# Patient Record
Sex: MALE | Race: WHITE | NOT HISPANIC OR LATINO | Employment: OTHER | URBAN - METROPOLITAN AREA
[De-identification: names, ages, dates, MRNs, and addresses within clinical notes are randomized per-mention and may not be internally consistent; named-entity substitution may affect disease eponyms.]

---

## 2020-06-29 ENCOUNTER — APPOINTMENT (OUTPATIENT)
Dept: GENERAL RADIOLOGY | Facility: HOSPITAL | Age: 80
End: 2020-06-29

## 2020-06-29 ENCOUNTER — APPOINTMENT (OUTPATIENT)
Dept: CT IMAGING | Facility: HOSPITAL | Age: 80
End: 2020-06-29

## 2020-06-29 ENCOUNTER — HOSPITAL ENCOUNTER (INPATIENT)
Facility: HOSPITAL | Age: 80
LOS: 15 days | Discharge: NURSING, REHAB OR LONG-TERM CARE | End: 2020-07-14
Attending: EMERGENCY MEDICINE | Admitting: INTERNAL MEDICINE

## 2020-06-29 DIAGNOSIS — J96.01 ACUTE RESPIRATORY FAILURE WITH HYPOXIA (HCC): ICD-10-CM

## 2020-06-29 DIAGNOSIS — I47.20 VENTRICULAR TACHYCARDIA (HCC): ICD-10-CM

## 2020-06-29 DIAGNOSIS — R50.9 ACUTE FEBRILE ILLNESS: Primary | ICD-10-CM

## 2020-06-29 DIAGNOSIS — S51.011A SKIN TEAR OF RIGHT ELBOW WITHOUT COMPLICATION, INITIAL ENCOUNTER: ICD-10-CM

## 2020-06-29 DIAGNOSIS — R13.12 OROPHARYNGEAL DYSPHAGIA: ICD-10-CM

## 2020-06-29 PROBLEM — F03.90 DEMENTIA (HCC): Status: ACTIVE | Noted: 2020-06-29

## 2020-06-29 PROBLEM — K44.9 HIATAL HERNIA: Chronic | Status: ACTIVE | Noted: 2020-06-29

## 2020-06-29 PROBLEM — Z86.73 H/O: CVA (CEREBROVASCULAR ACCIDENT): Status: ACTIVE | Noted: 2020-06-29

## 2020-06-29 PROBLEM — J18.9 PNA (PNEUMONIA): Status: ACTIVE | Noted: 2020-06-29

## 2020-06-29 PROBLEM — S72.001A FRACTURE OF RIGHT HIP (HCC): Status: ACTIVE | Noted: 2020-06-29

## 2020-06-29 LAB
ALBUMIN SERPL-MCNC: 3.9 G/DL (ref 3.5–5.2)
ALBUMIN/GLOB SERPL: 1.1 G/DL
ALP SERPL-CCNC: 109 U/L (ref 39–117)
ALT SERPL W P-5'-P-CCNC: 12 U/L (ref 1–41)
ANION GAP SERPL CALCULATED.3IONS-SCNC: 12 MMOL/L (ref 5–15)
APTT PPP: 32.8 SECONDS (ref 24–37)
AST SERPL-CCNC: 19 U/L (ref 1–40)
B PARAPERT DNA SPEC QL NAA+PROBE: NOT DETECTED
B PERT DNA SPEC QL NAA+PROBE: NOT DETECTED
BASE EXCESS BLDA CALC-SCNC: 3 MMOL/L (ref -5–5)
BASOPHILS # BLD AUTO: 0.03 10*3/MM3 (ref 0–0.2)
BASOPHILS NFR BLD AUTO: 0.3 % (ref 0–1.5)
BILIRUB SERPL-MCNC: 0.9 MG/DL (ref 0.2–1.2)
BILIRUB UR QL STRIP: NEGATIVE
BUN SERPL-MCNC: 17 MG/DL (ref 8–23)
BUN/CREAT SERPL: 17.5 (ref 7–25)
C PNEUM DNA NPH QL NAA+NON-PROBE: NOT DETECTED
CA-I BLDA-SCNC: 1.23 MMOL/L (ref 1.2–1.32)
CALCIUM SPEC-SCNC: 9.2 MG/DL (ref 8.6–10.5)
CHLORIDE SERPL-SCNC: 100 MMOL/L (ref 98–107)
CLARITY UR: CLEAR
CO2 BLDA-SCNC: 29 MMOL/L (ref 24–29)
CO2 SERPL-SCNC: 26 MMOL/L (ref 22–29)
COLOR UR: YELLOW
CREAT BLDA-MCNC: 0.9 MG/DL (ref 0.6–1.3)
CREAT SERPL-MCNC: 0.97 MG/DL (ref 0.76–1.27)
D-LACTATE SERPL-SCNC: 1.8 MMOL/L (ref 0.5–2)
DEPRECATED RDW RBC AUTO: 42.1 FL (ref 37–54)
EOSINOPHIL # BLD AUTO: 0.03 10*3/MM3 (ref 0–0.4)
EOSINOPHIL NFR BLD AUTO: 0.3 % (ref 0.3–6.2)
ERYTHROCYTE [DISTWIDTH] IN BLOOD BY AUTOMATED COUNT: 14 % (ref 12.3–15.4)
FLUAV H1 2009 PAND RNA NPH QL NAA+PROBE: NOT DETECTED
FLUAV H1 HA GENE NPH QL NAA+PROBE: NOT DETECTED
FLUAV H3 RNA NPH QL NAA+PROBE: NOT DETECTED
FLUAV SUBTYP SPEC NAA+PROBE: NOT DETECTED
FLUBV RNA ISLT QL NAA+PROBE: NOT DETECTED
GFR SERPL CREATININE-BSD FRML MDRD: 74 ML/MIN/1.73
GLOBULIN UR ELPH-MCNC: 3.5 GM/DL
GLUCOSE BLDC GLUCOMTR-MCNC: 170 MG/DL (ref 70–130)
GLUCOSE SERPL-MCNC: 123 MG/DL (ref 65–99)
GLUCOSE UR STRIP-MCNC: NEGATIVE MG/DL
HADV DNA SPEC NAA+PROBE: NOT DETECTED
HCO3 BLDA-SCNC: 27.9 MMOL/L (ref 22–26)
HCOV 229E RNA SPEC QL NAA+PROBE: NOT DETECTED
HCOV HKU1 RNA SPEC QL NAA+PROBE: NOT DETECTED
HCOV NL63 RNA SPEC QL NAA+PROBE: NOT DETECTED
HCOV OC43 RNA SPEC QL NAA+PROBE: NOT DETECTED
HCT VFR BLD AUTO: 41.3 % (ref 37.5–51)
HCT VFR BLDA CALC: 41 % (ref 38–51)
HGB BLD-MCNC: 13.3 G/DL (ref 13–17.7)
HGB BLDA-MCNC: 13.9 G/DL (ref 12–17)
HGB UR QL STRIP.AUTO: NEGATIVE
HMPV RNA NPH QL NAA+NON-PROBE: NOT DETECTED
HOLD SPECIMEN: NORMAL
HOLD SPECIMEN: NORMAL
HPIV1 RNA SPEC QL NAA+PROBE: NOT DETECTED
HPIV2 RNA SPEC QL NAA+PROBE: NOT DETECTED
HPIV3 RNA NPH QL NAA+PROBE: NOT DETECTED
HPIV4 P GENE NPH QL NAA+PROBE: NOT DETECTED
IMM GRANULOCYTES # BLD AUTO: 0.06 10*3/MM3 (ref 0–0.05)
IMM GRANULOCYTES NFR BLD AUTO: 0.6 % (ref 0–0.5)
KETONES UR QL STRIP: NEGATIVE
LEUKOCYTE ESTERASE UR QL STRIP.AUTO: NEGATIVE
LYMPHOCYTES # BLD AUTO: 1.2 10*3/MM3 (ref 0.7–3.1)
LYMPHOCYTES NFR BLD AUTO: 11.8 % (ref 19.6–45.3)
M PNEUMO IGG SER IA-ACNC: NOT DETECTED
MAGNESIUM SERPL-MCNC: 1.7 MG/DL (ref 1.6–2.4)
MCH RBC QN AUTO: 26.8 PG (ref 26.6–33)
MCHC RBC AUTO-ENTMCNC: 32.2 G/DL (ref 31.5–35.7)
MCV RBC AUTO: 83.1 FL (ref 79–97)
MONOCYTES # BLD AUTO: 1.03 10*3/MM3 (ref 0.1–0.9)
MONOCYTES NFR BLD AUTO: 10.1 % (ref 5–12)
NEUTROPHILS NFR BLD AUTO: 7.83 10*3/MM3 (ref 1.7–7)
NEUTROPHILS NFR BLD AUTO: 76.9 % (ref 42.7–76)
NITRITE UR QL STRIP: NEGATIVE
NRBC BLD AUTO-RTO: 0 /100 WBC (ref 0–0.2)
PCO2 BLDA: 46.8 MM HG (ref 35–45)
PH BLDA: 7.38 PH UNITS (ref 7.35–7.6)
PH UR STRIP.AUTO: 8.5 [PH] (ref 5–8)
PLATELET # BLD AUTO: 227 10*3/MM3 (ref 140–450)
PMV BLD AUTO: 11.4 FL (ref 6–12)
PO2 BLDA: 17 MMHG (ref 80–105)
POTASSIUM BLDA-SCNC: 4.2 MMOL/L (ref 3.5–4.9)
POTASSIUM SERPL-SCNC: 4.4 MMOL/L (ref 3.5–5.2)
PROCALCITONIN SERPL-MCNC: 0.08 NG/ML (ref 0.1–0.25)
PROT SERPL-MCNC: 7.4 G/DL (ref 6–8.5)
PROT UR QL STRIP: ABNORMAL
RBC # BLD AUTO: 4.97 10*6/MM3 (ref 4.14–5.8)
RHINOVIRUS RNA SPEC NAA+PROBE: NOT DETECTED
RSV RNA NPH QL NAA+NON-PROBE: NOT DETECTED
SAO2 % BLDA: 23 % (ref 95–98)
SODIUM BLD-SCNC: 140 MMOL/L (ref 138–146)
SODIUM SERPL-SCNC: 138 MMOL/L (ref 136–145)
SP GR UR STRIP: 1.01 (ref 1–1.03)
TROPONIN T SERPL-MCNC: 0.03 NG/ML (ref 0–0.03)
UROBILINOGEN UR QL STRIP: ABNORMAL
WBC # BLD AUTO: 10.18 10*3/MM3 (ref 3.4–10.8)
WHOLE BLOOD HOLD SPECIMEN: NORMAL
WHOLE BLOOD HOLD SPECIMEN: NORMAL

## 2020-06-29 PROCEDURE — 70450 CT HEAD/BRAIN W/O DYE: CPT

## 2020-06-29 PROCEDURE — 82565 ASSAY OF CREATININE: CPT

## 2020-06-29 PROCEDURE — 84295 ASSAY OF SERUM SODIUM: CPT

## 2020-06-29 PROCEDURE — 70496 CT ANGIOGRAPHY HEAD: CPT

## 2020-06-29 PROCEDURE — 83735 ASSAY OF MAGNESIUM: CPT | Performed by: NURSE PRACTITIONER

## 2020-06-29 PROCEDURE — 87040 BLOOD CULTURE FOR BACTERIA: CPT | Performed by: EMERGENCY MEDICINE

## 2020-06-29 PROCEDURE — 82803 BLOOD GASES ANY COMBINATION: CPT

## 2020-06-29 PROCEDURE — 25010000002 TDAP 5-2.5-18.5 LF-MCG/0.5 SUSPENSION: Performed by: EMERGENCY MEDICINE

## 2020-06-29 PROCEDURE — 99285 EMERGENCY DEPT VISIT HI MDM: CPT

## 2020-06-29 PROCEDURE — 85730 THROMBOPLASTIN TIME PARTIAL: CPT | Performed by: EMERGENCY MEDICINE

## 2020-06-29 PROCEDURE — 85025 COMPLETE CBC W/AUTO DIFF WBC: CPT | Performed by: EMERGENCY MEDICINE

## 2020-06-29 PROCEDURE — 84484 ASSAY OF TROPONIN QUANT: CPT | Performed by: EMERGENCY MEDICINE

## 2020-06-29 PROCEDURE — 82330 ASSAY OF CALCIUM: CPT

## 2020-06-29 PROCEDURE — 71250 CT THORAX DX C-: CPT

## 2020-06-29 PROCEDURE — 0042T HC CT CEREBRAL PERFUSION W/WO CONTRAST: CPT

## 2020-06-29 PROCEDURE — 25010000002 PIPERACILLIN SOD-TAZOBACTAM PER 1 G: Performed by: EMERGENCY MEDICINE

## 2020-06-29 PROCEDURE — 93005 ELECTROCARDIOGRAM TRACING: CPT | Performed by: EMERGENCY MEDICINE

## 2020-06-29 PROCEDURE — 82947 ASSAY GLUCOSE BLOOD QUANT: CPT

## 2020-06-29 PROCEDURE — 87641 MR-STAPH DNA AMP PROBE: CPT | Performed by: NURSE PRACTITIONER

## 2020-06-29 PROCEDURE — 85014 HEMATOCRIT: CPT

## 2020-06-29 PROCEDURE — 81003 URINALYSIS AUTO W/O SCOPE: CPT | Performed by: EMERGENCY MEDICINE

## 2020-06-29 PROCEDURE — 80053 COMPREHEN METABOLIC PANEL: CPT | Performed by: EMERGENCY MEDICINE

## 2020-06-29 PROCEDURE — 73502 X-RAY EXAM HIP UNI 2-3 VIEWS: CPT

## 2020-06-29 PROCEDURE — 99223 1ST HOSP IP/OBS HIGH 75: CPT | Performed by: INTERNAL MEDICINE

## 2020-06-29 PROCEDURE — 25010000002 AMIODARONE IN DEXTROSE 5% 150-4.21 MG/100ML-% SOLUTION: Performed by: EMERGENCY MEDICINE

## 2020-06-29 PROCEDURE — 83605 ASSAY OF LACTIC ACID: CPT | Performed by: EMERGENCY MEDICINE

## 2020-06-29 PROCEDURE — 25010000002 ONDANSETRON PER 1 MG

## 2020-06-29 PROCEDURE — 93010 ELECTROCARDIOGRAM REPORT: CPT | Performed by: INTERNAL MEDICINE

## 2020-06-29 PROCEDURE — 70498 CT ANGIOGRAPHY NECK: CPT

## 2020-06-29 PROCEDURE — 90715 TDAP VACCINE 7 YRS/> IM: CPT | Performed by: EMERGENCY MEDICINE

## 2020-06-29 PROCEDURE — 99222 1ST HOSP IP/OBS MODERATE 55: CPT | Performed by: NURSE PRACTITIONER

## 2020-06-29 PROCEDURE — 84145 PROCALCITONIN (PCT): CPT | Performed by: NURSE PRACTITIONER

## 2020-06-29 PROCEDURE — 90471 IMMUNIZATION ADMIN: CPT | Performed by: EMERGENCY MEDICINE

## 2020-06-29 PROCEDURE — 25010000002 METOCLOPRAMIDE PER 10 MG: Performed by: EMERGENCY MEDICINE

## 2020-06-29 PROCEDURE — 71045 X-RAY EXAM CHEST 1 VIEW: CPT

## 2020-06-29 PROCEDURE — 25010000002 AMIODARONE IN DEXTROSE 5% 360-4.14 MG/200ML-% SOLUTION: Performed by: EMERGENCY MEDICINE

## 2020-06-29 PROCEDURE — 87635 SARS-COV-2 COVID-19 AMP PRB: CPT | Performed by: INTERNAL MEDICINE

## 2020-06-29 PROCEDURE — 0 IOPAMIDOL PER 1 ML: Performed by: EMERGENCY MEDICINE

## 2020-06-29 PROCEDURE — 93005 ELECTROCARDIOGRAM TRACING: CPT | Performed by: NURSE PRACTITIONER

## 2020-06-29 PROCEDURE — 0100U HC BIOFIRE FILMARRAY RESP PANEL 2: CPT | Performed by: INTERNAL MEDICINE

## 2020-06-29 PROCEDURE — 82962 GLUCOSE BLOOD TEST: CPT

## 2020-06-29 PROCEDURE — 84132 ASSAY OF SERUM POTASSIUM: CPT

## 2020-06-29 PROCEDURE — 25010000002 VANCOMYCIN 10 G RECONSTITUTED SOLUTION: Performed by: EMERGENCY MEDICINE

## 2020-06-29 RX ORDER — METOCLOPRAMIDE HYDROCHLORIDE 5 MG/ML
10 INJECTION INTRAMUSCULAR; INTRAVENOUS ONCE
Status: COMPLETED | OUTPATIENT
Start: 2020-06-29 | End: 2020-06-29

## 2020-06-29 RX ORDER — ACETAMINOPHEN 325 MG/1
650 TABLET ORAL EVERY 4 HOURS PRN
Status: DISCONTINUED | OUTPATIENT
Start: 2020-06-29 | End: 2020-07-14 | Stop reason: HOSPADM

## 2020-06-29 RX ORDER — SODIUM CHLORIDE 0.9 % (FLUSH) 0.9 %
10 SYRINGE (ML) INJECTION AS NEEDED
Status: DISCONTINUED | OUTPATIENT
Start: 2020-06-29 | End: 2020-07-01

## 2020-06-29 RX ORDER — ONDANSETRON 2 MG/ML
4 INJECTION INTRAMUSCULAR; INTRAVENOUS ONCE
Status: COMPLETED | OUTPATIENT
Start: 2020-06-29 | End: 2020-06-29

## 2020-06-29 RX ORDER — SODIUM CHLORIDE 0.9 % (FLUSH) 0.9 %
10 SYRINGE (ML) INJECTION AS NEEDED
Status: DISCONTINUED | OUTPATIENT
Start: 2020-06-29 | End: 2020-07-14 | Stop reason: HOSPADM

## 2020-06-29 RX ORDER — ACETAMINOPHEN 650 MG/1
650 SUPPOSITORY RECTAL EVERY 4 HOURS PRN
Status: DISCONTINUED | OUTPATIENT
Start: 2020-06-29 | End: 2020-06-30

## 2020-06-29 RX ORDER — VANCOMYCIN HYDROCHLORIDE 1 G/200ML
15 INJECTION, SOLUTION INTRAVENOUS EVERY 12 HOURS
Status: DISCONTINUED | OUTPATIENT
Start: 2020-06-30 | End: 2020-07-02

## 2020-06-29 RX ORDER — SODIUM CHLORIDE, SODIUM LACTATE, POTASSIUM CHLORIDE, CALCIUM CHLORIDE 600; 310; 30; 20 MG/100ML; MG/100ML; MG/100ML; MG/100ML
75 INJECTION, SOLUTION INTRAVENOUS CONTINUOUS
Status: DISCONTINUED | OUTPATIENT
Start: 2020-06-29 | End: 2020-07-12

## 2020-06-29 RX ORDER — DONEPEZIL HYDROCHLORIDE 10 MG/1
10 TABLET, FILM COATED ORAL DAILY
COMMUNITY

## 2020-06-29 RX ORDER — ACETAMINOPHEN 650 MG/1
650 SUPPOSITORY RECTAL ONCE
Status: COMPLETED | OUTPATIENT
Start: 2020-06-29 | End: 2020-06-29

## 2020-06-29 RX ORDER — OMEPRAZOLE 20 MG/1
20 CAPSULE, DELAYED RELEASE ORAL DAILY
COMMUNITY
End: 2020-07-14 | Stop reason: HOSPADM

## 2020-06-29 RX ORDER — AMLODIPINE BESYLATE AND BENAZEPRIL HYDROCHLORIDE 10; 40 MG/1; MG/1
1 CAPSULE ORAL DAILY
COMMUNITY
End: 2020-07-14 | Stop reason: HOSPADM

## 2020-06-29 RX ORDER — SODIUM CHLORIDE 0.9 % (FLUSH) 0.9 %
10 SYRINGE (ML) INJECTION EVERY 12 HOURS SCHEDULED
Status: DISCONTINUED | OUTPATIENT
Start: 2020-06-29 | End: 2020-07-01

## 2020-06-29 RX ORDER — PROMETHAZINE HYDROCHLORIDE 25 MG/ML
12.5 INJECTION, SOLUTION INTRAMUSCULAR; INTRAVENOUS EVERY 6 HOURS PRN
Status: DISCONTINUED | OUTPATIENT
Start: 2020-06-29 | End: 2020-06-30

## 2020-06-29 RX ADMIN — METOCLOPRAMIDE 10 MG: 5 INJECTION, SOLUTION INTRAMUSCULAR; INTRAVENOUS at 19:52

## 2020-06-29 RX ADMIN — ACETAMINOPHEN 650 MG: 650 SUPPOSITORY RECTAL at 19:33

## 2020-06-29 RX ADMIN — ONDANSETRON 4 MG: 2 INJECTION INTRAMUSCULAR; INTRAVENOUS at 18:20

## 2020-06-29 RX ADMIN — SODIUM CHLORIDE, POTASSIUM CHLORIDE, SODIUM LACTATE AND CALCIUM CHLORIDE 75 ML/HR: 600; 310; 30; 20 INJECTION, SOLUTION INTRAVENOUS at 22:07

## 2020-06-29 RX ADMIN — VANCOMYCIN HYDROCHLORIDE 1500 MG: 10 INJECTION, POWDER, LYOPHILIZED, FOR SOLUTION INTRAVENOUS at 20:19

## 2020-06-29 RX ADMIN — AMIODARONE HYDROCHLORIDE 1 MG/MIN: 1.8 INJECTION, SOLUTION INTRAVENOUS at 19:26

## 2020-06-29 RX ADMIN — IOPAMIDOL 115 ML: 755 INJECTION, SOLUTION INTRAVENOUS at 18:52

## 2020-06-29 RX ADMIN — TETANUS TOXOID, REDUCED DIPHTHERIA TOXOID AND ACELLULAR PERTUSSIS VACCINE, ADSORBED 0.5 ML: 5; 2.5; 8; 8; 2.5 SUSPENSION INTRAMUSCULAR at 20:02

## 2020-06-29 RX ADMIN — AMIODARONE HYDROCHLORIDE 150 MG: 1.5 INJECTION, SOLUTION INTRAVENOUS at 19:14

## 2020-06-29 RX ADMIN — TAZOBACTAM SODIUM AND PIPERACILLIN SODIUM 3.38 G: 375; 3 INJECTION, SOLUTION INTRAVENOUS at 20:01

## 2020-06-29 RX ADMIN — SODIUM CHLORIDE, PRESERVATIVE FREE 10 ML: 5 INJECTION INTRAVENOUS at 22:08

## 2020-06-30 ENCOUNTER — ANESTHESIA EVENT (OUTPATIENT)
Dept: PERIOP | Facility: HOSPITAL | Age: 80
End: 2020-06-30

## 2020-06-30 ENCOUNTER — PREP FOR SURGERY (OUTPATIENT)
Dept: OTHER | Facility: HOSPITAL | Age: 80
End: 2020-06-30

## 2020-06-30 ENCOUNTER — APPOINTMENT (OUTPATIENT)
Dept: CT IMAGING | Facility: HOSPITAL | Age: 80
End: 2020-06-30

## 2020-06-30 ENCOUNTER — APPOINTMENT (OUTPATIENT)
Dept: CARDIOLOGY | Facility: HOSPITAL | Age: 80
End: 2020-06-30

## 2020-06-30 LAB
ANION GAP SERPL CALCULATED.3IONS-SCNC: 10 MMOL/L (ref 5–15)
BASOPHILS # BLD AUTO: 0.07 10*3/MM3 (ref 0–0.2)
BASOPHILS NFR BLD AUTO: 0.6 % (ref 0–1.5)
BH CV ECHO MEAS - AO MAX PG (FULL): 3.7 MMHG
BH CV ECHO MEAS - AO MAX PG: 6 MMHG
BH CV ECHO MEAS - AO MEAN PG (FULL): 2 MMHG
BH CV ECHO MEAS - AO MEAN PG: 2.9 MMHG
BH CV ECHO MEAS - AO ROOT AREA (BSA CORRECTED): 2
BH CV ECHO MEAS - AO ROOT AREA: 10.1 CM^2
BH CV ECHO MEAS - AO ROOT DIAM: 3.6 CM
BH CV ECHO MEAS - AO V2 MAX: 122.2 CM/SEC
BH CV ECHO MEAS - AO V2 MEAN: 78.3 CM/SEC
BH CV ECHO MEAS - AO V2 VTI: 24.8 CM
BH CV ECHO MEAS - AVA(I,A): 2.1 CM^2
BH CV ECHO MEAS - AVA(I,D): 2.1 CM^2
BH CV ECHO MEAS - AVA(V,A): 2.6 CM^2
BH CV ECHO MEAS - AVA(V,D): 2.6 CM^2
BH CV ECHO MEAS - BSA(HAYCOCK): 1.7 M^2
BH CV ECHO MEAS - BSA: 1.8 M^2
BH CV ECHO MEAS - BZI_BMI: 19.4 KILOGRAMS/M^2
BH CV ECHO MEAS - BZI_METRIC_HEIGHT: 177.8 CM
BH CV ECHO MEAS - BZI_METRIC_WEIGHT: 61.2 KG
BH CV ECHO MEAS - EDV(CUBED): 56.6 ML
BH CV ECHO MEAS - EDV(MOD-SP2): 50 ML
BH CV ECHO MEAS - EDV(MOD-SP4): 47 ML
BH CV ECHO MEAS - EDV(TEICH): 63.5 ML
BH CV ECHO MEAS - EF(CUBED): 73.5 %
BH CV ECHO MEAS - EF(MOD-SP2): 62 %
BH CV ECHO MEAS - EF(MOD-SP4): 68.1 %
BH CV ECHO MEAS - EF(TEICH): 66 %
BH CV ECHO MEAS - EF{MOD-BP}: 65 %
BH CV ECHO MEAS - ESV(CUBED): 15 ML
BH CV ECHO MEAS - ESV(MOD-SP2): 19 ML
BH CV ECHO MEAS - ESV(MOD-SP4): 15 ML
BH CV ECHO MEAS - ESV(TEICH): 21.6 ML
BH CV ECHO MEAS - FS: 35.7 %
BH CV ECHO MEAS - IVS/LVPW: 1
BH CV ECHO MEAS - IVSD: 1 CM
BH CV ECHO MEAS - LA DIMENSION: 3.2 CM
BH CV ECHO MEAS - LA/AO: 0.89
BH CV ECHO MEAS - LAD MAJOR: 4.2 CM
BH CV ECHO MEAS - LAT PEAK E' VEL: 5.3 CM/SEC
BH CV ECHO MEAS - LATERAL E/E' RATIO: 10.1
BH CV ECHO MEAS - LV DIASTOLIC VOL/BSA (35-75): 26.6 ML/M^2
BH CV ECHO MEAS - LV MASS(C)D: 123.1 GRAMS
BH CV ECHO MEAS - LV MASS(C)DI: 69.7 GRAMS/M^2
BH CV ECHO MEAS - LV MAX PG: 2.3 MMHG
BH CV ECHO MEAS - LV MEAN PG: 0.93 MMHG
BH CV ECHO MEAS - LV SYSTOLIC VOL/BSA (12-30): 8.5 ML/M^2
BH CV ECHO MEAS - LV V1 MAX: 75.6 CM/SEC
BH CV ECHO MEAS - LV V1 MEAN: 43.3 CM/SEC
BH CV ECHO MEAS - LV V1 VTI: 12.4 CM
BH CV ECHO MEAS - LVIDD: 3.8 CM
BH CV ECHO MEAS - LVIDS: 2.5 CM
BH CV ECHO MEAS - LVLD AP2: 6.6 CM
BH CV ECHO MEAS - LVLD AP4: 6.9 CM
BH CV ECHO MEAS - LVLS AP2: 5.5 CM
BH CV ECHO MEAS - LVLS AP4: 5.6 CM
BH CV ECHO MEAS - LVOT AREA (M): 4.2 CM^2
BH CV ECHO MEAS - LVOT AREA: 4.2 CM^2
BH CV ECHO MEAS - LVOT DIAM: 2.3 CM
BH CV ECHO MEAS - LVPWD: 1 CM
BH CV ECHO MEAS - MED PEAK E' VEL: 5.4 CM/SEC
BH CV ECHO MEAS - MEDIAL E/E' RATIO: 9.9
BH CV ECHO MEAS - MV A MAX VEL: 79.4 CM/SEC
BH CV ECHO MEAS - MV DEC TIME: 0.25 SEC
BH CV ECHO MEAS - MV E MAX VEL: 54.8 CM/SEC
BH CV ECHO MEAS - MV E/A: 0.69
BH CV ECHO MEAS - MV MAX PG: 2.4 MMHG
BH CV ECHO MEAS - MV MEAN PG: 0.93 MMHG
BH CV ECHO MEAS - MV V2 MAX: 77.5 CM/SEC
BH CV ECHO MEAS - MV V2 MEAN: 44.5 CM/SEC
BH CV ECHO MEAS - MV V2 VTI: 22.7 CM
BH CV ECHO MEAS - MVA(VTI): 2.3 CM^2
BH CV ECHO MEAS - PA ACC SLOPE: 555.9 CM/SEC^2
BH CV ECHO MEAS - PA ACC TIME: 0.1 SEC
BH CV ECHO MEAS - PA MAX PG: 1.6 MMHG
BH CV ECHO MEAS - PA PR(ACCEL): 33.1 MMHG
BH CV ECHO MEAS - PA V2 MAX: 63.7 CM/SEC
BH CV ECHO MEAS - RVSP: 34 MMHG
BH CV ECHO MEAS - SI(AO): 142.3 ML/M^2
BH CV ECHO MEAS - SI(CUBED): 23.5 ML/M^2
BH CV ECHO MEAS - SI(LVOT): 29.3 ML/M^2
BH CV ECHO MEAS - SI(MOD-SP2): 17.6 ML/M^2
BH CV ECHO MEAS - SI(MOD-SP4): 18.1 ML/M^2
BH CV ECHO MEAS - SI(TEICH): 23.7 ML/M^2
BH CV ECHO MEAS - SV(AO): 251.3 ML
BH CV ECHO MEAS - SV(CUBED): 41.6 ML
BH CV ECHO MEAS - SV(LVOT): 51.7 ML
BH CV ECHO MEAS - SV(MOD-SP2): 31 ML
BH CV ECHO MEAS - SV(MOD-SP4): 32 ML
BH CV ECHO MEAS - SV(TEICH): 41.9 ML
BH CV ECHO MEAS - TAPSE (>1.6): 2 CM2
BH CV ECHO MEAS - TR MAX PG: 31 MMHG
BH CV ECHO MEAS - TR MAX VEL: 274.2 CM/SEC
BH CV ECHO MEASUREMENTS AVERAGE E/E' RATIO: 10.24
BH CV VAS BP RIGHT ARM: NORMAL MMHG
BH CV XLRA - RV BASE: 3.3 CM
BH CV XLRA - RV LENGTH: 5.6 CM
BH CV XLRA - RV MID: 2.1 CM
BH CV XLRA - TDI S': 9.21 CM/SEC
BUN SERPL-MCNC: 18 MG/DL (ref 8–23)
BUN/CREAT SERPL: 19.6 (ref 7–25)
CALCIUM SPEC-SCNC: 8.8 MG/DL (ref 8.6–10.5)
CHLORIDE SERPL-SCNC: 99 MMOL/L (ref 98–107)
CHOLEST SERPL-MCNC: 154 MG/DL (ref 0–200)
CO2 SERPL-SCNC: 25 MMOL/L (ref 22–29)
CREAT SERPL-MCNC: 0.92 MG/DL (ref 0.76–1.27)
DEPRECATED RDW RBC AUTO: 48.6 FL (ref 37–54)
EOSINOPHIL # BLD AUTO: 0.18 10*3/MM3 (ref 0–0.4)
EOSINOPHIL NFR BLD AUTO: 1.6 % (ref 0.3–6.2)
ERYTHROCYTE [DISTWIDTH] IN BLOOD BY AUTOMATED COUNT: 14.8 % (ref 12.3–15.4)
GFR SERPL CREATININE-BSD FRML MDRD: 79 ML/MIN/1.73
GLUCOSE BLDC GLUCOMTR-MCNC: 100 MG/DL (ref 70–130)
GLUCOSE BLDC GLUCOMTR-MCNC: 104 MG/DL (ref 70–130)
GLUCOSE BLDC GLUCOMTR-MCNC: 97 MG/DL (ref 70–130)
GLUCOSE SERPL-MCNC: 111 MG/DL (ref 65–99)
HBA1C MFR BLD: 5.6 % (ref 4.8–5.6)
HCT VFR BLD AUTO: 42.2 % (ref 37.5–51)
HDLC SERPL-MCNC: 46 MG/DL (ref 40–60)
HGB BLD-MCNC: 13 G/DL (ref 13–17.7)
IMM GRANULOCYTES # BLD AUTO: 0.1 10*3/MM3 (ref 0–0.05)
IMM GRANULOCYTES NFR BLD AUTO: 0.9 % (ref 0–0.5)
LDLC SERPL CALC-MCNC: 94 MG/DL (ref 0–100)
LDLC/HDLC SERPL: 2.05 {RATIO}
LEFT ATRIUM VOLUME INDEX: 14.2 ML/M^2
LEFT ATRIUM VOLUME: 25 ML
LV EF 2D ECHO EST: 55 %
LYMPHOCYTES # BLD AUTO: 1.49 10*3/MM3 (ref 0.7–3.1)
LYMPHOCYTES NFR BLD AUTO: 13.3 % (ref 19.6–45.3)
MAGNESIUM SERPL-MCNC: 2.3 MG/DL (ref 1.6–2.4)
MAXIMAL PREDICTED HEART RATE: 139 BPM
MCH RBC QN AUTO: 27.4 PG (ref 26.6–33)
MCHC RBC AUTO-ENTMCNC: 30.8 G/DL (ref 31.5–35.7)
MCV RBC AUTO: 89 FL (ref 79–97)
MONOCYTES # BLD AUTO: 1.59 10*3/MM3 (ref 0.1–0.9)
MONOCYTES NFR BLD AUTO: 14.2 % (ref 5–12)
MRSA DNA SPEC QL NAA+PROBE: NEGATIVE
NEUTROPHILS NFR BLD AUTO: 69.4 % (ref 42.7–76)
NEUTROPHILS NFR BLD AUTO: 7.78 10*3/MM3 (ref 1.7–7)
NRBC BLD AUTO-RTO: 0 /100 WBC (ref 0–0.2)
PHOSPHATE SERPL-MCNC: 4 MG/DL (ref 2.5–4.5)
PLATELET # BLD AUTO: 174 10*3/MM3 (ref 140–450)
PMV BLD AUTO: 11 FL (ref 6–12)
POTASSIUM SERPL-SCNC: 4.1 MMOL/L (ref 3.5–5.2)
PROCALCITONIN SERPL-MCNC: 1.65 NG/ML (ref 0.1–0.25)
RBC # BLD AUTO: 4.74 10*6/MM3 (ref 4.14–5.8)
SARS-COV-2 N GENE NPH QL NAA+PROBE: NOT DETECTED
SODIUM SERPL-SCNC: 134 MMOL/L (ref 136–145)
STRESS TARGET HR: 118 BPM
TRIGL SERPL-MCNC: 69 MG/DL (ref 0–150)
TROPONIN T SERPL-MCNC: 0.02 NG/ML (ref 0–0.03)
TROPONIN T SERPL-MCNC: 0.03 NG/ML (ref 0–0.03)
TSH SERPL DL<=0.05 MIU/L-ACNC: 2.16 UIU/ML (ref 0.27–4.2)
VLDLC SERPL-MCNC: 13.8 MG/DL
WBC # BLD AUTO: 11.21 10*3/MM3 (ref 3.4–10.8)

## 2020-06-30 PROCEDURE — 84484 ASSAY OF TROPONIN QUANT: CPT | Performed by: NURSE PRACTITIONER

## 2020-06-30 PROCEDURE — 25010000002 PIPERACILLIN SOD-TAZOBACTAM PER 1 G: Performed by: NURSE PRACTITIONER

## 2020-06-30 PROCEDURE — 93306 TTE W/DOPPLER COMPLETE: CPT | Performed by: INTERNAL MEDICINE

## 2020-06-30 PROCEDURE — 80048 BASIC METABOLIC PNL TOTAL CA: CPT | Performed by: NURSE PRACTITIONER

## 2020-06-30 PROCEDURE — 74176 CT ABD & PELVIS W/O CONTRAST: CPT

## 2020-06-30 PROCEDURE — 82962 GLUCOSE BLOOD TEST: CPT

## 2020-06-30 PROCEDURE — 84100 ASSAY OF PHOSPHORUS: CPT | Performed by: NURSE PRACTITIONER

## 2020-06-30 PROCEDURE — 83735 ASSAY OF MAGNESIUM: CPT | Performed by: NURSE PRACTITIONER

## 2020-06-30 PROCEDURE — 92610 EVALUATE SWALLOWING FUNCTION: CPT

## 2020-06-30 PROCEDURE — 99222 1ST HOSP IP/OBS MODERATE 55: CPT | Performed by: ORTHOPAEDIC SURGERY

## 2020-06-30 PROCEDURE — 84145 PROCALCITONIN (PCT): CPT | Performed by: INTERNAL MEDICINE

## 2020-06-30 PROCEDURE — 93306 TTE W/DOPPLER COMPLETE: CPT

## 2020-06-30 PROCEDURE — 84443 ASSAY THYROID STIM HORMONE: CPT | Performed by: NURSE PRACTITIONER

## 2020-06-30 PROCEDURE — 85025 COMPLETE CBC W/AUTO DIFF WBC: CPT | Performed by: NURSE PRACTITIONER

## 2020-06-30 PROCEDURE — 25010000002 VANCOMYCIN PER 500 MG

## 2020-06-30 PROCEDURE — 92523 SPEECH SOUND LANG COMPREHEN: CPT

## 2020-06-30 PROCEDURE — 83036 HEMOGLOBIN GLYCOSYLATED A1C: CPT | Performed by: NURSE PRACTITIONER

## 2020-06-30 PROCEDURE — 99232 SBSQ HOSP IP/OBS MODERATE 35: CPT | Performed by: INTERNAL MEDICINE

## 2020-06-30 PROCEDURE — 80061 LIPID PANEL: CPT | Performed by: NURSE PRACTITIONER

## 2020-06-30 RX ORDER — ATORVASTATIN CALCIUM 40 MG/1
80 TABLET, FILM COATED ORAL NIGHTLY
Status: DISCONTINUED | OUTPATIENT
Start: 2020-06-30 | End: 2020-07-14 | Stop reason: HOSPADM

## 2020-06-30 RX ORDER — ASPIRIN 300 MG/1
300 SUPPOSITORY RECTAL DAILY
Status: DISCONTINUED | OUTPATIENT
Start: 2020-06-30 | End: 2020-06-30

## 2020-06-30 RX ORDER — ASPIRIN 325 MG
325 TABLET ORAL DAILY
Status: DISCONTINUED | OUTPATIENT
Start: 2020-06-30 | End: 2020-06-30

## 2020-06-30 RX ORDER — SODIUM CHLORIDE 0.9 % (FLUSH) 0.9 %
10 SYRINGE (ML) INJECTION EVERY 12 HOURS SCHEDULED
Status: DISCONTINUED | OUTPATIENT
Start: 2020-06-30 | End: 2020-07-13

## 2020-06-30 RX ORDER — SODIUM CHLORIDE 0.9 % (FLUSH) 0.9 %
10 SYRINGE (ML) INJECTION AS NEEDED
Status: DISCONTINUED | OUTPATIENT
Start: 2020-06-30 | End: 2020-07-01

## 2020-06-30 RX ORDER — ASPIRIN 300 MG/1
300 SUPPOSITORY RECTAL DAILY
Status: DISCONTINUED | OUTPATIENT
Start: 2020-07-02 | End: 2020-07-01

## 2020-06-30 RX ORDER — ASPIRIN 325 MG
325 TABLET ORAL DAILY
Status: DISCONTINUED | OUTPATIENT
Start: 2020-07-02 | End: 2020-07-01

## 2020-06-30 RX ADMIN — TAZOBACTAM SODIUM AND PIPERACILLIN SODIUM 3.38 G: 375; 3 INJECTION, SOLUTION INTRAVENOUS at 02:53

## 2020-06-30 RX ADMIN — VANCOMYCIN HYDROCHLORIDE 1000 MG: 1 INJECTION, SOLUTION INTRAVENOUS at 08:09

## 2020-06-30 RX ADMIN — SODIUM CHLORIDE, PRESERVATIVE FREE 10 ML: 5 INJECTION INTRAVENOUS at 20:43

## 2020-06-30 RX ADMIN — ATORVASTATIN CALCIUM 80 MG: 40 TABLET, FILM COATED ORAL at 20:43

## 2020-06-30 RX ADMIN — SODIUM CHLORIDE, PRESERVATIVE FREE 10 ML: 5 INJECTION INTRAVENOUS at 08:10

## 2020-06-30 RX ADMIN — TAZOBACTAM SODIUM AND PIPERACILLIN SODIUM 3.38 G: 375; 3 INJECTION, SOLUTION INTRAVENOUS at 18:27

## 2020-06-30 RX ADMIN — ASPIRIN 300 MG: 300 SUPPOSITORY RECTAL at 05:35

## 2020-06-30 RX ADMIN — TAZOBACTAM SODIUM AND PIPERACILLIN SODIUM 3.38 G: 375; 3 INJECTION, SOLUTION INTRAVENOUS at 11:33

## 2020-06-30 RX ADMIN — VANCOMYCIN HYDROCHLORIDE 1000 MG: 1 INJECTION, SOLUTION INTRAVENOUS at 20:43

## 2020-06-30 RX ADMIN — SODIUM CHLORIDE, POTASSIUM CHLORIDE, SODIUM LACTATE AND CALCIUM CHLORIDE 75 ML/HR: 600; 310; 30; 20 INJECTION, SOLUTION INTRAVENOUS at 11:33

## 2020-07-01 ENCOUNTER — APPOINTMENT (OUTPATIENT)
Dept: GENERAL RADIOLOGY | Facility: HOSPITAL | Age: 80
End: 2020-07-01

## 2020-07-01 ENCOUNTER — ANESTHESIA (OUTPATIENT)
Dept: PERIOP | Facility: HOSPITAL | Age: 80
End: 2020-07-01

## 2020-07-01 LAB
ANION GAP SERPL CALCULATED.3IONS-SCNC: 11 MMOL/L (ref 5–15)
BASOPHILS # BLD AUTO: 0.04 10*3/MM3 (ref 0–0.2)
BASOPHILS NFR BLD AUTO: 0.5 % (ref 0–1.5)
BUN SERPL-MCNC: 17 MG/DL (ref 8–23)
BUN/CREAT SERPL: 16.8 (ref 7–25)
CALCIUM SPEC-SCNC: 8.1 MG/DL (ref 8.6–10.5)
CHLORIDE SERPL-SCNC: 100 MMOL/L (ref 98–107)
CO2 SERPL-SCNC: 24 MMOL/L (ref 22–29)
CREAT SERPL-MCNC: 1.01 MG/DL (ref 0.76–1.27)
DEPRECATED RDW RBC AUTO: 42.1 FL (ref 37–54)
EOSINOPHIL # BLD AUTO: 0.37 10*3/MM3 (ref 0–0.4)
EOSINOPHIL NFR BLD AUTO: 4.2 % (ref 0.3–6.2)
ERYTHROCYTE [DISTWIDTH] IN BLOOD BY AUTOMATED COUNT: 14.2 % (ref 12.3–15.4)
GFR SERPL CREATININE-BSD FRML MDRD: 71 ML/MIN/1.73
GLUCOSE SERPL-MCNC: 97 MG/DL (ref 65–99)
HCT VFR BLD AUTO: 34 % (ref 37.5–51)
HGB BLD-MCNC: 10.9 G/DL (ref 13–17.7)
IMM GRANULOCYTES # BLD AUTO: 0.04 10*3/MM3 (ref 0–0.05)
IMM GRANULOCYTES NFR BLD AUTO: 0.5 % (ref 0–0.5)
LYMPHOCYTES # BLD AUTO: 1.04 10*3/MM3 (ref 0.7–3.1)
LYMPHOCYTES NFR BLD AUTO: 11.9 % (ref 19.6–45.3)
MAGNESIUM SERPL-MCNC: 1.8 MG/DL (ref 1.6–2.4)
MCH RBC QN AUTO: 26.5 PG (ref 26.6–33)
MCHC RBC AUTO-ENTMCNC: 32.1 G/DL (ref 31.5–35.7)
MCV RBC AUTO: 82.5 FL (ref 79–97)
MONOCYTES # BLD AUTO: 1.17 10*3/MM3 (ref 0.1–0.9)
MONOCYTES NFR BLD AUTO: 13.4 % (ref 5–12)
NEUTROPHILS NFR BLD AUTO: 6.06 10*3/MM3 (ref 1.7–7)
NEUTROPHILS NFR BLD AUTO: 69.5 % (ref 42.7–76)
NRBC BLD AUTO-RTO: 0 /100 WBC (ref 0–0.2)
NT-PROBNP SERPL-MCNC: 1196 PG/ML (ref 5–1800)
PHOSPHATE SERPL-MCNC: 2.6 MG/DL (ref 2.5–4.5)
PLATELET # BLD AUTO: 170 10*3/MM3 (ref 140–450)
PMV BLD AUTO: 11.9 FL (ref 6–12)
POTASSIUM SERPL-SCNC: 3.6 MMOL/L (ref 3.5–5.2)
PROCALCITONIN SERPL-MCNC: 1.6 NG/ML (ref 0.1–0.25)
RBC # BLD AUTO: 4.12 10*6/MM3 (ref 4.14–5.8)
SODIUM SERPL-SCNC: 135 MMOL/L (ref 136–145)
VANCOMYCIN TROUGH SERPL-MCNC: 15.4 MCG/ML (ref 5–20)
WBC # BLD AUTO: 8.72 10*3/MM3 (ref 3.4–10.8)

## 2020-07-01 PROCEDURE — 84100 ASSAY OF PHOSPHORUS: CPT | Performed by: INTERNAL MEDICINE

## 2020-07-01 PROCEDURE — 25010000002 ROPIVACAINE PER 1 MG: Performed by: NURSE ANESTHETIST, CERTIFIED REGISTERED

## 2020-07-01 PROCEDURE — C1713 ANCHOR/SCREW BN/BN,TIS/BN: HCPCS | Performed by: ORTHOPAEDIC SURGERY

## 2020-07-01 PROCEDURE — 84145 PROCALCITONIN (PCT): CPT | Performed by: INTERNAL MEDICINE

## 2020-07-01 PROCEDURE — 73552 X-RAY EXAM OF FEMUR 2/>: CPT

## 2020-07-01 PROCEDURE — 25010000003 MAGNESIUM SULFATE 4 GM/100ML SOLUTION: Performed by: INTERNAL MEDICINE

## 2020-07-01 PROCEDURE — 99232 SBSQ HOSP IP/OBS MODERATE 35: CPT | Performed by: INTERNAL MEDICINE

## 2020-07-01 PROCEDURE — C1769 GUIDE WIRE: HCPCS | Performed by: ORTHOPAEDIC SURGERY

## 2020-07-01 PROCEDURE — 83735 ASSAY OF MAGNESIUM: CPT | Performed by: INTERNAL MEDICINE

## 2020-07-01 PROCEDURE — 25010000002 ONDANSETRON PER 1 MG: Performed by: ANESTHESIOLOGY

## 2020-07-01 PROCEDURE — 27235 TREAT THIGH FRACTURE: CPT | Performed by: ORTHOPAEDIC SURGERY

## 2020-07-01 PROCEDURE — 0QS634Z REPOSITION RIGHT UPPER FEMUR WITH INTERNAL FIXATION DEVICE, PERCUTANEOUS APPROACH: ICD-10-PCS | Performed by: ORTHOPAEDIC SURGERY

## 2020-07-01 PROCEDURE — 25010000002 VANCOMYCIN PER 500 MG

## 2020-07-01 PROCEDURE — 83880 ASSAY OF NATRIURETIC PEPTIDE: CPT | Performed by: INTERNAL MEDICINE

## 2020-07-01 PROCEDURE — 73502 X-RAY EXAM HIP UNI 2-3 VIEWS: CPT

## 2020-07-01 PROCEDURE — 25010000003 CEFAZOLIN IN DEXTROSE 2-4 GM/100ML-% SOLUTION: Performed by: ANESTHESIOLOGY

## 2020-07-01 PROCEDURE — 25010000002 PIPERACILLIN SOD-TAZOBACTAM PER 1 G: Performed by: NURSE PRACTITIONER

## 2020-07-01 PROCEDURE — 25010000002 PROPOFOL 10 MG/ML EMULSION: Performed by: ANESTHESIOLOGY

## 2020-07-01 PROCEDURE — 76000 FLUOROSCOPY <1 HR PHYS/QHP: CPT

## 2020-07-01 PROCEDURE — 80048 BASIC METABOLIC PNL TOTAL CA: CPT | Performed by: INTERNAL MEDICINE

## 2020-07-01 PROCEDURE — 80202 ASSAY OF VANCOMYCIN: CPT

## 2020-07-01 PROCEDURE — 85025 COMPLETE CBC W/AUTO DIFF WBC: CPT | Performed by: INTERNAL MEDICINE

## 2020-07-01 PROCEDURE — 25010000003 POTASSIUM CHLORIDE 10 MEQ/100ML SOLUTION: Performed by: INTERNAL MEDICINE

## 2020-07-01 DEVICE — SCRW CANN THRD 7.3X16X80MM: Type: IMPLANTABLE DEVICE | Site: HIP | Status: FUNCTIONAL

## 2020-07-01 DEVICE — WASHR BONE 13MM: Type: IMPLANTABLE DEVICE | Site: HIP | Status: FUNCTIONAL

## 2020-07-01 DEVICE — IMPLANTABLE DEVICE: Type: IMPLANTABLE DEVICE | Site: HIP | Status: FUNCTIONAL

## 2020-07-01 RX ORDER — MAGNESIUM HYDROXIDE 1200 MG/15ML
LIQUID ORAL AS NEEDED
Status: DISCONTINUED | OUTPATIENT
Start: 2020-07-01 | End: 2020-07-01 | Stop reason: HOSPADM

## 2020-07-01 RX ORDER — FENTANYL CITRATE 50 UG/ML
50 INJECTION, SOLUTION INTRAMUSCULAR; INTRAVENOUS
Status: DISCONTINUED | OUTPATIENT
Start: 2020-07-01 | End: 2020-07-01 | Stop reason: HOSPADM

## 2020-07-01 RX ORDER — POTASSIUM CHLORIDE 7.45 MG/ML
10 INJECTION INTRAVENOUS
Status: DISCONTINUED | OUTPATIENT
Start: 2020-07-01 | End: 2020-07-14 | Stop reason: HOSPADM

## 2020-07-01 RX ORDER — SODIUM CHLORIDE 0.9 % (FLUSH) 0.9 %
10 SYRINGE (ML) INJECTION AS NEEDED
Status: DISCONTINUED | OUTPATIENT
Start: 2020-07-01 | End: 2020-07-14 | Stop reason: HOSPADM

## 2020-07-01 RX ORDER — SODIUM CHLORIDE, SODIUM LACTATE, POTASSIUM CHLORIDE, CALCIUM CHLORIDE 600; 310; 30; 20 MG/100ML; MG/100ML; MG/100ML; MG/100ML
INJECTION, SOLUTION INTRAVENOUS CONTINUOUS PRN
Status: DISCONTINUED | OUTPATIENT
Start: 2020-07-01 | End: 2020-07-01

## 2020-07-01 RX ORDER — CEFAZOLIN SODIUM 2 G/100ML
INJECTION, SOLUTION INTRAVENOUS AS NEEDED
Status: DISCONTINUED | OUTPATIENT
Start: 2020-07-01 | End: 2020-07-01 | Stop reason: SURG

## 2020-07-01 RX ORDER — ROPIVACAINE HYDROCHLORIDE 2 MG/ML
8 INJECTION, SOLUTION EPIDURAL; INFILTRATION CONTINUOUS
Status: DISPENSED | OUTPATIENT
Start: 2020-07-01 | End: 2020-07-06

## 2020-07-01 RX ORDER — FAMOTIDINE 10 MG/ML
20 INJECTION, SOLUTION INTRAVENOUS ONCE
Status: DISCONTINUED | OUTPATIENT
Start: 2020-07-01 | End: 2020-07-01

## 2020-07-01 RX ORDER — FAMOTIDINE 20 MG/1
20 TABLET, FILM COATED ORAL ONCE
Status: DISCONTINUED | OUTPATIENT
Start: 2020-07-01 | End: 2020-07-01

## 2020-07-01 RX ORDER — BUPIVACAINE HYDROCHLORIDE 5 MG/ML
INJECTION, SOLUTION EPIDURAL; INTRACAUDAL
Status: COMPLETED | OUTPATIENT
Start: 2020-07-01 | End: 2020-07-01

## 2020-07-01 RX ORDER — SODIUM CHLORIDE 0.9 % (FLUSH) 0.9 %
10 SYRINGE (ML) INJECTION EVERY 12 HOURS SCHEDULED
Status: DISCONTINUED | OUTPATIENT
Start: 2020-07-01 | End: 2020-07-13

## 2020-07-01 RX ORDER — ASPIRIN 325 MG
325 TABLET, DELAYED RELEASE (ENTERIC COATED) ORAL EVERY 12 HOURS SCHEDULED
Status: DISCONTINUED | OUTPATIENT
Start: 2020-07-01 | End: 2020-07-09

## 2020-07-01 RX ORDER — CEFAZOLIN SODIUM 2 G/100ML
2 INJECTION, SOLUTION INTRAVENOUS EVERY 8 HOURS
Status: COMPLETED | OUTPATIENT
Start: 2020-07-02 | End: 2020-07-02

## 2020-07-01 RX ORDER — PROPOFOL 10 MG/ML
VIAL (ML) INTRAVENOUS AS NEEDED
Status: DISCONTINUED | OUTPATIENT
Start: 2020-07-01 | End: 2020-07-01 | Stop reason: SURG

## 2020-07-01 RX ORDER — MAGNESIUM SULFATE HEPTAHYDRATE 40 MG/ML
4 INJECTION, SOLUTION INTRAVENOUS AS NEEDED
Status: DISCONTINUED | OUTPATIENT
Start: 2020-07-01 | End: 2020-07-14 | Stop reason: HOSPADM

## 2020-07-01 RX ORDER — ONDANSETRON 2 MG/ML
INJECTION INTRAMUSCULAR; INTRAVENOUS AS NEEDED
Status: DISCONTINUED | OUTPATIENT
Start: 2020-07-01 | End: 2020-07-01 | Stop reason: SURG

## 2020-07-01 RX ORDER — SODIUM CHLORIDE, SODIUM LACTATE, POTASSIUM CHLORIDE, CALCIUM CHLORIDE 600; 310; 30; 20 MG/100ML; MG/100ML; MG/100ML; MG/100ML
9 INJECTION, SOLUTION INTRAVENOUS CONTINUOUS
Status: DISCONTINUED | OUTPATIENT
Start: 2020-07-01 | End: 2020-07-01

## 2020-07-01 RX ORDER — LIDOCAINE HYDROCHLORIDE 10 MG/ML
0.5 INJECTION, SOLUTION EPIDURAL; INFILTRATION; INTRACAUDAL; PERINEURAL ONCE AS NEEDED
Status: DISCONTINUED | OUTPATIENT
Start: 2020-07-01 | End: 2020-07-14 | Stop reason: HOSPADM

## 2020-07-01 RX ORDER — HYDROMORPHONE HYDROCHLORIDE 1 MG/ML
0.5 INJECTION, SOLUTION INTRAMUSCULAR; INTRAVENOUS; SUBCUTANEOUS
Status: DISCONTINUED | OUTPATIENT
Start: 2020-07-01 | End: 2020-07-01 | Stop reason: HOSPADM

## 2020-07-01 RX ADMIN — SODIUM CHLORIDE, PRESERVATIVE FREE 10 ML: 5 INJECTION INTRAVENOUS at 22:17

## 2020-07-01 RX ADMIN — ONDANSETRON 4 MG: 2 INJECTION INTRAMUSCULAR; INTRAVENOUS at 19:49

## 2020-07-01 RX ADMIN — ASPIRIN 325 MG: 325 TABLET, COATED ORAL at 22:16

## 2020-07-01 RX ADMIN — BUPIVACAINE HYDROCHLORIDE 20 ML: 5 INJECTION, SOLUTION EPIDURAL; INTRACAUDAL at 13:17

## 2020-07-01 RX ADMIN — SODIUM CHLORIDE, POTASSIUM CHLORIDE, SODIUM LACTATE AND CALCIUM CHLORIDE: 600; 310; 30; 20 INJECTION, SOLUTION INTRAVENOUS at 19:52

## 2020-07-01 RX ADMIN — CEFAZOLIN SODIUM 2 G: 2 INJECTION, SOLUTION INTRAVENOUS at 18:38

## 2020-07-01 RX ADMIN — TAZOBACTAM SODIUM AND PIPERACILLIN SODIUM 3.38 G: 375; 3 INJECTION, SOLUTION INTRAVENOUS at 12:32

## 2020-07-01 RX ADMIN — VANCOMYCIN HYDROCHLORIDE 1000 MG: 1 INJECTION, SOLUTION INTRAVENOUS at 10:34

## 2020-07-01 RX ADMIN — POTASSIUM CHLORIDE 10 MEQ: 7.46 INJECTION, SOLUTION INTRAVENOUS at 16:07

## 2020-07-01 RX ADMIN — TAZOBACTAM SODIUM AND PIPERACILLIN SODIUM 3.38 G: 375; 3 INJECTION, SOLUTION INTRAVENOUS at 03:32

## 2020-07-01 RX ADMIN — SODIUM CHLORIDE, POTASSIUM CHLORIDE, SODIUM LACTATE AND CALCIUM CHLORIDE 75 ML/HR: 600; 310; 30; 20 INJECTION, SOLUTION INTRAVENOUS at 01:06

## 2020-07-01 RX ADMIN — SODIUM CHLORIDE, PRESERVATIVE FREE 10 ML: 5 INJECTION INTRAVENOUS at 08:00

## 2020-07-01 RX ADMIN — PROPOFOL 150 MG: 10 INJECTION, EMULSION INTRAVENOUS at 18:19

## 2020-07-01 RX ADMIN — POTASSIUM CHLORIDE 10 MEQ: 7.46 INJECTION, SOLUTION INTRAVENOUS at 12:34

## 2020-07-01 RX ADMIN — POTASSIUM CHLORIDE 10 MEQ: 7.46 INJECTION, SOLUTION INTRAVENOUS at 13:28

## 2020-07-01 RX ADMIN — SODIUM CHLORIDE, POTASSIUM CHLORIDE, SODIUM LACTATE AND CALCIUM CHLORIDE 75 ML/HR: 600; 310; 30; 20 INJECTION, SOLUTION INTRAVENOUS at 17:56

## 2020-07-01 RX ADMIN — POTASSIUM CHLORIDE 10 MEQ: 7.46 INJECTION, SOLUTION INTRAVENOUS at 14:56

## 2020-07-01 RX ADMIN — ROPIVACAINE HYDROCHLORIDE 8 ML/HR: 2 INJECTION, SOLUTION EPIDURAL; INFILTRATION at 14:58

## 2020-07-01 RX ADMIN — MAGNESIUM SULFATE HEPTAHYDRATE 4 G: 40 INJECTION, SOLUTION INTRAVENOUS at 10:39

## 2020-07-01 NOTE — PLAN OF CARE
Patient awaiting hip surgery. VSS. Potassium and Mg replaced. Plan to go to tele floor after surgery.

## 2020-07-01 NOTE — PROGRESS NOTES
INTENSIVIST   PROGRESS NOTE     Hospital:  LOS: 2 days      S     Mr. Ken Jasso, 80 y.o. male is followed for:      Acute febrile illness    Fracture of right hip (CMS/HCC)    Dementia (CMS/HCC)    H/O: CVA (no deficit)    As an Intensivist, we provide an integrated approach to the ICU patient and family, medical management of comorbid conditions, including but not limited to electrolytes, glycemic control, organ dysfunction, lead interdisciplinary rounds and coordinate the care with all other services, including those from other specialists.     Interval History:  Uneventful night.  No complaints.  Awaiting for surgery this afternoon.    Temp  Min: 98.4 °F (36.9 °C)  Max: 99.7 °F (37.6 °C)     The patient's relevant past medical, surgical and social history were reviewed and updated in Epic as appropriate.          O     Vitals:  Temp: 98.7 °F (37.1 °C) (07/01/20 0800) Temp  Min: 98.4 °F (36.9 °C)  Max: 99.7 °F (37.6 °C)   Temp core:      BP: 149/74 (07/01/20 1000) BP  Min: 128/74  Max: 165/94   Pulse: 87 (07/01/20 1000) Pulse  Min: 61  Max: 98   Resp: 18 (07/01/20 1000) Resp  Min: 16  Max: 18   SpO2: 93 % (07/01/20 1000) SpO2  Min: 92 %  Max: 99 %   Device: room air (07/01/20 1000)    Flow Rate: 1 (06/30/20 1200) Flow (L/min)  Min: 1  Max: 1     Intake/Ouptut 24 hrs (7:00AM - 6:59 AM)  Intake & Output (last 3 days)       06/28 0701 - 06/29 0700 06/29 0701 - 06/30 0700 06/30 0701 - 07/01 0700 07/01 0701 - 07/02 0700    P.O.   360     I.V. (mL/kg)  313 (5.1) 1856.2 (31) 164 (2.7)    IV Piggyback  571.2 623 19    Total Intake(mL/kg)  884.2 (14.4) 2839.2 (47.4) 183 (3.1)    Urine (mL/kg/hr)  200 850 (0.6) 50 (0.2)    Total Output  200 850 50    Net  +684.2 +1989.2 +133            Urine Unmeasured Occurrence  1 x 2 x 1 x        Physical Examination  Telemetry:  Rhythm: normal sinus rhythm (07/01/20 1000)         Constitutional:  No acute distress.   Cardiovascular: RRR.   Normal heart sounds.  No murmurs,  gallop or rub.   Respiratory: Normal breath sounds  No adventitious sounds.   Abdominal:  Soft with no tenderness.  No distension.   No HSM.   Extremities: Warm.  Dry.  No cyanosis.  No Edema   Neurological:   Awake.  Best Eye Response: 4-->(E4) spontaneous (07/01/20 1000)  Best Motor Response: 6-->(M6) obeys commands (07/01/20 1000)  Best Verbal Response: 4-->(V4) confused (07/01/20 1000)  [place] Coma Scale Score: 14 (07/01/20 1000)       Hematology:  Results from last 7 days   Lab Units 07/01/20  0400 06/30/20 0605 06/29/20  1837   WBC 10*3/mm3 8.72 11.21* 10.18   HEMOGLOBIN g/dL 10.9* 13.0 13.3   MCV fL 82.5 89.0 83.1   PLATELETS 10*3/mm3 170 174 227   NEUTROS ABS 10*3/mm3 6.06 7.78* 7.83*   LYMPHS ABS 10*3/mm3 1.04 1.49 1.20   EOS ABS 10*3/mm3 0.37 0.18 0.03   IMMATURE GRANS (ABS) 10*3/mm3 0.04 0.10* 0.06*       Chemistry:  Estimated Creatinine Clearance: 48.6 mL/min (by C-G formula based on SCr of 1.01 mg/dL).    Results from last 7 days   Lab Units 07/01/20 0250 06/30/20 0605 06/29/20  1837   SODIUM mmol/L 135* 134* 138   POTASSIUM mmol/L 3.6 4.1 4.4   CHLORIDE mmol/L 100 99 100   CO2 mmol/L 24.0 25.0 26.0   BUN mg/dL 17 18 17   CREATININE mg/dL 1.01 0.92 0.97   GLUCOSE mg/dL 97 111* 123*   CALCIUM mg/dL 8.1* 8.8 9.2   ANION GAP mmol/L 11.0 10.0 12.0     Results from last 7 days   Lab Units 07/01/20  0250 06/30/20 0605 06/29/20  1837   MAGNESIUM mg/dL 1.8 2.3 1.7   PHOSPHORUS mg/dL 2.6 4.0  --      Inflammatory markers:  Results from last 7 days   Lab Units 07/01/20  0250 06/30/20 0605 06/29/20  1932 06/29/20  1837   LACTATE mmol/L  --   --  1.8  --    PROCALCITONIN ng/mL 1.60* 1.65*  --  0.08*   TRIGLYCERIDES mg/dL  --  69  --   --      Cardiac Labs:  Results from last 7 days   Lab Units 07/01/20  0250 06/30/20  0605 06/30/20  0036 06/29/20  1837   TROPONIN T ng/mL  --  0.021 0.030 0.034*   PROBNP pg/mL 1,196.0  --   --   --      COVID-19  Lab Results   Lab Value Date/Time    COVID19 Not Detected  06/29/2020 2004     Images:  Ct Abdomen Pelvis Without Contrast    Result Date: 6/30/2020  Atelectatic changes or infiltrate seen at the lung bases bilaterally for which clinical correlation is needed. Mild wall thickening of the stomach which may be related to incomplete distention. Clinical correlation is needed. Stool within the colon. The remainder of the abdomen and pelvis is grossly unremarkable. No obstruction to the kidneys with residual contrast seen in the renal collecting systems as well as the ureters and bladder from prior examination.  D:  06/30/2020 E:  06/30/2020  This report was finalized on 6/30/2020 4:48 PM by Dr. Cher Macias MD.      Ct Angiogram Head    Result Date: 6/30/2020  1. CTA neck demonstrates atherosclerotic involvement including calcific disease creating 25% right and 20% left luminal narrowing as measured by NASCET criteria. 2. CTA head demonstrates minimal atherosclerotic involvement of the distal internal carotid arteries without hemodynamically significant stenosis, aneurysm or occlusion with calcific disease burden associated as well as moderate stenosis of calcific disease burden and involvement of the left V4 segment focal area distal left vertebral artery without distinct occlusion and patency observed of the [place]. [place] is otherwise unremarkable without occlusion in particular the MCA distributions are widely patent.  D:  06/29/2020 E:  06/30/2020      Ct Angiogram Neck    Result Date: 6/30/2020  1. CTA neck demonstrates atherosclerotic involvement including calcific disease creating 25% right and 20% left luminal narrowing as measured by NASCET criteria. 2. CTA head demonstrates minimal atherosclerotic involvement of the distal internal carotid arteries without hemodynamically significant stenosis, aneurysm or occlusion with calcific disease burden associated as well as moderate stenosis of calcific disease burden and involvement of the left V4  segment focal area distal left vertebral artery without distinct occlusion and patency observed of the [place]. [place] is otherwise unremarkable without occlusion in particular the MCA distributions are widely patent.  D:  06/29/2020 E:  06/30/2020      Ct Chest Without Contrast    Result Date: 6/30/2020  Low lung volumes with hypoventilatory effects including moderate subsegmental dependent atelectasis in the dependent portions however no focal consolidation or opacification to suggest acute parenchymal process or airspace disease. Negative for pneumonia.  D:  06/29/2020 E:  06/30/2020       Xr Chest 1 View    Result Date: 6/29/2020  No acute cardiopulmonary findings. Signer Name: JAIME Díaz MD  Signed: 6/29/2020 7:36 PM  Workstation Name: [facility]  Radiology [facility]    Ct Head Without Contrast Stroke Protocol    Result Date: 6/30/2020  No acute cardiopulmonary process. Specifically no acute intracranial hemorrhage.  NOTE: Scan performed on 06/29/2020 at 1816 hours. Scan report given to treatment team in person by Dr. Elizondo at scanner on 06/29/2020 at 1820 hours.  D:  06/29/2020 E:  06/30/2020       Ct Cerebral Perfusion With & Without Contrast    Result Date: 6/30/2020  No reversible ischemia within a specific vascular territory.  D:  06/29/2020 E:  06/30/2020       Xr Hip With Or Without Pelvis 2 - 3 View Right    Result Date: 6/29/2020  Laterally impacted subcapital right femoral neck fracture. Signer Name: JAIME Díaz MD  Signed: 6/29/2020 9:02 PM  Workstation Name: [facility]  Radiology [facility]      Echo:  Results for orders placed during the hospital encounter of 06/29/20   Adult Transthoracic Echo Complete W/ Cont if Necessary Per Protocol (With Agitated Saline)    Narrative · Estimated EF = 55%.  · Left ventricular systolic function is normal.  · Mild mitral valve regurgitation is present  · Mild to moderate tricuspid valve  regurgitation is present.  · Calculated right ventricular systolic pressure from tricuspid   regurgitation is 34 mmHg.  · No intracardiac shunting is seen  · No cardiac source for embolus is seen          Results: Reviewed.  I reviewed the patient's new laboratory and imaging results.  I independently reviewed the patient's new images.    Medications: Reviewed.    Assessment/Plan   A / P     Temperature      Assessment:    80 y.o.male, admitted on 6/29/2020 with Acute febrile illness [R50.9]:     1. Fevers - none since admission to ICU  1. Temp 103 °F in the ED.  2. PCT elevated, 07/01/20 similar to 06/30/20, not in the sepsis range.  2. Dementia  3. R/o CVA - As per stroke team: probable NOT CVA  1. Negative CT perfusion.  4. Paroxysmal A Fib.  1. Episode of Wide Complex Tachycardia in the ED, resolved spontaneously.  5. Right femoral neck fracture. Eventually surgical repair.  6. Antibiotics: Piperacillin-Tazobactam  + Vanco IV  7. Glucose: No h/o DM    Results from last 7 days   Lab Units 06/30/20  1741 06/30/20  1118 06/30/20  0539 06/29/20  1938   GLUCOSE mg/dL 97 100 104 170*     Lab Results   Lab Value Date/Time    HGBA1C 5.60 06/30/2020 0605       Nutrition Support: Patient isn't on Tube Feeding   Modulars: Patient doesn't have any tube feeding modular orders   Diet: NPO Diet   Advance Directives: Code Status and Medical Interventions:   Ordered at: 06/30/20 0213     Code Status:    CPR     Medical Interventions (Level of Support Prior to Arrest):    Full        Plan:    1. Discussed with Dr. Harp yesterday. Surgery today.  2. Continue abs for now, awaiting final cultures.  3. F/U PCT and WBC in AM  4. ECHO EF 55%  5. Replace Mg, K.  6. Disposition: Transfer to Telemetry Unit.    Plan of care and goals reviewed during interdisciplinary rounds.  I discussed the patient's findings and my recommendations with patient    Level of Risk is High due to:  illness with threat to life or bodily function.     Time:  25 minutes, in direct patient care, with the patient and/or on the [place] coordinating care with other health care providers.     I have spent > 50% percent of this time, counseling and discussing management.     OK to floor.  We will follow as needed once out of the Intensive Care Unit.  Hospitalist Team will assist with medical management and assume Primary Attending, once on the Floor.    Thank you.    Rodney Banks MD, FACP, FCCP, [facility]  Intensive Care Medicine, Nutrition Support and Pulmonary Medicine     [x]  Primary Attending  []  Consultant

## 2020-07-01 NOTE — NURSING NOTE
Daily low elicia screen and report completed. No identifiable issues or concerns at this time. Skin/pressure interventions and appropriate charting are kosher. Contact [facility] nurse if needs arise.     Thanks

## 2020-07-01 NOTE — BRIEF OP NOTE
HIP PERCUTANEOUS PINNING  Progress Note    Ken Jasso  7/1/2020    Pre-op Diagnosis:   RIGHT valgus impacted femoral neck fracture       Post-Op Diagnosis Codes:   same    Procedure/CPT® Codes:      Procedure(s):  HIP PERCUTANEOUS PINNING    Surgeon(s):  Sam Harp MD    Anesthesia: Choice    Staff:   Circulator: Mini Pitts RN  Radiology Technologist: Kristy Martinez  Scrub Person: Meenakshi Bryant; Luisa Malcolm  Vendor Representative: Joey Castaneda  Nursing Assistant: Jess Almanza; Krista Tejeda    Estimated Blood Loss: minimal    Urine Voided: * No values recorded between 7/1/2020  6:13 PM and 7/1/2020  7:53 PM *    Specimens:                None          Drains:   [REMOVED] External Urinary Catheter (Removed)   Site Assessment Clean;Skin intact;Bleeding 6/30/2020  7:00 PM   Application/Removal external catheter removed;skin care provided 6/30/2020 10:45 PM   Collection Container Wall suction 6/30/2020  7:00 PM   Wall suction (mmHG) 80 mmHG 6/30/2020  7:00 PM   Output (mL) 150 mL 6/30/2020 10:45 PM       Findings: valgus impacted fracture, amenable to percutaneous screw fixation    Complications: none      Sam Harp MD     Date: 7/1/2020  Time: 19:54

## 2020-07-01 NOTE — PROGRESS NOTES
Clinical Nutrition     Multidisciplinary Rounds      Patient Name: Ken Jasso  Date of Encounter: 07/01/20 10:22  MRN: [id number]  Admission date: 6/29/2020    CHOLO. MASTER to continue to follow per protocol.     Pt going for surgery    Current diet: NPO Diet  No active supplement orders    Intervention:  Follow treatment plan  Care plan reviewed    Follow up:   Per protocol      Lennei Hinkle RD  10:22 AM  Time: 10min

## 2020-07-01 NOTE — PROGRESS NOTES
"Pharmacy Consult-Vancomycin Dosing  Ken Jasso is a  80 y.o. male receiving vancomycin therapy.     Indication: PNA  Consulting Provider: intensivist  Goal Trough:15-20    Current Antimicrobial Therapy  Anti-Infectives (From admission, onward)      Ordered     Dose/Rate Route Frequency Start Stop    06/29/20 2249  vancomycin (VANCOCIN) in iso-osmotic dextrose IVPB 1 g (premix) 200 mL  Review   Ordering Provider:  Lisandro Reyes, [facility]    15 mg/kg × 72.6 kg  over 90 Minutes Intravenous Every 12 Hours 06/30/20 0900 07/07/20 0859    06/29/20 2204  piperacillin-tazobactam (ZOSYN) 3.375 g in iso-osmotic dextrose 50 ml (premix)  Review   Ordering Provider:  Lazaro Lozada APRN    3.375 g  over 4 Hours Intravenous Every 8 Hours 06/30/20 0300 07/07/20 0259    06/29/20 2226  Pharmacy to dose vancomycin  Review   Ordering Provider:  Lazaro Lozada APRN     Does not apply Continuous PRN 06/29/20 2226 07/09/20 2225    06/29/20 1915  piperacillin-tazobactam (ZOSYN) 3.375 g in iso-osmotic dextrose 50 ml (premix)     Ordering Provider:  Bhargav Hall MD    3.375 g  over 30 Minutes Intravenous Once 06/29/20 1917 06/29/20 2018 06/29/20 1915  vancomycin 1500 mg/500 mL 0.9% NS IVPB ([facility])     Ordering Provider:  Bhargav Hall MD    20 mg/kg × 72.6 kg  over 90 Minutes Intravenous Once 06/29/20 1917 06/29/20 2149          Allergies  Allergies as of 06/29/2020    (No Known Allergies)     Labs  Results from last 7 days   Lab Units 07/01/20  0250 06/30/20  0605 06/29/20  1837   BUN mg/dL 17 18 17   CREATININE mg/dL 1.01 0.92 0.97     Results from last 7 days   Lab Units 07/01/20  0400 06/30/20  0605 06/29/20  1837   WBC 10*3/mm3 8.72 11.21* 10.18     Evaluation of Dosing   Last Dose Received in the ED/Outside Facility: 1500mg  Ht - 177.8 cm (70\")  Wt - 59.9 kg (132 lb 0.9 oz)    Estimated Creatinine Clearance: 48.6 mL/min (by C-G formula based on SCr of 1.01 mg/dL).  Intake & Output (last 3 days)         06/28 0701 " - 06/29 0700 06/29 0701 - 06/30 0700 06/30 0701 - 07/01 0700 07/01 0701 - 07/02 0700    P.O.   360     I.V. (mL/kg)  313 (5.1) 1856.2 (31) 164 (2.7)    IV Piggyback  571.2 623 19    Total Intake(mL/kg)  884.2 (14.4) 2839.2 (47.4) 183 (3.1)    Urine (mL/kg/hr)  200 850 (0.6) 50 (0.2)    Total Output  200 850 50    Net  +684.2 +1989.2 +133            Urine Unmeasured Occurrence  1 x 2 x 1 x          Microbiology and Radiology  Microbiology Results (last 10 days)       Procedure Component Value - Date/Time    MRSA Screen, PCR (Inpatient) - Swab, Nares [[id number]]  (Normal) Collected:  06/29/20 2352    Lab Status:  Final result Specimen:  Swab from Nares Updated:  06/30/20 0958     MRSA PCR Negative    Narrative:       MRSA Negative    Respiratory Panel, PCR - Swab, Nasopharynx [[id number]]  (Normal) Collected:  06/29/20 2004    Lab Status:  Final result Specimen:  Swab from Nasopharynx Updated:  06/29/20 2129     ADENOVIRUS, PCR Not Detected     Coronavirus 229E Not Detected     Coronavirus HKU1 Not Detected     Coronavirus NL63 Not Detected     Coronavirus OC43 Not Detected     Human Metapneumovirus Not Detected     Human Rhinovirus/Enterovirus Not Detected     Influenza B PCR Not Detected     Parainfluenza Virus 1 Not Detected     Parainfluenza Virus 2 Not Detected     Parainfluenza Virus 3 Not Detected     Parainfluenza Virus 4 Not Detected     Bordetella pertussis pcr Not Detected     Influenza A H1 2009 PCR Not Detected     Chlamydophila pneumoniae PCR Not Detected     Mycoplasma pneumo by PCR Not Detected     Influenza A PCR Not Detected     Influenza A H3 Not Detected     Influenza A H1 Not Detected     RSV, PCR Not Detected     Bordetella parapertussis PCR Not Detected    Narrative:       The coronavirus on the RVP is NOT COVID-19 and is NOT indicative of infection with COVID-19.     COVID-19,[facility] IN-HOUSE, NP SWAB IN TRANSPORT MEDIA 8-12 HR TAT - Swab, Nasopharynx [[id number]]  (Normal) Collected:  06/29/20  2004    Lab Status:  Final result Specimen:  Swab from Nasopharynx Updated:  06/30/20 0305     COVID19 Not Detected    Narrative:       Fact sheet for providers: https://www.fda.gov/media/713378/download     Fact sheet for patients: https://www.fda.gov/media/394828/download    Blood Culture - Blood, Arm, Right [[id number]] Collected:  06/29/20 1931    Lab Status:  Preliminary result Specimen:  Blood from Arm, Right Updated:  06/30/20 2000     Blood Culture No growth at 24 hours    Blood Culture - Blood, Arm, Left [[id number]] Collected:  06/29/20 1931    Lab Status:  Preliminary result Specimen:  Blood from Arm, Left Updated:  06/30/20 2000     Blood Culture No growth at 24 hours          Evaluation of Level  Results from last 7 days   Lab Units 07/01/20  0832   VANCOMYCIN TR mcg/mL 15.40       Assessment/Plan:  Continue current dose of Vancomycin. Will continue through surgery.  Pharmacy will continue to follow the patient’s culture results and clinical progress daily.    Thanks,   Jeannette OrtizD BCPS

## 2020-07-01 NOTE — ANESTHESIA PROCEDURE NOTES
Airway  Urgency: elective    Airway not difficult    General Information and Staff    Patient location during procedure: OR  Anesthesiologist: Darci Salinas MD    Indications and Patient Condition  Indications for airway management: airway protection    Preoxygenated: yes  Mask difficulty assessment: 1 - vent by mask    Final Airway Details  Final airway type: supraglottic airway      Successful airway: classic and I-gel  Size 4    Number of attempts at approach: 1    Additional Comments  LMA placed without difficulty, ventilation with assist, equal breath sounds and symmetric chest rise and fall

## 2020-07-01 NOTE — ANESTHESIA PREPROCEDURE EVALUATION
Anesthesia Evaluation     Patient summary reviewed and Nursing notes reviewed                Airway   Mallampati: II  Dental      Pulmonary    (+) pneumonia ,   Cardiovascular     ECG reviewed    (+) dysrhythmias Atrial Fib,     ROS comment: ECHO from this admission reviewed:  · Estimated EF = 55%.  · Left ventricular systolic function is normal.  · Mild mitral valve regurgitation is present  · Mild to moderate tricuspid valve regurgitation is present.  · Calculated right ventricular systolic pressure from tricuspid regurgitation is 34 mmHg.  · No intracardiac shunting is seen  · No cardiac source for embolus is seen      Admitted to the ICU for wide-complex tachycardia. Started on amiodarone drip which is now off.     Neuro/Psych  (+) CVA, psychiatric history, dementia,     GI/Hepatic/Renal/Endo    (+)  hiatal hernia,    (-) liver disease, no renal disease, diabetes    Musculoskeletal     Abdominal    Substance History      OB/GYN          Other          Other Comment: Acute febrile illness of unknown etiology                Anesthesia Plan    ASA 3     general with block     intravenous induction     Anesthetic plan, all risks, benefits, and alternatives have been provided, discussed and informed consent has been obtained with: patient.

## 2020-07-01 NOTE — PROGRESS NOTES
RIGHT valgus impacted femoral neck fracture    Ready for surgery today  I spoke with the family regarding the surgery.    Cleared for surgery and transferred from ICU to OR.    Right hip pain, no events overnight.

## 2020-07-01 NOTE — ANESTHESIA PROCEDURE NOTES
Peripheral Block    Pre-sedation assessment completed: 7/1/2020 1:09 PM    Patient reassessed immediately prior to procedure    Patient location during procedure: pre-op  Stop time: 7/1/2020 1:18 PM  Reason for block: procedure for pain and at surgeon's request  Performed by  CRNA: Kalina Almanza CRNA  Assisted by: Kalina Lee CRNA  Preanesthetic Checklist  Completed: patient identified, site marked, surgical consent, pre-op evaluation, timeout performed, IV checked, risks and benefits discussed and monitors and equipment checked  Prep:  Sterile barriers:cap, gloves and mask  Prep: ChloraPrep  Patient monitoring: blood pressure monitoring, continuous pulse oximetry and EKG  Procedure  Performed under: local infiltration  Guidance:ultrasound guided  Images:still images obtained, printed/placed on chart    Laterality:right  Block Type:fascia iliaca catheter  Injection Technique:catheter  Needle Type:echogenic  Needle Gauge:18 G  Resistance on Injection: none  Catheter Size:20 G (20g)  Cath Depth at skin: 15 cm    Medications Used: bupivacaine (PF) (MARCAINE) 0.5 % injection, 20 mL  Med admintered at 7/1/2020 1:17 PM      Medications  Preservative Free Saline:20ml    Post Assessment  Injection Assessment: negative aspiration for heme, no paresthesia on injection and incremental injection  Patient Tolerance:comfortable throughout block  Complications:no  Additional Notes  Procedure:                 Pt placed in supine position.   The insertion site was prepped in sterile fashion with Chlorapreop and clear plastic drapes.  Analgesia was provided by skin infiltration at insertion site with Lidocaine 1% 3mls.  A B-Fernandes 18 g , 4 inch echogenic Touhy needle was advance In-plane under ultrasound guidance. The   Anterior superior Iliac crest was initially visualized and the probe was directed slightly medially and slightly towards the umbilicus.  The course of the needle was tracked over the sartorius muscle  through the fascia Iliacus and into the anterior portion of the Iliacus muscle.  Major vessels where identified and avoided as where structures of the peritoneal cavity.  LA injection was made incrementally in 1-5ml amounts spread was visualized superiorly below fascia iliacus.  Injection was completed with negative aspiration of blood and negative intravascular injection.  Injection pressures where normal or minimal resistance.  A 20 g B-Fernandes wire styleted catheter was then advance thru the needle and very easily placed in a superior or cephalad direction.  The catheter was secured at insertion site with skin afix , mastisol, steristreps.  A CHG tegaderm dressing was placed over the insertion site and the nerve catheter labeled and capped.  Thank You.

## 2020-07-01 NOTE — PLAN OF CARE
Problem: Patient Care Overview  Goal: Plan of Care Review  Outcome: Ongoing (interventions implemented as appropriate)  Flowsheets (Taken 7/1/2020 4120)  Progress: improving  Plan of Care Reviewed With: patient  Outcome Summary: VSS. Femur surgery scheduled for today. Awaiting Tele bed.

## 2020-07-02 ENCOUNTER — APPOINTMENT (OUTPATIENT)
Dept: GENERAL RADIOLOGY | Facility: HOSPITAL | Age: 80
End: 2020-07-02

## 2020-07-02 LAB
ANION GAP SERPL CALCULATED.3IONS-SCNC: 13 MMOL/L (ref 5–15)
BASOPHILS # BLD AUTO: 0.03 10*3/MM3 (ref 0–0.2)
BASOPHILS NFR BLD AUTO: 0.3 % (ref 0–1.5)
BUN SERPL-MCNC: 12 MG/DL (ref 8–23)
BUN/CREAT SERPL: 14 (ref 7–25)
CALCIUM SPEC-SCNC: 8.1 MG/DL (ref 8.6–10.5)
CHLORIDE SERPL-SCNC: 99 MMOL/L (ref 98–107)
CO2 SERPL-SCNC: 23 MMOL/L (ref 22–29)
CREAT SERPL-MCNC: 0.86 MG/DL (ref 0.76–1.27)
DEPRECATED RDW RBC AUTO: 42.3 FL (ref 37–54)
EOSINOPHIL # BLD AUTO: 0.22 10*3/MM3 (ref 0–0.4)
EOSINOPHIL NFR BLD AUTO: 2.5 % (ref 0.3–6.2)
ERYTHROCYTE [DISTWIDTH] IN BLOOD BY AUTOMATED COUNT: 14.2 % (ref 12.3–15.4)
GFR SERPL CREATININE-BSD FRML MDRD: 85 ML/MIN/1.73
GLUCOSE SERPL-MCNC: 80 MG/DL (ref 65–99)
HCT VFR BLD AUTO: 32.9 % (ref 37.5–51)
HGB BLD-MCNC: 10.8 G/DL (ref 13–17.7)
IMM GRANULOCYTES # BLD AUTO: 0.04 10*3/MM3 (ref 0–0.05)
IMM GRANULOCYTES NFR BLD AUTO: 0.4 % (ref 0–0.5)
LYMPHOCYTES # BLD AUTO: 0.8 10*3/MM3 (ref 0.7–3.1)
LYMPHOCYTES NFR BLD AUTO: 8.9 % (ref 19.6–45.3)
MAGNESIUM SERPL-MCNC: 2.4 MG/DL (ref 1.6–2.4)
MCH RBC QN AUTO: 26.9 PG (ref 26.6–33)
MCHC RBC AUTO-ENTMCNC: 32.8 G/DL (ref 31.5–35.7)
MCV RBC AUTO: 82 FL (ref 79–97)
MONOCYTES # BLD AUTO: 1.2 10*3/MM3 (ref 0.1–0.9)
MONOCYTES NFR BLD AUTO: 13.4 % (ref 5–12)
NEUTROPHILS NFR BLD AUTO: 6.65 10*3/MM3 (ref 1.7–7)
NEUTROPHILS NFR BLD AUTO: 74.5 % (ref 42.7–76)
NRBC BLD AUTO-RTO: 0 /100 WBC (ref 0–0.2)
PHOSPHATE SERPL-MCNC: 2.9 MG/DL (ref 2.5–4.5)
PLATELET # BLD AUTO: 176 10*3/MM3 (ref 140–450)
PMV BLD AUTO: 12 FL (ref 6–12)
POTASSIUM SERPL-SCNC: 3.6 MMOL/L (ref 3.5–5.2)
PROCALCITONIN SERPL-MCNC: 0.91 NG/ML (ref 0.1–0.25)
RBC # BLD AUTO: 4.01 10*6/MM3 (ref 4.14–5.8)
SODIUM SERPL-SCNC: 135 MMOL/L (ref 136–145)
WBC # BLD AUTO: 8.94 10*3/MM3 (ref 3.4–10.8)

## 2020-07-02 PROCEDURE — 84100 ASSAY OF PHOSPHORUS: CPT | Performed by: INTERNAL MEDICINE

## 2020-07-02 PROCEDURE — 74230 X-RAY XM SWLNG FUNCJ C+: CPT

## 2020-07-02 PROCEDURE — 92611 MOTION FLUOROSCOPY/SWALLOW: CPT

## 2020-07-02 PROCEDURE — 80048 BASIC METABOLIC PNL TOTAL CA: CPT | Performed by: INTERNAL MEDICINE

## 2020-07-02 PROCEDURE — 84145 PROCALCITONIN (PCT): CPT | Performed by: INTERNAL MEDICINE

## 2020-07-02 PROCEDURE — 99232 SBSQ HOSP IP/OBS MODERATE 35: CPT | Performed by: INTERNAL MEDICINE

## 2020-07-02 PROCEDURE — 99024 POSTOP FOLLOW-UP VISIT: CPT | Performed by: ORTHOPAEDIC SURGERY

## 2020-07-02 PROCEDURE — 83735 ASSAY OF MAGNESIUM: CPT | Performed by: INTERNAL MEDICINE

## 2020-07-02 PROCEDURE — 25010000002 PIPERACILLIN SOD-TAZOBACTAM PER 1 G: Performed by: INTERNAL MEDICINE

## 2020-07-02 PROCEDURE — 25010000003 CEFAZOLIN IN DEXTROSE 2-4 GM/100ML-% SOLUTION: Performed by: ORTHOPAEDIC SURGERY

## 2020-07-02 PROCEDURE — 85025 COMPLETE CBC W/AUTO DIFF WBC: CPT | Performed by: INTERNAL MEDICINE

## 2020-07-02 PROCEDURE — 97166 OT EVAL MOD COMPLEX 45 MIN: CPT

## 2020-07-02 PROCEDURE — 97162 PT EVAL MOD COMPLEX 30 MIN: CPT

## 2020-07-02 PROCEDURE — 92610 EVALUATE SWALLOWING FUNCTION: CPT

## 2020-07-02 RX ORDER — PANTOPRAZOLE SODIUM 40 MG/1
40 TABLET, DELAYED RELEASE ORAL EVERY MORNING
Status: DISCONTINUED | OUTPATIENT
Start: 2020-07-02 | End: 2020-07-03

## 2020-07-02 RX ORDER — DONEPEZIL HYDROCHLORIDE 10 MG/1
10 TABLET, FILM COATED ORAL DAILY
Status: DISCONTINUED | OUTPATIENT
Start: 2020-07-02 | End: 2020-07-14 | Stop reason: HOSPADM

## 2020-07-02 RX ORDER — AMLODIPINE BESYLATE 10 MG/1
10 TABLET ORAL
Status: DISCONTINUED | OUTPATIENT
Start: 2020-07-02 | End: 2020-07-14 | Stop reason: HOSPADM

## 2020-07-02 RX ORDER — LISINOPRIL 40 MG/1
40 TABLET ORAL
Status: DISCONTINUED | OUTPATIENT
Start: 2020-07-02 | End: 2020-07-14 | Stop reason: HOSPADM

## 2020-07-02 RX ADMIN — AMLODIPINE BESYLATE 10 MG: 10 TABLET ORAL at 11:20

## 2020-07-02 RX ADMIN — ASPIRIN 325 MG: 325 TABLET, COATED ORAL at 08:22

## 2020-07-02 RX ADMIN — CEFAZOLIN SODIUM 2 G: 2 INJECTION, SOLUTION INTRAVENOUS at 00:17

## 2020-07-02 RX ADMIN — BARIUM SULFATE 50 ML: 400 SUSPENSION ORAL at 13:25

## 2020-07-02 RX ADMIN — LISINOPRIL 40 MG: 40 TABLET ORAL at 11:20

## 2020-07-02 RX ADMIN — BARIUM SULFATE 10 ML: 400 SUSPENSION ORAL at 13:25

## 2020-07-02 RX ADMIN — TAZOBACTAM SODIUM AND PIPERACILLIN SODIUM 3.38 G: 375; 3 INJECTION, SOLUTION INTRAVENOUS at 02:48

## 2020-07-02 RX ADMIN — TAZOBACTAM SODIUM AND PIPERACILLIN SODIUM 3.38 G: 375; 3 INJECTION, SOLUTION INTRAVENOUS at 18:11

## 2020-07-02 RX ADMIN — DONEPEZIL HYDROCHLORIDE 10 MG: 10 TABLET, FILM COATED ORAL at 11:20

## 2020-07-02 RX ADMIN — BARIUM SULFATE 100 ML: 0.81 POWDER, FOR SUSPENSION ORAL at 13:26

## 2020-07-02 RX ADMIN — BARIUM SULFATE 20 ML: 400 PASTE ORAL at 13:25

## 2020-07-02 RX ADMIN — TAZOBACTAM SODIUM AND PIPERACILLIN SODIUM 3.38 G: 375; 3 INJECTION, SOLUTION INTRAVENOUS at 11:20

## 2020-07-02 RX ADMIN — CEFAZOLIN SODIUM 2 G: 2 INJECTION, SOLUTION INTRAVENOUS at 08:22

## 2020-07-02 RX ADMIN — PANTOPRAZOLE SODIUM 40 MG: 40 TABLET, DELAYED RELEASE ORAL at 11:20

## 2020-07-02 NOTE — THERAPY EVALUATION
Patient Name: Ken Jasso  : 1940    MRN: [id number]                              Today's Date: 2020       Admit Date: 2020    Visit Dx:     ICD-10-CM ICD-9-CM   1. Acute febrile illness R50.9 780.60   2. Acute respiratory failure with hypoxia (CMS/[facility]) J96.01 518.81   3. Ventricular tachycardia (CMS/[facility]) I47.2 427.1   4. Skin tear of right elbow without complication, initial encounter S51.011A 881.01   5. Dysphagia, unspecified type R13.10 787.20     Patient Active Problem List   Diagnosis   • Acute febrile illness   • Fracture of right hip (CMS/[facility])   • Dementia (CMS/[facility])   • H/O: CVA (no deficit)     Past Medical History:   Diagnosis Date   • Dementia (CMS/HCC)    • Dementia (CMS/HCC)    • Dysphagia    • Esophageal dilatation    • Hiatal hernia    • PNA (pneumonia)    • PNA (pneumonia)    • Pneumonia     [place] EPISODES    • Prostate CA (CMS/[facility])    • Stroke (CMS/[facility])          History reviewed. No pertinent surgical history.  General Information     Row Name 20 1447          PT Evaluation Time/Intention    Document Type  evaluation  -AA     Mode of Treatment  physical therapy  -AA     Row Name 20 1447          General Information    Patient Profile Reviewed?  yes  -AA     Prior Level of Function  independent:;all household mobility;gait;transfer;bed mobility;feeding;grooming;dressing  -AA     Existing Precautions/Restrictions  fall dementia; TDWB RLE  -AA     Barriers to Rehab  cognitive status  -AA     Row Name 20 1447          Relationship/Environment    Lives With  spouse  -AA     Row Name 20 1447          Resource/Environmental Concerns    Current Living Arrangements  independent/assisted living facility  -AA     Row Name 20 1447          Home Main Entrance    Number of Stairs, Main Entrance  none  -AA     Row Name 20 1447          Stairs Within Home, Primary    Number of Stairs, Within Home, Primary  none  -AA     Row Name  07/02/20 1447          Cognitive Assessment/Intervention- PT/OT    Orientation Status (Cognition)  oriented x 4;verbal cues/prompts needed for orientation  -AA     Cognitive Assessment/Intervention Comment  pt A&O x4 although displays confusion during evaluation and inability to follow cues  -AA     Row Name 07/02/20 1447          Safety Issues, Functional Mobility    Safety Issues Affecting Function (Mobility)  ability to follow commands;awareness of need for assistance;insight into deficits/self awareness;judgment;positioning of assistive device;problem solving;safety precaution awareness;safety precautions follow-through/compliance;sequencing abilities  -AA     Impairments Affecting Function (Mobility)  balance;cognition;endurance/activity tolerance;pain;range of motion (ROM);strength  -AA       User Key  (r) = Recorded By, (t) = Taken By, (c) = Cosigned By    Initials Name Provider Type    Micaela Pickering, PT Physical Therapist        Mobility     Row Name 07/02/20 1447          Bed Mobility Assessment/Treatment    Bed Mobility Assessment/Treatment  rolling right;scooting/bridging;supine-sit  -AA     Rolling Right [place] (Bed Mobility)  maximum assist (25% patient effort);2 person assist;verbal cues;nonverbal cues (demo/gesture)  -AA     Scooting/Bridging [place] (Bed Mobility)  maximum assist (25% patient effort);2 person assist;verbal cues;nonverbal cues (demo/gesture)  -AA     Supine-Sit [place] (Bed Mobility)  maximum assist (25% patient effort);2 person assist;verbal cues;nonverbal cues (demo/gesture)  -AA     Assistive Device (Bed Mobility)  bed rails;head of bed elevated;draw sheet  -     Comment (Bed Mobility)  pain with RLE mobility; difficulty following cues for sequencing  -     Row Name 07/02/20 1447          Transfer Assessment/Treatment    Comment (Transfers)  VC for hand placement, foot placement. TDWB RLE, and posture.  Pt unable to maintain WB status with max verbal and  tactile cues.  SPT max A of 2 with pt sit prior to being at chair, dependently transferred to chair.  Pt unable to maintain WB status and continues to remain extremely flexed while transferring despite cues and education  -     Row Name 07/02/20 1447          Bed-Chair Transfer    Bed-Chair [place] (Transfers)  maximum assist (25% patient effort);2 person assist;verbal cues;nonverbal cues (demo/gesture)  -     Assistive Device (Bed-Chair Transfers)  walker, front-wheeled  -     Row Name 07/02/20 1447          Sit-Stand Transfer    Sit-Stand [place] (Transfers)  maximum assist (25% patient effort);2 person assist;verbal cues;nonverbal cues (demo/gesture)  -     Assistive Device (Sit-Stand Transfers)  walker, front-wheeled  -     Row Name 07/02/20 1447          Gait/Stairs Assessment/Training    Comment (Gait/Stairs)  pt unable to ambulate at this time  -       User Key  (r) = Recorded By, (t) = Taken By, (c) = Cosigned By    Initials Name Provider Type    AA Micaela Tate, PT Physical Therapist        Obj/Interventions     Row Name 07/02/20 1447          General ROM    GENERAL ROM COMMENTS  BLE WFL  -[facility] Name 07/02/20 1447          MMT (Manual Muscle Testing)    General MMT Comments  LLE 4+/5, RLE NT  -[facility] Name 07/02/20 1447          Static Sitting Balance    Level of [place] (Unsupported Sitting, Static Balance)  minimal assist, 75% patient effort  -     Sitting Position (Unsupported Sitting, Static Balance)  sitting on edge of bed  -AA     Time Able to Maintain Position (Unsupported Sitting, Static Balance)  more than 5 minutes  -     Row Name 07/02/20 1447          Static Standing Balance    Level of [place] (Supported Standing, Static Balance)  maximal assist, 25 to 49% patient effort;2 person assist  -     Assistive Device Utilized (Supported Standing, Static Balance)  walker, rolling  -     Row Name 07/02/20 1447          Dynamic Standing Balance    Level  of [place], Reaches Outside Midline (Standing, Dynamic Balance)  maximal assist, 25 to 49% patient effort;2 person assist  -AA     Assistive Device Utilized (Supported Standing, Dynamic Balance)  walker, rolling  -[facility] Name 07/02/20 1447          Sensory Assessment/Intervention    Sensory General Assessment  no sensation deficits identified  -       User Key  (r) = Recorded By, (t) = Taken By, (c) = Cosigned By    Initials Name Provider Type    Micaela Pickering, PT Physical Therapist        Goals/Plan     Row Name 07/02/20 1447          Bed Mobility Goal 1 (PT)    Activity/Assistive Device (Bed Mobility Goal 1, PT)  bed mobility activities, all  -AA     [place] Level/Cues Needed (Bed Mobility Goal 1, PT)  minimum assist (75% or more patient effort)  -AA     Time Frame (Bed Mobility Goal 1, PT)  10 days  -AA     Row Name 07/02/20 1447          Transfer Goal 1 (PT)    Activity/Assistive Device (Transfer Goal 1, PT)  sit-to-stand/stand-to-sit;bed-to-chair/chair-to-bed  -AA     [place] Level/Cues Needed (Transfer Goal 1, PT)  minimum assist (75% or more patient effort);2 person assist  -AA     Time Frame (Transfer Goal 1, PT)  10 days  -AA     Row Name 07/02/20 1447          Gait Training Goal 1 (PT)    Activity/Assistive Device (Gait Training Goal 1, PT)  gait (walking locomotion);assistive device use  -AA     [place] Level (Gait Training Goal 1, PT)  moderate assist (50-74% patient effort);2 person assist  -AA     Distance (Gait Goal 1, PT)  10'  -AA     Time Frame (Gait Training Goal 1, PT)  10 days  -       User Key  (r) = Recorded By, (t) = Taken By, (c) = Cosigned By    Initials Name Provider Type    Micaela Pickering, PT Physical Therapist        Clinical Impression     [facility] Name 07/02/20 1447          Pain Assessment    Additional Documentation  Pain Scale: Numbers Pre/Post-Treatment (Group)  -AA     Row Name 07/02/20 1447          Pain Scale: Numbers Pre/Post-Treatment    Pain  Scale: Numbers, Pretreatment  0/10 - no pain  -AA     Pain Scale: Numbers, Post-Treatment  0/10 - no pain  -AA     Pain Location - Side  Right  -AA     Pain Location  hip  -AA     Pre/Post Treatment Pain Comment  pain with mobility; pt declines cold pack  -AA     Pain Intervention(s)  Repositioned;Ambulation/increased activity  -AA     Row Name 07/02/20 1447          Plan of Care Review    Plan of Care Reviewed With  patient;spouse  -AA     Progress  improving  -AA     Row Name 07/02/20 1447          Physical Therapy Clinical Impression    Criteria for Skilled Interventions Met (PT Clinical Impression)  yes;treatment indicated  -AA     Rehab Potential (PT Clinical Summary)  good, to achieve stated therapy goals  -AA     Predicted Duration of Therapy (PT)  10 days  -AA     Row Name 07/02/20 1447          Positioning and Restraints    Pre-Treatment Position  in bed  -AA     Post Treatment Position  chair  -AA     In Chair  notified nsg;exit alarm on;reclined;on mechanical lift sling;with family/caregiver;waffle cushion;call light within reach;encouraged to call for assist SCDs  -AA       User Key  (r) = Recorded By, (t) = Taken By, (c) = Cosigned By    Initials Name Provider Type    AA Micaela Tate, PT Physical Therapist        Outcome Measures     Row Name 07/02/20 1447          How much help from another person do you currently need...    Turning from your back to your side while in flat bed without using bedrails?  2  -AA     Moving from lying on back to sitting on the side of a flat bed without bedrails?  2  -AA     Moving to and from a bed to a chair (including a wheelchair)?  2  -AA     Standing up from a chair using your arms (e.g., wheelchair, bedside chair)?  2  -AA     Climbing 3-5 steps with a railing?  1  -AA     To walk in hospital room?  1  -AA     AM-PAC 6 Clicks Score (PT)  10  -AA     Row Name 07/02/20 1447          Functional Assessment    Outcome Measure Options  AM-PAC 6 Clicks Basic Mobility  (PT)  -AA       User Key  (r) = Recorded By, (t) = Taken By, (c) = Cosigned By    Initials Name Provider Type    AA Micaela Tate, PT Physical Therapist        Physical Therapy Education                 Title: PT OT SLP Therapies (In Progress)     Topic: Physical Therapy (In Progress)     Point: Mobility training (In Progress)     Description:   Instruct learner(s) on safety and technique for assisting patient out of bed, chair or wheelchair.  Instruct in the proper use of assistive devices, such as walker, crutches, cane or brace.              Patient Friendly Description:   It's important to get you on your feet again, but we need to do so in a way that is safe for you. Falling has serious consequences, and your personal safety is the most important thing of all.        When it's time to get out of bed, one of us or a family member will sit next to you on the bed to give you support.     If your doctor or nurse tells you to use a walker, crutches, a cane, or a brace, be sure you use it every time you get out of bed, even if you think you don't need it.    Learning Progress Summary           Patient Acceptance, E,D, NR by AA at 7/2/2020 1447   Family Acceptance, E,D, NR by AA at 7/2/2020 1447                   Point: Home exercise program (Not Started)     Description:   Instruct learner(s) on appropriate technique for monitoring, assisting and/or progressing patient with therapeutic exercises and activities.              Learner Progress:   Not documented in this visit.          Point: Body mechanics (In Progress)     Description:   Instruct learner(s) on proper positioning and spine alignment for patient and/or caregiver during mobility tasks and/or exercises.              Learning Progress Summary           Patient Acceptance, E,D, NR by AA at 7/2/2020 1447   Family Acceptance, E,D, NR by AA at 7/2/2020 1447                   Point: Precautions (In Progress)     Description:   Instruct learner(s) on prescribed  precautions during mobility and gait tasks              Learning Progress Summary           Patient Acceptance, E,D, NR by SYLVIE at 7/2/2020 1447   Family Acceptance, E,D, NR by SYLVIE at 7/2/2020 1447                               User Key     Initials Effective Dates Name Provider Type Discipline     04/02/18 -  Micaela Tate, PT Physical Therapist PT              PT Recommendation and Plan  Planned Therapy Interventions (PT Eval): balance training, bed mobility training, gait training, home exercise program, patient/family education, strengthening, transfer training  Outcome Summary/Treatment Plan (PT)  Anticipated Discharge Disposition (PT): skilled nursing facility  Plan of Care Reviewed With: patient, spouse  Progress: improving  Outcome Summary: Pt presents s/p R hip pinning with reduced mobility and strength.  Pt requried max A of 2 for bed mobility and transfers with RW.  SPT with RW and max A of 2 with pt attempt to sit prior to being at chair requiring dependent of 2.  Pt unable to maintain TDWB RLE or follow cues for sequencing and safety.  Pain reported with mobility and subsides at rest.  Recommend DC to SNF when appropriate.     Time Calculation:   PT Charges     Row Name 07/02/20 1447             Time Calculation    Start Time  1447  -AA      PT Received On  07/02/20  -      PT Goal Re-Cert Due Date  07/12/20  -        User Key  (r) = Recorded By, (t) = Taken By, (c) = Cosigned By    Initials Name Provider Type    AA Micaela Tate PT Physical Therapist        Therapy Charges for Today     Code Description Service Date Service Provider Modifiers Qty    24123873750 HC PT EVAL MOD COMPLEXITY 4 7/2/2020 Micaela Tate, PT GP 1          PT G-Codes  Outcome Measure Options: AM-PAC 6 Clicks Basic Mobility (PT)  AM-PAC 6 Clicks Score (PT): 10    April ROX Tate PT  7/2/2020

## 2020-07-02 NOTE — PLAN OF CARE
Problem: Patient Care Overview  Goal: Plan of Care Review  Outcome: Ongoing (interventions implemented as appropriate)  Flowsheets (Taken 7/2/2020 6707)  Progress: improving  Plan of Care Reviewed With: patient  Note:   Patient denied pain during the night, dressing in place small stained area noted, patient placed on waffle mattress bottom red and blanchable, skin tear noted on right elbow mepilex in place, patient had several incontinent episodes during the night.

## 2020-07-02 NOTE — THERAPY EVALUATION
Acute Care - Occupational Therapy Initial Evaluation  [facility]     Patient Name: Ken Jasso  : 1940  MRN: [id number]  Today's Date: 2020             Admit Date: 2020       ICD-10-CM ICD-9-CM   1. Acute febrile illness R50.9 780.60   2. Acute respiratory failure with hypoxia (CMS/[facility]) J96.01 518.81   3. Ventricular tachycardia (CMS/[facility]) I47.2 427.1   4. Skin tear of right elbow without complication, initial encounter S51.011A 881.01   5. Oropharyngeal dysphagia R13.12 787.22     Patient Active Problem List   Diagnosis   • Acute febrile illness   • Fracture of right hip (CMS/HCC)   • Dementia (CMS/HCC)   • H/O: CVA (no deficit)     Past Medical History:   Diagnosis Date   • Dementia (CMS/HCC)    • Dementia (CMS/HCC)    • Dysphagia    • Esophageal dilatation    • Hiatal hernia    • PNA (pneumonia)    • PNA (pneumonia) 2019   • Pneumonia     [place] EPISODES    • Prostate CA (CMS/HCC)    • Stroke (CMS/HCC)     2019     History reviewed. No pertinent surgical history.       OT ASSESSMENT FLOWSHEET (last 12 hours)      Occupational Therapy Evaluation     Row Name 20 1457                   OT Evaluation Time/Intention    Subjective Information  no complaints  -JY        Document Type  evaluation  -JY        Mode of Treatment  occupational therapy  -JY        Patient Effort  good  -JY           General Information    Patient Profile Reviewed?  yes  -JY        Patient Observations  alert;cooperative;agree to therapy  -JY        Patient/Family Observations  spouse present at bedside and supportive in PLOF recall   -JY        Prior Level of Function  independent:;ADL's;feeding;grooming;dressing;bathing;all household mobility;gait;transfer;bed mobility;dependent:;home management;cooking;cleaning  -JY        Existing Precautions/Restrictions  fall;weight bearing;other (see comments) TDWB RLE, dementia   -JY        Risks Reviewed  patient:;spouse/S.O.:  -JY        Benefits Reviewed   patient:;spouse/S.O.:  -JY        Barriers to Rehab  cognitive status;previous functional deficit previous fxl deficits in IADL completion   -JY           Relationship/Environment    Lives With  spouse  -JY           Resource/Environmental Concerns    Current Living Arrangements  independent/assisted living facility  -JY           Home Main Entrance    Number of Stairs, Main Entrance  none  -JY           Stairs Within Home, Primary    Number of Stairs, Within Home, Primary  none  -JY           Cognitive Assessment/Interventions    Additional Documentation  Cognitive Assessment/Intervention (Group)  -JY           Cognitive Assessment/Intervention- PT/OT    Affect/Mental Status (Cognitive)  WFL;confused;other (see comments) intermittent confusion   -JY        Orientation Status (Cognition)  oriented x 4;verbal cues/prompts needed for orientation  -JY        Follows Commands (Cognition)  follows one step commands;50-74% accuracy  -JY        Cognitive Function (Cognitive)  memory deficit  -JY        Safety Deficit (Cognitive)  awareness of need for assistance;insight into deficits/self awareness;safety precautions awareness;safety precautions follow-through/compliance  -JY        Cognitive Assessment/Intervention Comment  pt A & O x 4 yet displays intermittent confusion and difficulty following commands; pt spouse served as primary contributor of PLOF   -JY           Safety Issues, Functional Mobility    Safety Issues Affecting Function (Mobility)  ability to follow commands;awareness of need for assistance;insight into deficits/self awareness;judgment;positioning of assistive device;problem solving;safety precaution awareness;safety precautions follow-through/compliance;sequencing abilities  -JY        Impairments Affecting Function (Mobility)  balance;cognition;endurance/activity tolerance;pain;range of motion (ROM);strength  -JY        Comment, Safety Issues/Impairments (Mobility)  defer to PT for specifics   -JY            Mobility Assessment/Treatment    Extremity Weight-bearing Status  right lower extremity  -JY        Right Lower Extremity (Weight-bearing Status)  touch down weight-bearing (TDWB)  -JY           Bed Mobility Assessment/Treatment    Bed Mobility Assessment/Treatment  rolling left;supine-sit;scooting/bridging  -JY        Rolling Right [place] (Bed Mobility)  maximum assist (25% patient effort);2 person assist;verbal cues;nonverbal cues (demo/gesture)  -JY        Scooting/Bridging [place] (Bed Mobility)  maximum assist (25% patient effort);2 person assist;verbal cues;nonverbal cues (demo/gesture)  -JY        Supine-Sit [place] (Bed Mobility)  maximum assist (25% patient effort);2 person assist;verbal cues;nonverbal cues (demo/gesture)  -JY        Assistive Device (Bed Mobility)  bed rails;draw sheet;head of bed elevated  -JY        Comment (Bed Mobility)  pt req'd increased time to advance LEs over to EOB and req'd physical A for LEs and trunk/ UB   -JY           Functional Mobility    Functional Mobility- Ind. Level  not tested  -JY        Functional Mobility- Comment  pt u/a to follow TDWB and when standing presented w/ significant forward flexion at trunk thus fxl mobility not assessed this date d/t safety   -JY           Transfer Assessment/Treatment    Transfer Assessment/Treatment  sit-stand transfer;stand-sit transfer;bed-chair transfer  -JY        Maintains Weight-bearing Status (Transfers)  unable to maintain weight-bearing status  -JY        Comment (Transfers)  v/c for hand placement to control ascend/descend and progress transition; pt demonstrated difficulty following commands and presented w/ safety concerns in not adhering to TDWB and forward flexion at trunk while standing. Max verbal and tactile cues provided for WB and posture and pt did not correct.   -JY           Bed-Chair Transfer    Bed-Chair [place] (Transfers)  maximum assist (25% patient effort);2 person assist;verbal  cues;contact guard  -JY        Assistive Device (Bed-Chair Transfers)  walker, front-wheeled  -JY           Sit-Stand Transfer    Sit-Stand [place] (Transfers)  maximum assist (25% patient effort);2 person assist;verbal cues;nonverbal cues (demo/gesture)  -JY        Assistive Device (Sit-Stand Transfers)  walker, front-wheeled  -JY           Stand-Sit Transfer    Stand-Sit [place] (Transfers)  maximum assist (25% patient effort);2 person assist;verbal cues;nonverbal cues (demo/gesture)  -JY        Assistive Device (Stand-Sit Transfers)  walker, front-wheeled  -JY           BADL Safety/Performance    Impairments, BADL Safety/Performance  balance;cognition;endurance/activity tolerance;pain;range of motion;strength;trunk/postural control  -JY        Cognitive Impairments, BADL Safety/Performance  awareness, need for assistance;insight into deficits/self awareness;safety precaution awareness;safety precaution follow-through;sequencing abilities  -JY        Skilled BADL Treatment/Intervention  BADL process/adaptation training  -JY        Progress in BADL Status  other (see comments) initiated for POC   -JY           General ROM    GENERAL ROM COMMENTS  BUEs grossly WFL   -JY           MMT (Manual Muscle Testing)    General MMT Comments  BUEs grossly 4 to 4+/5   -JY           Motor Assessment/Interventions    Additional Documentation  Balance (Group)  -JY           Balance    Balance  static sitting balance;static standing balance;dynamic sitting balance;dynamic standing balance  -JY           Static Sitting Balance    Level of [place] (Unsupported Sitting, Static Balance)  minimal assist, 75% patient effort  -JY        Sitting Position (Unsupported Sitting, Static Balance)  sitting on edge of bed  -JY        Time Able to Maintain Position (Unsupported Sitting, Static Balance)  more than 5 minutes  -JY           Dynamic Sitting Balance    Level of [place], Reaches Outside Midline (Sitting, Dynamic  Balance)  moderate assist, 50 to 74% patient effort  -JY        Sitting Position, Reaches Outside Midline (Sitting, Dynamic Balance)  sitting on edge of bed  -JY        Comment, Reaches Outside Midline (Sitting, Dynamic Balance)  initial distal reach for fxl purpose, pt presented w/ forward flexion at rest hindering balance   -JY           Static Standing Balance    Level of [place] (Supported Standing, Static Balance)  maximal assist, 25 to 49% patient effort;2 person assist  -JY        Assistive Device Utilized (Supported Standing, Static Balance)  walker, rolling  -JY           Dynamic Standing Balance    Level of [place], Reaches Outside Midline (Standing, Dynamic Balance)  maximal assist, 25 to 49% patient effort;2 person assist  -JY        Assistive Device Utilized (Supported Standing, Dynamic Balance)  walker, rolling  -JY           Sensory Assessment/Intervention    Sensory General Assessment  no sensation deficits identified BUEs   -JY           Positioning and Restraints    Pre-Treatment Position  in bed  -JY        Post Treatment Position  chair  -JY        In Chair  notified nsg;reclined;call light within reach;encouraged to call for assist;exit alarm on;with family/caregiver;waffle cushion;on mechanical lift sling;legs elevated;heels elevated;compression device SCDs applied; PT DECLINED ICE   -JY           Pain Assessment    Additional Documentation  Pain Scale: Numbers Pre/Post-Treatment (Group)  -JY           Pain Scale: Numbers Pre/Post-Treatment    Pain Scale: Numbers, Pretreatment  0/10 - no pain  -JY        Pain Scale: Numbers, Post-Treatment  0/10 - no pain  -JY        Pain Location - Side  Right  -JY        Pain Location  hip  -JY        Pre/Post Treatment Pain Comment  pt indicated increase pain w/ mobility yet denied any upon rest at recliner; pt declined ice pack for further relief.   -JY           Wound 06/29/20 1930 Right elbow Skin Tear    Wound - Properties Group Date first  assessed: 06/29/20  -AB Time first assessed: 1930  -AB Present on Hospital Admission: Y  -AB Side: Right  -AB Location: elbow  -AB Primary Wound Type: Skin tear  -AB Additional Comments: CLEANED AND DRESSING PLACED   -AB       Wound 07/01/20 1848 Right lateral hip Incision    Wound - Properties Group Date first assessed: 07/01/20  -HB Time first assessed: 1848  -HB Side: Right  -HB Orientation: lateral  -HB Location: hip  -HB Primary Wound Type: Incision  -HB       Plan of Care Review    Plan of Care Reviewed With  patient;spouse  -JY        Progress  no change OT IE   -JY           Clinical Impression (OT)    OT Diagnosis  decreased I in ADLs, related t/f   -JY        Patient/Family Goals Statement (OT Eval)  to increase I in ADLs, related t/f, return to PLOF   -JY        Criteria for Skilled Therapeutic Interventions Met (OT Eval)  yes;treatment indicated  -JY        Rehab Potential (OT Eval)  good, to achieve stated therapy goals  -JY        Therapy Frequency (OT Eval)  daily  -JY        Anticipated Discharge Disposition (OT)  skilled nursing facility  -JY           Vital Signs    O2 Delivery Pre Treatment  room air  -JY        O2 Delivery Intra Treatment  room air  -JY        O2 Delivery Post Treatment  room air  -JY        Pre Patient Position  Supine  -JY        Intra Patient Position  Standing  -JY        Post Patient Position  Sitting  -JY           OT Goals    Transfer Goal Selection (OT)  transfer, OT goal 1  -JY        Dressing Goal Selection (OT)  dressing, OT goal 1  -JY        Toileting Goal Selection (OT)  toileting, OT goal 1  -JY        Strength Goal Selection (OT)  strength, OT goal 1  -JY        Activity Tolerance Goal Selection (OT)  activity tolerance, OT goal 1  -JY        Additional Documentation  Strength Goal Selection (OT) (Row);Activity Tolerance Goal Selection (OT) (Row)  -JY           Transfer Goal 1 (OT)    Activity/Assistive Device (Transfer Goal 1, OT)   sit-to-stand/stand-to-sit;bed-to-chair/chair-to-bed;walker, rolling;other (see comments) for progression in ADLs w/AD/AE as recand adhere to TDWB   -JY        [place] Level/Cues Needed (Transfer Goal 1, OT)  minimum assist (75% or more patient effort);2 person assist;verbal cues required;tactile cues required  -JY        Time Frame (Transfer Goal 1, OT)  long term goal (LTG);by discharge  -JY        Progress/Outcome (Transfer Goal 1, OT)  goal ongoing  -JY           Dressing Goal 1 (OT)    Activity/Assistive Device (Dressing Goal 1, OT)  lower body dressing;other (see comments) pants/socks/undergarments w/ AD/AE as needed/recommended   -JY        [place]/Cues Needed (Dressing Goal 1, OT)  moderate assist (50-74% patient effort);verbal cues required;tactile cues required  -JY        Time Frame (Dressing Goal 1, OT)  long term goal (LTG);by discharge  -JY        Progress/Outcome (Dressing Goal 1, OT)  goal ongoing  -JY           Strength Goal 1 (OT)    Strength Goal 1 (OT)  Pt to complete seated HEP encompassing BUEs x 10 reps and tolerable resistance in order to advanced ADL and related t/f   -JY        Time Frame (Strength Goal 1, OT)  long term goal (LTG);by discharge  -JY        Progress/Outcome (Strength Goal 1, OT)  goal ongoing  -JY            Activity Tolerance Goal 1 (OT)    Activity Tolerance Goal 1 (OT)  Pt to tolerate 3-5 min standing tolerance w/ FWW in order to progress position for caregiver A hygiene/dressing w/ TDWB adhered   -JY        Activity Level (Endurance Goal 1, OT)  5 min activity  -JY        Time Frame (Activity Tolerance Goal 1, OT)  long term goal (LTG);by discharge  -JY        Progress/Outcome (Activity Tolerance Goal 1, OT)  goal ongoing  -JY          User Key  (r) = Recorded By, (t) = Taken By, (c) = Cosigned By    Initials Name Effective Dates    AB Efrem Mei RN 06/16/16 -     JAIMEY Pat Montes OT 01/29/20 -     HB Mini Pitts RN 02/21/20 -          Occupational  Therapy Education                 Title: PT OT SLP Therapies (In Progress)     Topic: Occupational Therapy (In Progress)     Point: ADL training (In Progress)     Description:   Instruct learner(s) on proper safety adaptation and remediation techniques during self care or transfers.   Instruct in proper use of assistive devices.              Learning Progress Summary           Patient Acceptance, E,D, NR by PATTI at 7/2/2020 1457   Family Acceptance, E,D, NR by PATTI at 7/2/2020 1457                   Point: Home exercise program (In Progress)     Description:   Instruct learner(s) on appropriate technique for monitoring, assisting and/or progressing therapeutic exercises/activities.              Learning Progress Summary           Patient Acceptance, E,D, NR by PATTI at 7/2/2020 1457   Family Acceptance, E,D, NR by PATTI at 7/2/2020 1457                   Point: Precautions (In Progress)     Description:   Instruct learner(s) on prescribed precautions during self-care and functional transfers.              Learning Progress Summary           Patient Acceptance, E,D, NR by PATTI at 7/2/2020 1457   Family Acceptance, E,D, NR by PATTI at 7/2/2020 1457                   Point: Body mechanics (In Progress)     Description:   Instruct learner(s) on proper positioning and spine alignment during self-care, functional mobility activities and/or exercises.              Learning Progress Summary           Patient Acceptance, E,D, NR by PATTI at 7/2/2020 1457   Family Acceptance, E,D, NR by PATTI at 7/2/2020 1457                               User Key     Initials Effective Dates Name Provider Type Discipline    JY 01/29/20 -  Pat Montes OT Occupational Therapist OT                  OT Recommendation and Plan  Outcome Summary/Treatment Plan (OT)  Anticipated Discharge Disposition (OT): skilled nursing facility  Therapy Frequency (OT Eval): daily  Plan of Care Review  Plan of Care Reviewed With: patient, spouse  Plan of Care Reviewed With:  patient, spouse  Outcome Summary: OT IE completed post chart review. Pt presents w/ decreased I in ADLs and related t/f most limited by pain w/ bed mobility, muscle weakness at BUEs, fxl endurance deficits and decreased balance and poor adherence to TDWB and postural alignment. Pt did not adhere to TDWB w/ max verbal and physical cues/A and presented w/ forward flexion at waist. Pt grossly req'd max A x 2 for all bed mobility, STS and fxl transfer to recliner. Pt u/a to complete any LBB or bathing d/t pain at hip w/ distal reach and difficulty following commands. Pt indicates pain resolves w/ neutral position and seated rest. Pt would benefit from further IPOT POC and d/c recommendation being SNF given underlying impairments and social support.    Outcome Measures     Row Name 07/02/20 1457             How much help from another is currently needed...    Putting on and taking off regular lower body clothing?  2  -JY      Bathing (including washing, rinsing, and drying)  1  -JY      Toileting (which includes using toilet bed pan or urinal)  1  -JY      Putting on and taking off regular upper body clothing  2  -JY      Taking care of personal grooming (such as brushing teeth)  2  -JY      Eating meals  1  -JY      AM-PAC 6 Clicks Score (OT)  9  -JY         Functional Assessment    Outcome Measure Options  AM-PAC 6 Clicks Daily Activity (OT)  -JY        User Key  (r) = Recorded By, (t) = Taken By, (c) = Cosigned By    Initials Name Provider Type    Pat Carranza OT Occupational Therapist          Time Calculation:   Time Calculation- OT     Row Name 07/02/20 1457             Time Calculation- OT    OT Start Time  1457  -JY      OT Received On  07/02/20  -JY      OT Goal Re-Cert Due Date  07/12/20  -JY        User Key  (r) = Recorded By, (t) = Taken By, (c) = Cosigned By    Initials Name Provider Type    Pat Carranza OT Occupational Therapist        Therapy Charges for Today     Code Description Service Date  Service Provider Modifiers Qty    58045199895 HC OT EVAL MOD COMPLEXITY 4 7/2/2020 Pat Montes OT GO 1               Pat Montes OT  7/2/2020

## 2020-07-02 NOTE — PLAN OF CARE
Problem: Patient Care Overview  Goal: Plan of Care Review  Outcome: Ongoing (interventions implemented as appropriate)  Flowsheets (Taken 7/2/2020 5349)  Plan of Care Reviewed With: patient  Note:   SLP evaluation completed. Will f/u for further dysphagia edu/tx, as appropriate per POC . Please see note for further details and recommendations.

## 2020-07-02 NOTE — ANESTHESIA POSTPROCEDURE EVALUATION
Patient: Ken Jasso    Procedure Summary     Date:  07/01/20 [facility]    Anesthesia Start:  1813 Anesthesia Stop:      Procedure:  HIP PERCUTANEOUS PINNING (Right Hip) Diagnosis:      Surgeon:  Sam Harp MD Provider:  Darci Salinas MD    Anesthesia Type:  general with block ASA Status:  3          Anesthesia Type: general with block    Vitals  No vitals data found for the desired time range.          Post Anesthesia Care and Evaluation    Patient location during evaluation: PACU  Patient participation: complete - patient participated  Level of consciousness: awake and alert  Pain score: 0  Pain management: adequate  Airway patency: patent  Anesthetic complications: No anesthetic complications  PONV Status: none  Cardiovascular status: hemodynamically stable and acceptable  Respiratory status: nonlabored ventilation, acceptable and nasal cannula  Hydration status: acceptable

## 2020-07-02 NOTE — PLAN OF CARE
Problem: Dysphagia (Adult)  Goal: Identify Related Risk Factors and Signs and Symptoms  Outcome: Outcome(s) achieved  Flowsheets (Taken 7/2/2020 1617)  Related Risk Factors (Dysphagia): knowledge deficit; mental status altered  General Observations Signs and Symptoms (Dysphagia): recurring pneumonia  Oral Phase Dysphagia Signs and Symptoms (Dysphagia): impaired mastication  Pharyngeal Phase Dysphagia Signs and Symptoms (Dysphagia): aspiration clinically evident  Upper Esophageal Phase Dysphagia Signs and Symptoms (Dysphagia): difficulty with solid foods   Pt failed MBS, unable to swallow safely, will continue to monitor

## 2020-07-02 NOTE — THERAPY RE-EVALUATION
Acute Care - Speech Language Pathology   Swallow Re-Evaluation [facility]   Clinical Swallow Evaluation       Patient Name: Ken Jasso  : 1940  MRN: [id number]  Today's Date: 2020               Admit Date: 2020    Visit Dx:     ICD-10-CM ICD-9-CM   1. Acute febrile illness R50.9 780.60   2. Acute respiratory failure with hypoxia (CMS/[facility]) J96.01 518.81   3. Ventricular tachycardia (CMS/[facility]) I47.2 427.1   4. Skin tear of right elbow without complication, initial encounter S51.011A 881.01   5. Dysphagia, unspecified type R13.10 787.20     Patient Active Problem List   Diagnosis   • Acute febrile illness   • Fracture of right hip (CMS/HCC)   • Dementia (CMS/HCC)   • H/O: CVA (no deficit)     Past Medical History:   Diagnosis Date   • Dementia (CMS/HCC)    • Dementia (CMS/HCC)    • Dysphagia    • Esophageal dilatation    • Hiatal hernia    • PNA (pneumonia)    • PNA (pneumonia) 2019   • Pneumonia     [place] EPISODES    • Prostate CA (CMS/HCC)    • Stroke (CMS/HCC)          History reviewed. No pertinent surgical history.     SWALLOW EVALUATION (last 72 hours)      SLP Adult Swallow Evaluation     Row Name 20 0930 20 1500 20 1100             Rehab Evaluation    Document Type  re-evaluation  -CH  re-evaluation  -AW  evaluation  -AW      Subjective Information  no complaints  -CH  no complaints  -AW  no complaints  -AW      Patient Observations  alert;cooperative;agree to therapy  -CH  alert;cooperative  -AW  alert;cooperative  -AW      Patient/Family Observations  No family present  -CH  no family present  -AW  --      Patient Effort  good  -CH  good  -AW  --      Symptoms Noted During/After Treatment  --  none  -AW  --         General Information    Patient Profile Reviewed  yes  -CH  yes  -AW  --      Pertinent History Of Current Problem  see initial evaluation. Pt. with s/s of aspiration with thin liquids on initial evaluation. Diet recommended was lvl 4 ,  NTL with recommendation for instrumental evaluation following hip sx. Re-evaluation completed to assess swallow function and determine readiness for instrumental evaluation.   -CH  see prior  -AW  --      Current Method of Nutrition  NPO  -CH  NPO repeating bedside as surgery not being done today.   -AW  --      Precautions/Limitations, Vision  WFL  -CH  --  --      Precautions/Limitations, Hearing  WFL  -CH  --  --      Plans/Goals Discussed with  patient  -CH  patient  -AW  --      Barriers to Rehab  none identified  -CH  none identified  -AW  --      Patient's Goals for Discharge  patient did not state  -CH  patient did not state  -AW  --         Pain Assessment    Additional Documentation  Pain Scale: FACES Pre/Post-Treatment (Group);Pain Scale: Numbers Pre/Post-Treatment (Group)  -CH  --  --         Pain Scale: Numbers Pre/Post-Treatment    Pain Scale: Numbers, Pretreatment  0/10 - no pain  -CH  0/10 - no pain  -AW  --      Pain Scale: Numbers, Post-Treatment  0/10 - no pain  -CH  0/10 - no pain  -AW  --         Pain Scale: FACES Pre/Post-Treatment    Pain: FACES Scale, Pretreatment  0-->no hurt  -CH  --  --      Pain: FACES Scale, Post-Treatment  0-->no hurt  -CH  --  --         Oral Motor and Function    Dentition Assessment  other (see comments);missing teeth wears lower dentures, not available. Left at home?  -CH  --  --      Secretion Management  WNL/WFL  -CH  requires suctioning to control secretions pt coughs up secretions  -AW  --      Mucosal Quality  dry  -CH  moist, healthy  -AW  --      Volitional Swallow  delayed  -CH  delayed  -AW  --      Volitional Cough  WFL  -CH  WFL  -AW  --         Oral Musculature and Cranial Nerve Assessment    Oral Motor General Assessment  WFL  -CH  --  --         General Eating/Swallowing Observations    Eating/Swallowing Skills  self-fed;appropriate self-feeding skills observed  -CH  self-fed  -AW  self-fed  -AW      Positioning During Eating  upright 90 degree;upright  in bed  -CH  upright in bed  -AW  upright in bed  -AW      Utensils Used  spoon;cup  -CH  spoon;straw  -AW  spoon;straw  -AW      Consistencies Trialed  thin liquids;nectar/syrup-thick liquids;pureed;soft textures  -CH  thin liquids;pureed;nectar/syrup-thick liquids;regular textures  -AW  regular textures;pureed;thin liquids  -AW         Respiratory    Respiratory Status  WFL  -CH  WFL  -AW  --         Clinical Swallow Eval    Oral Prep Phase  impaired  -CH  WFL  -AW  WFL  -AW      Oral Transit  WFL  -CH  WFL  -AW  WFL  -AW      Oral Residue  WFL  -CH  WFL  -AW  WFL  -AW      Pharyngeal Phase  suspected pharyngeal impairment  -CH  suspected pharyngeal impairment  -AW  suspected pharyngeal impairment  -AW      Esophageal Phase  suspected esophageal impairment  -CH  suspected esophageal impairment  -AW  suspected esophageal impairment  -AW      Clinical Swallow Evaluation Summary  Pt with hx of esophageal dysphagia and dilation. Prolonged and inadequate mastication with soft solids d/t missing lower dentures. Throat clear/cough following thin liquid trials via teaspoon. No s/s with NT liquids via cup. No s/s with pureed or pureed/mashed trials. Recommend lvl 3, NTL via cup. MBS to further evaluate.  -CH  Pt re-evaluated at the bedside as surgery may be planned for tomorrow. Do not want to proceed with FEES, then pt be intubated for surgery, and swallow status have worsening. Pt still showing some throat clear and multiple swallows with thins. He is not showing overt s/s with nectar or foods. Will place on L4, NT and f/u tomorrow for surgery plan. Will do FEES after surgery.   -AW  Pt with h/o esophageal dilations. Pt with wet vocal quality and throat clearing after all trials, intermittent coughing noted with thins. Will do FEES today.   -AW         Oral Prep Concerns    Oral Prep Concerns  prolonged mastication;inefficient mastication  -CH  --  --      Prolonged Mastication  mechanical soft  -CH  --  --       Inefficient Mastication  mechanical soft  -CH  --  --         Pharyngeal Phase Concerns    Pharyngeal Phase Concerns  cough;throat clear  -CH  wet vocal quality;multiple swallows  -AW  wet vocal quality;cough;throat clear;multiple swallows  -AW      Wet Vocal Quality  --  thin  -AW  thin  -AW      Multiple Swallows  --  --  thin  -AW      Cough  thin  -CH  --  thin  -AW      Throat Clear  thin  -CH  thin  -AW  thin;pudding;regular consistencies  -AW         Esophageal Phase Concerns    Esophageal Phase Concerns  belching  -CH  belching  -AW  --      Belching  thin;nectar  -CH  --  --         Clinical Impression    SLP Swallowing Diagnosis  mild;oral dysfunction;suspected pharyngeal dysfunction  -CH  functional oral phase;suspected pharyngeal dysfunction  -AW  functional oral phase;suspected pharyngeal dysfunction  -AW      Functional Impact  risk of aspiration/pneumonia  -CH  risk of aspiration/pneumonia  -AW  risk of aspiration/pneumonia  -AW      Swallow Criteria for Skilled Therapeutic Interventions Met  demonstrates skilled criteria  -CH  demonstrates skilled criteria  -AW  demonstrates skilled criteria  -AW         Recommendations    Therapy Frequency (Swallow)  PRN  -CH  PRN  -AW  --      SLP Diet Recommendation  puree with some mashed;nectar thick liquids  -CH  mechanical soft with no mixed consistencies;nectar thick liquids  -AW  --      Recommended Diagnostics  VFSS (MBS)  -  reassess via clinical swallow evaluation;FEES;other (see comments) will check surgery status tomorrow. FEES to follow hip repai  -AW  FEES  -AW      Recommended Precautions and Strategies  upright posture during/after eating  -CH  upright posture during/after eating  -AW  --      SLP Rec. for Method of Medication Administration  meds whole;with pudding or applesauce;with thick liquids  -  meds whole;with pudding or applesauce;with thick liquids  -AW  --      Monitor for Signs of Aspiration  notify SLP if any concerns  -CH  notify  SLP if any concerns  -AW  --      Anticipated Dischage Disposition (SLP)  unknown  -  unknown  -AW  --        User Key  (r) = Recorded By, (t) = Taken By, (c) = Cosigned By    Initials Name Effective Dates    AW FatimaMacie, MS CCC-SLP 06/22/15 -     Ny Mckee MS CCC-SLP 02/14/19 -           EDUCATION  The patient has been educated in the following areas:   Dysphagia (Swallowing Impairment) Modified Diet Instruction.    SLP Recommendation and Plan  SLP Swallowing Diagnosis: mild, oral dysfunction, suspected pharyngeal dysfunction  SLP Diet Recommendation: puree with some mashed, nectar thick liquids  Recommended Precautions and Strategies: upright posture during/after eating  SLP Rec. for Method of Medication Administration: meds whole, with pudding or applesauce, with thick liquids     Monitor for Signs of Aspiration: notify SLP if any concerns  Recommended Diagnostics: VFSS (MBS)  Swallow Criteria for Skilled Therapeutic Interventions Met: demonstrates skilled criteria  Anticipated Dischage Disposition (SLP): unknown     Therapy Frequency (Swallow): PRN                      Time Calculation:   Time Calculation- SLP     Row Name 07/02/20 1016             Time Calculation- SLP    SLP Start Time  0930  -      SLP Received On  07/02/20  -        User Key  (r) = Recorded By, (t) = Taken By, (c) = Cosigned By    Initials Name Provider Type    Ny Mckee MS CCC-SLP Speech and Language Pathologist          Therapy Charges for Today     Code Description Service Date Service Provider Modifiers Qty    14892320472 HC ST EVAL ORAL PHARYNG SWALLOW 3 7/2/2020 Ny Rendon MS CCC-SLP GN 1               Ny Rendon MS CCC-TREVA  7/2/2020

## 2020-07-02 NOTE — OP NOTE
SIDE: RIGHT hip    Location: [place], [place]    Surgeon: Sam Harp M.D.       Assistant: none    Anesthesia:  General endotracheal anesthesia    Block: Iliofascial block with indwelling catheter by anesthesia team, removed prior to surgery    Pre-operative Diagnosis                          1. Non-displaced valgus impacted femoral neck fracture      Post-operative Diagnosis  1. same    Operative Procedure         1. Closed reduction, percutaneous hip screw fixation    Intraoperative Complications: None    Estimated Blood Loss: 10mL    Drain: NO    Specimen: None    Antibiotics:   IV Cefazolin infused prior to incision    Fixation Technique  Percutaneous screw fixation  Traction with Lo Table    Intraoperative findings:   Valgus impacted fracture    Components:  Synthes   3 screws implanted; 7.3mm cannulated screws  Inferior - 80mm with washer short thread  Superior/Posterior - 75mm with washer fully threaded (placed a partial thread but removed and much better bite with fully threaded)  Superior/Anterior - 80mm no washer, short thread    Time out: Time out was called and the patient, side, site, and intended procedure was confirmed.    Counts: Needle, lap sponge, and ray-javi sponge counts were correct prior to closure.    Marked: The patient was marked with an indelible marker in the preoperative holding area confirming the correct site and side.    History & Physical:   A history and physical examination was completed at the time of consultation.     Consent: The patient signed the consent form for surgery with an understanding of the risks and benefits as outlined at the time of consultation and in the preoperative holding area.    Risks and Benefits:   Need for blood transfusion; medical complications with anesthesia/surgery including deep venous thrombosis, pulmonary embolus, stroke, myocardial infarction, and death; potential block complications if block performed;  infection; revision surgery; fracture; failure of hardware; nerve or blood vessel injury; incomplete pain relief; incomplete restoration of hip range of motion; possible need to revise to hip hemiarthroplasty.    I personally spoke with the family by phone preoperatively as well and consent was obtained with the family as well.    Indications for surgery: The patient suffered an acute femoral neck fracture, but stable valgus impacted pattern.    Operative Findings     Femoral neck fracture    Procedure in Detail:   The patient was seen and identified in the preoperative area.  The operative extremity was marked with an indelible marker.  The patient was examined and a consult note was signed in the chart.  The patient understood the risks and benefits of the surgery and elected to proceed with surgery. The patient was taken to the operating room, placed on the operating table in the supine position where general anesthesia was administered.  All bony prominences were well-padded.  The upper extremities were padded and secured.    The well leg was placed and well-padded in the leg ybarra.  The operative leg was placed in the traction boot. C-arm confirmed the fracture.  Mild traction and slight adduction was applied prior to sterile prep and drape. Fracture was well aligned.    The operative extremity was cleaned with isopropyl alcohol then sterile prepping and draping. Iodine-impregnated drape was placed to ensure no skin edges were exposed.  A fresh pair of surgical gloves was placed following placement of the iodine-impregnated drape.      I used fluoroscopy to determine the proper start point using a percutaneous pin placement.  I then made a small incision to allow for all 3 screws to be placed.  I split the fascia.  I started with the inferior screw I placed the guidepin and confirmed proper positioning on the AP and lateral films try to stay as close as I could to the inferior calcar and centered on the  lateral view.  Once I was pleased with this I proceeded to place the posterior superior pin in the anterior superior pin with the assistance of the Jignesh gun device.  I tried to minimize passages of the guidepins to prevent subtrochanteric fracture and also started above the level of the lesser trochanter.  I was pleased with all 3 pin positions given the spread in the length.    I started with placement of the inferior screw I used the drill to open the lateral cortex and placed a partially threaded short thread 80 mm screw with excellent fixation.  I then proceeded to place the posterior superior screw with a 75 mm partially-threaded initially but did not have a great bite so ultimately at the end switch this to a 75 mm fully threaded with a washer.  The inferior screw also had a washer.  And finally I placed the anterior screw in a similar fashion an 80 mm partially-threaded screw with no washer had an excellent bite.  I sequentially retightened each to ensure proper fixation and was very pleased.  The fracture remained in a stable position and was well-appearing.    I copiously irrigated and then closed the fascial layer with 0 Vicryl the deep layer with 0 Vicryl and then ultimately 2-0 Vicryl 3-0 PDS and skin fixation Dermabond.  And then Steri-Strips and a sterile dressing was placed.      The patient tolerated the procedure well and was transferred to the recovery room in satisfactory condition.        Postoperative Plan:    Radiographs: AP pelvis and hip lateral in recovery room    Activity: Touchdown weightbearing letting pain be the patient’s guide and assistance needed along with walker and wheelchair.    PT consultation: assistance and walker/wheelchair    Antibiotics: postoperative IV antibiotics for 24hr postoperatively    DVT prophylaxis: Perioperative DVT prophylaxis with sequential compression devices (SCDs) on both lower extremities.  Aspirin 325mg PO BID for 2 weeks    Diet: advance as  tolerated/defer to primary team    Follow-up in clinic: 3 weeks, x-rays on arrival at clinic AP pelvis and hip lateral

## 2020-07-02 NOTE — DISCHARGE INSTR - DIET
MBS/VFSS Reason for Referral 7/2/2020  Patient was referred for a MBS to assess the efficiency of his/her swallow function, rule out aspiration and make recommendations regarding safe dietary consistencies, effective compensatory strategies, and safe eating environment.             Recommendations/Treatment  SLP Swallowing Diagnosis: mild, oral dysfunction, severe, profound, pharyngeal dysfunction  Functional Impact: risk of aspiration/pneumonia, risk of malnutrition, risk of dehydration  Rehab Potential/Prognosis, Swallowing: re-evaluate goals as necessary  Swallow Criteria for Skilled Therapeutic Interventions Met: demonstrates skilled criteria  Therapy Frequency (Swallow): 5 days per week  Predicted Duration Therapy Intervention (Days): until discharge  SLP Diet Recommendation: NPO, temporary alternate methods of nutrition/hydration, other (see comments)(may consider comfort diet 2' adv age + hx dementia)  SLP Rec. for Method of Medication Administration: meds via alternate route  Anticipated Dischage Disposition (SLP): anticipate therapy at next level of care  Demonstrates Need for Referral to Another Service: other (see comments)(may consider palliative care team consult)    Instrumental Set-up  Utensils Used: spoon, cup  Consistencies Trialed: thin liquids, nectar/syrup-thick liquids, honey-thick liquids, pudding thick    Oral Preparation/ Oral Phase  Oral Prep Phase: WFL, other (see comments)(for consistencies trialed)  Oral Transit Phase: impaired  Oral Residue: impaired  Oral Phase, Comment: DNT solid 2' severity of aspiration risk     Impaired Oral Transit Phase: increased A-P transit time  Impaired Oral Residue: lingual residue  Lingual Residue: pudding/puree  Response to Oral Residue: cleared residue, with spontaneous subsequent swallow  Oral Residue, Comment: Mild amount    Pharyngeal Phase  Initiation of Pharyngeal Swallow: bolus in pyriform sinuses  Pharyngeal Phase: impaired pharyngeal phase of  swallowing  Penetration Before the Swallow: thin liquids, nectar-thick liquids, secondary to delayed swallow initiation or mistiming  Penetration During the Swallow: thin liquids, nectar-thick liquids, secondary to delayed swallow initiation or mistiming, secondary to reduced laryngeal elevation, secondary to reduced vestibular closure  Penetration After the Swallow: honey-thick liquids, pudding/puree, secondary to residue, in valleculae, in pyriform sinuses  Aspiration After the Swallow: thin liquids, nectar-thick liquids, secondary to residue, in valleculae, in pyriform sinuses, in laryngeal vestibule  Response to Penetration: no response  Response to Aspiration: no response, silent aspiration  Pharyngeal Residue: all consistencies tested, diffuse within pharynx, secondary to reduced base of tongue retraction, secondary to reduced posterior pharyngeal wall stripping, secondary to reduced laryngeal elevation, secondary to reduced hyolaryngeal excursion, other (see comments)(greatest in valleculae & w/ pudding)  Response to Residue: unable to clear residue  Attempted Compensatory Maneuvers: bolus size, bolus presentation style, chin tuck, head turn to right, multiple swallows, effortful (hard swallow), combination of maneuvers (see comments)  Response to Attempted Compensatory Maneuvers: did not prevent aspiration, did not reduce residue  Pharyngeal Phase, Comment: There was penetration of thin and nectar-thick liquid before/during the swallow w/ aspiration occurring after the swallow. There was diffuse pharyngeal residue across consistencies - mod-severe w/ thin/nectar/honey, profound w/ minimal clearance w/ pudding. This led to penetration of honey-thick and pudding consistencies after the swallow that pt did not appear to sense nor spontaneously clear. Aspiration of pharyngeal residue deemed inevitable across consistencies. Pt exhibited wet vocal quality near end of study; otherwise, aspiration was SILENT.  Significant diffuse pharyngeal weakness noted, as well as prominent c-spine curvature, and prominent cricopharyngeous. Safest options is NPO w/ alternative means of nutrition. Given advanced age + hx dementia, may consider comfort diet, pending POC. Discussed w/ Dr. Britt who plans to speak w/ family. If comfort diet desired, consider dysphagia level III (puree/mashed) diet w/ allowance of soft solids, as tolerated, and thin vs nectar-thick liquids, as tolerated.                     MBS/VFSS Reason for Referral 7/13/2020  Patient was referred for a MBS to assess the efficiency of his/her swallow function, rule out aspiration and make recommendations regarding safe dietary consistencies, effective compensatory strategies, and safe eating environment.             Recommendations/Treatment  SLP Swallowing Diagnosis: mild, oral dysfunction, severe, profound, pharyngeal dysfunction  Functional Impact: risk of aspiration/pneumonia, risk of malnutrition, risk of dehydration  Rehab Potential/Prognosis, Swallowing: re-evaluate goals as necessary  Swallow Criteria for Skilled Therapeutic Interventions Met: demonstrates skilled criteria  Therapy Frequency (Swallow): 5 days per week  Predicted Duration Therapy Intervention (Days): until discharge  SLP Diet Recommendation: full liquid diet, other (see comments)(conservative comfort diet suggestion 2' pt refusal of FT)  Recommended Precautions and Strategies: upright posture during/after eating, small bites of food and sips of liquid, other (see comments)(slow pace, aggressive oral care - prior to PO)  SLP Rec. for Method of Medication Administration: meds crushed, with pudding or applesauce(crush w/ ice cream/runny puree if possible)  Monitor for Signs of Aspiration: notify SLP if any concerns  Anticipated Dischage Disposition (SLP): anticipate therapy at next level of care, skilled nursing facility    Instrumental Set-up  Utensils Used: spoon, cup  Consistencies Trialed: thin  liquids, nectar/syrup-thick liquids, honey-thick liquids, pudding thick    Oral Preparation/ Oral Phase  Oral Prep Phase: WFL, other (see comments)(for consistencies trialed)  Oral Transit Phase: impaired  Oral Residue: impaired  Oral Phase, Comment: DNT solid 2' severity of aspiration risk     Impaired Oral Transit Phase: increased A-P transit time  Impaired Oral Residue: lingual residue  Lingual Residue: pudding/puree  Response to Oral Residue: cleared residue, with spontaneous subsequent swallow  Oral Residue, Comment: Mild amount    Pharyngeal Phase  Initiation of Pharyngeal Swallow: bolus in pyriform sinuses  Pharyngeal Phase: impaired pharyngeal phase of swallowing  Penetration Before the Swallow: thin liquids, nectar-thick liquids, secondary to delayed swallow initiation or mistiming  Aspiration Before the Swallow: thin liquids, nectar-thick liquids, secondary to delayed swallow initiation or mistiming  Penetration During the Swallow: thin liquids, nectar-thick liquids, secondary to delayed swallow initiation or mistiming, secondary to reduced laryngeal elevation, secondary to reduced vestibular closure  Penetration After the Swallow: pudding/puree, secondary to residue, in valleculae, in pyriform sinuses  Aspiration After the Swallow: thin liquids, nectar-thick liquids, honey-thick liquids, secondary to residue, in valleculae, in pyriform sinuses, in laryngeal vestibule  Response to Penetration: no response  Response to Aspiration: inconsistent response, other (see comments)(wk/non-productive cough only w/ large amounts of aspiration)  Pharyngeal Residue: all consistencies tested, diffuse within pharynx, secondary to reduced base of tongue retraction, secondary to reduced posterior pharyngeal wall stripping, secondary to reduced laryngeal elevation, secondary to reduced hyolaryngeal excursion, other (see comments)(greatest in valleculae & w/ pudding (severe-profound amt))  Response to Residue: unable to  "clear residue  Attempted Compensatory Maneuvers: bolus size, bolus presentation style, head turn to right, multiple swallows, effortful (hard swallow), breath hold, supraglottic swallow  Response to Attempted Compensatory Maneuvers: did not prevent aspiration, did not reduce residue  Pharyngeal Phase, Comment: Unforunately, no improvement in oropharyngeal swallow function. Pt cont to exhibit penetration/aspiration of thin and nectar-thick liquids before/during the swallow. Cont'd significant pharyngeal residue across consistencies--particularly w/ pudding--which resulted in aspiration of thin/nectar/honey after the swallow and penetration of pudding residue after the swallow (eventual aspiration deemed inevitable). Pt exhibited larger amount of aspiration of liquid when administered as liquid wash as residue increased. Discussed results of study, risks of aspiration, silent aspiration, swallow physiology, and options w/ pt and his spouse following study.  Pt's wife reported that he has had swallowing issues in the past, as well as pneumonia. Suspect dysphagia in acute-on-chronic in nature. Pt/wife cont to decline feeding tube, despite understanding risks of aspiration. Desire \"safest-possible\" PO diet, though understand pt will aspirate all consistencies to some degree. Pt strongly dislikes thickened liquids and would like to avoid these, as well. Pt/wife would like to try full liquid diet (thin liquids) w/ precautions in place @ this time. Paged Dr. Dorado who ok'd this diet if pt's wishes. Will f/u for further edu/modifications and tx, if appropriate per POC.    Cervical Esophageal Phase                  "

## 2020-07-02 NOTE — PROGRESS NOTES
Continued Stay Note  [facility]     Patient Name: Ken Jasso  MRN: [id number]  Today's Date: 7/2/2020    Admit Date: 6/29/2020    Discharge Plan     Row Name 07/02/20 1514       Plan    Plan  Home with Home Health vs Inpatient Rehab    Patient/Family in Agreement with Plan  yes    Plan Comments  CM is continuing to follow Mr. Jasso for PT and OT evaluation.  He will likely benefit from inpatient rehab.  Per ICU CM notes, Mr. Jasso was agreeable to a referral to [facility].  Referred Mr. Jasso to [place] with [facility].  A prior auth will be required from Mr. Jasso's [employer] for the rehab admission.  CM will follow up.    Final Discharge Disposition Code  03 - skilled nursing facility (SNF)        Discharge Codes    No documentation.       Expected Discharge Date and Time     Expected Discharge Date Expected Discharge Time    Jul 5, 2020             Abida Florence RN

## 2020-07-02 NOTE — MBS/VFSS/FEES
Acute Care - Speech Language Pathology   Swallow Initial Evaluation  [place]   Modified Barium Swallow Study (MBS)     Patient Name: Ken Jasso  : 1940  MRN: [id number]  Today's Date: 2020               Admit Date: 2020    Visit Dx:     ICD-10-CM ICD-9-CM   1. Acute febrile illness R50.9 780.60   2. Acute respiratory failure with hypoxia (CMS/[facility]) J96.01 518.81   3. Ventricular tachycardia (CMS/[facility]) I47.2 427.1   4. Skin tear of right elbow without complication, initial encounter S51.011A 881.01   5. Oropharyngeal dysphagia R13.12 787.22     Patient Active Problem List   Diagnosis   • Acute febrile illness   • Fracture of right hip (CMS/HCC)   • Dementia (CMS/HCC)   • H/O: CVA (no deficit)     Past Medical History:   Diagnosis Date   • Dementia (CMS/HCC)    • Dementia (CMS/HCC)    • Dysphagia    • Esophageal dilatation    • Hiatal hernia    • PNA (pneumonia)    • PNA (pneumonia) 2019   • Pneumonia     [place] EPISODES    • Prostate CA (CMS/HCC)    • Stroke (CMS/HCC)     2019     History reviewed. No pertinent surgical history.     SWALLOW EVALUATION (last 72 hours)      SLP Adult Swallow Evaluation     Row Name 20 1310 20 0930 20 1500 20 1100          Rehab Evaluation    Document Type  evaluation  -AC  re-evaluation  -CH  re-evaluation  -AW  evaluation  -AW     Subjective Information  no complaints  -AC  no complaints  -CH  no complaints  -AW  no complaints  -AW     Patient Observations  alert;cooperative  -AC  alert;cooperative;agree to therapy  -CH  alert;cooperative  -AW  alert;cooperative  -AW     Patient/Family Observations  No family present.   -AC  No family present  -CH  no family present  -AW  --     Patient Effort  good  -AC  good  -CH  good  -AW  --     Symptoms Noted During/After Treatment  --  --  none  -AW  --        General Information    Patient Profile Reviewed  yes  -AC  yes  -CH  yes  -AW  --     Pertinent History Of Current Problem   See prev eval.  -AC  see initial evaluation. Pt. with s/s of aspiration with thin liquids on initial evaluation. Diet recommended was lvl 4 , NTL with recommendation for instrumental evaluation following hip sx. Re-evaluation completed to assess swallow function and determine readiness for instrumental evaluation.   -CH  see prior  -AW  --     Current Method of Nutrition  pureed with some mashed;nectar/syrup-thick liquids  -AC  NPO  -CH  NPO repeating bedside as surgery not being done today.   -AW  --     Precautions/Limitations, Vision  --  WFL  -CH  --  --     Precautions/Limitations, Hearing  --  WFL  -CH  --  --     Prior Level of Function-Swallowing  esophageal concerns;other (see comments) hx esophageal dilation per chart  -AC  --  --  --     Plans/Goals Discussed with  patient  -AC  patient  -CH  patient  -AW  --     Barriers to Rehab  cognitive status  -  none identified  -  none identified  -AW  --     Patient's Goals for Discharge  patient did not state  -AC  patient did not state  -CH  patient did not state  -AW  --        Pain Assessment    Additional Documentation  --  Pain Scale: FACES Pre/Post-Treatment (Group);Pain Scale: Numbers Pre/Post-Treatment (Group)  -CH  --  --        Pain Scale: Numbers Pre/Post-Treatment    Pain Scale: Numbers, Pretreatment  --  0/10 - no pain  -CH  0/10 - no pain  -AW  --     Pain Scale: Numbers, Post-Treatment  --  0/10 - no pain  -CH  0/10 - no pain  -AW  --        Pain Scale: FACES Pre/Post-Treatment    Pain: FACES Scale, Pretreatment  0-->no hurt  -AC  0-->no hurt  -CH  --  --     Pain: FACES Scale, Post-Treatment  0-->no hurt  -AC  0-->no hurt  -CH  --  --        Oral Motor and Function    Dentition Assessment  --  other (see comments);missing teeth wears lower dentures, not available. Left at home?  -CH  --  --     Secretion Management  --  WNL/WFL  -CH  requires suctioning to control secretions pt coughs up secretions  -AW  --     Mucosal Quality  --  dry  -CH   moist, healthy  -AW  --     Volitional Swallow  --  delayed  -CH  delayed  -AW  --     Volitional Cough  --  WFL  -CH  WFL  -AW  --        Oral Musculature and Cranial Nerve Assessment    Oral Motor General Assessment  --  [facility]  -  --  --        General Eating/Swallowing Observations    Respiratory Support Currently in Use  room air  -AC  --  --  --     Eating/Swallowing Skills  fed by SLP;self-fed;appropriate self-feeding skills observed  -AC  self-fed;appropriate self-feeding skills observed  -  self-fed  -AW  self-fed  -AW     Positioning During Eating  upright 90 degree;upright in chair  -AC  upright 90 degree;upright in bed  -  upright in bed  -AW  upright in bed  -AW     Utensils Used  --  spoon;cup  -  spoon;straw  -AW  spoon;straw  -AW     Consistencies Trialed  --  thin liquids;nectar/syrup-thick liquids;pureed;soft textures  -  thin liquids;pureed;nectar/syrup-thick liquids;regular textures  -  regular textures;pureed;thin liquids  -AW        Respiratory    Respiratory Status  --  WFL  -  WFL  -AW  --        Clinical Swallow Eval    Oral Prep Phase  --  impaired  -  WFL  -AW  WFL  -AW     Oral Transit  --  [facility]  -  WFL  -AW  WFL  -AW     Oral Residue  --  L  -  WFL  -AW  WFL  -AW     Pharyngeal Phase  --  suspected pharyngeal impairment  -  suspected pharyngeal impairment  -AW  suspected pharyngeal impairment  -AW     Esophageal Phase  --  suspected esophageal impairment  -  suspected esophageal impairment  -AW  suspected esophageal impairment  -AW     Clinical Swallow Evaluation Summary  --  Pt with hx of esophageal dysphagia and dilation. Prolonged and inadequate mastication with soft solids d/t missing lower dentures. Throat clear/cough following thin liquid trials via teaspoon. No s/s with NT liquids via cup. No s/s with pureed or pureed/mashed trials. Recommend lvl 3, NTL via cup. MBS to further evaluate.  -  Pt re-evaluated at the bedside as surgery may be planned for  tomorrow. Do not want to proceed with FEES, then pt be intubated for surgery, and swallow status have worsening. Pt still showing some throat clear and multiple swallows with thins. He is not showing overt s/s with nectar or foods. Will place on L4, NT and f/u tomorrow for surgery plan. Will do FEES after surgery.   -AW  Pt with h/o esophageal dilations. Pt with wet vocal quality and throat clearing after all trials, intermittent coughing noted with thins. Will do FEES today.   -AW        Oral Prep Concerns    Oral Prep Concerns  --  prolonged mastication;inefficient mastication  -CH  --  --     Prolonged Mastication  --  mechanical soft  -CH  --  --     Inefficient Mastication  --  mechanical soft  -CH  --  --        Pharyngeal Phase Concerns    Pharyngeal Phase Concerns  --  cough;throat clear  -CH  wet vocal quality;multiple swallows  -AW  wet vocal quality;cough;throat clear;multiple swallows  -AW     Wet Vocal Quality  --  --  thin  -AW  thin  -AW     Multiple Swallows  --  --  --  thin  -AW     Cough  --  thin  -CH  --  thin  -AW     Throat Clear  --  thin  -CH  thin  -AW  thin;pudding;regular consistencies  -AW        Esophageal Phase Concerns    Esophageal Phase Concerns  --  belching  -CH  belching  -AW  --     Belching  --  thin;nectar  -CH  --  --        MBS/VFSS    Utensils Used  spoon;cup  -AC  --  --  --     Consistencies Trialed  thin liquids;nectar/syrup-thick liquids;honey-thick liquids;pudding thick  -AC  --  --  --        MBS/VFSS Interpretation    Oral Prep Phase  WFL;other (see comments) for consistencies trialed  -AC  --  --  --     Oral Transit Phase  impaired  -AC  --  --  --     Oral Residue  impaired  -AC  --  --  --     Oral Phase, Comment  DNT solid 2' severity of aspiration risk  -AC  --  --  --        Oral Transit Phase    Impaired Oral Transit Phase  increased A-P transit time  -AC  --  --  --     Increased A-P Transit Time  all consistencies tested;secondary to impaired cognitive  status  -AC  --  --  --        Oral Residue    Impaired Oral Residue  lingual residue  -AC  --  --  --     Lingual Residue  pudding/puree  -AC  --  --  --     Response to Oral Residue  cleared residue;with spontaneous subsequent swallow  -AC  --  --  --     Oral Residue, Comment  Mild amount  -AC  --  --  --        Initiation of Pharyngeal Swallow    Initiation of Pharyngeal Swallow  bolus in pyriform sinuses  -AC  --  --  --     Pharyngeal Phase  impaired pharyngeal phase of swallowing  -AC  --  --  --     Penetration Before the Swallow  thin liquids;nectar-thick liquids;secondary to delayed swallow initiation or mistiming  -AC  --  --  --     Penetration During the Swallow  thin liquids;nectar-thick liquids;secondary to delayed swallow initiation or mistiming;secondary to reduced laryngeal elevation;secondary to reduced vestibular closure  -AC  --  --  --     Penetration After the Swallow  honey-thick liquids;pudding/puree;secondary to residue;in valleculae;in pyriform sinuses  -AC  --  --  --     Aspiration After the Swallow  thin liquids;nectar-thick liquids;secondary to residue;in valleculae;in pyriform sinuses;in laryngeal vestibule  -AC  --  --  --     Response to Penetration  no response  -AC  --  --  --     Response to Aspiration  no response, silent aspiration  -AC  --  --  --     Rosenbek's Scale  thin:;nectar:;8--->level 8;honey:;pudding/puree:;5--->level 5  -AC  --  --  --     Pharyngeal Residue  all consistencies tested;diffuse within pharynx;secondary to reduced base of tongue retraction;secondary to reduced posterior pharyngeal wall stripping;secondary to reduced laryngeal elevation;secondary to reduced hyolaryngeal excursion;other (see comments) greatest in valleculae & w/ pudding  -AC  --  --  --     Response to Residue  unable to clear residue  -AC  --  --  --     Attempted Compensatory Maneuvers  bolus size;bolus presentation style;chin tuck;head turn to right;multiple swallows;effortful (hard  swallow);combination of maneuvers (see comments)  -AC  --  --  --     Response to Attempted Compensatory Maneuvers  did not prevent aspiration;did not reduce residue  -AC  --  --  --     Pharyngeal Phase, Comment  There was penetration of thin and nectar-thick liquid before/during the swallow w/ aspiration occurring after the swallow. There was diffuse pharyngeal residue across consistencies - mod-severe w/ thin/nectar/honey, profound w/ minimal clearance w/ pudding. This led to penetration of honey-thick and pudding consistencies after the swallow that pt did not appear to sense nor spontaneously clear. Aspiration of pharyngeal residue deemed inevitable across consistencies. Pt exhibited wet vocal quality near end of study; otherwise, aspiration was SILENT. Significant diffuse pharyngeal weakness noted, as well as prominent c-spine curvature, and prominent cricopharyngeous.     Safest option is NPO w/ alternative means of nutrition. Given advanced age + hx dementia, may consider comfort diet, pending POC. Discussed w/ Dr. Britt who plans to speak w/ family. If comfort diet desired, consider dysphagia level III (puree/mashed) diet w/ allowance of soft solids, as tolerated, and thin vs nectar-thick liquids, as tolerated.   -AC  --  --  --        Clinical Impression    SLP Swallowing Diagnosis  mild;oral dysfunction;severe;profound;pharyngeal dysfunction  -AC  mild;oral dysfunction;suspected pharyngeal dysfunction  -CH  functional oral phase;suspected pharyngeal dysfunction  -AW  functional oral phase;suspected pharyngeal dysfunction  -AW     Functional Impact  risk of aspiration/pneumonia;risk of malnutrition;risk of dehydration  -AC  risk of aspiration/pneumonia  -CH  risk of aspiration/pneumonia  -AW  risk of aspiration/pneumonia  -AW     Rehab Potential/Prognosis, Swallowing  re-evaluate goals as necessary  -AC  --  --  --     Swallow Criteria for Skilled Therapeutic Interventions Met  demonstrates skilled  criteria  -AC  demonstrates skilled criteria  -CH  demonstrates skilled criteria  -AW  demonstrates skilled criteria  -AW        Recommendations    Therapy Frequency (Swallow)  5 days per week  -AC  PRN  -CH  PRN  -AW  --     Predicted Duration Therapy Intervention (Days)  until discharge  -  --  --  --     SLP Diet Recommendation  NPO;temporary alternate methods of nutrition/hydration;other (see comments) may consider comfort diet 2' adv age + hx dementia  -AC  puree with some mashed;nectar thick liquids  -CH  mechanical soft with no mixed consistencies;nectar thick liquids  -AW  --     Recommended Diagnostics  --  VFSS (MBS)  -CH  reassess via clinical swallow evaluation;FEES;other (see comments) will check surgery status tomorrow. FEES to follow hip repai  -AW  FEES  -AW     Recommended Precautions and Strategies  --  upright posture during/after eating  -CH  upright posture during/after eating  -AW  --     SLP Rec. for Method of Medication Administration  meds via alternate route  -AC  meds whole;with pudding or applesauce;with thick liquids  -  meds whole;with pudding or applesauce;with thick liquids  -AW  --     Monitor for Signs of Aspiration  --  notify SLP if any concerns  -CH  notify SLP if any concerns  -AW  --     Anticipated Dischage Disposition (SLP)  anticipate therapy at next level of care  -AC  unknown  -CH  unknown  -AW  --     Demonstrates Need for Referral to Another Service  other (see comments) may consider palliative care team consult  -  --  --  --       User Key  (r) = Recorded By, (t) = Taken By, (c) = Cosigned By    Initials Name Effective Dates     Lyla Otero, MS CCC-SLP 07/27/17 -     AW Macie Fatima, MS CCC-SLP 06/22/15 -     Ny Mckee MS CCC-SLP 02/14/19 -           EDUCATION  The patient has been educated in the following areas:   Dysphagia (Swallowing Impairment) Oral Care/Hydration NPO rationale.    SLP Recommendation and Plan  SLP Swallowing Diagnosis:  mild, oral dysfunction, severe, profound, pharyngeal dysfunction  SLP Diet Recommendation: NPO, temporary alternate methods of nutrition/hydration, other (see comments)(may consider comfort diet 2' adv age + hx dementia)     SLP Rec. for Method of Medication Administration: meds via alternate route           Swallow Criteria for Skilled Therapeutic Interventions Met: demonstrates skilled criteria  Anticipated Dischage Disposition (SLP): anticipate therapy at next level of care  Rehab Potential/Prognosis, Swallowing: re-evaluate goals as necessary  Therapy Frequency (Swallow): 5 days per week  Predicted Duration Therapy Intervention (Days): until discharge  Demonstrates Need for Referral to Another Service: other (see comments)(may consider palliative care team consult)    Plan of Care Reviewed With: patient           Time Calculation:   Time Calculation- SLP     Row Name 07/02/20 1653 07/02/20 1016          Time Calculation- SLP    SLP Start Time  1310  -  0930  -     SLP Received On  07/02/20  -  07/02/20  -       User Key  (r) = Recorded By, (t) = Taken By, (c) = Cosigned By    Initials Name Provider Type    AC Lyla Otero MS CCC-SLP Speech and Language Pathologist     Ny Rendon MS CCC-SLP Speech and Language Pathologist          Therapy Charges for Today     Code Description Service Date Service Provider Modifiers Qty    [id number] HC ST MOTION FLUORO EVAL SWALLOW 5 7/2/2020 Lyla Otero MS CCC-SLP GN 1               Lyla Otero MS CCC-SLP  7/2/2020

## 2020-07-02 NOTE — PROGRESS NOTES
Subjective:  The patient rested comfortably overnight with an expected amount of postoperative pain.  No events overnight. Some confusion this AM - did not realize he had surgery.  He seems more comfortable with regards to the hip.    Medications/Allergies:  Scheduled Meds:  aspirin 325 mg Oral Q12H   atorvastatin 80 mg Oral Nightly   ceFAZolin 2 g Intravenous Q8H   piperacillin-tazobactam 3.375 g Intravenous Q8H   sodium chloride 10 mL Intravenous Q12H   sodium chloride 10 mL Intravenous Q12H     Continuous Infusions:  lactated ringers 75 mL/hr Last Rate: 75 mL/hr (07/01/20 1756)   niCARdipine 5-15 mg/hr    ropivacaine (NAROPIN) 0.2% peripheral nerve cath (moog) 8 mL/hr Last Rate: 8 mL/hr (07/01/20 1458)     PRN Meds:.•  acetaminophen **OR** [DISCONTINUED] acetaminophen  •  lidocaine PF 1%  •  magnesium sulfate  •  potassium chloride  •  sodium chloride  •  sodium chloride  Patient has no known allergies.      Objective:  Vital Signs   Temp:  [97.4 °F (36.3 °C)-99 °F (37.2 °C)] 98.8 °F (37.1 °C)  Heart Rate:  [73-90] 85  Resp:  [14-20] 16  BP: (143-179)/() 164/86    Results Review:   I reviewed the patient's new clinical results.  Results from last 7 days   Lab Units 07/02/20  0340   WBC 10*3/mm3 8.94   HEMOGLOBIN g/dL 10.8*   HEMATOCRIT % 32.9*   PLATELETS 10*3/mm3 176     Results from last 7 days   Lab Units 07/02/20  0340   SODIUM mmol/L 135*   POTASSIUM mmol/L 3.6   CHLORIDE mmol/L 99   CO2 mmol/L 23.0   BUN mg/dL 12   CREATININE mg/dL 0.86   GLUCOSE mg/dL 80   CALCIUM mg/dL 8.1*         Results from last 7 days   Lab Units 07/02/20  0340  06/29/20  1837   SODIUM mmol/L 135*   < > 138   POTASSIUM mmol/L 3.6   < > 4.4   CHLORIDE mmol/L 99   < > 100   CO2 mmol/L 23.0   < > 26.0   BUN mg/dL 12   < > 17   CREATININE mg/dL 0.86   < > 0.97   CALCIUM mg/dL 8.1*   < > 9.2   ALK PHOS U/L  --   --  109   ALT (SGPT) U/L  --   --  12   AST (SGOT) U/L  --   --  19   GLUCOSE mg/dL 80   < > 123*    < > = values in this  interval not displayed.       Xr Hip With Or Without Pelvis 2 - 3 View Right    Result Date: 7/1/2020  CR Hip Uni Comp Min 2 Vws RT INDICATION: Status post percutaneous screws. Right hip fracture. COMPARISON: 6/29/2020 FINDINGS: AP and frog-leg lateral view(s) of the right hip. There is been the interval placement of 3 percutaneous screws. No evidence of hardware failure. The position of the screws appears satisfactory.     Satisfactory postoperative exam. Signer Name: Lisandro Colby MD  Signed: 7/1/2020 9:16 PM  Workstation Name: [facility]  Radiology [facility]    Xr Hip With Or Without Pelvis 2 - 3 View Right    Result Date: 6/29/2020  CR Hip Uni Comp Min 2 Vws RT INDICATION: Pain after a fall today. COMPARISON: None. FINDINGS: AP and frog-leg lateral view(s) of the right hip.  Laterally impacted subcapital right femoral neck fracture with up to 9 mm of suspected impaction. No pelvic fracture. No significant arthrosis right hip. Left hip unremarkable. Contrast distended bladder. No bone erosion or destruction.      Laterally impacted subcapital right femoral neck fracture. Signer Name: JAIME Díaz MD  Signed: 6/29/2020 9:02 PM  Workstation Name: [facility]  Radiology [facility]       Physical Exam:  General: comfortable  Vascular: 2+ pulses, symmetric; digits are pink and well perfused  Operative Hip Exam:   Neurologic: sensation to light touch is intact distally, EHL/FHL/TA/GCS intact  Dermatologic: surgical dressing is well appearing - clean, dry, and intact; no obvious signs or symptoms of infection or DVT, no calf swelling  No pain with gentle log rolling  No hip deformity, symmetric leg length    Assessment/Plan:  I appreciate the medical management of the admitting internal medicine team.      The patient is okay for discharge from orthopedic perspective once cleared by the medical team and home or appropriate transfer is arranged.  Please contact me if there are any  questions.    POD #: 1    Anemia - acute blood loss anemia following hip fracture, minimal surgical blood loss  Pain control - well controlled    Dressing - clean and can be removed by patient on POD#3.  Okay for RN to replace daily and as needed prior to discharge if dressing is saturated.      Antibiotics - complete 24 hour postoperative course of IV antibioitics    Diet - per primary team    Activity -   Touchdown weight bearing with assistance and support devices (walker, wheelchair) as needed    DVT Prophylaxis - I recommend daily Aspirin 325mg by mouth BID for 6 weeks; Rolando and ISI hose as an inpatient    PT/OT Consult - Touchdown weight bearing    Osteoporotic hip fracture - I recommend informing the patient's PCP regarding osteoporotic fracture/consideration of enrolling patient in osteoporotic care program postoperatively      Follow-up - the  will need to call my office to arrange for a discharge follow-up appointment in 3 weeks.  I discussed the patient's findings and my recommendations with patient        Please contact me personally at [phone] for any questions.    Please have the schedulers call our office at [phone] for the clinic appointment as needed.    Sam Harp MD, FAAOS  Orthopedic Surgeon  [facility]  Orthopedics and Sports Medicine  [address]  [place] [address]      Sam Harp MD  07/02/20  08:23

## 2020-07-02 NOTE — PLAN OF CARE
Problem: Patient Care Overview  Goal: Plan of Care Review  Outcome: Ongoing (interventions implemented as appropriate)  Flowsheets (Taken 7/2/2020 0822 by Yamilex Chau, RN)  Plan of Care Reviewed With: patient  Note:   SLP re-evaluation completed. Will continue to address dysphagia with MBS. Please see note for further details and recommendations.

## 2020-07-02 NOTE — PLAN OF CARE
Problem: Patient Care Overview  Goal: Plan of Care Review  Outcome: Ongoing (interventions implemented as appropriate)  Flowsheets (Taken 7/2/2020 8653)  Outcome Summary: Pt presents s/p R hip pinning with reduced mobility and strength.  Pt requried max A of 2 for bed mobility and transfers with RW.  SPT with RW and max A of 2 with pt attempt to sit prior to being at chair requiring dependent of 2.  Pt unable to maintain TDWB RLE or follow cues for sequencing and safety.  Pt unable to ambulate at this time.  Pain reported with mobility and subsides at rest.  Recommend DC to SNF when appropriate.

## 2020-07-02 NOTE — PLAN OF CARE
Problem: Patient Care Overview  Goal: Plan of Care Review  Flowsheets (Taken 7/2/2020 7273)  Outcome Summary: OT IE completed post chart review. Pt presents w/ decreased I in ADLs and related t/f most limited by pain w/ bed mobility, muscle weakness at BUEs, fxl endurance deficits and decreased balance and poor adherence to TDWB and postural alignment. Pt did not adhere to TDWB w/ max verbal and physical cues/A and presented w/ forward flexion at waist. Pt grossly req'd max A x 2 for all bed mobility, STS and fxl transfer to recliner. Pt u/a to complete any LBB or bathing d/t pain at hip w/ distal reach and difficulty following commands. Pt indicates pain resolves w/ neutral position and seated rest. Pt would benefit from further IPOT POC and d/c recommendation being SNF given underlying impairments and social support.

## 2020-07-02 NOTE — PROGRESS NOTES
[facility] Medicine Services  PROGRESS NOTE    Patient Name: Ken Jasso  : 1940  MRN: [id number]    Date of Admission: 2020  Primary Care Physician: Provider, No Known    Subjective   Subjective     CC:  Follow-up for right hip fracture, acute febrile illness     HPI:  No acute events overnight. Denies significant pain in hp, has not had a BM yet. Denies fever/chills     Review of Systems  CV- No chest pain, palpitations  Resp- No cough, dyspnea  GI- No N/V/D, abd pain     Objective   Objective     Vital Signs:   Temp:  [97.4 °F (36.3 °C)-99.5 °F (37.5 °C)] 99.5 °F (37.5 °C)  Heart Rate:  [73-90] 87  Resp:  [14-20] 16  BP: (137-179)/() 137/89  Total (NIH Stroke Scale): 0     Physical Exam:  Constitutional: No acute distress, awake, alert  HENT: NCAT, mucous membranes moist  Respiratory: Clear to auscultation bilaterally, respiratory effort normal on nasal canula  Cardiovascular: RRR, no murmurs  Gastrointestinal: Positive bowel sounds, soft, nontender, nondistended  Psychiatric: Appropriate affect, cooperative  Neurologic: Oriented x 3,Cranial Nerves grossly intact to confrontation, speech clear  Skin: No rashes    Results Reviewed:  Results from last 7 days   Lab Units 20  0340 20  0400 20  0250 20  0605   WBC 10*3/mm3 8.94 8.72  --  11.21*   HEMOGLOBIN g/dL 10.8* 10.9*  --  13.0   HEMATOCRIT % 32.9* 34.0*  --  42.2   PLATELETS 10*3/mm3 176 170  --  174   PROCALCITONIN ng/mL 0.91*  --  1.60* 1.65*     Results from last 7 days   Lab Units 20  0340 20  0250 20  0605 20  0036 20  1837   SODIUM mmol/L 135* 135* 134*  --  138   POTASSIUM mmol/L 3.6 3.6 4.1  --  4.4   CHLORIDE mmol/L 99 100 99  --  100   CO2 mmol/L 23.0 24.0 25.0  --  26.0   BUN mg/dL 12 17 18  --  17   CREATININE mg/dL 0.86 1.01 0.92  --  0.97   GLUCOSE mg/dL 80 97 111*  --  123*   CALCIUM mg/dL 8.1* 8.1* 8.8  --  9.2   ALT (SGPT) U/L  --   --   --    --  12   AST (SGOT) U/L  --   --   --   --  19   TROPONIN T ng/mL  --   --  0.021 0.030 0.034*   PROBNP pg/mL  --  1,196.0  --   --   --      Estimated Creatinine Clearance: 57.1 mL/min (by C-G formula based on SCr of 0.86 mg/dL).    Microbiology Results Abnormal     Procedure Component Value - Date/Time    Blood Culture - Blood, Arm, Right [[id number]] Collected:  06/29/20 1931    Lab Status:  Preliminary result Specimen:  Blood from Arm, Right Updated:  07/01/20 2000     Blood Culture No growth at 2 days    Blood Culture - Blood, Arm, Left [[id number]] Collected:  06/29/20 1931    Lab Status:  Preliminary result Specimen:  Blood from Arm, Left Updated:  07/01/20 2000     Blood Culture No growth at 2 days    MRSA Screen, PCR (Inpatient) - Swab, Nares [[id number]]  (Normal) Collected:  06/29/20 2352    Lab Status:  Final result Specimen:  Swab from Nares Updated:  06/30/20 0958     MRSA PCR Negative    Narrative:       MRSA Negative    COVID-19,[facility] IN-HOUSE, NP SWAB IN TRANSPORT MEDIA 8-12 HR TAT - Swab, Nasopharynx [[id number]]  (Normal) Collected:  06/29/20 2004    Lab Status:  Final result Specimen:  Swab from Nasopharynx Updated:  06/30/20 0305     COVID19 Not Detected    Narrative:       Fact sheet for providers: https://www.fda.gov/media/855988/download     Fact sheet for patients: https://www.fda.gov/media/779708/download    Respiratory Panel, PCR - Swab, Nasopharynx [[id number]]  (Normal) Collected:  06/29/20 2004    Lab Status:  Final result Specimen:  Swab from Nasopharynx Updated:  06/29/20 2129     ADENOVIRUS, PCR Not Detected     Coronavirus 229E Not Detected     Coronavirus HKU1 Not Detected     Coronavirus NL63 Not Detected     Coronavirus OC43 Not Detected     Human Metapneumovirus Not Detected     Human Rhinovirus/Enterovirus Not Detected     Influenza B PCR Not Detected     Parainfluenza Virus 1 Not Detected     Parainfluenza Virus 2 Not Detected     Parainfluenza Virus 3 Not Detected      Parainfluenza Virus 4 Not Detected     Bordetella pertussis pcr Not Detected     Influenza A H1 2009 PCR Not Detected     Chlamydophila pneumoniae PCR Not Detected     Mycoplasma pneumo by PCR Not Detected     Influenza A PCR Not Detected     Influenza A H3 Not Detected     Influenza A H1 Not Detected     RSV, PCR Not Detected     Bordetella parapertussis PCR Not Detected    Narrative:       The coronavirus on the RVP is NOT COVID-19 and is NOT indicative of infection with COVID-19.           Imaging Results (Last 24 Hours)     Procedure Component Value Units Date/Time    FL C Arm During Surgery [[id number]] Collected:  07/02/20 0835     Updated:  07/02/20 1200    Narrative:       EXAMINATION: FL C ARM DURING SURGERY- 07/01/2020     INDICATION: hip pinning; R50.9-Fever, unspecified; J96.01-Acute  respiratory failure with hypoxia; I47.2-Ventricular tachycardia;  S51.011A-Laceration without foreign body of right elbow, initial  encounter      TECHNIQUE: Intraoperative fluoroscopy for improved localization and  treatment planning     COMPARISON: NONE     FINDINGS: Intraoperative fluoroscopy with total fluoroscopic time usage  2 minutes 12 seconds and 16 representative images saved during right hip  pinning.       Impression:       Intraoperative fluoroscopy utilized during right hip  pinning.     D:  07/02/2020  E:  07/02/2020           XR Hip With or Without Pelvis 2 - 3 View Right [[id number]] Collected:  07/01/20 2116     Updated:  07/01/20 2118    Narrative:       CR Hip Uni Comp Min 2 Vws RT    INDICATION:   Status post percutaneous screws. Right hip fracture.    COMPARISON:   6/29/2020    FINDINGS:  AP and frog-leg lateral view(s) of the right hip. There is been the interval placement of 3 percutaneous screws. No evidence of hardware failure. The position of the screws appears satisfactory.      Impression:       Satisfactory postoperative exam.    Signer Name: Lisandro Colby MD   Signed: 7/1/2020 9:16 PM    Workstation Name: [id number]    [facility]    CT Chest Without Contrast [[id number]] Collected:  06/29/20 1844     Updated:  07/01/20 1500    Narrative:       EXAMINATION: CT CHEST WO CONTRAST- 06/29/2020     INDICATION: low sats, fever      TECHNIQUE: CT chest without intravenous contrast     The radiation dose reduction device was turned on for each scan per the  ALARA (As Low as Reasonably Achievable) protocol.     COMPARISON: NONE     FINDINGS: Thyroid is homogeneous in attenuation. No bulky mediastinal  adenopathy. Central airways are patent. Esophagus in normal course and  caliber. Atherosclerotic nonaneurysmal thoracic aorta.  Cardiac size  within normal limits and without pericardial effusion. Extended lung  windows demonstrate mild to moderate subsegmental dependent atelectasis  without focal consolidation or opacification. No nodule or mass. No  pleural effusion. Degenerative changes of the thoracic spine without  aggressive osseous lesion. No soft tissue body wall findings of concern.  Visualized portions of the upper abdomen grossly unremarkable.       Impression:       Low lung volumes with hypoventilatory effects including  moderate subsegmental dependent atelectasis in the dependent portions  however no focal consolidation or opacification to suggest acute  parenchymal process or airspace disease. Negative for pneumonia.     D:  06/29/2020  E:  06/30/2020        This report was finalized on 7/1/2020 2:56 PM by Dr. Manoj Elizondo.       CT Cerebral Perfusion With & Without Contrast [[id number]] Collected:  06/29/20 1852     Updated:  07/01/20 1500    Narrative:       EXAMINATION: CT CEREBRAL PERFUSION W WO CONTRAST-      INDICATION: Stroke.      TECHNIQUE: CT cerebral perfusion with and without intravenous contrast.  Multiple parametric maps including mean transit time, time to drain,  cerebral blood flow and cerebral blood volume performed.     The radiation dose reduction  device was turned on for each scan per the  ALARA (As Low as Reasonably Achievable) protocol.     COMPARISON: CT head stroke protocol immediately prior.     FINDINGS: Grossly symmetric and normal correlating parametric maps  without perfusion defect of abnormality. No reversible ischemia within a  specific vascular territory is identified.       Impression:       No reversible ischemia within a specific vascular territory.     D:  06/29/2020  E:  06/30/2020        This report was finalized on 7/1/2020 2:56 PM by Dr. Manoj Elizondo.       CT Angiogram Neck [[id number]] Collected:  06/29/20 1854     Updated:  07/01/20 1500    Narrative:       EXAMINATION: CT ANGIOGRAM NECK-, CT ANGIOGRAM HEAD-      INDICATION: Stroke.      TECHNIQUE: CT angiogram head and neck with and without intravenous  contrast administration. 2-D and 3-D reconstructions performed.     The radiation dose reduction device was turned on for each scan per the  ALARA (As Low as Reasonably Achievable) protocol.     COMPARISON: CT cerebral perfusion and CT head noncontrast performed  concurrently.     FINDINGS: CTA NECK: Normal three vessel arch with patent great vessel  origins. Proximal subclavian arteries are patent. Vertebral arteries  demonstrate grossly symmetric appearance of the vertebral artery caliber  and flow signal of opacification without hemodynamically significant  stenosis, aneurysm or occlusion with tortuosity of the vertebral  arteries bilateral exiting the foramina, however no luminal irregularity  or defect. Calcification within the V4 intradural segment left vertebral  discussed further below the dedicated CTA head portion. Carotids  demonstrate grossly normal course and branching pattern with  atherosclerotic involvement including calcific disease creating 25%  right and 20% left luminal narrowing as measured by NASCET criteria with  patency to the distal internal carotid arteries intracranial segments  discussed further below in the  dedicated CTA head portion. Cervical soft  tissues unremarkable for acute findings without bulky cervical  adenopathy. Thyroid is homogeneous. Lung apices are grossly clear.     CTA HEAD: Distal internal carotid arteries demonstrate mild irregularity  of atherosclerotic involvement and calcific disease burden with mild  luminal stenosis, however no hemodynamically significant stenosis,  aneurysm or occlusion. Anterior cerebral arteries are patent without  hemodynamically significant stenosis, aneurysm or occlusion. Middle  cerebral arteries are patent without hemodynamically significant  stenosis, aneurysm or occlusion. Vertebrobasilar system and posterior  cerebral arteries demonstrate at least moderate stenosis of calcific  disease involvement left vertebral distal V4 segment, however, patency  observed of the basilar artery and basilar apex with posterior cerebral  arteries widely patent bilaterally without hemodynamically significant  stenosis, aneurysm or occlusion. Visualized venous structures are  unremarkable with superior sagittal sinus widely patent.       Impression:       1. CTA neck demonstrates atherosclerotic involvement including calcific  disease creating 25% right and 20% left luminal narrowing as measured by  NASCET criteria.  2. CTA head demonstrates minimal atherosclerotic involvement of the  distal internal carotid arteries without hemodynamically significant  stenosis, aneurysm or occlusion with calcific disease burden associated  as well as moderate stenosis of calcific disease burden and involvement  of the left V4 segment focal area distal left vertebral artery without  distinct occlusion and patency observed of the [place]. [place] is otherwise unremarkable without occlusion in particular the  MCA distributions are widely patent.     D:  06/29/2020  E:  06/30/2020     This report was finalized on 7/1/2020 2:56 PM by Dr. Manoj Elizondo.       CT Angiogram Head [[id number]]  Collected:  06/29/20 1854     Updated:  07/01/20 1500    Narrative:       EXAMINATION: CT ANGIOGRAM NECK-, CT ANGIOGRAM HEAD-      INDICATION: Stroke.      TECHNIQUE: CT angiogram head and neck with and without intravenous  contrast administration. 2-D and 3-D reconstructions performed.     The radiation dose reduction device was turned on for each scan per the  ALARA (As Low as Reasonably Achievable) protocol.     COMPARISON: CT cerebral perfusion and CT head noncontrast performed  concurrently.     FINDINGS: CTA NECK: Normal three vessel arch with patent great vessel  origins. Proximal subclavian arteries are patent. Vertebral arteries  demonstrate grossly symmetric appearance of the vertebral artery caliber  and flow signal of opacification without hemodynamically significant  stenosis, aneurysm or occlusion with tortuosity of the vertebral  arteries bilateral exiting the foramina, however no luminal irregularity  or defect. Calcification within the V4 intradural segment left vertebral  discussed further below the dedicated CTA head portion. Carotids  demonstrate grossly normal course and branching pattern with  atherosclerotic involvement including calcific disease creating 25%  right and 20% left luminal narrowing as measured by NASCET criteria with  patency to the distal internal carotid arteries intracranial segments  discussed further below in the dedicated CTA head portion. Cervical soft  tissues unremarkable for acute findings without bulky cervical  adenopathy. Thyroid is homogeneous. Lung apices are grossly clear.     CTA HEAD: Distal internal carotid arteries demonstrate mild irregularity  of atherosclerotic involvement and calcific disease burden with mild  luminal stenosis, however no hemodynamically significant stenosis,  aneurysm or occlusion. Anterior cerebral arteries are patent without  hemodynamically significant stenosis, aneurysm or occlusion. Middle  cerebral arteries are patent without  hemodynamically significant  stenosis, aneurysm or occlusion. Vertebrobasilar system and posterior  cerebral arteries demonstrate at least moderate stenosis of calcific  disease involvement left vertebral distal V4 segment, however, patency  observed of the basilar artery and basilar apex with posterior cerebral  arteries widely patent bilaterally without hemodynamically significant  stenosis, aneurysm or occlusion. Visualized venous structures are  unremarkable with superior sagittal sinus widely patent.       Impression:       1. CTA neck demonstrates atherosclerotic involvement including calcific  disease creating 25% right and 20% left luminal narrowing as measured by  NASCET criteria.  2. CTA head demonstrates minimal atherosclerotic involvement of the  distal internal carotid arteries without hemodynamically significant  stenosis, aneurysm or occlusion with calcific disease burden associated  as well as moderate stenosis of calcific disease burden and involvement  of the left V4 segment focal area distal left vertebral artery without  distinct occlusion and patency observed of the [place]. [place] is otherwise unremarkable without occlusion in particular the  MCA distributions are widely patent.     D:  06/29/2020  E:  06/30/2020     This report was finalized on 7/1/2020 2:56 PM by Dr. Manoj Elizondo.       CT Head Without Contrast Stroke Protocol [[id number]] Collected:  06/29/20 1823     Updated:  07/01/20 1459    Narrative:       EXAMINATION: CT HEAD WO CONTRAST STROKE PROTOCOL-      INDICATION: Stroke.      TECHNIQUE: CT head without intravenous contrast following stroke  protocol.     The radiation dose reduction device was turned on for each scan per the  ALARA (As Low as Reasonably Achievable) protocol.     COMPARISON: None.     FINDINGS: Midline structures are symmetric without evidence of mass,  mass effect or midline shift demonstrating at least moderately advanced  low-attenuation of  the periventricular and deep white matter of chronic  small vessel ischemic disease. No intraaxial hemorrhage or extraaxial  fluid collection. Globes and orbits are unremarkable. Visualized  paranasal sinuses and mastoid air cells are grossly clear and well  pneumatized. Calvarium intact.       Impression:       No acute cardiopulmonary process. Specifically no acute  intracranial hemorrhage.     NOTE: Scan performed on 06/29/2020 at 1816 hours. Scan report given to  treatment team in person by Dr. Elizondo at scanner on 06/29/2020 at 1820  hours.     D:  06/29/2020  E:  06/30/2020        This report was finalized on 7/1/2020 2:56 PM by Dr. Manoj Elizondo.       XR Femur 2 View Right [[id number]] Collected:  07/01/20 1131     Updated:  07/01/20 1347    Narrative:       EXAMINATION: XR FEMUR 2 VW RIGHT-      INDICATION: Right hip fracture; R50.9-Fever, unspecified; J96.01-Acute  respiratory failure with hypoxia; I47.2-Ventricular tachycardia;  S51.011A-Laceration without foreign body of right elbow, initial  encounter.      COMPARISON: None.     FINDINGS: Two views of the right femur reveal fracture seen of the right  femoral neck. Slight impaction identified with mild overlapping of the  fracture fragments. The remainder of the femur is unremarkable. The  acetabulum is intact with some mild degenerative changes identified.           Impression:       Right femoral neck fracture. The remainder of the femur is  grossly unremarkable with spurring of the patella. There is minimal  degenerative changes seen within the acetabulum with no significant  displacement.     D:  07/01/2020  E:  07/01/2020             Results for orders placed during the hospital encounter of 06/29/20   Adult Transthoracic Echo Complete W/ Cont if Necessary Per Protocol (With Agitated Saline)    Narrative · Estimated EF = 55%.  · Left ventricular systolic function is normal.  · Mild mitral valve regurgitation is present  · Mild to moderate tricuspid  valve regurgitation is present.  · Calculated right ventricular systolic pressure from tricuspid   regurgitation is 34 mmHg.  · No intracardiac shunting is seen  · No cardiac source for embolus is seen          I have reviewed the medications:  Scheduled Meds:  amLODIPine 10 mg Oral Q24H   aspirin 325 mg Oral Q12H   atorvastatin 80 mg Oral Nightly   donepezil 10 mg Oral Daily   lisinopril 40 mg Oral Q24H   pantoprazole 40 mg Oral QAM   piperacillin-tazobactam 3.375 g Intravenous Q8H   sodium chloride 10 mL Intravenous Q12H   sodium chloride 10 mL Intravenous Q12H     Continuous Infusions:  lactated ringers 75 mL/hr Last Rate: 75 mL/hr (07/01/20 1756)   niCARdipine 5-15 mg/hr    ropivacaine (NAROPIN) 0.2% peripheral nerve cath (moog) 8 mL/hr Last Rate: 8 mL/hr (07/01/20 1458)     PRN Meds:.•  acetaminophen **OR** [DISCONTINUED] acetaminophen  •  lidocaine PF 1%  •  magnesium sulfate  •  potassium chloride  •  sodium chloride  •  sodium chloride    Assessment/Plan   Assessment & Plan     Active Hospital Problems    Diagnosis  POA   • **Fracture of right hip (CMS/HCC) [S72.001A]  Yes   • Acute febrile illness [R50.9]  Yes   • Dementia (CMS/HCC) [F03.90]  Yes   • H/O: CVA (no deficit) [Z86.73]  Not Applicable      Resolved Hospital Problems   No resolved problems to display.        Brief Hospital Course to date:  Ken Jasso is a 80 y.o. male with PMH of CVA, HTN, dementia, and GERD who was admitted on 6/29 after fall while walking to car, sustaining a right femoral neck fracture, there was concern for stroke and code stroke was called. Imaging negative for acute CVA. Had fever up to 103 on arrival and non-sustained wide complex tachycardia requiring admission to the ICU.     Fever  -temp 103 in the ED, has not had a fever since then   -continue zosyn for possible pneumonia, CT abd/pelvis showed possible infiltrates in lower lungs bilaterally  -d/c vanc as MRSA nares negative  -blood cultures NGTD    Right femoral  neck fracture  -s/p hip percutaneous pinning on 7/1  -PT/OT pending   -follow-up with Dr. Harp in 3 weeks, continue aspirin 325 mg BID for 6 weeks for VTE ppx    Dysphagia  -speech recommended MBS    HTN- continue amlodipine, lisinopril and atorvastatin   Dementia-continue aricept     DVT Prophylaxis:  SCD    Disposition: I expect the patient to be discharged pending PT/OT eval    CODE STATUS:   Code Status and Medical Interventions:   Ordered at: 06/30/20 0213     Code Status:    CPR     Medical Interventions (Level of Support Prior to Arrest):    Full         Electronically signed by Tash Britt MD, 07/02/20, 12:40.

## 2020-07-03 LAB — POTASSIUM SERPL-SCNC: 3.7 MMOL/L (ref 3.5–5.2)

## 2020-07-03 PROCEDURE — 99232 SBSQ HOSP IP/OBS MODERATE 35: CPT | Performed by: INTERNAL MEDICINE

## 2020-07-03 PROCEDURE — 25010000003 POTASSIUM CHLORIDE 10 MEQ/100ML SOLUTION: Performed by: INTERNAL MEDICINE

## 2020-07-03 PROCEDURE — 97110 THERAPEUTIC EXERCISES: CPT

## 2020-07-03 PROCEDURE — 97530 THERAPEUTIC ACTIVITIES: CPT

## 2020-07-03 PROCEDURE — 84132 ASSAY OF SERUM POTASSIUM: CPT | Performed by: INTERNAL MEDICINE

## 2020-07-03 PROCEDURE — 25010000002 PIPERACILLIN SOD-TAZOBACTAM PER 1 G: Performed by: INTERNAL MEDICINE

## 2020-07-03 RX ORDER — LANSOPRAZOLE
30 KIT EVERY MORNING
Status: DISCONTINUED | OUTPATIENT
Start: 2020-07-04 | End: 2020-07-14 | Stop reason: HOSPADM

## 2020-07-03 RX ORDER — LANSOPRAZOLE
30 KIT EVERY MORNING
Status: DISCONTINUED | OUTPATIENT
Start: 2020-07-03 | End: 2020-07-03

## 2020-07-03 RX ORDER — POTASSIUM CHLORIDE 1.5 G/1.77G
40 POWDER, FOR SOLUTION ORAL AS NEEDED
Status: DISCONTINUED | OUTPATIENT
Start: 2020-07-03 | End: 2020-07-14 | Stop reason: HOSPADM

## 2020-07-03 RX ORDER — POTASSIUM CHLORIDE 750 MG/1
40 CAPSULE, EXTENDED RELEASE ORAL AS NEEDED
Status: DISCONTINUED | OUTPATIENT
Start: 2020-07-03 | End: 2020-07-14 | Stop reason: HOSPADM

## 2020-07-03 RX ADMIN — DONEPEZIL HYDROCHLORIDE 10 MG: 10 TABLET, FILM COATED ORAL at 11:48

## 2020-07-03 RX ADMIN — AMLODIPINE BESYLATE 10 MG: 10 TABLET ORAL at 11:50

## 2020-07-03 RX ADMIN — POTASSIUM CHLORIDE 10 MEQ: 7.46 INJECTION, SOLUTION INTRAVENOUS at 16:53

## 2020-07-03 RX ADMIN — POTASSIUM CHLORIDE 10 MEQ: 7.46 INJECTION, SOLUTION INTRAVENOUS at 11:51

## 2020-07-03 RX ADMIN — ATORVASTATIN CALCIUM 80 MG: 40 TABLET, FILM COATED ORAL at 20:11

## 2020-07-03 RX ADMIN — SODIUM CHLORIDE, PRESERVATIVE FREE 10 ML: 5 INJECTION INTRAVENOUS at 08:25

## 2020-07-03 RX ADMIN — POTASSIUM CHLORIDE 10 MEQ: 7.46 INJECTION, SOLUTION INTRAVENOUS at 15:34

## 2020-07-03 RX ADMIN — ASPIRIN 325 MG: 325 TABLET, COATED ORAL at 20:11

## 2020-07-03 RX ADMIN — TAZOBACTAM SODIUM AND PIPERACILLIN SODIUM 3.38 G: 375; 3 INJECTION, SOLUTION INTRAVENOUS at 02:51

## 2020-07-03 RX ADMIN — TAZOBACTAM SODIUM AND PIPERACILLIN SODIUM 3.38 G: 375; 3 INJECTION, SOLUTION INTRAVENOUS at 18:32

## 2020-07-03 RX ADMIN — ASPIRIN 325 MG: 325 TABLET, COATED ORAL at 11:49

## 2020-07-03 RX ADMIN — ACETAMINOPHEN 650 MG: 325 TABLET, FILM COATED ORAL at 11:50

## 2020-07-03 RX ADMIN — TAZOBACTAM SODIUM AND PIPERACILLIN SODIUM 3.38 G: 375; 3 INJECTION, SOLUTION INTRAVENOUS at 11:51

## 2020-07-03 RX ADMIN — LISINOPRIL 40 MG: 40 TABLET ORAL at 11:50

## 2020-07-03 RX ADMIN — POTASSIUM CHLORIDE 10 MEQ: 7.46 INJECTION, SOLUTION INTRAVENOUS at 14:09

## 2020-07-03 RX ADMIN — SODIUM CHLORIDE, PRESERVATIVE FREE 10 ML: 5 INJECTION INTRAVENOUS at 20:11

## 2020-07-03 RX ADMIN — SODIUM CHLORIDE, POTASSIUM CHLORIDE, SODIUM LACTATE AND CALCIUM CHLORIDE 75 ML/HR: 600; 310; 30; 20 INJECTION, SOLUTION INTRAVENOUS at 02:51

## 2020-07-03 NOTE — THERAPY TREATMENT NOTE
Patient Name: Ken Jasso  : 1940    MRN: [id number]                              Today's Date: 7/3/2020       Admit Date: 2020    Visit Dx:     ICD-10-CM ICD-9-CM   1. Acute febrile illness R50.9 780.60   2. Acute respiratory failure with hypoxia (CMS/[facility]) J96.01 518.81   3. Ventricular tachycardia (CMS/[facility]) I47.2 427.1   4. Skin tear of right elbow without complication, initial encounter S51.011A 881.01   5. Oropharyngeal dysphagia R13.12 787.22     Patient Active Problem List   Diagnosis   • Acute febrile illness   • Fracture of right hip (CMS/[facility])   • Dementia (CMS/[facility])   • H/O: CVA (no deficit)     Past Medical History:   Diagnosis Date   • Dementia (CMS/HCC)    • Dementia (CMS/[facility])    • Dysphagia    • Esophageal dilatation    • Hiatal hernia    • PNA (pneumonia)    • PNA (pneumonia)    • Pneumonia     [place] EPISODES    • Prostate CA (CMS/[facility])    • Stroke (CMS/[facility])          History reviewed. No pertinent surgical history.  General Information     Row Name 20 1136          PT Evaluation Time/Intention    Document Type  therapy note (daily note)  -EJ     Mode of Treatment  physical therapy  -     Row Name 20 1136          General Information    Patient Profile Reviewed?  yes  -EJ     Existing Precautions/Restrictions  fall;weight bearing;other (see comments) dementia; TDWB RLE  -EJ     Barriers to Rehab  cognitive status;previous functional deficit  -     Row Name 20 1136          Cognitive Assessment/Intervention- PT/OT    Orientation Status (Cognition)  oriented x 4;verbal cues/prompts needed for orientation  -     Row Name 20 1136          Safety Issues, Functional Mobility    Impairments Affecting Function (Mobility)  balance;cognition;endurance/activity tolerance;pain;range of motion (ROM);strength  -EJ       User Key  (r) = Recorded By, (t) = Taken By, (c) = Cosigned By    Initials Name Provider Type    EJ Karine Mcmahon, PT Physical  Therapist        Mobility     Row Name 07/03/20 1139          Bed Mobility Assessment/Treatment    Bed Mobility Assessment/Treatment  rolling right;scooting/bridging;supine-sit  -EJ     Scooting/Bridging [place] (Bed Mobility)  maximum assist (25% patient effort);2 person assist;verbal cues;nonverbal cues (demo/gesture)  -     Supine-Sit [place] (Bed Mobility)  maximum assist (25% patient effort);2 person assist;verbal cues;nonverbal cues (demo/gesture)  -     Assistive Device (Bed Mobility)  bed rails;head of bed elevated;draw sheet  -     Comment (Bed Mobility)  increased time needed.  -     Row Name 07/03/20 1139          Transfer Assessment/Treatment    Comment (Transfers)  BUE supported as pt unable to maintain upright posture with UE support independently for long enough to complete t/f. two attempts needed. Pt's RLE placed on PT's foot to assist with movement to chair and monitor weight bearing. Pt able to keep TTWB with this assist.  -[facility] Name 07/03/20 1139          Bed-Chair Transfer    Bed-Chair [place] (Transfers)  maximum assist (25% patient effort);2 person assist;verbal cues;nonverbal cues (demo/gesture)  -[facility] Name 07/03/20 1139          Sit-Stand Transfer    Sit-Stand [place] (Transfers)  maximum assist (25% patient effort);2 person assist;verbal cues;nonverbal cues (demo/gesture)  -[facility] Name 07/03/20 1139          Gait/Stairs Assessment/Training    Comment (Gait/Stairs)  unable to progress to ambulation at this time.  -[facility] Name 07/03/20 1139          Mobility Assessment/Intervention    Extremity Weight-bearing Status  (S) right lower extremity  -     Right Lower Extremity (Weight-bearing Status)  (S) touch down weight-bearing (TDWB)  -       User Key  (r) = Recorded By, (t) = Taken By, (c) = Cosigned By    Initials Name Provider Type    Karine Hale PT Physical Therapist        Obj/Interventions     Row Name 07/03/20 114           Therapeutic Exercise    Lower Extremity (Therapeutic Exercise)  SLR (straight leg raise), left;heel slides, right  -EJ     Lower Extremity Range of Motion (Therapeutic Exercise)  hip abduction/adduction, right;ankle dorsiflexion/plantar flexion, bilateral  -EJ     Exercise Type (Therapeutic Exercise)  AAROM (active assistive range of motion);isometric contraction, static  -EJ     Position (Therapeutic Exercise)  seated  -EJ     Row Name 07/03/20 1143          Static Sitting Balance    Level of [place] (Unsupported Sitting, Static Balance)  moderate assist, 50 to 74% patient effort  -EJ     Sitting Position (Unsupported Sitting, Static Balance)  sitting on edge of bed  -EJ     Time Able to Maintain Position (Unsupported Sitting, Static Balance)  more than 5 minutes  -EJ     Comment (Unsupported Sitting, Static Balance)  Pt has forward flexed posture with head down. Pt cued for head up and chest out with good improvement but he does not maintain this position. with fatigue pt pitches forward and has LOB anteriorly in sitting. Assist to regain balance.  -     Row Name 07/03/20 1143          Dynamic Sitting Balance    Level of [place], Reaches Outside Midline (Sitting, Dynamic Balance)  moderate assist, 50 to 74% patient effort  -EJ     Sitting Position, Reaches Outside Midline (Sitting, Dynamic Balance)  sitting on edge of bed  -EJ       User Key  (r) = Recorded By, (t) = Taken By, (c) = Cosigned By    Initials Name Provider Type    Karine Hale PT Physical Therapist        Goals/Plan    No documentation.       Clinical Impression     Row Name 07/03/20 1150          Pain Assessment    Additional Documentation  Pain Scale: Numbers Pre/Post-Treatment (Group)  -EJ     Row Name 07/03/20 1150          Pain Scale: Numbers Pre/Post-Treatment    Pain Scale: Numbers, Pretreatment  0/10 - no pain  -EJ     Pain Scale: Numbers, Post-Treatment  0/10 - no pain  -EJ     Pain Location - Side  Right  -EJ     Pain  Location  hip  -EJ     Pre/Post Treatment Pain Comment  Pt denied pain during t/f after completion, but grimiced during bed mobility.   -EJ     Pain Intervention(s)  Repositioned;Ambulation/increased activity  -EJ     Row Name 07/03/20 1150          Plan of Care Review    Plan of Care Reviewed With  patient  -EJ     Outcome Summary  Pt t/f via MAX A x2 with BUE support, support to maintain RLE TTWB, to chair. Pt required MAX A x2 for bed mobility and MOD A for sitting balance at EOB. SNF recommended.   -EJ     Row Name 07/03/20 1150          Physical Therapy Clinical Impression    Criteria for Skilled Interventions Met (PT Clinical Impression)  yes;treatment indicated  -EJ     Rehab Potential (PT Clinical Summary)  good, to achieve stated therapy goals  -EJ     Row Name 07/03/20 1150          Vital Signs    Pre Systolic BP Rehab  -- BP stable; no drop noted  -EJ     Intratreatment Heart Rate (beats/min)  94  -EJ     Pre SpO2 (%)  98  -EJ     O2 Delivery Pre Treatment  supplemental O2  -EJ     O2 Delivery Intra Treatment  room air  -EJ     Post SpO2 (%)  94  -EJ     O2 Delivery Post Treatment  room air  -EJ     Pre Patient Position  Sitting  -EJ     Intra Patient Position  Standing  -EJ     Post Patient Position  Sitting  -EJ     Row Name 07/03/20 1150          Positioning and Restraints    Pre-Treatment Position  in bed  -EJ     Post Treatment Position  chair  -EJ     In Chair  reclined  -EJ       User Key  (r) = Recorded By, (t) = Taken By, (c) = Cosigned By    Initials Name Provider Type    EJ Karine Mcmahon, PT Physical Therapist        Outcome Measures     Row Name 07/03/20 1152          How much help from another person do you currently need...    Turning from your back to your side while in flat bed without using bedrails?  2  -EJ     Moving from lying on back to sitting on the side of a flat bed without bedrails?  2  -EJ     Moving to and from a bed to a chair (including a wheelchair)?  2  -EJ      Standing up from a chair using your arms (e.g., wheelchair, bedside chair)?  2  -EJ     Climbing 3-5 steps with a railing?  1  -EJ     To walk in hospital room?  1  -EJ     AM-PAC 6 Clicks Score (PT)  10  -EJ     Row Name 07/03/20 1154          Functional Assessment    Outcome Measure Options  AM-PAC 6 Clicks Basic Mobility (PT)  -       User Key  (r) = Recorded By, (t) = Taken By, (c) = Cosigned By    Initials Name Provider Type    Karine Hale PT Physical Therapist        Physical Therapy Education                 Title: PT OT SLP Therapies (In Progress)     Topic: Physical Therapy (In Progress)     Point: Mobility training (In Progress)     Description:   Instruct learner(s) on safety and technique for assisting patient out of bed, chair or wheelchair.  Instruct in the proper use of assistive devices, such as walker, crutches, cane or brace.              Patient Friendly Description:   It's important to get you on your feet again, but we need to do so in a way that is safe for you. Falling has serious consequences, and your personal safety is the most important thing of all.        When it's time to get out of bed, one of us or a family member will sit next to you on the bed to give you support.     If your doctor or nurse tells you to use a walker, crutches, a cane, or a brace, be sure you use it every time you get out of bed, even if you think you don't need it.    Learning Progress Summary           Patient Acceptance, E, NR by ERICA at 7/3/2020 1154    Acceptance, E,D, NR by AA at 7/2/2020 1447   Family Acceptance, E,D, NR by AA at 7/2/2020 1447                   Point: Home exercise program (Not Started)     Description:   Instruct learner(s) on appropriate technique for monitoring, assisting and/or progressing patient with therapeutic exercises and activities.              Learner Progress:   Not documented in this visit.          Point: Body mechanics (In Progress)     Description:   Instruct  learner(s) on proper positioning and spine alignment for patient and/or caregiver during mobility tasks and/or exercises.              Learning Progress Summary           Patient Acceptance, E, NR by  at 7/3/2020 1154    Acceptance, E,D, NR by AA at 7/2/2020 1447   Family Acceptance, E,D, NR by AA at 7/2/2020 1447                   Point: Precautions (In Progress)     Description:   Instruct learner(s) on prescribed precautions during mobility and gait tasks              Learning Progress Summary           Patient Acceptance, E, NR by  at 7/3/2020 1154    Acceptance, E,D, NR by AA at 7/2/2020 1447   Family Acceptance, E,D, NR by AA at 7/2/2020 1447                               User Key     Initials Effective Dates Name Provider Type [facility] 11/20/18 -  Karine Mcmahon PT Physical Therapist PT    AA 04/02/18 -  Micaela Tate PT Physical Therapist PT              PT Recommendation and Plan     Outcome Summary/Treatment Plan (PT)  Anticipated Discharge Disposition (PT): skilled nursing facility  Plan of Care Reviewed With: patient  Outcome Summary: Pt t/f via MAX A x2 with BUE support, support to maintain RLE TTWB, to chair. Pt required MAX A x2 for bed mobility and MOD A for sitting balance at EOB. SNF recommended.      Time Calculation:   PT Charges     Row Name 07/03/20 1155             Time Calculation    Start Time  1105  -EJ      PT Received On  07/03/20  -         Time Calculation- PT    Total Timed Code Minutes- PT  25 minute(s)  -EJ         Timed Charges    80953 - PT Therapeutic Exercise Minutes  7  -EJ      77479 - PT Therapeutic Activity Minutes  18  -EJ        User Key  (r) = Recorded By, (t) = Taken By, (c) = Cosigned By    Initials Name Provider Type     Karine Mcmahon PT Physical Therapist        Therapy Charges for Today     Code Description Service Date Service Provider Modifiers Qty    31504561054 HC PT THER PROC EA 15 MIN 7/3/2020 Karine Mcmahon PT GP 1     [id number]  PT THERAPEUTIC ACT EA 15 MIN 7/3/2020 Karine Mcmahon, PT GP 1          PT G-Codes  Outcome Measure Options: AM-PAC 6 Clicks Basic Mobility (PT)  AM-PAC 6 Clicks Score (PT): 10  AM-PAC 6 Clicks Score (OT): 9    Karine Adame, PT  7/3/2020

## 2020-07-03 NOTE — PROGRESS NOTES
[facility] Medicine Services  PROGRESS NOTE    Patient Name: Ken Jasso  : 1940  MRN: [id number]    Date of Admission: 2020  Primary Care Physician: Provider, No Known    Subjective   Subjective     CC:  Follow-up dysphagia, and right hip fracture     HPI:  No acute events overnight. Pain controlled in right leg except when people get him up to move.    Review of Systems  Gen- No fevers, chills  CV- No chest pain, palpitations  Resp- No cough, dyspnea  GI- No N/V/D, abd pain     Objective   Objective     Vital Signs:   Temp:  [98.5 °F (36.9 °C)-99.5 °F (37.5 °C)] 99 °F (37.2 °C)  Heart Rate:  [] 86  Resp:  [16] 16  BP: (137-162)/(76-89) 162/87  Total (NIH Stroke Scale): 0     Physical Exam:  Constitutional: No acute distress, awake, alert  HENT: NCAT, mucous membranes dry  Respiratory: Clear to auscultation bilaterally, respiratory effort normal on nasal canula   Cardiovascular: RRR, no murmurs, no lower extremity edema  Gastrointestinal: Positive bowel sounds, soft, nontender, nondistended  Psychiatric: Appropriate affect, cooperative  Neurologic: Oriented x 3, Cranial Nerves grossly intact to confrontation, speech difficult to understand   Skin: No rashes on exposed skin     Results Reviewed:  Results from last 7 days   Lab Units 20  0340 20  0400 20  0250 20  0605   WBC 10*3/mm3 8.94 8.72  --  11.21*   HEMOGLOBIN g/dL 10.8* 10.9*  --  13.0   HEMATOCRIT % 32.9* 34.0*  --  42.2   PLATELETS 10*3/mm3 176 170  --  174   PROCALCITONIN ng/mL 0.91*  --  1.60* 1.65*     Results from last 7 days   Lab Units 20  0340 20  0250 20  0605 20  0036 20  1837   SODIUM mmol/L 135* 135* 134*  --  138   POTASSIUM mmol/L 3.6 3.6 4.1  --  4.4   CHLORIDE mmol/L 99 100 99  --  100   CO2 mmol/L 23.0 24.0 25.0  --  26.0   BUN mg/dL 12 17 18  --  17   CREATININE mg/dL 0.86 1.01 0.92  --  0.97   GLUCOSE mg/dL 80 97 111*  --  123*      CALCIUM mg/dL 8.1* 8.1* 8.8  --  9.2   ALT (SGPT) U/L  --   --   --   --  12   AST (SGOT) U/L  --   --   --   --  19   TROPONIN T ng/mL  --   --  0.021 0.030 0.034*   PROBNP pg/mL  --  1,196.0  --   --   --      Estimated Creatinine Clearance: 57.1 mL/min (by C-G formula based on SCr of 0.86 mg/dL).    Microbiology Results Abnormal     Procedure Component Value - Date/Time    Blood Culture - Blood, Arm, Right [[id number]] Collected:  06/29/20 1931    Lab Status:  Preliminary result Specimen:  Blood from Arm, Right Updated:  07/02/20 2000     Blood Culture No growth at 3 days    Blood Culture - Blood, Arm, Left [[id number]] Collected:  06/29/20 1931    Lab Status:  Preliminary result Specimen:  Blood from Arm, Left Updated:  07/02/20 2000     Blood Culture No growth at 3 days    MRSA Screen, PCR (Inpatient) - Swab, Nares [[id number]]  (Normal) Collected:  06/29/20 2352    Lab Status:  Final result Specimen:  Swab from Nares Updated:  06/30/20 0958     MRSA PCR Negative    Narrative:       MRSA Negative    COVID-19,[facility] IN-HOUSE, NP SWAB IN TRANSPORT MEDIA 8-12 HR TAT - Swab, Nasopharynx [[id number]]  (Normal) Collected:  06/29/20 2004    Lab Status:  Final result Specimen:  Swab from Nasopharynx Updated:  06/30/20 0305     COVID19 Not Detected    Narrative:       Fact sheet for providers: https://www.fda.gov/media/931342/download     Fact sheet for patients: https://www.fda.gov/media/604066/download    Respiratory Panel, PCR - Swab, Nasopharynx [[id number]]  (Normal) Collected:  06/29/20 2004    Lab Status:  Final result Specimen:  Swab from Nasopharynx Updated:  06/29/20 2129     ADENOVIRUS, PCR Not Detected     Coronavirus 229E Not Detected     Coronavirus HKU1 Not Detected     Coronavirus NL63 Not Detected     Coronavirus OC43 Not Detected     Human Metapneumovirus Not Detected     Human Rhinovirus/Enterovirus Not Detected     Influenza B PCR Not Detected     Parainfluenza Virus 1 Not Detected     Parainfluenza  Virus 2 Not Detected     Parainfluenza Virus 3 Not Detected     Parainfluenza Virus 4 Not Detected     Bordetella pertussis pcr Not Detected     Influenza A H1 2009 PCR Not Detected     Chlamydophila pneumoniae PCR Not Detected     Mycoplasma pneumo by PCR Not Detected     Influenza A PCR Not Detected     Influenza A H3 Not Detected     Influenza A H1 Not Detected     RSV, PCR Not Detected     Bordetella parapertussis PCR Not Detected    Narrative:       The coronavirus on the RVP is NOT COVID-19 and is NOT indicative of infection with COVID-19.           Imaging Results (Last 24 Hours)     Procedure Component Value Units Date/Time    FL Video Swallow With Speech Single Contrast [[id number]] Collected:  07/02/20 1420     Updated:  07/02/20 2016    Narrative:       EXAMINATION: FL VIDEO SWALLOW W SPEECH SINGLE-CONTRAST-     INDICATION: dysphagia; R50.9-Fever, unspecified; J96.01-Acute  respiratory failure with hypoxia; I47.2-Ventricular tachycardia;  S51.011A-Laceration without foreign body of right elbow, initial  encounter; R13.10-Dysphagia, unspecified     TECHNIQUE: 48 seconds of fluoroscopic time was used for this exam. 8  associated fluoroscopic loops were saved. The patient was evaluated in  the seated lateral position while taking a variety of consistencies of  barium by mouth under the direction of speech pathology.     COMPARISON: NONE     FINDINGS: 48 seconds of fluoroscopy provided for a modified barium  swallow. Please see speech therapy report for full details and  recommendations.          Impression:       Fluoroscopy provided for a modified barium swallow. Please  see speech therapy report for full details and recommendations.         This report was finalized on 7/2/2020 8:13 PM by Dr. Cher Macias MD.       XR Femur 2 View Right [[id number]] Collected:  07/01/20 1131     Updated:  07/02/20 2014    Narrative:       EXAMINATION: XR FEMUR 2 VW RIGHT-      INDICATION: Right hip fracture;  R50.9-Fever, unspecified; J96.01-Acute  respiratory failure with hypoxia; I47.2-Ventricular tachycardia;  S51.011A-Laceration without foreign body of right elbow, initial  encounter.      COMPARISON: None.     FINDINGS: Two views of the right femur reveal fracture seen of the right  femoral neck. Slight impaction identified with mild overlapping of the  fracture fragments. The remainder of the femur is unremarkable. The  acetabulum is intact with some mild degenerative changes identified.           Impression:       Right femoral neck fracture. The remainder of the femur is  grossly unremarkable with spurring of the patella. There is minimal  degenerative changes seen within the acetabulum with no significant  displacement.     D:  07/01/2020  E:  07/01/2020     This report was finalized on 7/2/2020 8:11 PM by Dr. Cher Macias MD.       FL C Arm During Surgery [[id number]] Collected:  07/02/20 0835     Updated:  07/02/20 1200    Narrative:       EXAMINATION: FL C ARM DURING SURGERY- 07/01/2020     INDICATION: hip pinning; R50.9-Fever, unspecified; J96.01-Acute  respiratory failure with hypoxia; I47.2-Ventricular tachycardia;  S51.011A-Laceration without foreign body of right elbow, initial  encounter      TECHNIQUE: Intraoperative fluoroscopy for improved localization and  treatment planning     COMPARISON: NONE     FINDINGS: Intraoperative fluoroscopy with total fluoroscopic time usage  2 minutes 12 seconds and 16 representative images saved during right hip  pinning.       Impression:       Intraoperative fluoroscopy utilized during right hip  pinning.     D:  07/02/2020  E:  07/02/2020                 Results for orders placed during the hospital encounter of 06/29/20   Adult Transthoracic Echo Complete W/ Cont if Necessary Per Protocol (With Agitated Saline)    Narrative · Estimated EF = 55%.  · Left ventricular systolic function is normal.  · Mild mitral valve regurgitation is present  · Mild to moderate  tricuspid valve regurgitation is present.  · Calculated right ventricular systolic pressure from tricuspid   regurgitation is 34 mmHg.  · No intracardiac shunting is seen  · No cardiac source for embolus is seen          I have reviewed the medications:  Scheduled Meds:  amLODIPine 10 mg Oral Q24H   aspirin 325 mg Oral Q12H   atorvastatin 80 mg Oral Nightly   donepezil 10 mg Oral Daily   lisinopril 40 mg Oral Q24H   pantoprazole 40 mg Oral QAM   piperacillin-tazobactam 3.375 g Intravenous Q8H   sodium chloride 10 mL Intravenous Q12H   sodium chloride 10 mL Intravenous Q12H     Continuous Infusions:  lactated ringers 75 mL/hr Last Rate: 75 mL/hr (07/03/20 0251)   niCARdipine 5-15 mg/hr    ropivacaine (NAROPIN) 0.2% peripheral nerve cath (moog) 8 mL/hr Last Rate: 8 mL/hr (07/01/20 1458)     PRN Meds:.•  acetaminophen **OR** [DISCONTINUED] acetaminophen  •  lidocaine PF 1%  •  magnesium sulfate  •  potassium chloride  •  sodium chloride  •  sodium chloride    Assessment/Plan   Assessment & Plan     Active Hospital Problems    Diagnosis  POA   • **Fracture of right hip (CMS/HCC) [S72.001A]  Yes   • Acute febrile illness [R50.9]  Yes   • Dementia (CMS/HCC) [F03.90]  Yes   • H/O: CVA (no deficit) [Z86.73]  Not Applicable      Resolved Hospital Problems   No resolved problems to display.        Brief Hospital Course to date:  Ken Jasso is a 80 y.o. male with PMH of CVA, HTN, dementia, and GERD who was admitted on 6/29 after fall while walking to car, sustaining a right femoral neck fracture, there was concern for stroke and code stroke was called. Imaging negative for acute CVA. Had fever up to 103 on arrival and non-sustained wide complex tachycardia requiring admission to the ICU. Now out of ICU and stable  On telemetry floor.      Fever  -temp 103 in the ED, has not had a fever since then   -continue zosyn for possible pneumonia, CT abd/pelvis showed possible infiltrates in lower lungs bilaterally  -d/c vanc as  MRSA nares negative  -blood cultures NGTD      Right femoral neck fracture  -s/p hip percutaneous pinning on 7/1  -PT/OT - wll need SNF   -follow-up with Dr. Harp in 3 weeks, continue aspirin 325 mg BID for 6 weeks for VTE ppx     Dysphagia  -MBS resulted with severe pharyngeal dysphagia  -speech recommending NPO, however, I spoke with Mr. Jasso and he would like to eat, we discussed his code status and he is CODE STATUS: NO CPR/INTUBATION      HTN- continue amlodipine, lisinopril, and atorvastatin   Dementia-continue aricept      DVT Prophylaxis:  SCD     Disposition: I expect the patient to be discharged pending PT/OT eval    CODE STATUS: I had a long discussion with Mr. Jasso this morning about his code status. He wants to be comfortable and wants to be able to eat, and he would not want CPR or intubation knowing that his chances of surviving would be low and would cause more suffering for him and his family.  Code Status and Medical Interventions:   Ordered at: 07/03/20 0956     Limited Support to NOT Include:    Intubation     Level Of Support Discussed With:    Patient     Code Status:    No CPR     Medical Interventions (Level of Support Prior to Arrest):    Limited         Electronically signed by Tash Britt MD, 07/03/20, 09:59.

## 2020-07-03 NOTE — PLAN OF CARE
Problem: Patient Care Overview  Goal: Plan of Care Review  Flowsheets  Taken 7/2/2020 1457 by Pat Montes, OT  Progress: no change (OT IE )  Taken 7/3/2020 1150 by Karine Mcmahon PT  Plan of Care Reviewed With: patient  Outcome Summary: Pt t/f via MAX A x2 with BUE support, support to maintain RLE TTWB, to chair. Pt required MAX A x2 for bed mobility and MOD A for sitting balance at EOB. SNF recommended.

## 2020-07-03 NOTE — PROGRESS NOTES
Continued Stay Note  [facility]     Patient Name: Ken Jasso  MRN: [id number]  Today's Date: 7/3/2020    Admit Date: 6/29/2020    Discharge Plan     Row Name 07/03/20 1426       Plan    Plan  TBD    Patient/Family in Agreement with Plan  yes    Plan Comments  Discussed Mr. Jasso in unit rounds today.  He has been moved to a comfort diet.  MBS resulted with severe pharyngeal dysphagia.  Mr. Jasso is unable to participate with PT.   CM will continue to follow.    Final Discharge Disposition Code  03 - skilled nursing facility (SNF)        Discharge Codes    No documentation.       Expected Discharge Date and Time     Expected Discharge Date Expected Discharge Time    Jul 6, 2020             Abida Florence RN

## 2020-07-04 LAB
BACTERIA SPEC AEROBE CULT: NORMAL
BACTERIA SPEC AEROBE CULT: NORMAL

## 2020-07-04 PROCEDURE — 97110 THERAPEUTIC EXERCISES: CPT

## 2020-07-04 PROCEDURE — 99024 POSTOP FOLLOW-UP VISIT: CPT | Performed by: ORTHOPAEDIC SURGERY

## 2020-07-04 PROCEDURE — 99232 SBSQ HOSP IP/OBS MODERATE 35: CPT | Performed by: INTERNAL MEDICINE

## 2020-07-04 PROCEDURE — 97530 THERAPEUTIC ACTIVITIES: CPT

## 2020-07-04 PROCEDURE — 25010000002 PIPERACILLIN SOD-TAZOBACTAM PER 1 G: Performed by: INTERNAL MEDICINE

## 2020-07-04 RX ORDER — BISACODYL 5 MG/1
10 TABLET, DELAYED RELEASE ORAL DAILY PRN
Status: DISCONTINUED | OUTPATIENT
Start: 2020-07-04 | End: 2020-07-14 | Stop reason: HOSPADM

## 2020-07-04 RX ORDER — BISACODYL 10 MG
10 SUPPOSITORY, RECTAL RECTAL DAILY
Status: DISCONTINUED | OUTPATIENT
Start: 2020-07-04 | End: 2020-07-14 | Stop reason: HOSPADM

## 2020-07-04 RX ADMIN — LANSOPRAZOLE 30 MG: KIT at 07:59

## 2020-07-04 RX ADMIN — ASPIRIN 325 MG: 325 TABLET, COATED ORAL at 08:05

## 2020-07-04 RX ADMIN — TAZOBACTAM SODIUM AND PIPERACILLIN SODIUM 3.38 G: 375; 3 INJECTION, SOLUTION INTRAVENOUS at 04:18

## 2020-07-04 RX ADMIN — ACETAMINOPHEN 650 MG: 325 TABLET, FILM COATED ORAL at 08:05

## 2020-07-04 RX ADMIN — TAZOBACTAM SODIUM AND PIPERACILLIN SODIUM 3.38 G: 375; 3 INJECTION, SOLUTION INTRAVENOUS at 19:33

## 2020-07-04 RX ADMIN — LISINOPRIL 40 MG: 40 TABLET ORAL at 08:06

## 2020-07-04 RX ADMIN — TAZOBACTAM SODIUM AND PIPERACILLIN SODIUM 3.38 G: 375; 3 INJECTION, SOLUTION INTRAVENOUS at 11:44

## 2020-07-04 RX ADMIN — BISACODYL 10 MG: 10 SUPPOSITORY RECTAL at 11:44

## 2020-07-04 RX ADMIN — ACETAMINOPHEN 650 MG: 325 TABLET, FILM COATED ORAL at 00:36

## 2020-07-04 RX ADMIN — ATORVASTATIN CALCIUM 80 MG: 40 TABLET, FILM COATED ORAL at 19:33

## 2020-07-04 RX ADMIN — AMLODIPINE BESYLATE 10 MG: 10 TABLET ORAL at 08:05

## 2020-07-04 RX ADMIN — POLYETHYLENE GLYCOL 3350 17 G: 17 POWDER, FOR SOLUTION ORAL at 11:44

## 2020-07-04 RX ADMIN — ASPIRIN 325 MG: 325 TABLET, COATED ORAL at 19:33

## 2020-07-04 RX ADMIN — DONEPEZIL HYDROCHLORIDE 10 MG: 10 TABLET, FILM COATED ORAL at 08:06

## 2020-07-04 RX ADMIN — SODIUM CHLORIDE, PRESERVATIVE FREE 10 ML: 5 INJECTION INTRAVENOUS at 08:06

## 2020-07-04 NOTE — PLAN OF CARE
A&Ox3 with intermittent confusion. Mood/affect appropriate. Speech clear/spontaneous. PT is able to make needs known verbally. Lung sound coarse bilaterally. Gauze dressing to the right hip CDI. No c/o pain/discomfort have been reported during the shift. PT is currently resting in bed without sx/si of pain/acute distress. VSS. SR with wide QRS on cardiac mx.   Will cont to mx. Call light in reach.

## 2020-07-04 NOTE — PROGRESS NOTES
"Orthopaedic Surgery Progress Note     LOS: 5 days   Patient Care Team:  Provider, No Known as PCP - General    POD 4    Subjective     Interval History:   Patient Reports: No new complaints    Objective     Vital Signs:  Temp (24hrs), Av °F (36.7 °C), Min:98 °F (36.7 °C), Max:98 °F (36.7 °C)    /76   Pulse 94   Temp 98 °F (36.7 °C) (Oral)   Resp 16   Ht 177.8 cm (70\")   Wt 59.9 kg (132 lb 0.9 oz)   SpO2 93%   BMI 18.95 kg/m²     Labs:  Lab Results (last 24 hours)     Procedure Component Value Units Date/Time    Potassium [[id number]]  (Normal) Collected:  20    Specimen:  Blood Updated:  20     Potassium 3.7 mmol/L     Blood Culture - Blood, Arm, Right [[id number]] Collected:  20    Specimen:  Blood from Arm, Right Updated:  20     Blood Culture No growth at 4 days    Blood Culture - Blood, Arm, Left [[id number]] Collected:  20    Specimen:  Blood from Arm, Left Updated:  20     Blood Culture No growth at 4 days          Imaging:  FL Video Swallow With Speech Single Contrast  Narrative: EXAMINATION: FL VIDEO SWALLOW W SPEECH SINGLE-CONTRAST-     INDICATION: dysphagia; R50.9-Fever, unspecified; J96.01-Acute  respiratory failure with hypoxia; I47.2-Ventricular tachycardia;  S51.011A-Laceration without foreign body of right elbow, initial  encounter; R13.10-Dysphagia, unspecified     TECHNIQUE: 48 seconds of fluoroscopic time was used for this exam. 8  associated fluoroscopic loops were saved. The patient was evaluated in  the seated lateral position while taking a variety of consistencies of  barium by mouth under the direction of speech pathology.     COMPARISON: NONE     FINDINGS: 48 seconds of fluoroscopy provided for a modified barium  swallow. Please see speech therapy report for full details and  recommendations.        Impression: Fluoroscopy provided for a modified barium swallow. Please  see speech therapy report for full details " and recommendations.         This report was finalized on 7/2/2020 8:13 PM by Dr. Cher Macias MD.     XR Femur 2 View Right  Narrative: EXAMINATION: XR FEMUR 2 VW RIGHT-      INDICATION: Right hip fracture; R50.9-Fever, unspecified; J96.01-Acute  respiratory failure with hypoxia; I47.2-Ventricular tachycardia;  S51.011A-Laceration without foreign body of right elbow, initial  encounter.      COMPARISON: None.     FINDINGS: Two views of the right femur reveal fracture seen of the right  femoral neck. Slight impaction identified with mild overlapping of the  fracture fragments. The remainder of the femur is unremarkable. The  acetabulum is intact with some mild degenerative changes identified.         Impression: Right femoral neck fracture. The remainder of the femur is  grossly unremarkable with spurring of the patella. There is minimal  degenerative changes seen within the acetabulum with no significant  displacement.     D:  07/01/2020  E:  07/01/2020     This report was finalized on 7/2/2020 8:11 PM by Dr. Cher Macias MD.     FL C Arm During Surgery  Narrative: EXAMINATION: FL C ARM DURING SURGERY- 07/01/2020     INDICATION: hip pinning; R50.9-Fever, unspecified; J96.01-Acute  respiratory failure with hypoxia; I47.2-Ventricular tachycardia;  S51.011A-Laceration without foreign body of right elbow, initial  encounter      TECHNIQUE: Intraoperative fluoroscopy for improved localization and  treatment planning     COMPARISON: NONE     FINDINGS: Intraoperative fluoroscopy with total fluoroscopic time usage  2 minutes 12 seconds and 16 representative images saved during right hip  pinning.     Impression: Intraoperative fluoroscopy utilized during right hip  pinning.     D:  07/02/2020  E:  07/02/2020              Physical Exam:  Right hip incision intact foot dorsiflexion intact    Assessment/Plan   Continue DVT prophylaxis, toe-touch weightbearing right lower extremity   follow-up with Dr. Harp in  3 weeks    Hair Henson MD  07/04/20  10:47

## 2020-07-04 NOTE — PLAN OF CARE
Problem: Patient Care Overview  Goal: Plan of Care Review  Outcome: Ongoing (interventions implemented as appropriate)  Flowsheets (Taken 7/4/2020 1502)  Outcome Summary: Patient completed supine>sit and STS with Max Ax2. He transferred to the recliner with Max A x2 and RW, requiring cues for TDWB status and for pushing through LLE and BUEs for support. PT's foot placed under pt to ensure appropriate WBing status. Will continue to progress as tolerated.

## 2020-07-04 NOTE — PROGRESS NOTES
[facility] Medicine Services  PROGRESS NOTE    Patient Name: Ken Jasso  : 1940  MRN: [id number]    Date of Admission: 2020  Primary Care Physician: Provider, No Known    Subjective   Subjective     CC:  Follow-up for dysphasia and right hip fracture    HPI:  No acute events overnight.  Patient currently eating breakfast, denies any nausea or vomiting.  Still having pain in his right hip make it hard for him to stand, did get up to the chair yesterday left-sided.  Has not had a bowel movement in the last couple days.  Denies any nausea or vomiting.    Review of Systems  Gen- No fevers, chills  CV- No chest pain, palpitations  Resp- No cough, dyspnea         Objective   Objective     Vital Signs:   Temp:  [98 °F (36.7 °C)] 98 °F (36.7 °C)  Heart Rate:  [79-94] 94  Resp:  [16] 16  BP: (132-149)/(72-80) 144/76  Total (NIH Stroke Scale): 0     Physical Exam:  Constitutional: No acute distress, awake, alert  HENT: NCAT, mucous membranes moist  Respiratory: Clear to auscultation bilaterally, respiratory effort normal    Cardiovascular: RRR, no murmurs, no lower extremity edema  Psychiatric: Appropriate affect, cooperative  Neurologic: Oriented x 3, right foot dorsiflexion intact, cranial Nerves grossly intact to confrontation, speech clear  Skin: No rashes    Results Reviewed:  Results from last 7 days   Lab Units 20  0340 20  0400 20  0250 20  0605   WBC 10*3/mm3 8.94 8.72  --  11.21*   HEMOGLOBIN g/dL 10.8* 10.9*  --  13.0   HEMATOCRIT % 32.9* 34.0*  --  42.2   PLATELETS 10*3/mm3 176 170  --  174   PROCALCITONIN ng/mL 0.91*  --  1.60* 1.65*     Results from last 7 days   Lab Units 20  2134 20  0340 20  0250 20  0605 20  0036 20  1837   SODIUM mmol/L  --  135* 135* 134*  --  138   POTASSIUM mmol/L 3.7 3.6 3.6 4.1  --  4.4   CHLORIDE mmol/L  --  99 100 99  --  100   CO2 mmol/L  --  23.0 24.0 25.0  --  26.0   BUN  mg/dL  --  12 17 18  --  17   CREATININE mg/dL  --  0.86 1.01 0.92  --  0.97   GLUCOSE mg/dL  --  80 97 111*  --  123*   CALCIUM mg/dL  --  8.1* 8.1* 8.8  --  9.2   ALT (SGPT) U/L  --   --   --   --   --  12   AST (SGOT) U/L  --   --   --   --   --  19   TROPONIN T ng/mL  --   --   --  0.021 0.030 0.034*   PROBNP pg/mL  --   --  1,196.0  --   --   --      Estimated Creatinine Clearance: 57.1 mL/min (by C-G formula based on SCr of 0.86 mg/dL).    Microbiology Results Abnormal     Procedure Component Value - Date/Time    Blood Culture - Blood, Arm, Right [[id number]] Collected:  06/29/20 1931    Lab Status:  Preliminary result Specimen:  Blood from Arm, Right Updated:  07/03/20 2000     Blood Culture No growth at 4 days    Blood Culture - Blood, Arm, Left [[id number]] Collected:  06/29/20 1931    Lab Status:  Preliminary result Specimen:  Blood from Arm, Left Updated:  07/03/20 2000     Blood Culture No growth at 4 days    MRSA Screen, PCR (Inpatient) - Swab, Nares [[id number]]  (Normal) Collected:  06/29/20 2352    Lab Status:  Final result Specimen:  Swab from Nares Updated:  06/30/20 0958     MRSA PCR Negative    Narrative:       MRSA Negative    COVID-19,[facility] IN-HOUSE, NP SWAB IN TRANSPORT MEDIA 8-12 HR TAT - Swab, Nasopharynx [[id number]]  (Normal) Collected:  06/29/20 2004    Lab Status:  Final result Specimen:  Swab from Nasopharynx Updated:  06/30/20 0305     COVID19 Not Detected    Narrative:       Fact sheet for providers: https://www.fda.gov/media/091872/download     Fact sheet for patients: https://www.fda.gov/media/622638/download    Respiratory Panel, PCR - Swab, Nasopharynx [[id number]]  (Normal) Collected:  06/29/20 2004    Lab Status:  Final result Specimen:  Swab from Nasopharynx Updated:  06/29/20 2129     ADENOVIRUS, PCR Not Detected     Coronavirus 229E Not Detected     Coronavirus HKU1 Not Detected     Coronavirus NL63 Not Detected     Coronavirus OC43 Not Detected     Human Metapneumovirus Not  Detected     Human Rhinovirus/Enterovirus Not Detected     Influenza B PCR Not Detected     Parainfluenza Virus 1 Not Detected     Parainfluenza Virus 2 Not Detected     Parainfluenza Virus 3 Not Detected     Parainfluenza Virus 4 Not Detected     Bordetella pertussis pcr Not Detected     Influenza A H1 2009 PCR Not Detected     Chlamydophila pneumoniae PCR Not Detected     Mycoplasma pneumo by PCR Not Detected     Influenza A PCR Not Detected     Influenza A H3 Not Detected     Influenza A H1 Not Detected     RSV, PCR Not Detected     Bordetella parapertussis PCR Not Detected    Narrative:       The coronavirus on the RVP is NOT COVID-19 and is NOT indicative of infection with COVID-19.           Imaging Results (Last 24 Hours)     ** No results found for the last 24 hours. **          Results for orders placed during the hospital encounter of 06/29/20   Adult Transthoracic Echo Complete W/ Cont if Necessary Per Protocol (With Agitated Saline)    Narrative · Estimated EF = 55%.  · Left ventricular systolic function is normal.  · Mild mitral valve regurgitation is present  · Mild to moderate tricuspid valve regurgitation is present.  · Calculated right ventricular systolic pressure from tricuspid   regurgitation is 34 mmHg.  · No intracardiac shunting is seen  · No cardiac source for embolus is seen          I have reviewed the medications:  Scheduled Meds:  amLODIPine 10 mg Oral Q24H   aspirin 325 mg Oral Q12H   atorvastatin 80 mg Oral Nightly   bisacodyl 10 mg Rectal Daily   donepezil 10 mg Oral Daily   lansoprazole 30 mg Oral QAM   lisinopril 40 mg Oral Q24H   piperacillin-tazobactam 3.375 g Intravenous Q8H   polyethylene glycol 17 g Oral Daily   sodium chloride 10 mL Intravenous Q12H   sodium chloride 10 mL Intravenous Q12H     Continuous Infusions:  lactated ringers 75 mL/hr Last Rate: 75 mL/hr (07/03/20 0251)   niCARdipine 5-15 mg/hr    ropivacaine (NAROPIN) 0.2% peripheral nerve cath (moog) 8 mL/hr Last Rate:  8 mL/hr (07/01/20 1458)     PRN Meds:.•  acetaminophen **OR** [DISCONTINUED] acetaminophen  •  bisacodyl  •  lidocaine PF 1%  •  magnesium sulfate  •  potassium chloride  •  potassium chloride  •  potassium chloride  •  sodium chloride  •  sodium chloride    Assessment/Plan   Assessment & Plan     Active Hospital Problems    Diagnosis  POA   • **Fracture of right hip (CMS/HCC) [S72.001A]  Yes   • Acute febrile illness [R50.9]  Yes   • Dementia (CMS/HCC) [F03.90]  Yes   • H/O: CVA (no deficit) [Z86.73]  Not Applicable      Resolved Hospital Problems   No resolved problems to display.        Brief Hospital Course to date:  Ken Jasso is a 80 y.o. male with PMH of CVA, HTN, dementia, and GERD who was admitted on 6/29 after fall while walking to car, sustaining a right femoral neck fracture, there was concern for stroke and code stroke was called. Imaging negative for acute CVA. Had fever up to 103 on arrival and non-sustained wide complex tachycardia requiring admission to the ICU. Now out of ICU and has been stable  On telemetry floor.      Dysphagia  -MBS resulted with severe pharyngeal dysphagia  -speech recommending NPO, however, I spoke with Mr. Jasso and he would like to eat, we discussed his code status and he is CODE STATUS: NO CPR/INTUBATION      Right femoral neck fracture  -s/p hip percutaneous pinning on 7/1  -PT/OT - wll need SNF   -follow-up with Dr. Harp in 3 weeks, continue aspirin 325 mg BID for 6 weeks for VTE ppx    Fever-resolved  -temp 103 in the ED, has not had a fever since then   -continue zosyn for possible pneumonia for 7 days total, CT abd/pelvis showed possible infiltrates in lower lungs bilaterally  -d/c vanc as MRSA nares negative  -blood cultures NGTD      HTN- continue amlodipine, lisinopril, and atorvastatin   Dementia-continue aricept      DVT Prophylaxis:  SCD     Disposition: I expect the patient to be discharged appropriate discharge planning       CODE STATUS:   Code  Status and Medical Interventions:   Ordered at: 07/03/20 0956     Limited Support to NOT Include:    Intubation     Level Of Support Discussed With:    Patient     Code Status:    No CPR     Medical Interventions (Level of Support Prior to Arrest):    Limited         Electronically signed by Tash Britt MD, 07/04/20, 12:01.

## 2020-07-04 NOTE — THERAPY TREATMENT NOTE
Patient Name: Ken Jasso  : 1940    MRN: [id number]                              Today's Date: 2020       Admit Date: 2020    Visit Dx:     ICD-10-CM ICD-9-CM   1. Acute febrile illness R50.9 780.60   2. Acute respiratory failure with hypoxia (CMS/[facility]) J96.01 518.81   3. Ventricular tachycardia (CMS/[facility]) I47.2 427.1   4. Skin tear of right elbow without complication, initial encounter S51.011A 881.01   5. Oropharyngeal dysphagia R13.12 787.22     Patient Active Problem List   Diagnosis   • Acute febrile illness   • Fracture of right hip (CMS/[facility])   • Dementia (CMS/HCC)   • H/O: CVA (no deficit)     Past Medical History:   Diagnosis Date   • Dementia (CMS/HCC)    • Dementia (CMS/HCC)    • Dysphagia    • Esophageal dilatation    • Hiatal hernia    • PNA (pneumonia)    • PNA (pneumonia)    • Pneumonia     [facility] EPISODES    • Prostate CA (CMS/HCC)    • Stroke (CMS/HCC)          History reviewed. No pertinent surgical history.  General Information     Row Name 20 1502          PT Evaluation Time/Intention    Document Type  therapy note (daily note)  -NS     Mode of Treatment  individual therapy;physical therapy  -NS     Row Name 20 1502          General Information    Patient Profile Reviewed?  yes  -NS     Existing Precautions/Restrictions  fall;weight bearing;other (see comments) TDWB RLE; dementia  -NS     Row Name 20 1502          Cognitive Assessment/Intervention- PT/OT    Orientation Status (Cognition)  oriented x 3  -NS     Row Name 20 1502          Safety Issues, Functional Mobility    Safety Issues Affecting Function (Mobility)  insight into deficits/self awareness;judgment;safety precaution awareness;safety precautions follow-through/compliance;sequencing abilities;awareness of need for assistance  -NS     Impairments Affecting Function (Mobility)  balance;cognition;endurance/activity tolerance;pain;range of motion (ROM);strength;motor  planning;coordination  -NS       User Key  (r) = Recorded By, (t) = Taken By, (c) = Cosigned By    Initials Name Provider Type    Aissatou Frazier, PT Physical Therapist        Mobility     Row Name 07/04/20 1502          Bed Mobility Assessment/Treatment    Bed Mobility Assessment/Treatment  supine-sit  -NS     Supine-Sit [place] (Bed Mobility)  maximum assist (25% patient effort);2 person assist;verbal cues;nonverbal cues (demo/gesture)  -NS     Assistive Device (Bed Mobility)  bed rails;head of bed elevated;draw sheet  -NS     Comment (Bed Mobility)  VCs for sequencing. Assist required to bring RLE to EOB and to bring trunk to upright sitting.   -NS     Row Name 07/04/20 1502          Transfer Assessment/Treatment    Comment (Transfers)  VCs for hand placement and for pushing through LLE and BUEs. PT's foot placed under pt's to assist with pivoting and prevent bearing too much weight. Pt completed 2 reps STS then stand pivot transfer over to chair. Cues for standing up tall.   -[facility] Name 07/04/20 1502          Bed-Chair Transfer    Bed-Chair [place] (Transfers)  maximum assist (25% patient effort);2 person assist;verbal cues;nonverbal cues (demo/gesture)  -NS     Assistive Device (Bed-Chair Transfers)  walker, front-wheeled  -NS     [facility] Name 07/04/20 1502          Sit-Stand Transfer    Sit-Stand [place] (Transfers)  maximum assist (25% patient effort);2 person assist;verbal cues;nonverbal cues (demo/gesture)  -NS     Assistive Device (Sit-Stand Transfers)  walker, front-wheeled  -NS     [facility] Name 07/04/20 1502          Gait/Stairs Assessment/Training    Comment (Gait/Stairs)  Not appropriate to assess at this time.   -NS     Row Name 07/04/20 1502          Mobility Assessment/Intervention    Extremity Weight-bearing Status  (S) right lower extremity  -NS     Right Lower Extremity (Weight-bearing Status)  (S) touch down weight-bearing (TDWB)  -NS       User Key  (r) = Recorded By, (t) = Taken  By, (c) = Cosigned By    Initials Name Provider Type    Aissatou Frazier PT Physical Therapist        Obj/Interventions     Row Name 07/04/20 1502          Therapeutic Exercise    Lower Extremity (Therapeutic Exercise)  gluteal sets;quad sets, bilateral;SLR (straight leg raise), bilateral;marching while seated;LAQ (long arc quad), bilateral  -NS     Lower Extremity Range of Motion (Therapeutic Exercise)  ankle dorsiflexion/plantar flexion, bilateral  -NS     Exercise Type (Therapeutic Exercise)  AAROM (active assistive range of motion);AROM (active range of motion);isometric contraction, static  -NS     Position (Therapeutic Exercise)  seated  -NS     Sets/Reps (Therapeutic Exercise)  AAROM for RLE SLR and LAQs  -NS     Expected Outcome (Therapeutic Exercise)  facilitate normal movement patterns;improve neuromuscular control  -NS     Row Name 07/04/20 1502          Static Sitting Balance    Level of [place] (Unsupported Sitting, Static Balance)  minimal assist, 75% patient effort  -NS     Sitting Position (Unsupported Sitting, Static Balance)  sitting on edge of bed  -NS     Time Able to Maintain Position (Unsupported Sitting, Static Balance)  1 to 2 minutes  -NS     Row Name 07/04/20 1502          Dynamic Sitting Balance    Level of [place], Reaches Outside Midline (Sitting, Dynamic Balance)  moderate assist, 50 to 74% patient effort  -NS     Sitting Position, Reaches Outside Midline (Sitting, Dynamic Balance)  sitting on edge of bed  -NS     Row Name 07/04/20 1502          Static Standing Balance    Level of [place] (Supported Standing, Static Balance)  maximal assist, 25 to 49% patient effort;2 person assist  -NS     Time Able to Maintain Position (Supported Standing, Static Balance)  30 to 45 seconds  -NS     Assistive Device Utilized (Supported Standing, Static Balance)  walker, rolling  -[facility] Name 07/04/20 1502          Dynamic Standing Balance    Level of [place], Reaches Outside  Midline (Standing, Dynamic Balance)  maximal assist, 25 to 49% patient effort;2 person assist  -NS     Assistive Device Utilized (Supported Standing, Dynamic Balance)  walker, rolling  -NS       User Key  (r) = Recorded By, (t) = Taken By, (c) = Cosigned By    Initials Name Provider Type    Aissatou Frazier PT Physical Therapist        Goals/Plan    No documentation.       Clinical Impression     Row Name 07/04/20 1502          Pain Assessment    Additional Documentation  Pain Scale: Numbers Pre/Post-Treatment (Group)  -NS     Row Name 07/04/20 1502          Pain Scale: Numbers Pre/Post-Treatment    Pain Scale: Numbers, Pretreatment  0/10 - no pain  -NS     Pain Scale: Numbers, Post-Treatment  0/10 - no pain  -NS     Pre/Post Treatment Pain Comment  No complaints of pain during session.   -NS     Row Name 07/04/20 1502          Plan of Care Review    Plan of Care Reviewed With  patient  -NS     Progress  improving  -NS     Row Name 07/04/20 1502          Vital Signs    Pre Systolic BP Rehab  -- VSS- RN cleared for PT treatment  -NS     Pre Patient Position  Supine  -NS     Intra Patient Position  Standing  -NS     Post Patient Position  Sitting  -NS     Row Name 07/04/20 1502          Positioning and Restraints    Pre-Treatment Position  in bed  -NS     Post Treatment Position  chair  -NS     In Chair  reclined;call light within reach;encouraged to call for assist;exit alarm on;with family/caregiver;on mechanical lift sling;legs elevated  -NS       User Key  (r) = Recorded By, (t) = Taken By, (c) = Cosigned By    Initials Name Provider Type    Aissatou Frazier PT Physical Therapist        Outcome Measures     Row Name 07/04/20 1502          How much help from another person do you currently need...    Turning from your back to your side while in flat bed without using bedrails?  2  -NS     Moving from lying on back to sitting on the side of a flat bed without bedrails?  2  -NS     Moving to and from a bed to a  chair (including a wheelchair)?  2  -NS     Standing up from a chair using your arms (e.g., wheelchair, bedside chair)?  2  -NS     Climbing 3-5 steps with a railing?  1  -NS     To walk in hospital room?  1  -NS     AM-PAC 6 Clicks Score (PT)  10  -NS     Row Name 07/04/20 1502          Functional Assessment    Outcome Measure Options  AM-PAC 6 Clicks Basic Mobility (PT)  -NS       User Key  (r) = Recorded By, (t) = Taken By, (c) = Cosigned By    Initials Name Provider Type    Aissatou Frazier PT Physical Therapist        Physical Therapy Education                 Title: PT OT SLP Therapies (In Progress)     Topic: Physical Therapy (In Progress)     Point: Mobility training (In Progress)     Description:   Instruct learner(s) on safety and technique for assisting patient out of bed, chair or wheelchair.  Instruct in the proper use of assistive devices, such as walker, crutches, cane or brace.              Patient Friendly Description:   It's important to get you on your feet again, but we need to do so in a way that is safe for you. Falling has serious consequences, and your personal safety is the most important thing of all.        When it's time to get out of bed, one of us or a family member will sit next to you on the bed to give you support.     If your doctor or nurse tells you to use a walker, crutches, a cane, or a brace, be sure you use it every time you get out of bed, even if you think you don't need it.    Learning Progress Summary           Patient Acceptance, E, NR by NS at 7/4/2020 1601    Comment:  Patient was educated about WBing precautions and sequencing with transfers.    Acceptance, E, NR by ERICA at 7/3/2020 1154    Acceptance, E,D, NR by AA at 7/2/2020 1447   Family Acceptance, E,D, NR by AA at 7/2/2020 1447                   Point: Home exercise program (In Progress)     Description:   Instruct learner(s) on appropriate technique for monitoring, assisting and/or progressing patient with  therapeutic exercises and activities.              Learning Progress Summary           Patient Acceptance, E, NR by NS at 7/4/2020 1601    Comment:  Patient was educated about WBing precautions and sequencing with transfers.                   Point: Body mechanics (In Progress)     Description:   Instruct learner(s) on proper positioning and spine alignment for patient and/or caregiver during mobility tasks and/or exercises.              Learning Progress Summary           Patient Acceptance, E, NR by NS at 7/4/2020 1601    Comment:  Patient was educated about WBing precautions and sequencing with transfers.    Acceptance, E, NR by  at 7/3/2020 1154    Acceptance, E,D, NR by AA at 7/2/2020 1447   Family Acceptance, E,D, NR by AA at 7/2/2020 1447                   Point: Precautions (In Progress)     Description:   Instruct learner(s) on prescribed precautions during mobility and gait tasks              Learning Progress Summary           Patient Acceptance, E, NR by NS at 7/4/2020 1601    Comment:  Patient was educated about WBing precautions and sequencing with transfers.    Acceptance, E, NR by  at 7/3/2020 1154    Acceptance, E,D, NR by AA at 7/2/2020 1447   Family Acceptance, E,D, NR by AA at 7/2/2020 1447                               User Key     Initials Effective Dates Name Provider Type Discipline     11/20/18 -  Karine Mcmahon, PT Physical Therapist PT     04/02/18 -  Micaela Tate, PT Physical Therapist PT    NS 09/10/19 -  Aissatou Díaz PT Physical Therapist PT              PT Recommendation and Plan     Outcome Summary/Treatment Plan (PT)  Anticipated Discharge Disposition (PT): skilled nursing facility  Plan of Care Reviewed With: patient  Progress: improving  Outcome Summary: Patient completed supine>sit and STS with Max Ax2. He transferred to the recliner with Max A x2 and RW, requiring cues for TDWB status and for pushing through LLE and BUEs for support. PT's foot placed under pt to  ensure appropriate WBing status. Will continue to progress as tolerated.     Time Calculation:   PT Charges     Row Name 07/04/20 1502             Time Calculation    Start Time  1502  -NS      PT Received On  07/04/20  -NS      PT Goal Re-Cert Due Date  07/12/20  -NS         Timed Charges    33308 - PT Therapeutic Exercise Minutes  14  -NS      00042 - PT Therapeutic Activity Minutes  14  -NS        User Key  (r) = Recorded By, (t) = Taken By, (c) = Cosigned By    Initials Name Provider Type    NS Aissatou Díaz PT Physical Therapist        Therapy Charges for Today     Code Description Service Date Service Provider Modifiers Qty    73238295620 HC PT THER PROC EA 15 MIN 7/4/2020 Aissatou Díaz, PT GP 1    56947320409 HC PT THERAPEUTIC ACT EA 15 MIN 7/4/2020 Aissatou Díaz, PT GP 1    96602181063 HC PT THER SUPP EA 15 MIN 7/4/2020 Aissatou Díaz, PT GP 2          PT G-Codes  Outcome Measure Options: AM-PAC 6 Clicks Basic Mobility (PT)  AM-PAC 6 Clicks Score (PT): 10  AM-PAC 6 Clicks Score (OT): 9    Aissatou Díaz PT  7/4/2020

## 2020-07-05 PROCEDURE — 25010000002 PIPERACILLIN SOD-TAZOBACTAM PER 1 G: Performed by: INTERNAL MEDICINE

## 2020-07-05 PROCEDURE — 99232 SBSQ HOSP IP/OBS MODERATE 35: CPT | Performed by: INTERNAL MEDICINE

## 2020-07-05 PROCEDURE — 99024 POSTOP FOLLOW-UP VISIT: CPT | Performed by: ORTHOPAEDIC SURGERY

## 2020-07-05 RX ADMIN — TAZOBACTAM SODIUM AND PIPERACILLIN SODIUM 3.38 G: 375; 3 INJECTION, SOLUTION INTRAVENOUS at 03:13

## 2020-07-05 RX ADMIN — AMLODIPINE BESYLATE 10 MG: 10 TABLET ORAL at 09:39

## 2020-07-05 RX ADMIN — ASPIRIN 325 MG: 325 TABLET, COATED ORAL at 09:39

## 2020-07-05 RX ADMIN — LISINOPRIL 40 MG: 40 TABLET ORAL at 09:39

## 2020-07-05 RX ADMIN — LANSOPRAZOLE 30 MG: KIT at 09:38

## 2020-07-05 RX ADMIN — ATORVASTATIN CALCIUM 80 MG: 40 TABLET, FILM COATED ORAL at 19:39

## 2020-07-05 RX ADMIN — POLYETHYLENE GLYCOL 3350 17 G: 17 POWDER, FOR SOLUTION ORAL at 09:38

## 2020-07-05 RX ADMIN — DONEPEZIL HYDROCHLORIDE 10 MG: 10 TABLET, FILM COATED ORAL at 09:39

## 2020-07-05 RX ADMIN — TAZOBACTAM SODIUM AND PIPERACILLIN SODIUM 3.38 G: 375; 3 INJECTION, SOLUTION INTRAVENOUS at 19:39

## 2020-07-05 RX ADMIN — ASPIRIN 325 MG: 325 TABLET, COATED ORAL at 19:39

## 2020-07-05 RX ADMIN — TAZOBACTAM SODIUM AND PIPERACILLIN SODIUM 3.38 G: 375; 3 INJECTION, SOLUTION INTRAVENOUS at 11:57

## 2020-07-05 NOTE — PLAN OF CARE
Border dressing to the right hip CDI. PT denies pain/discomfort at this time. Cont vs incon, UOP ADQ. SBP moderately elevated. SR with wide QRS on cardiac mx. Q 2 hr T&R bu nursing staff in place. Call light in reach.

## 2020-07-05 NOTE — PROGRESS NOTES
"Orthopaedic Surgery Progress Note     LOS: 6 days   Patient Care Team:  Provider, No Known as PCP - General    POD 5    Subjective     Interval History:   Patient Reports: Patient with no new complaints    Objective     Vital Signs:  Temp (24hrs), Av.3 °F (36.8 °C), Min:97.8 °F (36.6 °C), Max:98.9 °F (37.2 °C)    /81 (BP Location: Left arm, Patient Position: Lying)   Pulse 88   Temp 98.1 °F (36.7 °C) (Oral)   Resp 16   Ht 177.8 cm (70\")   Wt 60.6 kg (133 lb 8 oz)   SpO2 95%   BMI 19.16 kg/m²     Labs:  Lab Results (last 24 hours)     Procedure Component Value Units Date/Time    Blood Culture - Blood, Arm, Right [[id number]] Collected:  20    Specimen:  Blood from Arm, Right Updated:  20     Blood Culture No growth at 5 days    Blood Culture - Blood, Arm, Left [[id number]] Collected:  20    Specimen:  Blood from Arm, Left Updated:  20     Blood Culture No growth at 5 days          Imaging:  FL Video Swallow With Speech Single Contrast  Narrative: EXAMINATION: FL VIDEO SWALLOW W SPEECH SINGLE-CONTRAST-     INDICATION: dysphagia; R50.9-Fever, unspecified; J96.01-Acute  respiratory failure with hypoxia; I47.2-Ventricular tachycardia;  S51.011A-Laceration without foreign body of right elbow, initial  encounter; R13.10-Dysphagia, unspecified     TECHNIQUE: 48 seconds of fluoroscopic time was used for this exam. 8  associated fluoroscopic loops were saved. The patient was evaluated in  the seated lateral position while taking a variety of consistencies of  barium by mouth under the direction of speech pathology.     COMPARISON: NONE     FINDINGS: 48 seconds of fluoroscopy provided for a modified barium  swallow. Please see speech therapy report for full details and  recommendations.        Impression: Fluoroscopy provided for a modified barium swallow. Please  see speech therapy report for full details and recommendations.         This report was finalized on " 7/2/2020 8:13 PM by Dr. Cher Macias MD.     XR Femur 2 View Right  Narrative: EXAMINATION: XR FEMUR 2 VW RIGHT-      INDICATION: Right hip fracture; R50.9-Fever, unspecified; J96.01-Acute  respiratory failure with hypoxia; I47.2-Ventricular tachycardia;  S51.011A-Laceration without foreign body of right elbow, initial  encounter.      COMPARISON: None.     FINDINGS: Two views of the right femur reveal fracture seen of the right  femoral neck. Slight impaction identified with mild overlapping of the  fracture fragments. The remainder of the femur is unremarkable. The  acetabulum is intact with some mild degenerative changes identified.         Impression: Right femoral neck fracture. The remainder of the femur is  grossly unremarkable with spurring of the patella. There is minimal  degenerative changes seen within the acetabulum with no significant  displacement.     D:  07/01/2020  E:  07/01/2020     This report was finalized on 7/2/2020 8:11 PM by Dr. Cher Macias MD.     FL C Arm During Surgery  Narrative: EXAMINATION: FL C ARM DURING SURGERY- 07/01/2020     INDICATION: hip pinning; R50.9-Fever, unspecified; J96.01-Acute  respiratory failure with hypoxia; I47.2-Ventricular tachycardia;  S51.011A-Laceration without foreign body of right elbow, initial  encounter      TECHNIQUE: Intraoperative fluoroscopy for improved localization and  treatment planning     COMPARISON: NONE     FINDINGS: Intraoperative fluoroscopy with total fluoroscopic time usage  2 minutes 12 seconds and 16 representative images saved during right hip  pinning.     Impression: Intraoperative fluoroscopy utilized during right hip  pinning.     D:  07/02/2020  E:  07/02/2020              Physical Exam:  Right hip incision intact, foot dorsiflexion intact    Assessment/Plan   Postop day 5 status post right femoral neck fixation  Toe-touch weightbearing right lower extremity  Follow-up with Dr. Harp in 3 weeks  Hair Henson,  MD  07/05/20  13:50

## 2020-07-05 NOTE — PROGRESS NOTES
[facility] Medicine Services  PROGRESS NOTE    Patient Name: Ken Jasso  : 1940  MRN: [id number]    Date of Admission: 2020  Primary Care Physician: Provider, No Known    Subjective   Subjective     CC:  Follow-up for dysphagia and right hip fracture    HPI:  No acute events overnight.  Patient still not eating very much and not working with physical therapy due to weakness, is feels like he has no strength at all.  Pain controlled on current medications.  He had a bowel movement yesterday.    Review of Systems  Gen- No fevers, chills  CV- No chest pain, palpitations  Resp- No cough, dyspnea  GI- No N/V/D, abd pain     Objective   Objective     Vital Signs:   Temp:  [97.8 °F (36.6 °C)-98.9 °F (37.2 °C)] 98.1 °F (36.7 °C)  Heart Rate:  [84-98] 88  Resp:  [14-16] 16  BP: (141-171)/(66-84) 151/81  Total (NIH Stroke Scale): 0     Physical Exam:  Constitutional: No acute distress, awake, alert, very weak  HENT: NCAT, mucous membranes moist  Respiratory: Clear to auscultation bilaterally, respiratory effort normal on nasal cannula  Cardiovascular: RRR, no murmurs, palpable pedal pulses bilaterally  Gastrointestinal: Positive bowel sounds, soft, nontender, nondistended  Psychiatric: Appropriate affect, cooperative  Neurologic: Oriented x 3, strength equal in lower extremities, but overall very weak, cranial Nerves grossly intact to confrontation, speech clear  Skin: No rashes    Results Reviewed:  Results from last 7 days   Lab Units 20  03420  0400 20  02520  0605   WBC 10*3/mm3 8.94 8.72  --  11.21*   HEMOGLOBIN g/dL 10.8* 10.9*  --  13.0   HEMATOCRIT % 32.9* 34.0*  --  42.2   PLATELETS 10*3/mm3 176 170  --  174   PROCALCITONIN ng/mL 0.91*  --  1.60* 1.65*     Results from last 7 days   Lab Units 20  2134 20  0340 20  0250 20  0605 20  0036 20  1837   SODIUM mmol/L  --  135* 135* 134*  --  138   POTASSIUM mmol/L  3.7 3.6 3.6 4.1  --  4.4   CHLORIDE mmol/L  --  99 100 99  --  100   CO2 mmol/L  --  23.0 24.0 25.0  --  26.0   BUN mg/dL  --  12 17 18  --  17   CREATININE mg/dL  --  0.86 1.01 0.92  --  0.97   GLUCOSE mg/dL  --  80 97 111*  --  123*   CALCIUM mg/dL  --  8.1* 8.1* 8.8  --  9.2   ALT (SGPT) U/L  --   --   --   --   --  12   AST (SGOT) U/L  --   --   --   --   --  19   TROPONIN T ng/mL  --   --   --  0.021 0.030 0.034*   PROBNP pg/mL  --   --  1,196.0  --   --   --      Estimated Creatinine Clearance: 57.7 mL/min (by C-G formula based on SCr of 0.86 mg/dL).    Microbiology Results Abnormal     Procedure Component Value - Date/Time    Blood Culture - Blood, Arm, Right [[id number]] Collected:  06/29/20 1931    Lab Status:  Final result Specimen:  Blood from Arm, Right Updated:  07/04/20 2000     Blood Culture No growth at 5 days    Blood Culture - Blood, Arm, Left [[id number]] Collected:  06/29/20 1931    Lab Status:  Final result Specimen:  Blood from Arm, Left Updated:  07/04/20 2000     Blood Culture No growth at 5 days    MRSA Screen, PCR (Inpatient) - Swab, Nares [[id number]]  (Normal) Collected:  06/29/20 2352    Lab Status:  Final result Specimen:  Swab from Nares Updated:  06/30/20 0958     MRSA PCR Negative    Narrative:       MRSA Negative    COVID-19,[facility] IN-HOUSE, NP SWAB IN TRANSPORT MEDIA 8-12 HR TAT - Swab, Nasopharynx [[id number]]  (Normal) Collected:  06/29/20 2004    Lab Status:  Final result Specimen:  Swab from Nasopharynx Updated:  06/30/20 0305     COVID19 Not Detected    Narrative:       Fact sheet for providers: https://www.fda.gov/media/876512/download     Fact sheet for patients: https://www.fda.gov/media/672485/download    Respiratory Panel, PCR - Swab, Nasopharynx [[id number]]  (Normal) Collected:  06/29/20 2004    Lab Status:  Final result Specimen:  Swab from Nasopharynx Updated:  06/29/20 2129     ADENOVIRUS, PCR Not Detected     Coronavirus 229E Not Detected     Coronavirus HKU1 Not  Detected     Coronavirus NL63 Not Detected     Coronavirus OC43 Not Detected     Human Metapneumovirus Not Detected     Human Rhinovirus/Enterovirus Not Detected     Influenza B PCR Not Detected     Parainfluenza Virus 1 Not Detected     Parainfluenza Virus 2 Not Detected     Parainfluenza Virus 3 Not Detected     Parainfluenza Virus 4 Not Detected     Bordetella pertussis pcr Not Detected     Influenza A H1 2009 PCR Not Detected     Chlamydophila pneumoniae PCR Not Detected     Mycoplasma pneumo by PCR Not Detected     Influenza A PCR Not Detected     Influenza A H3 Not Detected     Influenza A H1 Not Detected     RSV, PCR Not Detected     Bordetella parapertussis PCR Not Detected    Narrative:       The coronavirus on the RVP is NOT COVID-19 and is NOT indicative of infection with COVID-19.           Imaging Results (Last 24 Hours)     ** No results found for the last 24 hours. **          Results for orders placed during the hospital encounter of 06/29/20   Adult Transthoracic Echo Complete W/ Cont if Necessary Per Protocol (With Agitated Saline)    Narrative · Estimated EF = 55%.  · Left ventricular systolic function is normal.  · Mild mitral valve regurgitation is present  · Mild to moderate tricuspid valve regurgitation is present.  · Calculated right ventricular systolic pressure from tricuspid   regurgitation is 34 mmHg.  · No intracardiac shunting is seen  · No cardiac source for embolus is seen          I have reviewed the medications:  Scheduled Meds:  amLODIPine 10 mg Oral Q24H   aspirin 325 mg Oral Q12H   atorvastatin 80 mg Oral Nightly   bisacodyl 10 mg Rectal Daily   donepezil 10 mg Oral Daily   lansoprazole 30 mg Oral QAM   lisinopril 40 mg Oral Q24H   piperacillin-tazobactam 3.375 g Intravenous Q8H   polyethylene glycol 17 g Oral Daily   sodium chloride 10 mL Intravenous Q12H   sodium chloride 10 mL Intravenous Q12H     Continuous Infusions:  lactated ringers 75 mL/hr Last Rate: 75 mL/hr (07/03/20  0251)   niCARdipine 5-15 mg/hr    ropivacaine (NAROPIN) 0.2% peripheral nerve cath (moog) 8 mL/hr Last Rate: 8 mL/hr (07/01/20 0628)     PRN Meds:.•  acetaminophen **OR** [DISCONTINUED] acetaminophen  •  bisacodyl  •  lidocaine PF 1%  •  magnesium sulfate  •  potassium chloride  •  potassium chloride  •  potassium chloride  •  sodium chloride  •  sodium chloride    Assessment/Plan   Assessment & Plan     Active Hospital Problems    Diagnosis  POA   • **Fracture of right hip (CMS/HCC) [S72.001A]  Yes   • Acute febrile illness [R50.9]  Yes   • Dementia (CMS/HCC) [F03.90]  Yes   • H/O: CVA (no deficit) [Z86.73]  Not Applicable      Resolved Hospital Problems   No resolved problems to display.        Brief Hospital Course to date:  Ken Jasso is a 80 y.o. male with PMH of CVA, HTN, dementia, and GERD who was admitted on 6/29 after fall while walking to car, sustaining a right femoral neck fracture, there was concern for stroke and code stroke was called. Imaging negative for acute CVA. Had fever up to 103 on arrival and non-sustained wide complex tachycardia requiring admission to the ICU. Now out of ICU and has been stable  On telemetry floor.      Dysphagia  -MBS resulted with severe pharyngeal dysphagia  -speech recommending NPO, however, I spoke with Mr. Jasso and he would like to eat, we discussed his code status and he is CODE STATUS: NO CPR/INTUBATION      Right femoral neck fracture  -s/p hip percutaneous pinning on 7/1 by Dr. Harp  -PT/OT - wll need SNF-but needs to participate with PT here, if he continues to do poorly may consider palliative or hospice consult  -follow-up with Dr. Harp in 3 weeks, continue aspirin 325 mg BID for 6 weeks for VTE ppx     Fever-resolved  -temp 103 in the ED, has not had a fever since then   -continue zosyn for possible pneumonia for 7 days total (6/30 start date) CT abd/pelvis showed possible infiltrates in lower lungs bilaterally  -d/c vanc as MRSA nares  negative  -blood cultures NGTD      HTN- continue amlodipine, lisinopril, and atorvastatin   Dementia-continue aricept      DVT Prophylaxis:  SCD     Disposition: I expect the patient to be discharged to be determined      CODE STATUS:   Code Status and Medical Interventions:   Ordered at: 07/03/20 0956     Limited Support to NOT Include:    Intubation     Level Of Support Discussed With:    Patient     Code Status:    No CPR     Medical Interventions (Level of Support Prior to Arrest):    Limited         Electronically signed by Tash Britt MD, 07/05/20, 11:22.

## 2020-07-06 PROCEDURE — 99232 SBSQ HOSP IP/OBS MODERATE 35: CPT | Performed by: NURSE PRACTITIONER

## 2020-07-06 PROCEDURE — 97110 THERAPEUTIC EXERCISES: CPT

## 2020-07-06 PROCEDURE — 97535 SELF CARE MNGMENT TRAINING: CPT | Performed by: OCCUPATIONAL THERAPIST

## 2020-07-06 PROCEDURE — 25010000002 PIPERACILLIN SOD-TAZOBACTAM PER 1 G: Performed by: INTERNAL MEDICINE

## 2020-07-06 PROCEDURE — 97530 THERAPEUTIC ACTIVITIES: CPT

## 2020-07-06 RX ORDER — TRAMADOL HYDROCHLORIDE 50 MG/1
25 TABLET ORAL ONCE
Status: COMPLETED | OUTPATIENT
Start: 2020-07-06 | End: 2020-07-06

## 2020-07-06 RX ADMIN — ACETAMINOPHEN 650 MG: 325 TABLET, FILM COATED ORAL at 20:22

## 2020-07-06 RX ADMIN — ACETAMINOPHEN 650 MG: 325 TABLET, FILM COATED ORAL at 16:12

## 2020-07-06 RX ADMIN — LISINOPRIL 40 MG: 40 TABLET ORAL at 08:40

## 2020-07-06 RX ADMIN — ASPIRIN 325 MG: 325 TABLET, COATED ORAL at 20:22

## 2020-07-06 RX ADMIN — TAZOBACTAM SODIUM AND PIPERACILLIN SODIUM 3.38 G: 375; 3 INJECTION, SOLUTION INTRAVENOUS at 02:41

## 2020-07-06 RX ADMIN — POLYETHYLENE GLYCOL 3350 17 G: 17 POWDER, FOR SOLUTION ORAL at 08:38

## 2020-07-06 RX ADMIN — TRAMADOL HYDROCHLORIDE 25 MG: 50 TABLET, FILM COATED ORAL at 17:54

## 2020-07-06 RX ADMIN — TAZOBACTAM SODIUM AND PIPERACILLIN SODIUM 3.38 G: 375; 3 INJECTION, SOLUTION INTRAVENOUS at 20:23

## 2020-07-06 RX ADMIN — AMLODIPINE BESYLATE 10 MG: 10 TABLET ORAL at 08:39

## 2020-07-06 RX ADMIN — BISACODYL 5 MG: 5 TABLET, COATED ORAL at 08:39

## 2020-07-06 RX ADMIN — ATORVASTATIN CALCIUM 80 MG: 40 TABLET, FILM COATED ORAL at 20:22

## 2020-07-06 RX ADMIN — DONEPEZIL HYDROCHLORIDE 10 MG: 10 TABLET, FILM COATED ORAL at 08:40

## 2020-07-06 RX ADMIN — TAZOBACTAM SODIUM AND PIPERACILLIN SODIUM 3.38 G: 375; 3 INJECTION, SOLUTION INTRAVENOUS at 12:55

## 2020-07-06 RX ADMIN — SODIUM CHLORIDE, POTASSIUM CHLORIDE, SODIUM LACTATE AND CALCIUM CHLORIDE 75 ML/HR: 600; 310; 30; 20 INJECTION, SOLUTION INTRAVENOUS at 12:51

## 2020-07-06 RX ADMIN — ASPIRIN 325 MG: 325 TABLET, COATED ORAL at 08:39

## 2020-07-06 NOTE — PLAN OF CARE
Problem: Patient Care Overview  Goal: Plan of Care Review  Flowsheets  Taken 7/6/2020 0828  Progress: improving  Plan of Care Reviewed With: patient  Taken 7/6/2020 0910  Outcome Summary: Pt completed bed mobility with max assist x2, pivot transfer to chair with max assist x2 with verbal cues and physical assist to maintain TTWB RLE. He completed UB dressing task with max assist and LB dressing with dependence, self-feeding with supervision. Recommend SNF-level rehab.

## 2020-07-06 NOTE — PROGRESS NOTES
"                  Clinical Nutrition     Reason for Visit:   Follow-up protocol    Patient Name: Ken Jasso  YOB: 1940  MRN: [id number]  Date of Encounter: 07/06/20 09:06  Admission date: 6/29/2020     Comments: Follow up for PO. Patient failed MBS 7/2 and opted for comfort diet over alternate means of nutrition. Current PO intake per documentation is ~55% of past 5 meals as assessed below:    Patient consuming ~55% of [facility]  trays which provide ~1800 kcal/day   990 kcal / 1550 est daily kcal needs = ~64% est kcal needs consumed  Patient consuming ~55% of [facility]  trays which provide ~80 g protein/day   44 kcal / 80 g est daily protein needs = ~55% est protein needs consumed    Nutrition Assessment   Assessment     Admission diagnosis  R femur fracture s/p fall    Additional diagnosis/conditions/procedures this admission  R/O CVA  (7/1) s/p R hip percutaneous pinning  Fever, resolved    (6/30) SLP eval - mechanical soft with no mixed consistencies, nectar thick liquids  (7/2) SLP eval - puree with some mashed, nectar thick liquids  (7/2) SLP MBS - Safest option is NPO w/ alternative means of nutrition. Given advanced age + hx dementia, may consider comfort diet, pending POC. Discussed w/ Dr. Britt who plans to speak w/ family. If comfort diet desired, consider dysphagia level III (puree/mashed) diet w/ allowance of soft solids, as tolerated, and thin vs nectar-thick liquids, as tolerated.    Additional PMH/procedures:  Esophageal dilation  Dysphagia  Hiatal hernia  Pneumonia  Dementia  CVA    Reported/Observed/Food/Nutrition Related History:      Patient failed MBS 7/2. Per SLP, safest option NPO/alternate means of nutrition. Patient changed code status to No CPR/Intubation. Opted for comfort diet. Eating ~50% per PO documentation.      Anthropometrics     Height: 177.8 cm (70\")  Last filed wt: Weight: 60.6 kg (133 lb 8 oz) (07/05/20 0614)  Weight Method: Bed scale    BMI: " BMI (Calculated): 19.2  Normal: 18.5-24.9kg/m2    Ideal Body Weight (IBW) (kg): 76.48  Admission wt: 160 lb  Method obtained: estimated weight per charting 6/29    Labs reviewed     Results from last 7 days   Lab Units 07/03/20  2134 07/02/20  0340 07/01/20  0250 06/30/20  0605 06/29/20  1837   GLUCOSE mg/dL  --  80 97 111* 123*   BUN mg/dL  --  12 17 18 17   CREATININE mg/dL  --  0.86 1.01 0.92 0.97   SODIUM mmol/L  --  135* 135* 134* 138   CHLORIDE mmol/L  --  99 100 99 100   POTASSIUM mmol/L 3.7 3.6 3.6 4.1 4.4   PHOSPHORUS mg/dL  --  2.9 2.6 4.0  --    MAGNESIUM mg/dL  --  2.4 1.8 2.3 1.7   ALT (SGPT) U/L  --   --   --   --  12     Results from last 7 days   Lab Units 06/30/20  0605 06/29/20  1837   ALBUMIN g/dL  --  3.90   CHOLESTEROL mg/dL 154  --    TRIGLYCERIDES mg/dL 69  --        Results from last 7 days   Lab Units 06/30/20  1741 06/30/20  1118 06/30/20  0539 06/29/20  1938   GLUCOSE mg/dL 97 100 104 170*     Lab Results   Lab Value Date/Time    HGBA1C 5.60 06/30/2020 0605       Medications reviewed   Pertinent: IV zosyn, dulcolax, aricept, miralax    Intake/Output 24 hrs (7:00AM - 6:59 AM)     Intake & Output (last day)       07/05 0701 - 07/06 0700 07/06 0701 - 07/07 0700    P.O. 118     I.V. (mL/kg)  0 (0)    Total Intake(mL/kg) 118 (1.9) 0 (0)    Urine (mL/kg/hr) 700 (0.5) 200 (1.6)    Stool      Total Output 700 200    Net -582 -200          Urine Unmeasured Occurrence 10 x 2 x        Current Nutrition Prescription     PO: Diet Dysphagia; III - Pureed With Some Mashed; Nectar / Syrup Thick   Oral Nutrition Supplement: Magic Cup x2/d  Intake: 55% x 5 meals    Patient consuming ~55% of [facility]  trays which provide ~1800 kcal/day   990 kcal / 1550 est daily kcal needs = ~64% est kcal needs consumed  Patient consuming ~55% of [facility]  trays which provide ~80 g protein/day   44 kcal / 80 g est daily protein needs = ~55% est protein needs consumed    Nutrition Diagnosis     7/6  Problem  Inadequate oral intake   Etiology Dysphagia modifications / comfort diet / clinical status   Signs/Symptoms Consuming ~64% est daily kcal needs and 55% est daily protein needs   Status: ongoing    6/30 updated 7/2, 7/6  Problem Swallowing difficulty   Etiology Dysphagia   Signs/Symptoms SLP kristen - 7/2, mack/brandt, NTL   Status: ongoing    Nutrition Intervention   1.  Follow treatment progress, Care plan reviewed  2.  Cont to send Magic Cup supplement for increased kcal/protein needs  3. Patient opted for comfort diet at this time; declined aggressive nutrition interventions.  4.  staff to honor food preferences as able within comfort diet modifications    Goal:   General: Nutrition support treatment  PO: Comfort diet; Honor food preferences      Monitoring/Evaluation:   Per protocol, PO intake, Supplement intake, Symptoms, POC/GOC, Swallow function    Will Continue to follow per protocol    Carolina Farrell RDN, LD  Time Spent: 25 minutes

## 2020-07-06 NOTE — PLAN OF CARE
Problem: Patient Care Overview  Goal: Plan of Care Review  Outcome: Ongoing (interventions implemented as appropriate)  Flowsheets (Taken 7/6/2020 8136)  Progress: no change  Plan of Care Reviewed With: patient  Note:   Pt remains A&OX4, but forgetful at times. Remains non ambulatory, max assist 2 to chair VS sling. Waffle mattress in place. Pt incontinent of bladder. Barrier cream has been applied PRN. VSS. NSR on tele. PRN tyelnol & 1 time dose of ultram given this evening for RLE pain. Will cont to mx. Call light in reach. Plan for rehab at d/c.

## 2020-07-06 NOTE — PROGRESS NOTES
Continued Stay Note  [facility]     Patient Name: Ken Jasso  MRN: [id number]  Today's Date: 7/6/2020    Admit Date: 6/29/2020    Discharge Plan     Row Name 07/06/20 1432       Plan    Plan  TBD    Plan Comments  Followed up with Mr. Jasso at the bedside for discharge planning.  The patient's speech was difficult to understand and garbled.  No family is at the bedside.  After unit rounds this morning, rehab was discussed.  Mr. Jasso continues to be unable to participate and requires 2 person max assist for all PT and OT interventions.  The patient will need to progress with PT/OT to be accepted by rehab facilities.    CM will continue to follow.    Final Discharge Disposition Code  30 - still a patient        Discharge Codes    No documentation.       Expected Discharge Date and Time     Expected Discharge Date Expected Discharge Time    Jul 8, 2020             Abida Florence RN

## 2020-07-06 NOTE — PLAN OF CARE
Problem: Patient Care Overview  Goal: Plan of Care Review  Outcome: Ongoing (interventions implemented as appropriate)  Flowsheets (Taken 7/6/2020 2538)  Outcome Summary: Pt perform bed mobility with max A of 2, pt unable to advance RLE to EOB.  STS max A of 2 with RW, multiple attempts with pt unable to peform safely.  SPT max A of 2 with BUE support and PT foot under RLE for TTWB.  Recommend DC to SNF when appropriate

## 2020-07-06 NOTE — PLAN OF CARE
PT remains non ambulatory. Barrier cream has been applied at every incontinent episode. VSS. SR/S tach. Will cont to mx. Call light in reach.

## 2020-07-06 NOTE — PROGRESS NOTES
[facility] Medicine Services  PROGRESS NOTE    Patient Name: Ken Jasso  : 1940  MRN: [id number]    Date of Admission: 2020  Primary Care Physician: Provider, No Known    Subjective   Subjective     CC:  Follow up for dysphagia and right hip fracture     HPI:  Patient is sitting up in chair in NAD. He states he wants to go to rehab. Tolerating diet. Pain controlled. No acute events overnight per nursing     Review of Systems  Gen- No fevers, chills  CV- No chest pain, palpitations  Resp- No cough, dyspnea  GI- No N/V/D, abd pain         Objective   Objective     Vital Signs:   Temp:  [98.1 °F (36.7 °C)-99.4 °F (37.4 °C)] 99.4 °F (37.4 °C)  Heart Rate:  [] 88  Resp:  [16-18] 16  BP: (143-165)/(71-84) 147/78  Total (NIH Stroke Scale): 0     Physical Exam:  Constitutional: No acute distress, awake, alert  HENT: NCAT, mucous membranes moist  Respiratory: Clear to auscultation bilaterally, respiratory effort normal room air 95%  Cardiovascular: RRR, no murmurs, rubs, or gallops, palpable pedal pulses bilaterally  Gastrointestinal: Positive bowel sounds, soft, nontender, nondistended  Musculoskeletal: No bilateral ankle edema  Psychiatric: Appropriate affect, cooperative  Neurologic: Oriented x 3, strength symmetric in all extremities, Cranial Nerves grossly intact to confrontation, speech clear  Skin: No rashes, right hip dressing cdi       Results Reviewed:  Results from last 7 days   Lab Units 20  0340 20  0400 20  0250 20  0605   WBC 10*3/mm3 8.94 8.72  --  11.21*   HEMOGLOBIN g/dL 10.8* 10.9*  --  13.0   HEMATOCRIT % 32.9* 34.0*  --  42.2   PLATELETS 10*3/mm3 176 170  --  174   PROCALCITONIN ng/mL 0.91*  --  1.60* 1.65*     Results from last 7 days   Lab Units 20  2134 20  0340 20  0250 20  0605 20  0036 20  1837   SODIUM mmol/L  --  135* 135* 134*  --  138   POTASSIUM mmol/L 3.7 3.6 3.6 4.1  --  4.4      CHLORIDE mmol/L  --  99 100 99  --  100   CO2 mmol/L  --  23.0 24.0 25.0  --  26.0   BUN mg/dL  --  12 17 18  --  17   CREATININE mg/dL  --  0.86 1.01 0.92  --  0.97   GLUCOSE mg/dL  --  80 97 111*  --  123*   CALCIUM mg/dL  --  8.1* 8.1* 8.8  --  9.2   ALT (SGPT) U/L  --   --   --   --   --  12   AST (SGOT) U/L  --   --   --   --   --  19   TROPONIN T ng/mL  --   --   --  0.021 0.030 0.034*   PROBNP pg/mL  --   --  1,196.0  --   --   --      Estimated Creatinine Clearance: 57.7 mL/min (by C-G formula based on SCr of 0.86 mg/dL).    Microbiology Results Abnormal     Procedure Component Value - Date/Time    Blood Culture - Blood, Arm, Right [[id number]] Collected:  06/29/20 1931    Lab Status:  Final result Specimen:  Blood from Arm, Right Updated:  07/04/20 2000     Blood Culture No growth at 5 days    Blood Culture - Blood, Arm, Left [[id number]] Collected:  06/29/20 1931    Lab Status:  Final result Specimen:  Blood from Arm, Left Updated:  07/04/20 2000     Blood Culture No growth at 5 days    MRSA Screen, PCR (Inpatient) - Swab, Nares [[id number]]  (Normal) Collected:  06/29/20 2352    Lab Status:  Final result Specimen:  Swab from Nares Updated:  06/30/20 0958     MRSA PCR Negative    Narrative:       MRSA Negative    COVID-19,[facility] IN-HOUSE, NP SWAB IN TRANSPORT MEDIA 8-12 HR TAT - Swab, Nasopharynx [[id number]]  (Normal) Collected:  06/29/20 2004    Lab Status:  Final result Specimen:  Swab from Nasopharynx Updated:  06/30/20 0305     COVID19 Not Detected    Narrative:       Fact sheet for providers: https://www.fda.gov/media/327744/download     Fact sheet for patients: https://www.fda.gov/media/471727/download    Respiratory Panel, PCR - Swab, Nasopharynx [[id number]]  (Normal) Collected:  06/29/20 2004    Lab Status:  Final result Specimen:  Swab from Nasopharynx Updated:  06/29/20 2129     ADENOVIRUS, PCR Not Detected     Coronavirus 229E Not Detected     Coronavirus HKU1 Not Detected     Coronavirus NL63  Not Detected     Coronavirus OC43 Not Detected     Human Metapneumovirus Not Detected     Human Rhinovirus/Enterovirus Not Detected     Influenza B PCR Not Detected     Parainfluenza Virus 1 Not Detected     Parainfluenza Virus 2 Not Detected     Parainfluenza Virus 3 Not Detected     Parainfluenza Virus 4 Not Detected     Bordetella pertussis pcr Not Detected     Influenza A H1 2009 PCR Not Detected     Chlamydophila pneumoniae PCR Not Detected     Mycoplasma pneumo by PCR Not Detected     Influenza A PCR Not Detected     Influenza A H3 Not Detected     Influenza A H1 Not Detected     RSV, PCR Not Detected     Bordetella parapertussis PCR Not Detected    Narrative:       The coronavirus on the RVP is NOT COVID-19 and is NOT indicative of infection with COVID-19.           Imaging Results (Last 24 Hours)     Procedure Component Value Units Date/Time    FL C Arm During Surgery [[id number]] Collected:  07/02/20 0835     Updated:  07/06/20 0857    Narrative:       EXAMINATION: FL C ARM DURING SURGERY- 07/01/2020     INDICATION: hip pinning; R50.9-Fever, unspecified; J96.01-Acute  respiratory failure with hypoxia; I47.2-Ventricular tachycardia;  S51.011A-Laceration without foreign body of right elbow, initial  encounter      TECHNIQUE: Intraoperative fluoroscopy for improved localization and  treatment planning     COMPARISON: NONE     FINDINGS: Intraoperative fluoroscopy with total fluoroscopic time usage  2 minutes 12 seconds and 16 representative images saved during right hip  pinning.       Impression:       Intraoperative fluoroscopy utilized during right hip  pinning.     D:  07/02/2020  E:  07/02/2020         This report was finalized on 7/6/2020 8:54 AM by Dr. Manoj Elizondo.             Results for orders placed during the hospital encounter of 06/29/20   Adult Transthoracic Echo Complete W/ Cont if Necessary Per Protocol (With Agitated Saline)    Narrative · Estimated EF = 55%.  · Left ventricular systolic  function is normal.  · Mild mitral valve regurgitation is present  · Mild to moderate tricuspid valve regurgitation is present.  · Calculated right ventricular systolic pressure from tricuspid   regurgitation is 34 mmHg.  · No intracardiac shunting is seen  · No cardiac source for embolus is seen          I have reviewed the medications:  Scheduled Meds:  amLODIPine 10 mg Oral Q24H   aspirin 325 mg Oral Q12H   atorvastatin 80 mg Oral Nightly   bisacodyl 10 mg Rectal Daily   donepezil 10 mg Oral Daily   lansoprazole 30 mg Oral QAM   lisinopril 40 mg Oral Q24H   piperacillin-tazobactam 3.375 g Intravenous Q8H   polyethylene glycol 17 g Oral Daily   sodium chloride 10 mL Intravenous Q12H   sodium chloride 10 mL Intravenous Q12H     Continuous Infusions:  lactated ringers 75 mL/hr Last Rate: 75 mL/hr (07/03/20 0251)   niCARdipine 5-15 mg/hr    ropivacaine (NAROPIN) 0.2% peripheral nerve cath (moog) 8 mL/hr Last Rate: 8 mL/hr (07/01/20 1458)     PRN Meds:.•  acetaminophen **OR** [DISCONTINUED] acetaminophen  •  bisacodyl  •  lidocaine PF 1%  •  magnesium sulfate  •  potassium chloride  •  potassium chloride  •  potassium chloride  •  sodium chloride  •  sodium chloride    Assessment/Plan   Assessment & Plan     Active Hospital Problems    Diagnosis  POA   • **Fracture of right hip (CMS/HCC) [S72.001A]  Yes   • Acute febrile illness [R50.9]  Yes   • Dementia (CMS/HCC) [F03.90]  Yes   • H/O: CVA (no deficit) [Z86.73]  Not Applicable      Resolved Hospital Problems   No resolved problems to display.        Brief Hospital Course to date:  Ken Jasso is a 80 y.o. male with PMH of CVA, HTN, dementia, and GERD who was admitted on 6/29 after fall while walking to car, sustaining a right femoral neck fracture, there was concern for stroke and code stroke was called. Imaging negative for acute CVA. Had fever up to 103 on arrival and non-sustained wide complex tachycardia requiring admission to the ICU. Now out of ICU  and has been stable  On telemetry floor.     Plan was partially entered by my partner and I have reviewed and updated on 7/6 and verify accuracy      Dysphagia  -MBS resulted with severe pharyngeal dysphagia  -speech recommending NPO, however, prior provider spoke with Mr. Jasso and he would like to eat, they discussed his code status and he is CODE STATUS: NO CPR/INTUBATION   -- wife aware and agrees with his decision      Right femoral neck fracture  -s/p hip percutaneous pinning on 7/1 by Dr. Harp  -PT/OT - wll need SNF-but needs to participate with PT here, if he continues to do poorly may consider palliative or hospice consult  -follow-up with Dr. Harp in 3 weeks, continue aspirin 325 mg BID for 6 weeks for VTE ppx     Fever-resolved  -temp 103 in the ED, has not had a fever since then   -continue zosyn for possible pneumonia for 7 days total (6/30 start date) CT abd/pelvis showed possible infiltrates in lower lungs bilaterally  -d/c vanc as MRSA nares negative  -blood cultures NGTD      HTN- continue amlodipine, lisinopril, and atorvastatin   Dementia-continue aricept        DVT Prophylaxis:  scd    Daily Care Communication  Due to current limited visitation policies, an attempt will be made daily to update patient's identified best point-of-contact(s)   Contact: ishan   Relation: spouse   Type of communication (phone or televideo): on phone    Time of communication: 1150   Notes (if applicable): updated      Disposition: I expect the patient to be discharged to rehab     CODE STATUS:   Code Status and Medical Interventions:   Ordered at: 07/03/20 0956     Limited Support to NOT Include:    Intubation     Level Of Support Discussed With:    Patient     Code Status:    No CPR     Medical Interventions (Level of Support Prior to Arrest):    Limited         Electronically signed by DAVID Starks, 07/06/20, 11:38.

## 2020-07-06 NOTE — THERAPY TREATMENT NOTE
Acute Care - Occupational Therapy Treatment Note  [facility]     Patient Name: Ken Jasso  : 1940  MRN: [id number]  Today's Date: 2020             Admit Date: 2020       ICD-10-CM ICD-9-CM   1. Acute febrile illness R50.9 780.60   2. Acute respiratory failure with hypoxia (CMS/HCC) J96.01 518.81   3. Ventricular tachycardia (CMS/HCC) I47.2 427.1   4. Skin tear of right elbow without complication, initial encounter S51.011A 881.01   5. Oropharyngeal dysphagia R13.12 787.22     Patient Active Problem List   Diagnosis   • Acute febrile illness   • Fracture of right hip (CMS/HCC)   • Dementia (CMS/HCC)   • H/O: CVA (no deficit)     Past Medical History:   Diagnosis Date   • Dementia (CMS/HCC)    • Dementia (CMS/HCC)    • Dysphagia    • Esophageal dilatation    • Hiatal hernia    • PNA (pneumonia)    • PNA (pneumonia)    • Pneumonia     [place] EPISODES    • Prostate CA (CMS/HCC)    • Stroke (CMS/HCC)          History reviewed. No pertinent surgical history.    Therapy Treatment    Rehabilitation Treatment Summary     Row Name 20             Treatment Time/Intention    Discipline  occupational therapist  -AR      Document Type  therapy note (daily note)  -AR      Subjective Information  complains of;pain  -AR      Mode of Treatment  occupational therapy  -AR      Existing Precautions/Restrictions  (S) fall;weight bearing TTWB RLE, dementia  -AR      Recorded by [AR] Mallory Coleman OT 20      Row Name 20             Cognitive Assessment/Intervention- PT/OT    Affect/Mental Status (Cognitive)  WFL  -AR      Orientation Status (Cognition)  oriented to;person;situation  -AR      Follows Commands (Cognition)  follows one step commands;75-90% accuracy;verbal cues/prompting required  -AR      Cognitive Function (Cognitive)  memory deficit;safety deficit  -AR      Recorded by [AR] Mallory Coleman OT 20      Row Name 20              Safety Issues, Functional Mobility    Safety Issues Affecting Function (Mobility)  safety precaution awareness;safety precautions follow-through/compliance;sequencing abilities;judgment;problem solving  -AR      Impairments Affecting Function (Mobility)  balance;cognition;endurance/activity tolerance;range of motion (ROM);strength  -AR      Recorded by [AR] Mallory Coleman, OT 07/06/20 0908      Row Name 07/06/20 0828             Mobility Assessment/Intervention    Extremity Weight-bearing Status  right lower extremity  -AR      Right Lower Extremity (Weight-bearing Status)  (S) toe touch weight-bearing (TTWB)  -AR      Recorded by [AR] Mallory Coleman, OT 07/06/20 0908      Row Name 07/06/20 0828             Bed Mobility Assessment/Treatment    Bed Mobility Assessment/Treatment  scooting/bridging;supine-sit  -AR      Scooting/Bridging [place] (Bed Mobility)  maximum assist (25% patient effort);verbal cues  -AR      Supine-Sit [place] (Bed Mobility)  maximum assist (25% patient effort);2 person assist;verbal cues  -AR      Bed Mobility, Safety Issues  decreased use of legs for bridging/pushing;cognitive deficits limit understanding  -AR      Assistive Device (Bed Mobility)  draw sheet;head of bed elevated;bed rails  -AR      Recorded by [AR] Mallory Coleman, OT 07/06/20 0908      Row Name 07/06/20 0828             Transfer Assessment/Treatment    Transfer Assessment/Treatment  sit-stand transfer;stand-sit transfer;bed-chair transfer  -AR      Maintains Weight-bearing Status (Transfers)  verbal cues to maintain;physical assist to maintain  -AR      Comment (Transfers)  Cues for hand placement and sequencing. Pt transitioned sit-to-stand with RW, unable to achieve upright stance and needed ongoing cues to sequence and for maintaining TTWB RLE. Pt returned to EOB, following rest break he pivoted to chair with BUE support and assist to maintain TTWB RLE. Forward-flexed posture noted.    -AR      Recorded by [AR] Mallory Coleman, OT 07/06/20 0908      Row Name 07/06/20 0828             Bed-Chair Transfer    Bed-Chair [place] (Transfers)  maximum assist (25% patient effort);2 person assist;verbal cues  -AR      Assistive Device (Bed-Chair Transfers)  other (see comments) BUE support  -AR      Recorded by [AR] Mallory Coleman, OT 07/06/20 0908      Row Name 07/06/20 0828             Sit-Stand Transfer    Sit-Stand [place] (Transfers)  maximum assist (25% patient effort);2 person assist;verbal cues  -AR      Assistive Device (Sit-Stand Transfers)  other (see comments) BUE support  -AR      Recorded by [AR] Mallory Coleman, OT 07/06/20 0908      Row Name 07/06/20 0828             Stand-Sit Transfer    Stand-Sit [place] (Transfers)  maximum assist (25% patient effort);2 person assist;verbal cues  -AR      Assistive Device (Stand-Sit Transfers)  other (see comments) BUE support  -AR      Recorded by [AR] Mallory Coleman, OT 07/06/20 0908      Row Name 07/06/20 0828             ADL Assessment/Intervention    56978 - OT Self Care/Mgmt Minutes  12  -AR      BADL Assessment/Intervention  upper body dressing;lower body dressing;feeding;toileting  -AR2      Recorded by [AR] Mallory Coleman, OT 07/06/20 0908  [AR2] Mallory Coleman OT 07/06/20 0913      Row Name 07/06/20 0828             Upper Body Dressing Assessment/Training    Upper Body Dressing [place] Level  doff;don;front opening garment;pajama/robe;moderate assist (50% patient effort)  -AR      Upper Body Dressing Position  supine  -AR      Recorded by [AR] Mallory Coleman OT 07/06/20 0908      Row Name 07/06/20 0828             Lower Body Dressing Assessment/Training    Lower Body Dressing [place] Level  don;doff;socks;dependent (less than 25% patient effort)  -AR      Lower Body Dressing Position  supported sitting  -AR      Recorded by [AR] Mallory Coleman, OT 07/06/20 0908      Row Name  07/06/20 0828             Self-Feeding Assessment/Training    [place] Level (Feeding)  feeding skills;supervision  -AR      Position (Self-Feeding)  supported sitting  -AR      Recorded by [AR] Mallory Coleman, OT 07/06/20 0908      Row Name 07/06/20 0828             Toileting Assessment/Training    [place] Level (Toileting)  dependent (less than 25% patient effort)  -AR      Assistive Devices (Toileting)  urinal  -AR      Toileting Position  edge of bed sitting  -AR      Comment (Toileting)  Pt with episode of urinary incontinence.   -AR      Recorded by [AR] Mallory Coleman, OT 07/06/20 0913      Row Name 07/06/20 0828             BADL Safety/Performance    Impairments, BADL Safety/Performance  balance;cognition;endurance/activity tolerance;maintains weight bearing status;pain;range of motion;strength  -AR      Cognitive Impairments, BADL Safety/Performance  attention;judgment;problem solving/reasoning;safety precaution awareness;safety precaution follow-through;sequencing abilities  -AR      Skilled BADL Treatment/Intervention  BADL process/adaptation training;compensatory training;cristi/one-hand technique  -AR      Recorded by [AR] Mallory Coleman, OT 07/06/20 0908      Row Name 07/06/20 0828             Balance    Balance  static sitting balance;static standing balance  -AR      Recorded by [AR] Mallory Coleman, OT 07/06/20 0908      Row Name 07/06/20 0828             Static Sitting Balance    Level of [place] (Unsupported Sitting, Static Balance)  contact guard assist  -AR      Sitting Position (Unsupported Sitting, Static Balance)  sitting on edge of bed  -AR      Time Able to Maintain Position (Unsupported Sitting, Static Balance)  more than 5 minutes  -AR      Recorded by [AR] Mallory Coleman, OT 07/06/20 0908      Row Name 07/06/20 0828             Static Standing Balance    Level of [place] (Supported Standing, Static Balance)  maximal assist, 25 to 49% patient  effort;2 person assist  -AR      Time Able to Maintain Position (Supported Standing, Static Balance)  45 to 60 seconds  -AR      Assistive Device Utilized (Supported Standing, Static Balance)  walker, rolling  -AR      Recorded by [AR] Mallory Coleman, OT 07/06/20 0908      Row Name 07/06/20 0828             Positioning and Restraints    Pre-Treatment Position  in bed  -AR      Post Treatment Position  chair  -AR      In Chair  reclined;call light within reach;encouraged to call for assist;exit alarm on;with nsg;compression device;waffle cushion;on mechanical lift sling;legs elevated  -AR      Recorded by [AR] Mallory Coleman, OT 07/06/20 0908      Row Name 07/06/20 0828             Pain Assessment    Additional Documentation  Pain Scale: FACES Pre/Post-Treatment (Group)  -AR      Recorded by [AR] Mallory Coleman, OT 07/06/20 0908      Row Name 07/06/20 0828             Pain Scale: Numbers Pre/Post-Treatment    Pain Scale: Numbers, Pretreatment  --  -AR      Pain Scale: Numbers, Post-Treatment  --  -AR      Pain Location - Side  Right  -AR      Pain Location - Orientation  generalized  -AR      Pain Location  hip  -AR      Pain Intervention(s)  Repositioned;Ambulation/increased activity pt declined need for pain medication  -AR      Recorded by [AR] Mallory Coleman, OT 07/06/20 0908      Row Name 07/06/20 0828             Pain Scale: FACES Pre/Post-Treatment    Pain: FACES Scale, Pretreatment  2-->hurts little bit  -AR      Pain: FACES Scale, Post-Treatment  0-->no hurt  -AR      Recorded by [AR] Mallory Coleman, OT 07/06/20 0908      Row Name                Wound 06/29/20 1930 Right elbow Skin Tear    Wound - Properties Group Date first assessed: 06/29/20 [AB] Time first assessed: 1930 [AB] Present on Hospital Admission: Y [AB] Side: Right [AB] Location: elbow [AB] Primary Wound Type: Skin tear [AB] Additional Comments: CLEANED AND DRESSING PLACED  [AB] Recorded by:  [AB] Efrem Mei RN  06/29/20 1953    Row Name                Wound 07/01/20 1848 Right lateral hip Incision    Wound - Properties Group Date first assessed: 07/01/20 [HB] Time first assessed: 1848 [HB] Side: Right [HB] Orientation: lateral [HB] Location: hip [HB] Primary Wound Type: Incision [HB] Recorded by:  [HB] Mini Pitts RN 07/01/20 1848    Row Name 07/06/20 0828             Plan of Care Review    Plan of Care Reviewed With  patient  -AR      Progress  improving  -AR      Recorded by [AR] Mallory Coleman OT 07/06/20 0908      Row Name 07/06/20 0828             Outcome Summary/Treatment Plan (OT)    Daily Summary of Progress (OT)  progress towards functional goals is fair  -AR      Recorded by [AR] Mallory Coleman OT 07/06/20 0908        User Key  (r) = Recorded By, (t) = Taken By, (c) = Cosigned By    Initials Name Effective Dates Discipline    AR Mallory Coleman OT 06/22/15 -  OT    AB Efrem Mei RN 06/16/16 -  Nurse    HB Mini Pitts RN 02/21/20 -  Nurse        Wound 06/29/20 1930 Right elbow Skin Tear (Active)   Dressing Appearance open to air 7/6/2020  5:35 AM   Periwound Temperature warm 7/5/2020  7:40 PM   Periwound Skin Turgor soft 7/5/2020  7:40 PM   Edges irregular 7/5/2020  7:40 PM   Drainage Amount none 7/6/2020  5:35 AM   Dressing Care, Wound open to air 7/6/2020  3:49 AM       Wound 07/01/20 1848 Right lateral hip Incision (Active)   Dressing Appearance dry;intact 7/6/2020  5:35 AM   Closure AMY 7/6/2020  5:35 AM   Base dressing in place, unable to visualize 7/6/2020  5:35 AM   Periwound intact;dry 7/5/2020  9:41 AM   Drainage Amount none 7/6/2020  5:35 AM   Dressing Care, Wound border dressing 7/6/2020  5:35 AM     Rehab Goal Summary     Row Name 07/06/20 0828             Transfer Goal 1 (OT)    Progress/Outcome (Transfer Goal 1, OT)  goal ongoing  -AR         Dressing Goal 1 (OT)    Progress/Outcome (Dressing Goal 1, OT)  goal ongoing  -AR         Strength Goal 1 (OT)    Progress/Outcome  (Strength Goal 1, OT)  goal ongoing  -AR          Activity Tolerance Goal 1 (OT)    Progress/Outcome (Activity Tolerance Goal 1, OT)  goal ongoing  -AR        User Key  (r) = Recorded By, (t) = Taken By, (c) = Cosigned By    Initials Name Provider Type Discipline    Mallory Sinclair, OT Occupational Therapist OT        Occupational Therapy Education                 Title: PT OT SLP Therapies (In Progress)     Topic: Occupational Therapy (In Progress)     Point: ADL training (In Progress)     Description:   Instruct learner(s) on proper safety adaptation and remediation techniques during self care or transfers.   Instruct in proper use of assistive devices.              Learning Progress Summary           Patient Eager, E,TB,D, NR by AR at 7/6/2020 0828    Acceptance, E,D, NR by JRUDY at 7/2/2020 1457   Family Acceptance, E,D, NR by JY at 7/2/2020 1457                   Point: Home exercise program (In Progress)     Description:   Instruct learner(s) on appropriate technique for monitoring, assisting and/or progressing therapeutic exercises/activities.              Learning Progress Summary           Patient Eager, E,TB,D, NR by AR at 7/6/2020 0828    Acceptance, E,D, NR by JY at 7/2/2020 1457   Family Acceptance, E,D, NR by JY at 7/2/2020 1457                   Point: Precautions (In Progress)     Description:   Instruct learner(s) on prescribed precautions during self-care and functional transfers.              Learning Progress Summary           Patient Eager, E,TB,D, NR by AR at 7/6/2020 0828    Acceptance, E,D, NR by JY at 7/2/2020 1457   Family Acceptance, E,D, NR by JY at 7/2/2020 1457                   Point: Body mechanics (In Progress)     Description:   Instruct learner(s) on proper positioning and spine alignment during self-care, functional mobility activities and/or exercises.              Learning Progress Summary           Patient Eager, E,TB,D, NR by AR at 7/6/2020 0828    Acceptance, E,D, NR by  PATTI at 7/2/2020 1457   Family Acceptance, E,D, NR by PATTI at 7/2/2020 1457                               User Key     Initials Effective Dates Name Provider Type Discipline    AR 06/22/15 -  Mallory Coleman OT Occupational Therapist OT    PATTI 01/29/20 -  Pat Montes OT Occupational Therapist OT                OT Recommendation and Plan  Outcome Summary/Treatment Plan (OT)  Daily Summary of Progress (OT): progress towards functional goals is fair  Daily Summary of Progress (OT): progress towards functional goals is fair  Plan of Care Review  Plan of Care Reviewed With: patient  Plan of Care Reviewed With: patient  Outcome Summary: Pt completed bed mobility with max assist x2, pivot transfer to chair with max assist x2 with verbal cues and physical assist to maintain TTWB RLE. He completed UB dressing task with max assist and LB dressing with dependence, self-feeding with supervision. Recommend SNF-level rehab.  Outcome Measures     Row Name 07/06/20 0828             How much help from another is currently needed...    Putting on and taking off regular lower body clothing?  1  -AR      Bathing (including washing, rinsing, and drying)  2  -AR      Toileting (which includes using toilet bed pan or urinal)  1  -AR      Putting on and taking off regular upper body clothing  2  -AR      Taking care of personal grooming (such as brushing teeth)  2  -AR      Eating meals  3  -AR      AM-PAC 6 Clicks Score (OT)  11  -AR         Functional Assessment    Outcome Measure Options  AM-PAC 6 Clicks Daily Activity (OT)  -AR        User Key  (r) = Recorded By, (t) = Taken By, (c) = Cosigned By    Initials Name Provider Type    Mallory Sinclair OT Occupational Therapist           Time Calculation:   Time Calculation- OT     Row Name 07/06/20 0828             Time Calculation- OT    OT Start Time  0828  -AR      OT Stop Time  1200  -AR      OT Time Calculation (min)  212 min  -AR      OT Received On  07/06/20  -AR      OT  Goal Re-Cert Due Date  07/12/20  -AR         Timed Charges    17638 - OT Self Care/Mgmt Minutes  12  -AR        User Key  (r) = Recorded By, (t) = Taken By, (c) = Cosigned By    Initials Name Provider Type    Mallory Sinclair OT Occupational Therapist        Therapy Charges for Today     Code Description Service Date Service Provider Modifiers Qty    [id number] HC OT SELF CARE/MGMT/TRAIN EA 15 MIN 7/6/2020 Mallory Coleman OT GO 1               Mallory Coleman OT  7/6/2020

## 2020-07-06 NOTE — THERAPY TREATMENT NOTE
Patient Name: Ken Jasso  : 1940    MRN: [id number]                              Today's Date: 2020       Admit Date: 2020    Visit Dx:     ICD-10-CM ICD-9-CM   1. Acute febrile illness R50.9 780.60   2. Acute respiratory failure with hypoxia (CMS/[facility]) J96.01 518.81   3. Ventricular tachycardia (CMS/[facility]) I47.2 427.1   4. Skin tear of right elbow without complication, initial encounter S51.011A 881.01   5. Oropharyngeal dysphagia R13.12 787.22     Patient Active Problem List   Diagnosis   • Acute febrile illness   • Fracture of right hip (CMS/[facility])   • Dementia (CMS/[facility])   • H/O: CVA (no deficit)     Past Medical History:   Diagnosis Date   • Dementia (CMS/HCC)    • Dementia (CMS/HCC)    • Dysphagia    • Esophageal dilatation    • Hiatal hernia    • PNA (pneumonia)    • PNA (pneumonia)    • Pneumonia     [facility] EPISODES    • Prostate CA (CMS/[facility])    • Stroke (CMS/HCC)          History reviewed. No pertinent surgical history.  General Information     Row Name 20          PT Evaluation Time/Intention    Document Type  therapy note (daily note)  -AA     Mode of Treatment  physical therapy  -AA     Row Name 20          General Information    Patient Profile Reviewed?  yes  -AA     Existing Precautions/Restrictions  fall;weight bearing;no known precautions/restrictions TTWB RLE, dementia  -AA     Row Name 20          Cognitive Assessment/Intervention- PT/OT    Orientation Status (Cognition)  oriented to;person;situation  -AA     Row Name 20          Safety Issues, Functional Mobility    Impairments Affecting Function (Mobility)  balance;cognition;endurance/activity tolerance;range of motion (ROM);strength  -AA       User Key  (r) = Recorded By, (t) = Taken By, (c) = Cosigned By    Initials Name Provider Type    Micaela Pickering, PT Physical Therapist        Mobility     Row Name 20          Bed Mobility Assessment/Treatment     Bed Mobility Assessment/Treatment  scooting/bridging;supine-sit;rolling right  -AA     Rolling Right [place] (Bed Mobility)  maximum assist (25% patient effort);2 person assist;verbal cues;nonverbal cues (demo/gesture)  -AA     Scooting/Bridging [place] (Bed Mobility)  maximum assist (25% patient effort);verbal cues  -AA     Supine-Sit [place] (Bed Mobility)  maximum assist (25% patient effort);2 person assist;verbal cues  -AA     Assistive Device (Bed Mobility)  draw sheet;head of bed elevated;bed rails  -AA     Comment (Bed Mobility)  pt unable to advance RLE without max A; VC for sequencing  -AA     Row Name 07/06/20 0827          Transfer Assessment/Treatment    Comment (Transfers)  VC for push off, TTWB RLE, and posture.  STS x3 to RW with pt unable to safely perform transfer to chair.  BUE support and gait belt utilized with SPT and PT foot under pt R foot to maintain WB.    -AA     Row Name 07/06/20 0827          Bed-Chair Transfer    Bed-Chair [place] (Transfers)  maximum assist (25% patient effort);2 person assist;verbal cues  -AA     Assistive Device (Bed-Chair Transfers)  other (see comments) BUE support and gait belt  -AA     Row Name 07/06/20 0827          Sit-Stand Transfer    Sit-Stand [place] (Transfers)  maximum assist (25% patient effort);2 person assist;verbal cues  -AA     Assistive Device (Sit-Stand Transfers)  walker, front-wheeled  -AA     Row Name 07/06/20 0827          Gait/Stairs Assessment/Training    Comment (Gait/Stairs)  pt unable at this time  -AA     Row Name 07/06/20 0827          Mobility Assessment/Intervention    Extremity Weight-bearing Status  right lower extremity  -AA     Right Lower Extremity (Weight-bearing Status)  toe touch weight-bearing (TTWB)  -AA       User Key  (r) = Recorded By, (t) = Taken By, (c) = Cosigned By    Initials Name Provider Type    Micaela Pickering PT Physical Therapist        Obj/Interventions     Row Name 07/06/20 0827           Therapeutic Exercise    Lower Extremity (Therapeutic Exercise)  gluteal sets;heel slides, right;LAQ (long arc quad), right;quad sets, right;SLR (straight leg raise), right  -AA     Lower Extremity Range of Motion (Therapeutic Exercise)  hip abduction/adduction, right;ankle dorsiflexion/plantar flexion, bilateral  -AA     Exercise Type (Therapeutic Exercise)  AAROM (active assistive range of motion)  -AA     Position (Therapeutic Exercise)  seated  -AA     Sets/Reps (Therapeutic Exercise)  15  -AA     Row Name 07/06/20 0827          Static Sitting Balance    Level of [place] (Unsupported Sitting, Static Balance)  contact guard assist  -AA     Sitting Position (Unsupported Sitting, Static Balance)  sitting on edge of bed  -AA     Time Able to Maintain Position (Unsupported Sitting, Static Balance)  more than 5 minutes  -AA     Row Name 07/06/20 0827          Static Standing Balance    Level of [place] (Supported Standing, Static Balance)  maximal assist, 25 to 49% patient effort;2 person assist  -AA     Assistive Device Utilized (Supported Standing, Static Balance)  walker, rolling  -AA     Row Name 07/06/20 0827          Dynamic Standing Balance    Level of [place], Reaches Outside Midline (Standing, Dynamic Balance)  maximal assist, 25 to 49% patient effort;2 person assist  -AA     Assistive Device Utilized (Supported Standing, Dynamic Balance)  other (see comments) BUE support and gait belt  -AA       User Key  (r) = Recorded By, (t) = Taken By, (c) = Cosigned By    Initials Name Provider Type    Micaela Pickering PT Physical Therapist        Goals/Plan    No documentation.       Clinical Impression     Row Name 07/06/20 0827          Pain Assessment    Additional Documentation  Pain Scale: FACES Pre/Post-Treatment (Group)  -AA     Row Name 07/06/20 0827          Pain Scale: Numbers Pre/Post-Treatment    Pain Location - Side  Right  -AA     Pain Location - Orientation  generalized  -AA      Pain Location  hip  -AA     Pain Intervention(s)  Repositioned;Ambulation/increased activity  -AA     Row Name 07/06/20 0827          Pain Scale: FACES Pre/Post-Treatment    Pain: FACES Scale, Pretreatment  0-->no hurt  -AA     Pain: FACES Scale, Post-Treatment  2-->hurts little bit  -AA     Row Name 07/06/20 0827          Plan of Care Review    Plan of Care Reviewed With  patient  -AA     Progress  no change  -AA     Row Name 07/06/20 0827          Positioning and Restraints    Pre-Treatment Position  in bed  -AA     Post Treatment Position  chair  -AA     In Chair  notified nsg;exit alarm on;waffle cushion;reclined;on mechanical lift sling;call light within reach;encouraged to call for assist SCDs  -AA       User Key  (r) = Recorded By, (t) = Taken By, (c) = Cosigned By    Initials Name Provider Type    Micaela Pickering PT Physical Therapist        Outcome Measures     Row Name 07/06/20 0827          How much help from another person do you currently need...    Turning from your back to your side while in flat bed without using bedrails?  2  -AA     Moving from lying on back to sitting on the side of a flat bed without bedrails?  2  -AA     Moving to and from a bed to a chair (including a wheelchair)?  2  -AA     Standing up from a chair using your arms (e.g., wheelchair, bedside chair)?  2  -AA     Climbing 3-5 steps with a railing?  1  -AA     To walk in hospital room?  1  -AA     AM-PAC 6 Clicks Score (PT)  10  -AA     Row Name 07/06/20 0827          Functional Assessment    Outcome Measure Options  AM-PAC 6 Clicks Basic Mobility (PT)  -       User Key  (r) = Recorded By, (t) = Taken By, (c) = Cosigned By    Initials Name Provider Type    Micaela Pickering PT Physical Therapist        Physical Therapy Education                 Title: PT OT SLP Therapies (In Progress)     Topic: Physical Therapy (In Progress)     Point: Mobility training (In Progress)     Description:   Instruct learner(s) on safety and  technique for assisting patient out of bed, chair or wheelchair.  Instruct in the proper use of assistive devices, such as walker, crutches, cane or brace.              Patient Friendly Description:   It's important to get you on your feet again, but we need to do so in a way that is safe for you. Falling has serious consequences, and your personal safety is the most important thing of all.        When it's time to get out of bed, one of us or a family member will sit next to you on the bed to give you support.     If your doctor or nurse tells you to use a walker, crutches, a cane, or a brace, be sure you use it every time you get out of bed, even if you think you don't need it.    Learning Progress Summary           Patient Acceptance, E,D, NR by AA at 7/6/2020 0827    Acceptance, E, NR by NS at 7/4/2020 1601    Comment:  Patient was educated about WBing precautions and sequencing with transfers.    Acceptance, E, NR by ERICA at 7/3/2020 1154    Acceptance, E,D, NR by AA at 7/2/2020 1447   Family Acceptance, E,D, NR by AA at 7/2/2020 1447                   Point: Home exercise program (In Progress)     Description:   Instruct learner(s) on appropriate technique for monitoring, assisting and/or progressing patient with therapeutic exercises and activities.              Learning Progress Summary           Patient Acceptance, E,D, NR by AA at 7/6/2020 0827    Acceptance, E, NR by NS at 7/4/2020 1601    Comment:  Patient was educated about WBing precautions and sequencing with transfers.                   Point: Body mechanics (In Progress)     Description:   Instruct learner(s) on proper positioning and spine alignment for patient and/or caregiver during mobility tasks and/or exercises.              Learning Progress Summary           Patient Acceptance, E,D, NR by AA at 7/6/2020 0827    Acceptance, E, NR by NS at 7/4/2020 1601    Comment:  Patient was educated about WBing precautions and sequencing with transfers.     Acceptance, E, NR by ERICA at 7/3/2020 1154    Acceptance, E,D, NR by AA at 7/2/2020 1447   Family Acceptance, E,D, NR by AA at 7/2/2020 1447                   Point: Precautions (In Progress)     Description:   Instruct learner(s) on prescribed precautions during mobility and gait tasks              Learning Progress Summary           Patient Acceptance, E,D, NR by AA at 7/6/2020 0827    Acceptance, E, NR by NS at 7/4/2020 1601    Comment:  Patient was educated about WBing precautions and sequencing with transfers.    Acceptance, E, NR by ERICA at 7/3/2020 1154    Acceptance, E,D, NR by AA at 7/2/2020 1447   Family Acceptance, E,D, NR by AA at 7/2/2020 1447                               User Key     Initials Effective Dates Name Provider Type Discipline     11/20/18 -  Karine Mcmahon, PT Physical Therapist PT    AA 04/02/18 -  Micaela Tate, PT Physical Therapist PT    NS 09/10/19 -  Aissatou Díaz, PT Physical Therapist PT              PT Recommendation and Plan  Planned Therapy Interventions (PT Eval): balance training, bed mobility training, gait training, home exercise program, patient/family education, strengthening, transfer training  Outcome Summary/Treatment Plan (PT)  Anticipated Discharge Disposition (PT): skilled nursing facility  Plan of Care Reviewed With: patient  Progress: no change  Outcome Summary: Pt perform bed mobility with max A of 2, pt unable to advance RLE to EOB.  STS max A of 2 with RW, multiple attempts with pt unable to peform safely.  SPT max A of 2 with BUE support and PT foot under RLE for TTWB.  Recommend DC to SNF when appropriate     Time Calculation:   PT Charges     Row Name 07/06/20 0827             Time Calculation    Start Time  0827  -AA      PT Received On  07/06/20  -AA      PT Goal Re-Cert Due Date  07/12/20  -AA         Time Calculation- PT    Total Timed Code Minutes- PT  24 minute(s)  -AA         Timed Charges    65306 - PT Therapeutic Exercise Minutes  10  -AA       49928 - PT Therapeutic Activity Minutes  14  -AA        User Key  (r) = Recorded By, (t) = Taken By, (c) = Cosigned By    Initials Name Provider Type    Micaela Pickering, PT Physical Therapist        Therapy Charges for Today     Code Description Service Date Service Provider Modifiers Qty    04677092256  PT THER PROC EA 15 MIN 7/6/2020 Micaela Tate, PT GP 1    [id number]  PT THERAPEUTIC ACT EA 15 MIN 7/6/2020 Micaela Tate, PT GP 1    [id number]  PT THER SUPP EA 15 MIN 7/6/2020 Micaela Tate, PT GP 2          PT G-Codes  Outcome Measure Options: AM-PAC 6 Clicks Daily Activity (OT)  AM-PAC 6 Clicks Score (PT): 10  AM-PAC 6 Clicks Score (OT): 11 April ROX Tate PT  7/6/2020

## 2020-07-07 ENCOUNTER — APPOINTMENT (OUTPATIENT)
Dept: GENERAL RADIOLOGY | Facility: HOSPITAL | Age: 80
End: 2020-07-07

## 2020-07-07 LAB
ALBUMIN SERPL-MCNC: 3.1 G/DL (ref 3.5–5.2)
ALBUMIN/GLOB SERPL: 0.8 G/DL
ALP SERPL-CCNC: 84 U/L (ref 39–117)
ALT SERPL W P-5'-P-CCNC: 20 U/L (ref 1–41)
ANION GAP SERPL CALCULATED.3IONS-SCNC: 13 MMOL/L (ref 5–15)
AST SERPL-CCNC: 38 U/L (ref 1–40)
BACTERIA UR QL AUTO: NORMAL /HPF
BILIRUB SERPL-MCNC: 1.5 MG/DL (ref 0–1.2)
BILIRUB UR QL STRIP: NEGATIVE
BUN SERPL-MCNC: 12 MG/DL (ref 8–23)
BUN/CREAT SERPL: 15 (ref 7–25)
CALCIUM SPEC-SCNC: 9.2 MG/DL (ref 8.6–10.5)
CHLORIDE SERPL-SCNC: 98 MMOL/L (ref 98–107)
CLARITY UR: CLEAR
CO2 SERPL-SCNC: 25 MMOL/L (ref 22–29)
COLOR UR: YELLOW
CREAT SERPL-MCNC: 0.8 MG/DL (ref 0.76–1.27)
D-LACTATE SERPL-SCNC: 1.2 MMOL/L (ref 0.5–2)
D-LACTATE SERPL-SCNC: 2.8 MMOL/L (ref 0.5–2)
DEPRECATED RDW RBC AUTO: 41.2 FL (ref 37–54)
ERYTHROCYTE [DISTWIDTH] IN BLOOD BY AUTOMATED COUNT: 13.7 % (ref 12.3–15.4)
GFR SERPL CREATININE-BSD FRML MDRD: 93 ML/MIN/1.73
GLOBULIN UR ELPH-MCNC: 3.9 GM/DL
GLUCOSE BLDC GLUCOMTR-MCNC: 110 MG/DL (ref 70–130)
GLUCOSE SERPL-MCNC: 123 MG/DL (ref 65–99)
GLUCOSE UR STRIP-MCNC: NEGATIVE MG/DL
HCT VFR BLD AUTO: 39 % (ref 37.5–51)
HGB BLD-MCNC: 12.4 G/DL (ref 13–17.7)
HGB UR QL STRIP.AUTO: NEGATIVE
HYALINE CASTS UR QL AUTO: NORMAL /LPF
KETONES UR QL STRIP: ABNORMAL
LACTATE HOLD SPECIMEN: NORMAL
LEUKOCYTE ESTERASE UR QL STRIP.AUTO: NEGATIVE
MAGNESIUM SERPL-MCNC: 2.1 MG/DL (ref 1.6–2.4)
MCH RBC QN AUTO: 26.1 PG (ref 26.6–33)
MCHC RBC AUTO-ENTMCNC: 31.8 G/DL (ref 31.5–35.7)
MCV RBC AUTO: 82.1 FL (ref 79–97)
NITRITE UR QL STRIP: NEGATIVE
PH UR STRIP.AUTO: 7.5 [PH] (ref 5–8)
PLATELET # BLD AUTO: 442 10*3/MM3 (ref 140–450)
PMV BLD AUTO: 11 FL (ref 6–12)
POTASSIUM SERPL-SCNC: 3.6 MMOL/L (ref 3.5–5.2)
PROT SERPL-MCNC: 7 G/DL (ref 6–8.5)
PROT UR QL STRIP: ABNORMAL
RBC # BLD AUTO: 4.75 10*6/MM3 (ref 4.14–5.8)
RBC # UR: NORMAL /HPF
REF LAB TEST METHOD: NORMAL
SODIUM SERPL-SCNC: 136 MMOL/L (ref 136–145)
SP GR UR STRIP: 1.02 (ref 1–1.03)
SQUAMOUS #/AREA URNS HPF: NORMAL /HPF
UROBILINOGEN UR QL STRIP: ABNORMAL
WBC # BLD AUTO: 16.85 10*3/MM3 (ref 3.4–10.8)
WBC UR QL AUTO: NORMAL /HPF

## 2020-07-07 PROCEDURE — 80053 COMPREHEN METABOLIC PANEL: CPT | Performed by: INTERNAL MEDICINE

## 2020-07-07 PROCEDURE — 93005 ELECTROCARDIOGRAM TRACING: CPT | Performed by: INTERNAL MEDICINE

## 2020-07-07 PROCEDURE — 81001 URINALYSIS AUTO W/SCOPE: CPT | Performed by: INTERNAL MEDICINE

## 2020-07-07 PROCEDURE — 99233 SBSQ HOSP IP/OBS HIGH 50: CPT | Performed by: INTERNAL MEDICINE

## 2020-07-07 PROCEDURE — 25010000002 PIPERACILLIN SOD-TAZOBACTAM PER 1 G: Performed by: INTERNAL MEDICINE

## 2020-07-07 PROCEDURE — 82962 GLUCOSE BLOOD TEST: CPT

## 2020-07-07 PROCEDURE — 25010000002 VANCOMYCIN 10 G RECONSTITUTED SOLUTION

## 2020-07-07 PROCEDURE — 85027 COMPLETE CBC AUTOMATED: CPT | Performed by: INTERNAL MEDICINE

## 2020-07-07 PROCEDURE — 93010 ELECTROCARDIOGRAM REPORT: CPT | Performed by: INTERNAL MEDICINE

## 2020-07-07 PROCEDURE — 71045 X-RAY EXAM CHEST 1 VIEW: CPT

## 2020-07-07 PROCEDURE — 83605 ASSAY OF LACTIC ACID: CPT | Performed by: INTERNAL MEDICINE

## 2020-07-07 PROCEDURE — 83735 ASSAY OF MAGNESIUM: CPT | Performed by: INTERNAL MEDICINE

## 2020-07-07 PROCEDURE — 99024 POSTOP FOLLOW-UP VISIT: CPT | Performed by: ORTHOPAEDIC SURGERY

## 2020-07-07 RX ORDER — VANCOMYCIN HYDROCHLORIDE 1 G/200ML
1000 INJECTION, SOLUTION INTRAVENOUS EVERY 24 HOURS
Status: DISCONTINUED | OUTPATIENT
Start: 2020-07-08 | End: 2020-07-09

## 2020-07-07 RX ORDER — LABETALOL HYDROCHLORIDE 5 MG/ML
10 INJECTION, SOLUTION INTRAVENOUS
Status: DISCONTINUED | OUTPATIENT
Start: 2020-07-07 | End: 2020-07-14 | Stop reason: HOSPADM

## 2020-07-07 RX ORDER — ACETAMINOPHEN 650 MG/1
650 SUPPOSITORY RECTAL EVERY 4 HOURS PRN
Status: DISCONTINUED | OUTPATIENT
Start: 2020-07-07 | End: 2020-07-14 | Stop reason: HOSPADM

## 2020-07-07 RX ADMIN — LABETALOL 20 MG/4 ML (5 MG/ML) INTRAVENOUS SYRINGE 10 MG: at 08:40

## 2020-07-07 RX ADMIN — ATORVASTATIN CALCIUM 80 MG: 40 TABLET, FILM COATED ORAL at 21:27

## 2020-07-07 RX ADMIN — TAZOBACTAM SODIUM AND PIPERACILLIN SODIUM 3.38 G: 375; 3 INJECTION, SOLUTION INTRAVENOUS at 09:06

## 2020-07-07 RX ADMIN — ASPIRIN 325 MG: 325 TABLET, COATED ORAL at 21:27

## 2020-07-07 RX ADMIN — ACETAMINOPHEN 650 MG: 650 SUPPOSITORY RECTAL at 09:10

## 2020-07-07 RX ADMIN — LANSOPRAZOLE 30 MG: KIT at 06:19

## 2020-07-07 RX ADMIN — BISACODYL 10 MG: 10 SUPPOSITORY RECTAL at 08:40

## 2020-07-07 RX ADMIN — VANCOMYCIN HYDROCHLORIDE 1500 MG: 10 INJECTION, POWDER, LYOPHILIZED, FOR SOLUTION INTRAVENOUS at 11:08

## 2020-07-07 RX ADMIN — SODIUM CHLORIDE, PRESERVATIVE FREE 10 ML: 5 INJECTION INTRAVENOUS at 08:41

## 2020-07-07 NOTE — PLAN OF CARE
"  Problem: Fall Risk (Adult)  Goal: Absence of Fall  Outcome: Ongoing (interventions implemented as appropriate)  Flowsheets (Taken 7/7/2020 1821)  Absence of Fall: making progress toward outcome     Problem: Fall Risk (Adult)  Goal: Absence of Fall  Outcome: Ongoing (interventions implemented as appropriate)  Flowsheets (Taken 7/7/2020 1821)  Absence of Fall: making progress toward outcome     Problem: Patient Care Overview  Goal: Plan of Care Review  Outcome: Ongoing (interventions implemented as appropriate)  Flowsheets  Taken 7/7/2020 1821  Progress: improving  Outcome Summary: VSS;  rapid response called this a.m. r/t fever, confusion, tachycardia, and little response.   Dr. Pleitez following and was present at bedside.  Temperature 99.3 this evening, IV antibiotics ordered and admin.   Pt. much more alert.   Sinus Tach on tele.   Pt. turned/tilted/repositioned q2h, waffle mattress in use, and barrier cream to buttocks.   Wife updated this evening via phone.   Decision made for \"comfort diet\" per family and MD despite aspiration precautions.   Dsg to right hip CDI.  Will continue to monitor and will notify MD MARCOS Loyd RN  Taken 7/7/2020 1807  Plan of Care Reviewed With: patient     Problem: Patient Care Overview  Goal: Plan of Care Review  Outcome: Ongoing (interventions implemented as appropriate)  Flowsheets  Taken 7/7/2020 1821  Progress: improving  Outcome Summary: VSS;  rapid response called this a.m. r/t fever, confusion, tachycardia, and little response.   Dr. Pleitez following and was present at bedside.  Temperature 99.3 this evening, IV antibiotics ordered and admin.   Pt. much more alert.   Sinus Tach on tele.   Pt. turned/tilted/repositioned q2h, waffle mattress in use, and barrier cream to buttocks.   Wife updated this evening via phone.   Decision made for \"comfort diet\" per family and MD despite aspiration precautions.   Dsg to right hip CDI.  Will continue to monitor and will notify " MD MARCOS Loyd RN  Taken 7/7/2020 4481  Plan of Care Reviewed With: patient

## 2020-07-07 NOTE — PLAN OF CARE
Problem: Fall Risk (Adult)  Goal: Absence of Fall  Flowsheets (Taken 7/2/2020 1617 by Yamilex Chau, RN)  Absence of Fall: making progress toward outcome     Problem: Patient Care Overview  Goal: Plan of Care Review  Flowsheets  Taken 7/6/2020 1836 by Liseth Medel, RN  Progress: no change  Taken 7/6/2020 2000 by Lisandro Chavez, RN  Plan of Care Reviewed With: patient  Taken 7/7/2020 0418 by Lisandro Chavez, RN  Outcome Summary: Pt rested well through night, makes needs known to staff. No pain reported.     Problem: Skin Injury Risk (Adult)  Goal: Skin Health and Integrity  Flowsheets (Taken 7/2/2020 1617 by Yamilex Chau, RN)  Skin Health and Integrity: making progress toward outcome     Problem: Dysphagia (Adult)  Goal: Functional/Safe Swallow  Flowsheets (Taken 7/2/2020 1617 by Yamilex Chau, RN)  Functional/Safe Swallow: unable to achieve outcome  Goal: Compensatory Techniques to Improve Safety/Function with Swallowing  Flowsheets (Taken 7/2/2020 1617 by Yamilex Chau, RN)  Compensatory Techniques to Improve Safety/Function with Swallowing: unable to achieve outcome

## 2020-07-07 NOTE — PROGRESS NOTES
Referring Provider: No ref. provider found     Orthopedic Surgery Note - Sam Harp MD    Subjective:  The patient had an eventful course with a rapid response this morning.  He is now on broad-spectrum antibiotics for possible sepsis.    No issues with his right hip.  He does still have some distal pain no groin pain or no proximal hip pain.  This may be referred pain in his distal femur x-rays prior and his knee x-rays prior show knee arthritis but no distal fracture.  He is having difficulty ambulating with physical therapy given his ongoing medical issues and dementia.      Medications/Allergies:    Current Facility-Administered Medications:   •  [START ON 7/9/2020] !Vancomcyin trough 7/9 @ 0930. Please hold 1000 vancomycin dose until pharmacy evaluates level, , Does not apply, Once, Fco Joseph, [facility]  •  acetaminophen (TYLENOL) suppository 650 mg, 650 mg, Rectal, Q4H PRN, Lazaro Lozada, APRN, 650 mg at 07/07/20 0910  •  acetaminophen (TYLENOL) tablet 650 mg, 650 mg, Oral, Q4H PRN, 650 mg at 07/06/20 2022 **OR** [DISCONTINUED] acetaminophen (TYLENOL) suppository 650 mg, 650 mg, Rectal, Q4H PRN, Lazaro Lozada, APRN  •  amLODIPine (NORVASC) tablet 10 mg, 10 mg, Oral, Q24H, Tash Britt MD, 10 mg at 07/06/20 0839  •  aspirin EC tablet 325 mg, 325 mg, Oral, Q12H, Sam Harp MD, 325 mg at 07/06/20 2022  •  atorvastatin (LIPITOR) tablet 80 mg, 80 mg, Oral, Nightly, Rodney Banks MD, 80 mg at 07/06/20 2022  •  bisacodyl (DULCOLAX) EC tablet 10 mg, 10 mg, Oral, Daily PRN, Tash Britt MD, 5 mg at 07/06/20 0839  •  bisacodyl (DULCOLAX) suppository 10 mg, 10 mg, Rectal, Daily, Tash Britt MD, 10 mg at 07/07/20 0840  •  donepezil (ARICEPT) tablet 10 mg, 10 mg, Oral, Daily, Tash Britt MD, 10 mg at 07/06/20 0840  •  labetalol (NORMODYNE,TRANDATE) injection 10 mg, 10 mg, Intravenous, Q2H PRN, Lauren Pleitez MD, 10 mg at 07/07/20 0840  •  lactated ringers infusion, 75 mL/hr,  Intravenous, Continuous, Rodney Banks MD, Last Rate: 75 mL/hr at 07/06/20 1251, 75 mL/hr at 07/06/20 1251  •  lansoprazole (FIRST) oral suspension 30 mg, 30 mg, Oral, QAM, Tash Britt MD, 30 mg at 07/07/20 0619  •  lidocaine PF 1% (XYLOCAINE) injection 0.5 mL, 0.5 mL, Injection, Once PRN, Keith Tuttle MD  •  lisinopril (PRINIVIL,ZESTRIL) tablet 40 mg, 40 mg, Oral, Q24H, Tash Britt MD, 40 mg at 07/06/20 0840  •  magnesium sulfate 4g/100mL (PREMIX) infusion, 4 g, Intravenous, PRN, Rodney Banks MD, 4 g at 07/01/20 1039  •  niCARdipine (CARDENE) 20 mg in 200 mL NS (0.1 mg/mL) infusion, 5-15 mg/hr, Intravenous, Titrated, Rodney Banks MD  •  Pharmacy to dose vancomycin, , Does not apply, Continuous PRN, Lauren Pleitez MD  •  piperacillin-tazobactam (ZOSYN) 3.375 g in iso-osmotic dextrose 50 ml (premix), 3.375 g, Intravenous, Q8H, Lauren Pleitez MD  •  polyethylene glycol 3350 powder (packet), 17 g, Oral, Daily, Tash Britt MD, 17 g at 07/06/20 0838  •  potassium chloride (KLOR-CON) packet 40 mEq, 40 mEq, Oral, PRN, Tash Britt MD  •  potassium chloride (MICRO-K) CR capsule 40 mEq, 40 mEq, Oral, PRN, Tash Britt MD  •  potassium chloride 10 mEq in 100 mL IVPB, 10 mEq, Intravenous, Q1H PRN, Rodney Banks MD, Last Rate: 100 mL/hr at 07/03/20 1653, 10 mEq at 07/03/20 1653  •  sodium chloride 0.9 % flush 10 mL, 10 mL, Intravenous, Q12H, Rodney Banks MD, Stopped at 07/04/20 0806  •  sodium chloride 0.9 % flush 10 mL, 10 mL, Intravenous, PRN, Rodney Banks MD  •  sodium chloride 0.9 % flush 10 mL, 10 mL, Intravenous, Q12H, Keith Tuttle MD, 10 mL at 07/07/20 0841  •  sodium chloride 0.9 % flush 10 mL, 10 mL, Intravenous, PRN, Keith Tuttle MD  •  [START ON 7/8/2020] vancomycin (VANCOCIN) in iso-osmotic dextrose IVPB 1 g (premix) 200 mL, 1,000 mg, Intravenous, Q24H, Fco Joseph, [facility]  No Known Allergies    Objective:  Vital Signs   Temp:  [98 °F (36.7 °C)-102.8 °F (39.3 °C)] 99.8  °F (37.7 °C)  Heart Rate:  [] 113  Resp:  [16-18] 17  BP: (146-190)/() 158/70    Results Review:   I reviewed the patient's new clinical results.  Results from last 7 days   Lab Units 07/07/20  0825   WBC 10*3/mm3 16.85*   HEMOGLOBIN g/dL 12.4*   HEMATOCRIT % 39.0   PLATELETS 10*3/mm3 442     Results from last 7 days   Lab Units 07/07/20  0825   SODIUM mmol/L 136   POTASSIUM mmol/L 3.6   CHLORIDE mmol/L 98   CO2 mmol/L 25.0   BUN mg/dL 12   CREATININE mg/dL 0.80   GLUCOSE mg/dL 123*   CALCIUM mg/dL 9.2         Results from last 7 days   Lab Units 07/07/20  0825   SODIUM mmol/L 136   POTASSIUM mmol/L 3.6   CHLORIDE mmol/L 98   CO2 mmol/L 25.0   BUN mg/dL 12   CREATININE mg/dL 0.80   CALCIUM mg/dL 9.2   ALK PHOS U/L 84   ALT (SGPT) U/L 20   AST (SGOT) U/L 38   GLUCOSE mg/dL 123*       Ct Abdomen Pelvis Without Contrast    Result Date: 6/30/2020  EXAMINATION: CT ABDOMEN PELVIS WO CONTRAST- 06/30/2020  INDICATION: R50.9-Fever, unspecified; J96.01-Acute respiratory failure with hypoxia; I47.2-Ventricular tachycardia; S51.011A-Laceration without foreign body of right elbow, initial encounter;  vomiting, sepsis, infection  TECHNIQUE: Multiple axial CT imaging was obtained of the abdomen and pelvis without the administration of intravenous contrast.  The radiation dose reduction device was turned on for each scan per the ALARA (As Low as Reasonably Achievable) protocol.  COMPARISON: NONE  FINDINGS: Some increased markings identified lung bases bilaterally concerning for possible infiltrate. Aspiration cannot be excluded and clinical correlation is needed. The liver is homogeneous in appearance with vicarious excretion of contrast into the gallbladder. There is residual contrast seen in the renal collecting systems bilaterally. The spleen is unremarkable. The adrenal glands within normal limits. Pancreas is homogeneous. There is decompression identified of the stomach with possible mild wall thickening. Clinical  correlation is needed. No abdominal or retroperitoneal lymphadenopathy. The abdominal portion of the gastrointestinal tract within normal limits. No free fluid or free air. No abnormal mass or fluid collections identified.  Pelvis: The pelvic organs are unremarkable. The pelvic portion of the gastrointestinal tract within normal limits. The appendix is normal. No free fluid or free air. No abnormal mass or fluid collections identified. Contrast seen in the renal collecting systems bilaterally. No evidence of obstruction. The bony structures reveal no evidence of osseous abnormality. Degenerative changes seen within the spine and pelvis.      Atelectatic changes or infiltrate seen at the lung bases bilaterally for which clinical correlation is needed. Mild wall thickening of the stomach which may be related to incomplete distention. Clinical correlation is needed. Stool within the colon. The remainder of the abdomen and pelvis is grossly unremarkable. No obstruction to the kidneys with residual contrast seen in the renal collecting systems as well as the ureters and bladder from prior examination.  D:  06/30/2020 E:  06/30/2020  This report was finalized on 6/30/2020 4:48 PM by Dr. Cher Macias MD.      Ct Angiogram Head    Result Date: 7/1/2020  EXAMINATION: CT ANGIOGRAM NECK-, CT ANGIOGRAM HEAD-  INDICATION: Stroke.  TECHNIQUE: CT angiogram head and neck with and without intravenous contrast administration. 2-D and 3-D reconstructions performed.  The radiation dose reduction device was turned on for each scan per the ALARA (As Low as Reasonably Achievable) protocol.  COMPARISON: CT cerebral perfusion and CT head noncontrast performed concurrently.  FINDINGS: CTA NECK: Normal three vessel arch with patent great vessel origins. Proximal subclavian arteries are patent. Vertebral arteries demonstrate grossly symmetric appearance of the vertebral artery caliber and flow signal of opacification without  hemodynamically significant stenosis, aneurysm or occlusion with tortuosity of the vertebral arteries bilateral exiting the foramina, however no luminal irregularity or defect. Calcification within the V4 intradural segment left vertebral discussed further below the dedicated CTA head portion. Carotids demonstrate grossly normal course and branching pattern with atherosclerotic involvement including calcific disease creating 25% right and 20% left luminal narrowing as measured by NASCET criteria with patency to the distal internal carotid arteries intracranial segments discussed further below in the dedicated CTA head portion. Cervical soft tissues unremarkable for acute findings without bulky cervical adenopathy. Thyroid is homogeneous. Lung apices are grossly clear.  CTA HEAD: Distal internal carotid arteries demonstrate mild irregularity of atherosclerotic involvement and calcific disease burden with mild luminal stenosis, however no hemodynamically significant stenosis, aneurysm or occlusion. Anterior cerebral arteries are patent without hemodynamically significant stenosis, aneurysm or occlusion. Middle cerebral arteries are patent without hemodynamically significant stenosis, aneurysm or occlusion. Vertebrobasilar system and posterior cerebral arteries demonstrate at least moderate stenosis of calcific disease involvement left vertebral distal V4 segment, however, patency observed of the basilar artery and basilar apex with posterior cerebral arteries widely patent bilaterally without hemodynamically significant stenosis, aneurysm or occlusion. Visualized venous structures are unremarkable with superior sagittal sinus widely patent.      1. CTA neck demonstrates atherosclerotic involvement including calcific disease creating 25% right and 20% left luminal narrowing as measured by NASCET criteria. 2. CTA head demonstrates minimal atherosclerotic involvement of the distal internal carotid arteries without  hemodynamically significant stenosis, aneurysm or occlusion with calcific disease burden associated as well as moderate stenosis of calcific disease burden and involvement of the left V4 segment focal area distal left vertebral artery without distinct occlusion and patency observed of the [place] is otherwise unremarkable without occlusion in particular the MCA distributions are widely patent.  D:  06/29/2020 E:  06/30/2020  This report was finalized on 7/1/2020 2:56 PM by Dr. Manoj Elizondo.      Xr Femur 2 View Right    Result Date: 7/2/2020  EXAMINATION: XR FEMUR 2 VW RIGHT-  INDICATION: Right hip fracture; R50.9-Fever, unspecified; J96.01-Acute respiratory failure with hypoxia; I47.2-Ventricular tachycardia; S51.011A-Laceration without foreign body of right elbow, initial encounter.  COMPARISON: None.  FINDINGS: Two views of the right femur reveal fracture seen of the right femoral neck. Slight impaction identified with mild overlapping of the fracture fragments. The remainder of the femur is unremarkable. The acetabulum is intact with some mild degenerative changes identified.         Right femoral neck fracture. The remainder of the femur is grossly unremarkable with spurring of the patella. There is minimal degenerative changes seen within the acetabulum with no significant displacement.  D:  07/01/2020 E:  07/01/2020  This report was finalized on 7/2/2020 8:11 PM by Dr. Cher Macias MD.      Ct Angiogram Neck    Result Date: 7/1/2020  EXAMINATION: CT ANGIOGRAM NECK-, CT ANGIOGRAM HEAD-  INDICATION: Stroke.  TECHNIQUE: CT angiogram head and neck with and without intravenous contrast administration. 2-D and 3-D reconstructions performed.  The radiation dose reduction device was turned on for each scan per the ALARA (As Low as Reasonably Achievable) protocol.  COMPARISON: CT cerebral perfusion and CT head noncontrast performed concurrently.  FINDINGS: CTA NECK: Normal three vessel  arch with patent great vessel origins. Proximal subclavian arteries are patent. Vertebral arteries demonstrate grossly symmetric appearance of the vertebral artery caliber and flow signal of opacification without hemodynamically significant stenosis, aneurysm or occlusion with tortuosity of the vertebral arteries bilateral exiting the foramina, however no luminal irregularity or defect. Calcification within the V4 intradural segment left vertebral discussed further below the dedicated CTA head portion. Carotids demonstrate grossly normal course and branching pattern with atherosclerotic involvement including calcific disease creating 25% right and 20% left luminal narrowing as measured by NASCET criteria with patency to the distal internal carotid arteries intracranial segments discussed further below in the dedicated CTA head portion. Cervical soft tissues unremarkable for acute findings without bulky cervical adenopathy. Thyroid is homogeneous. Lung apices are grossly clear.  CTA HEAD: Distal internal carotid arteries demonstrate mild irregularity of atherosclerotic involvement and calcific disease burden with mild luminal stenosis, however no hemodynamically significant stenosis, aneurysm or occlusion. Anterior cerebral arteries are patent without hemodynamically significant stenosis, aneurysm or occlusion. Middle cerebral arteries are patent without hemodynamically significant stenosis, aneurysm or occlusion. Vertebrobasilar system and posterior cerebral arteries demonstrate at least moderate stenosis of calcific disease involvement left vertebral distal V4 segment, however, patency observed of the basilar artery and basilar apex with posterior cerebral arteries widely patent bilaterally without hemodynamically significant stenosis, aneurysm or occlusion. Visualized venous structures are unremarkable with superior sagittal sinus widely patent.      1. CTA neck demonstrates atherosclerotic involvement including  calcific disease creating 25% right and 20% left luminal narrowing as measured by NASCET criteria. 2. CTA head demonstrates minimal atherosclerotic involvement of the distal internal carotid arteries without hemodynamically significant stenosis, aneurysm or occlusion with calcific disease burden associated as well as moderate stenosis of calcific disease burden and involvement of the left V4 segment focal area distal left vertebral artery without distinct occlusion and patency observed of the [place]. [place] is otherwise unremarkable without occlusion in particular the MCA distributions are widely patent.  D:  06/29/2020 E:  06/30/2020  This report was finalized on 7/1/2020 2:56 PM by Dr. Manoj Elizondo.      Ct Chest Without Contrast    Result Date: 7/1/2020  EXAMINATION: CT CHEST WO CONTRAST- 06/29/2020  INDICATION: low sats, fever  TECHNIQUE: CT chest without intravenous contrast  The radiation dose reduction device was turned on for each scan per the ALARA (As Low as Reasonably Achievable) protocol.  COMPARISON: NONE  FINDINGS: Thyroid is homogeneous in attenuation. No bulky mediastinal adenopathy. Central airways are patent. Esophagus in normal course and caliber. Atherosclerotic nonaneurysmal thoracic aorta.  Cardiac size within normal limits and without pericardial effusion. Extended lung windows demonstrate mild to moderate subsegmental dependent atelectasis without focal consolidation or opacification. No nodule or mass. No pleural effusion. Degenerative changes of the thoracic spine without aggressive osseous lesion. No soft tissue body wall findings of concern. Visualized portions of the upper abdomen grossly unremarkable.      Low lung volumes with hypoventilatory effects including moderate subsegmental dependent atelectasis in the dependent portions however no focal consolidation or opacification to suggest acute parenchymal process or airspace disease. Negative for pneumonia.  D:   06/29/2020 E:  06/30/2020   This report was finalized on 7/1/2020 2:56 PM by Dr. Manoj Elizondo.      Fl Video Swallow With Speech Single Contrast    Result Date: 7/2/2020  EXAMINATION: FL VIDEO SWALLOW W SPEECH SINGLE-CONTRAST-  INDICATION: dysphagia; R50.9-Fever, unspecified; J96.01-Acute respiratory failure with hypoxia; I47.2-Ventricular tachycardia; S51.011A-Laceration without foreign body of right elbow, initial encounter; R13.10-Dysphagia, unspecified  TECHNIQUE: 48 seconds of fluoroscopic time was used for this exam. 8 associated fluoroscopic loops were saved. The patient was evaluated in the seated lateral position while taking a variety of consistencies of barium by mouth under the direction of speech pathology.  COMPARISON: NONE  FINDINGS: 48 seconds of fluoroscopy provided for a modified barium swallow. Please see speech therapy report for full details and recommendations.       Fluoroscopy provided for a modified barium swallow. Please see speech therapy report for full details and recommendations.    This report was finalized on 7/2/2020 8:13 PM by Dr. Cher Macias MD.      Xr Chest 1 View    Result Date: 7/7/2020  EXAMINATION: XR CHEST 1 VW-  INDICATION: RRT, increased sputum; R50.9-Fever, unspecified; J96.01-Acute respiratory failure with hypoxia; I47.2-Ventricular tachycardia; S51.011A-Laceration without foreign body of right elbow, initial encounter; R13.12-Dysphagia, oropharyngeal phase.  COMPARISON: 06/29/2020.  FINDINGS: Cardiac silhouette within normal limits. Pulmonary vascularity within normal limits. No focal opacification or consolidation. No pneumothorax or pleural effusion. Degenerative changes of the spine.         No acute cardiopulmonary process specifically no new focal consolidation of involvement or effusion.  D:  07/07/2020 E:  07/07/2020       Xr Chest 1 View    Result Date: 6/29/2020  CR Chest 1 Vw INDICATION: Acute stroke protocol. COMPARISON:  None available. FINDINGS:  Single portable AP view(s) of the chest.  The heart and mediastinal contours are normal. The lungs are clear. No pneumothorax or pleural effusion.     No acute cardiopulmonary findings. Signer Name: JAIME Díaz MD  Signed: 6/29/2020 7:36 PM  Workstation Name: [email or web address]  [facility]    Fl C Arm During Surgery    Result Date: 7/6/2020  EXAMINATION: FL C ARM DURING SURGERY- 07/01/2020  INDICATION: hip pinning; R50.9-Fever, unspecified; J96.01-Acute respiratory failure with hypoxia; I47.2-Ventricular tachycardia; S51.011A-Laceration without foreign body of right elbow, initial encounter  TECHNIQUE: Intraoperative fluoroscopy for improved localization and treatment planning  COMPARISON: NONE  FINDINGS: Intraoperative fluoroscopy with total fluoroscopic time usage 2 minutes 12 seconds and 16 representative images saved during right hip pinning.      Intraoperative fluoroscopy utilized during right hip pinning.  D:  07/02/2020 E:  07/02/2020    This report was finalized on 7/6/2020 8:54 AM by Dr. Manoj Elizondo.      Ct Head Without Contrast Stroke Protocol    Result Date: 7/1/2020  EXAMINATION: CT HEAD WO CONTRAST STROKE PROTOCOL-  INDICATION: Stroke.  TECHNIQUE: CT head without intravenous contrast following stroke protocol.  The radiation dose reduction device was turned on for each scan per the ALARA (As Low as Reasonably Achievable) protocol.  COMPARISON: None.  FINDINGS: Midline structures are symmetric without evidence of mass, mass effect or midline shift demonstrating at least moderately advanced low-attenuation of the periventricular and deep white matter of chronic small vessel ischemic disease. No intraaxial hemorrhage or extraaxial fluid collection. Globes and orbits are unremarkable. Visualized paranasal sinuses and mastoid air cells are grossly clear and well pneumatized. Calvarium intact.      No acute cardiopulmonary process. Specifically no acute intracranial hemorrhage.   NOTE: Scan performed on 06/29/2020 at 1816 hours. Scan report given to treatment team in person by Dr. Elizondo at scanner on 06/29/2020 at 1820 hours.  D:  06/29/2020 E:  06/30/2020   This report was finalized on 7/1/2020 2:56 PM by Dr. Manoj Elizondo.      Ct Cerebral Perfusion With & Without Contrast    Result Date: 7/1/2020  EXAMINATION: CT CEREBRAL PERFUSION W WO CONTRAST-  INDICATION: Stroke.  TECHNIQUE: CT cerebral perfusion with and without intravenous contrast. Multiple parametric maps including mean transit time, time to drain, cerebral blood flow and cerebral blood volume performed.  The radiation dose reduction device was turned on for each scan per the ALARA (As Low as Reasonably Achievable) protocol.  COMPARISON: CT head stroke protocol immediately prior.  FINDINGS: Grossly symmetric and normal correlating parametric maps without perfusion defect of abnormality. No reversible ischemia within a specific vascular territory is identified.      No reversible ischemia within a specific vascular territory.  D:  06/29/2020 E:  06/30/2020   This report was finalized on 7/1/2020 2:56 PM by Dr. Manoj Elizondo.      Xr Hip With Or Without Pelvis 2 - 3 View Right    Result Date: 7/1/2020  CR Hip Uni Comp Min 2 Vws RT INDICATION: Status post percutaneous screws. Right hip fracture. COMPARISON: 6/29/2020 FINDINGS: AP and frog-leg lateral view(s) of the right hip. There is been the interval placement of 3 percutaneous screws. No evidence of hardware failure. The position of the screws appears satisfactory.     Satisfactory postoperative exam. Signer Name: Lisandro Colby MD  Signed: 7/1/2020 9:16 PM  Workstation Name: [email or web address]  [facility]    Xr Hip With Or Without Pelvis 2 - 3 View Right    Result Date: 6/29/2020  CR Hip Uni Comp Min 2 Vws RT INDICATION: Pain after a fall today. COMPARISON: None. FINDINGS: AP and frog-leg lateral view(s) of the right hip.  Laterally impacted subcapital right femoral  neck fracture with up to 9 mm of suspected impaction. No pelvic fracture. No significant arthrosis right hip. Left hip unremarkable. Contrast distended bladder. No bone erosion or destruction.      Laterally impacted subcapital right femoral neck fracture. Signer Name: JAIME Díaz MD  Signed: 6/29/2020 9:02 PM  Workstation Name: [id number]  [facility] [place]       No results found for: ACANTHNAEG, AFBCX, BPERTUSSISCX, BLOODCX  No results found for: BCIDPCR, CXREFLEX, CSFCX, CULTURETIS  No results found for: CULTURES, HSVCX, URCX  No results found for: EYECULTURE, GCCX, LABHSV  No results found for: LEGIONELLA, MRSACX, MUMPSCX, MYCOPLASCX  No results found for: NOCARDIACX, STOOLCX  No results found for: THROATCX, UNSTIMCULT, URINECX, CULTURE, VZVCULTUR  No results found for: VIRALCULTU, WOUNDCX    Microbiology Results (last 10 days)     Procedure Component Value - Date/Time    MRSA Screen, PCR (Inpatient) - Swab, Nares [[id number]]  (Normal) Collected:  06/29/20 2352    Lab Status:  Final result Specimen:  Swab from Nares Updated:  06/30/20 0958     MRSA PCR Negative    Narrative:       MRSA Negative    Respiratory Panel, PCR - Swab, Nasopharynx [[id number]]  (Normal) Collected:  06/29/20 2004    Lab Status:  Final result Specimen:  Swab from Nasopharynx Updated:  06/29/20 2129     ADENOVIRUS, PCR Not Detected     Coronavirus 229E Not Detected     Coronavirus HKU1 Not Detected     Coronavirus NL63 Not Detected     Coronavirus OC43 Not Detected     Human Metapneumovirus Not Detected     Human Rhinovirus/Enterovirus Not Detected     Influenza B PCR Not Detected     Parainfluenza Virus 1 Not Detected     Parainfluenza Virus 2 Not Detected     Parainfluenza Virus 3 Not Detected     Parainfluenza Virus 4 Not Detected     Bordetella pertussis pcr Not Detected     Influenza A H1 2009 PCR Not Detected     Chlamydophila pneumoniae PCR Not Detected     Mycoplasma pneumo by PCR Not Detected     Influenza  A PCR Not Detected     Influenza A H3 Not Detected     Influenza A H1 Not Detected     RSV, PCR Not Detected     Bordetella parapertussis PCR Not Detected    Narrative:       The coronavirus on the RVP is NOT COVID-19 and is NOT indicative of infection with COVID-19.     COVID-19,[facility] IN-HOUSE, NP SWAB IN TRANSPORT MEDIA 8-12 HR TAT - Swab, Nasopharynx [[id number]]  (Normal) Collected:  06/29/20 2004    Lab Status:  Final result Specimen:  Swab from Nasopharynx Updated:  06/30/20 0305     COVID19 Not Detected    Narrative:       Fact sheet for providers: https://www.fda.gov/media/748515/download     Fact sheet for patients: https://www.fda.gov/media/983707/download    Blood Culture - Blood, Arm, Right [[id number]] Collected:  06/29/20 1931    Lab Status:  Final result Specimen:  Blood from Arm, Right Updated:  07/04/20 2000     Blood Culture No growth at 5 days    Blood Culture - Blood, Arm, Left [[id number]] Collected:  06/29/20 1931    Lab Status:  Final result Specimen:  Blood from Arm, Left Updated:  07/04/20 2000     Blood Culture No growth at 5 days             Physical Exam:  General: comfortable  Vascular: 2+ pulses, symmetric; digits are pink and well perfused    Extremities: Right hip incision is excellent appearing he has some mild bruising distally to the incision, the dressing is clean as well  No pain with logrolling able to flex at the hip.  He does report pain more distal and near his knee.  He does have knee joint arthritis and some tenderness palpation with joint line    Neurologic: sensation to light touch is intact distally    Dermatologic: Excellent appearing incision    No specific point tenderness or bony tenderness        I appreciate the medical management of the admitting internal medicine team.       The patient is okay for discharge from orthopedic perspective once cleared by the medical team and home or appropriate transfer is arranged.  Please contact me if there are any questions.  He  has continued ongoing medical issues with his dysphasia and work-up for sepsis he is currently on broad-spectrum antibiotics.  His right hip is well-appearing.  He will need to be up and ambulate with physical therapy with assistance when able from a medical perspective.     POD #: 7     Anemia - acute blood loss anemia following hip fracture, minimal surgical blood loss    Pain control - better controlled     Dressing - clean and can be changed PRN.  Okay for RN to replace daily and as needed prior to discharge if dressing is saturated.        Antibiotics - complete 24 hour postoperative course of IV antibioitics for surgical prophylaxis but on broad spectrum for possible sepsis at this time     Diet - per primary team; swallowing difficulties     Activity -   Touchdown weight bearing with assistance and support devices (walker, wheelchair) as needed     DVT Prophylaxis - I recommend daily Aspirin 325mg by mouth BID for 6 weeks; SCDs and ISI hose as an inpatient     PT/OT Consult - Touchdown weight bearing     Follow-up - the  will need to call my office to arrange for a discharge follow-up appointment in 3 weeks.  I discussed the patient's findings and my recommendations with patient           Please contact me personally at [phone] for any questions.    Please have the schedulers call our office at [phone] if a clinic appointment is needed.    Sam Harp MD, FAAOS  Orthopedic Surgeon  Fellowship-trained Shoulder and Elbow Surgeon  [facility]  Orthopedics and Sports Medicine  [address] [place] [address]. [address]  [place] [address]        Sam Hapr MD  07/07/20  15:05

## 2020-07-07 NOTE — PROGRESS NOTES
[facility] Medicine Services  PROGRESS NOTE    Patient Name: Ken Jasso  : 1940  MRN: [id number]    Date of Admission: 2020  Primary Care Physician: Provider, No Known    Subjective   Subjective     CC:  Follow up for dysphagia and right hip fracture     HPI:  Rapid response this morning for hypoxia, fever, tremors.  Patient seen and evaluated at bedside. Symptoms have improved- he is alert, drowsy.  VSS improved. Fever to 102.1, patient with significant mucus/sputum production.   D/w wife via phone and again verified that she would like to keep him comfort diet/DNR/DNI.    Review of Systems  Gen- as above  CV- No chest pain, palpitations  Resp- as above  GI- No N/V/D, abd pain         Objective   Objective     Vital Signs:   Temp:  [98 °F (36.7 °C)-102.8 °F (39.3 °C)] 99.8 °F (37.7 °C)  Heart Rate:  [] 113  Resp:  [16-18] 17  BP: (146-190)/() 158/70  Total (NIH Stroke Scale): 0     Physical Exam:  Constitutional: in distress, coughing, dyspneic  HENT: NCAT, mucous membranes moist  Respiratory: labored, rhonchorus breath sounds, on 2 LNC  Cardiovascular: sinus tach to the 120s, on tele  Gastrointestinal: Positive bowel sounds, soft, nontender, nondistended  Musculoskeletal: No bilateral ankle edema  Psychiatric: appropriate given situation  Neurologic: Oriented , alert, drowsy   Skin: No rashes, right hip dressing cdi       Results Reviewed:  Results from last 7 days   Lab Units 20  0820  03420  0400 20  0250   WBC 10*3/mm3 16.85* 8.94 8.72  --    HEMOGLOBIN g/dL 12.4* 10.8* 10.9*  --    HEMATOCRIT % 39.0 32.9* 34.0*  --    PLATELETS 10*3/mm3 442 176 170  --    PROCALCITONIN ng/mL  --  0.91*  --  1.60*     Results from last 7 days   Lab Units 20  0825 20  2134 20  0340 07/01/20  0250   SODIUM mmol/L 136  --  135* 135*   POTASSIUM mmol/L 3.6 3.7 3.6 3.6   CHLORIDE mmol/L 98  --  99 100   CO2 mmol/L 25.0  --  23.0  24.0   BUN mg/dL 12  --  12 17   CREATININE mg/dL 0.80  --  0.86 1.01   GLUCOSE mg/dL 123*  --  80 97   CALCIUM mg/dL 9.2  --  8.1* 8.1*   ALT (SGPT) U/L 20  --   --   --    AST (SGOT) U/L 38  --   --   --    PROBNP pg/mL  --   --   --  1,196.0     Estimated Creatinine Clearance: 64.1 mL/min (by C-G formula based on SCr of 0.8 mg/dL).    Microbiology Results Abnormal     Procedure Component Value - Date/Time    Blood Culture - Blood, Arm, Right [[id number]] Collected:  06/29/20 1931    Lab Status:  Final result Specimen:  Blood from Arm, Right Updated:  07/04/20 2000     Blood Culture No growth at 5 days    Blood Culture - Blood, Arm, Left [[id number]] Collected:  06/29/20 1931    Lab Status:  Final result Specimen:  Blood from Arm, Left Updated:  07/04/20 2000     Blood Culture No growth at 5 days    MRSA Screen, PCR (Inpatient) - Swab, Nares [[id number]]  (Normal) Collected:  06/29/20 2352    Lab Status:  Final result Specimen:  Swab from Nares Updated:  06/30/20 0958     MRSA PCR Negative    Narrative:       MRSA Negative    COVID-19,[facility], NP SWAB IN TRANSPORT MEDIA 8-12 HR TAT - Swab, Nasopharynx [[id number]]  (Normal) Collected:  06/29/20 2004    Lab Status:  Final result Specimen:  Swab from Nasopharynx Updated:  06/30/20 0305     COVID19 Not Detected    Narrative:       Fact sheet for providers: https://www.fda.gov/media/215311/download     Fact sheet for patients: https://www.fda.gov/media/477337/download    Respiratory Panel, PCR - Swab, Nasopharynx [[id number]]  (Normal) Collected:  06/29/20 2004    Lab Status:  Final result Specimen:  Swab from Nasopharynx Updated:  06/29/20 2129     ADENOVIRUS, PCR Not Detected     Coronavirus 229E Not Detected     Coronavirus HKU1 Not Detected     Coronavirus NL63 Not Detected     Coronavirus OC43 Not Detected     Human Metapneumovirus Not Detected     Human Rhinovirus/Enterovirus Not Detected     Influenza B PCR Not Detected     Parainfluenza Virus 1 Not Detected      Parainfluenza Virus 2 Not Detected     Parainfluenza Virus 3 Not Detected     Parainfluenza Virus 4 Not Detected     Bordetella pertussis pcr Not Detected     Influenza A H1 2009 PCR Not Detected     Chlamydophila pneumoniae PCR Not Detected     Mycoplasma pneumo by PCR Not Detected     Influenza A PCR Not Detected     Influenza A H3 Not Detected     Influenza A H1 Not Detected     RSV, PCR Not Detected     Bordetella parapertussis PCR Not Detected    Narrative:       The coronavirus on the RVP is NOT COVID-19 and is NOT indicative of infection with COVID-19.           Imaging Results (Last 24 Hours)     Procedure Component Value Units Date/Time    XR Chest 1 View [[id number]] Collected:  07/07/20 0848     Updated:  07/07/20 0853    Narrative:       EXAMINATION: XR CHEST 1 VW-      INDICATION: RRT, increased sputum; R50.9-Fever, unspecified;  J96.01-Acute respiratory failure with hypoxia; I47.2-Ventricular  tachycardia; S51.011A-Laceration without foreign body of right elbow,  initial encounter; R13.12-Dysphagia, oropharyngeal phase.      COMPARISON: 06/29/2020.     FINDINGS: Cardiac silhouette within normal limits. Pulmonary vascularity  within normal limits. No focal opacification or consolidation. No  pneumothorax or pleural effusion. Degenerative changes of the spine.           Impression:       No acute cardiopulmonary process specifically no new focal  consolidation of involvement or effusion.     D:  07/07/2020  E:  07/07/2020                Results for orders placed during the hospital encounter of 06/29/20   Adult Transthoracic Echo Complete W/ Cont if Necessary Per Protocol (With Agitated Saline)    Narrative · Estimated EF = 55%.  · Left ventricular systolic function is normal.  · Mild mitral valve regurgitation is present  · Mild to moderate tricuspid valve regurgitation is present.  · Calculated right ventricular systolic pressure from tricuspid   regurgitation is 34 mmHg.  · No intracardiac shunting  is seen  · No cardiac source for embolus is seen          I have reviewed the medications:  Scheduled Meds:    [START ON 7/9/2020] Pharmacy Consult  Does not apply Once   amLODIPine 10 mg Oral Q24H   aspirin 325 mg Oral Q12H   atorvastatin 80 mg Oral Nightly   bisacodyl 10 mg Rectal Daily   donepezil 10 mg Oral Daily   lansoprazole 30 mg Oral QAM   lisinopril 40 mg Oral Q24H   piperacillin-tazobactam 3.375 g Intravenous Q8H   polyethylene glycol 17 g Oral Daily   sodium chloride 10 mL Intravenous Q12H   sodium chloride 10 mL Intravenous Q12H   [START ON 7/8/2020] vancomycin 1,000 mg Intravenous Q24H   vancomycin 1,500 mg Intravenous Once     Continuous Infusions:    lactated ringers 75 mL/hr Last Rate: 75 mL/hr (07/06/20 1251)   niCARdipine 5-15 mg/hr    Pharmacy to dose vancomycin       PRN Meds:.acetaminophen  •  acetaminophen **OR** [DISCONTINUED] acetaminophen  •  bisacodyl  •  labetalol  •  lidocaine PF 1%  •  magnesium sulfate  •  Pharmacy to dose vancomycin  •  potassium chloride  •  potassium chloride  •  potassium chloride  •  sodium chloride  •  sodium chloride    Assessment/Plan   Assessment & Plan     Active Hospital Problems    Diagnosis  POA   • **Fracture of right hip (CMS/[facility]) [S72.001A]  Yes   • Acute febrile illness [R50.9]  Yes   • Dementia (CMS/HCC) [F03.90]  Yes   • H/O: CVA (no deficit) [Z86.73]  Not Applicable      Resolved Hospital Problems   No resolved problems to display.        Brief Hospital Course to date:  Ken Jasso is a 80 y.o. male with PMH of CVA, HTN, dementia, and GERD who was admitted on 6/29 after fall while walking to car, sustaining a right femoral neck fracture, there was concern for stroke and code stroke was called. Imaging negative for acute CVA. Had fever up to 103 on arrival and non-sustained wide complex tachycardia requiring admission to the ICU. Now out of ICU and has been stable  On telemetry floor.     This patient's problems and plans were partially entered  by my partner and updated as appropriate by me 07/07/20.         Dysphagia with ongoing aspiration events   Sepsis  Lactic acidosis  -MBS resulted with severe pharyngeal dysphagia  -speech recommending NPO, however, prior provider (Lorenza) as well as myself today spoke with Mr. Jasso and he would like to eat, they discussed his code status and he is CODE STATUS: NO CPR/INTUBATION  -- wife aware and agrees with his decision , would like to keep comfort diet ongoing and aware that continued aspirations events are likely  -- RR as above today 7/7 AM-- favored that this is another aspiration event as no other focal source of infection-- fevers noted to as high as 102- CXR reviewed and normal-- will send UA but start on broad spectrum abx and continue for now- if worsening respiratory status will consider CT chest      Right femoral neck fracture  -s/p hip percutaneous pinning on 7/1 by Dr. Harp  -PT/OT - wll need SNF-but needs to participate with PT here, if he continues to do poorly may consider palliative or hospice consult  -follow-up with Dr. Harp in 3 weeks, continue aspirin 325 mg BID for 6 weeks for VTE ppx     HTN- continue amlodipine, lisinopril, and atorvastatin   Dementia-continue aricept        DVT Prophylaxis:  scd    Daily Care Communication  Due to current limited visitation policies, an attempt will be made daily to update patient's identified best point-of-contact(s)   Contact: ishan   Relation: spouse   Type of communication (phone or televideo): on phone    Time of communication: 1140   Notes (if applicable): updated to events of this morning including RR as well as confirmed code status/wishes to be comfort      Disposition: I expect the patient to be discharged to rehab     CODE STATUS:   Code Status and Medical Interventions:   Ordered at: 07/03/20 0956     Limited Support to NOT Include:    Intubation     Level Of Support Discussed With:    Patient     Code Status:    No CPR     Medical  Interventions (Level of Support Prior to Arrest):    Limited         Electronically signed by Lauren Pleitez MD, 07/07/20, 11:51.

## 2020-07-07 NOTE — PROGRESS NOTES
Pharmacy Consult-Vancomycin Dosing  Ken Jasso is a  80 y.o. male receiving vancomycin therapy.     Indication: sepsis  Consulting Provider: hospitalist  ID Consult: no    Goal Trough: 15-20 mcg/mL    Current Antimicrobial Therapy  Anti-Infectives (From admission, onward)      Ordered     Dose/Rate Route Frequency Start Stop    20 0827  piperacillin-tazobactam (ZOSYN) 3.375 g in iso-osmotic dextrose 50 ml (premix)     Ordering Provider:  Lauren Pleitez MD    3.375 g  over 4 Hours Intravenous Every 8 Hours 20 1600 20 1559    20 0827  piperacillin-tazobactam (ZOSYN) 3.375 g in iso-osmotic dextrose 50 ml (premix)     Ordering Provider:  Lauren Pleitez MD    3.375 g  over 30 Minutes Intravenous Once 20 0900      20 0827  Pharmacy to dose vancomycin     Ordering Provider:  Lauren Pleitez MD     Does not apply Continuous PRN 20 0827 20 0826    20 2113  ceFAZolin in dextrose (ANCEF) IVPB solution 2 g     Ordering Provider:  Sam Harp MD    2 g  200 mL/hr over 30 Minutes Intravenous Every 8 Hours 20 0000 20 0852    20 2204  piperacillin-tazobactam (ZOSYN) 3.375 g in iso-osmotic dextrose 50 ml (premix)     Jeannette Guerrero, [facility] let the order  on 20 1433.   Ordering Provider:  Rodney Banks MD    3.375 g  over 4 Hours Intravenous Every 8 Hours 20 0300 20 0259    20 1915  piperacillin-tazobactam (ZOSYN) 3.375 g in iso-osmotic dextrose 50 ml (premix)     Ordering Provider:  Bhargav Hall MD    3.375 g  over 30 Minutes Intravenous Once 20 191  vancomycin 1500 mg/500 mL 0.9% NS IVPB ([facility])     Ordering Provider:  Bhargav Hall MD    20 mg/kg × 72.6 kg  over 90 Minutes Intravenous Once 2020 2149            Allergies  Allergies as of 2020    (No Known Allergies)       Labs    Results from last 7 days   Lab Units 20  0340 20  0250  "  BUN mg/dL 12 17   CREATININE mg/dL 0.86 1.01       Results from last 7 days   Lab Units 07/07/20  0825 07/02/20  0340 07/01/20  0400   WBC 10*3/mm3 16.85* 8.94 8.72       Evaluation of Dosing     Last Dose Received in the ED/Outside Facility: No  Is Patient on Dialysis or Renal Replacement: No    Ht - 177.8 cm (70\")  Wt - 62.6 kg (137 lb 14.4 oz)    Estimated Creatinine Clearance: 59.6 mL/min (by C-G formula based on SCr of 0.86 mg/dL).    Intake & Output (last 3 days)         07/04 0701 - 07/05 0700 07/05 0701 - 07/06 0700 07/06 0701 - 07/07 0700 07/07 0701 - 07/08 0700    P.O. 60 118 1100     I.V. (mL/kg)   1000 (16)     IV Piggyback   50     Total Intake(mL/kg) 60 (1) 118 (1.9) 2150 (34.3)     Urine (mL/kg/hr) 650 (0.4) 700 (0.5) 650 (0.4)     Stool 0       Total Output 650 700 650     Net -590 -582 +1500             Urine Unmeasured Occurrence 5 x 10 x 5 x     Stool Unmeasured Occurrence 1 x               Microbiology and Radiology  Microbiology Results (last 10 days)       Procedure Component Value - Date/Time    MRSA Screen, PCR (Inpatient) - Swab, Nares [[id number]]  (Normal) Collected:  06/29/20 2352    Lab Status:  Final result Specimen:  Swab from Nares Updated:  06/30/20 0958     MRSA PCR Negative    Narrative:       MRSA Negative    Respiratory Panel, PCR - Swab, Nasopharynx [[id number]]  (Normal) Collected:  06/29/20 2004    Lab Status:  Final result Specimen:  Swab from Nasopharynx Updated:  06/29/20 2129     ADENOVIRUS, PCR Not Detected     Coronavirus 229E Not Detected     Coronavirus HKU1 Not Detected     Coronavirus NL63 Not Detected     Coronavirus OC43 Not Detected     Human Metapneumovirus Not Detected     Human Rhinovirus/Enterovirus Not Detected     Influenza B PCR Not Detected     Parainfluenza Virus 1 Not Detected     Parainfluenza Virus 2 Not Detected     Parainfluenza Virus 3 Not Detected     Parainfluenza Virus 4 Not Detected     Bordetella pertussis pcr Not Detected     Influenza A H1 " 2009 PCR Not Detected     Chlamydophila pneumoniae PCR Not Detected     Mycoplasma pneumo by PCR Not Detected     Influenza A PCR Not Detected     Influenza A H3 Not Detected     Influenza A H1 Not Detected     RSV, PCR Not Detected     Bordetella parapertussis PCR Not Detected    Narrative:       The coronavirus on the RVP is NOT COVID-19 and is NOT indicative of infection with COVID-19.     COVID-19,[facility], NP SWAB IN TRANSPORT MEDIA 8-12 HR TAT - Swab, Nasopharynx [[id number]]  (Normal) Collected:  06/29/20 2004    Lab Status:  Final result Specimen:  Swab from Nasopharynx Updated:  06/30/20 0305     COVID19 Not Detected    Narrative:       Fact sheet for providers: https://www.fda.gov/media/309937/download     Fact sheet for patients: https://www.fda.gov/media/393923/download    Blood Culture - Blood, Arm, Right [[id number]] Collected:  06/29/20 1931    Lab Status:  Final result Specimen:  Blood from Arm, Right Updated:  07/04/20 2000     Blood Culture No growth at 5 days    Blood Culture - Blood, Arm, Left [[id number]] Collected:  06/29/20 1931    Lab Status:  Final result Specimen:  Blood from Arm, Left Updated:  07/04/20 2000     Blood Culture No growth at 5 days            Evaluation of Level              Results from last 7 days   Lab Units 07/01/20  0832   VANCOMYCIN TR mcg/mL 15.40          Assessment/Plan:  1. Pharmacy to dose vancomycin for sepsis. Goal trough 15-20 mcg/mL.    2. Patient received prior treatment with vancomycin earlier in admission from 6/29-7/1 for sepsis/PNA, was discontinued following negative MRSA nares PCR. Previous cultures revealed no growth, no new cultures to review at this time. Most recent temp of 100.4, WBC increased to 16.85. No new CMP to review, last Scr on 7/2 was 0.86. Documented I/O's may indicate pt has reduced UOP.     3. Will give a loading dose of vancomycin 1500 mg (~24mg/kg) IV today. Given patient's age, potential reduced UOP, will initiate maintenance  dose of vancomycin 1000 mg (~16mg/kg) IV Q24hr on 7/8 @ 1000.    4. Assess clearance by trough level on 7/9 @ 0930, prior to 3rd dose.    5. Pharmacy will continue to monitor renal function, cultures and sensitivities, and clinical status to adjust regimen as necessary.      Fco Joseph, PharmD  Pharmacy Resident  7/7/2020  08:53

## 2020-07-07 NOTE — SIGNIFICANT NOTE
Unable to see for dysphagia tx. Pt not appropriate at this time per RN. Will re-attempt as appropriate.

## 2020-07-08 LAB
ANION GAP SERPL CALCULATED.3IONS-SCNC: 12 MMOL/L (ref 5–15)
BACTERIA UR QL AUTO: ABNORMAL /HPF
BASOPHILS # BLD AUTO: 0.06 10*3/MM3 (ref 0–0.2)
BASOPHILS NFR BLD AUTO: 0.3 % (ref 0–1.5)
BILIRUB UR QL STRIP: NEGATIVE
BUN SERPL-MCNC: 24 MG/DL (ref 8–23)
BUN/CREAT SERPL: 32 (ref 7–25)
CALCIUM SPEC-SCNC: 8.5 MG/DL (ref 8.6–10.5)
CHLORIDE SERPL-SCNC: 102 MMOL/L (ref 98–107)
CLARITY UR: CLEAR
CO2 SERPL-SCNC: 25 MMOL/L (ref 22–29)
COLOR UR: YELLOW
CREAT SERPL-MCNC: 0.75 MG/DL (ref 0.76–1.27)
D-LACTATE SERPL-SCNC: 1.3 MMOL/L (ref 0.5–2)
DEPRECATED RDW RBC AUTO: 40.8 FL (ref 37–54)
EOSINOPHIL # BLD AUTO: 0.04 10*3/MM3 (ref 0–0.4)
EOSINOPHIL NFR BLD AUTO: 0.2 % (ref 0.3–6.2)
ERYTHROCYTE [DISTWIDTH] IN BLOOD BY AUTOMATED COUNT: 13.9 % (ref 12.3–15.4)
GFR SERPL CREATININE-BSD FRML MDRD: 100 ML/MIN/1.73
GLUCOSE SERPL-MCNC: 104 MG/DL (ref 65–99)
GLUCOSE UR STRIP-MCNC: NEGATIVE MG/DL
HCT VFR BLD AUTO: 28.6 % (ref 37.5–51)
HGB BLD-MCNC: 9.2 G/DL (ref 13–17.7)
HGB UR QL STRIP.AUTO: NEGATIVE
HYALINE CASTS UR QL AUTO: ABNORMAL /LPF
IMM GRANULOCYTES # BLD AUTO: 0.21 10*3/MM3 (ref 0–0.05)
IMM GRANULOCYTES NFR BLD AUTO: 1 % (ref 0–0.5)
KETONES UR QL STRIP: ABNORMAL
LEUKOCYTE ESTERASE UR QL STRIP.AUTO: ABNORMAL
LYMPHOCYTES # BLD AUTO: 1.52 10*3/MM3 (ref 0.7–3.1)
LYMPHOCYTES NFR BLD AUTO: 7 % (ref 19.6–45.3)
MCH RBC QN AUTO: 26.1 PG (ref 26.6–33)
MCHC RBC AUTO-ENTMCNC: 32.2 G/DL (ref 31.5–35.7)
MCV RBC AUTO: 81.3 FL (ref 79–97)
MONOCYTES # BLD AUTO: 1.63 10*3/MM3 (ref 0.1–0.9)
MONOCYTES NFR BLD AUTO: 7.6 % (ref 5–12)
NEUTROPHILS NFR BLD AUTO: 18.12 10*3/MM3 (ref 1.7–7)
NEUTROPHILS NFR BLD AUTO: 83.9 % (ref 42.7–76)
NITRITE UR QL STRIP: NEGATIVE
NRBC BLD AUTO-RTO: 0 /100 WBC (ref 0–0.2)
PH UR STRIP.AUTO: 7 [PH] (ref 5–8)
PHOSPHATE SERPL-MCNC: 2.8 MG/DL (ref 2.5–4.5)
PLAT MORPH BLD: NORMAL
PLATELET # BLD AUTO: 329 10*3/MM3 (ref 140–450)
PMV BLD AUTO: 11.4 FL (ref 6–12)
POTASSIUM SERPL-SCNC: 2.9 MMOL/L (ref 3.5–5.2)
PROT UR QL STRIP: ABNORMAL
RBC # BLD AUTO: 3.52 10*6/MM3 (ref 4.14–5.8)
RBC # UR: ABNORMAL /HPF
RBC MORPH BLD: NORMAL
REF LAB TEST METHOD: ABNORMAL
SODIUM SERPL-SCNC: 139 MMOL/L (ref 136–145)
SP GR UR STRIP: 1.02 (ref 1–1.03)
SQUAMOUS #/AREA URNS HPF: ABNORMAL /HPF
UROBILINOGEN UR QL STRIP: ABNORMAL
WBC # BLD AUTO: 21.58 10*3/MM3 (ref 3.4–10.8)
WBC MORPH BLD: NORMAL
WBC UR QL AUTO: ABNORMAL /HPF

## 2020-07-08 PROCEDURE — 85025 COMPLETE CBC W/AUTO DIFF WBC: CPT | Performed by: INTERNAL MEDICINE

## 2020-07-08 PROCEDURE — 81001 URINALYSIS AUTO W/SCOPE: CPT | Performed by: INTERNAL MEDICINE

## 2020-07-08 PROCEDURE — 80048 BASIC METABOLIC PNL TOTAL CA: CPT | Performed by: INTERNAL MEDICINE

## 2020-07-08 PROCEDURE — 84100 ASSAY OF PHOSPHORUS: CPT | Performed by: INTERNAL MEDICINE

## 2020-07-08 PROCEDURE — 85007 BL SMEAR W/DIFF WBC COUNT: CPT | Performed by: INTERNAL MEDICINE

## 2020-07-08 PROCEDURE — 99233 SBSQ HOSP IP/OBS HIGH 50: CPT | Performed by: INTERNAL MEDICINE

## 2020-07-08 PROCEDURE — 97530 THERAPEUTIC ACTIVITIES: CPT

## 2020-07-08 PROCEDURE — 25010000002 PIPERACILLIN SOD-TAZOBACTAM PER 1 G: Performed by: INTERNAL MEDICINE

## 2020-07-08 PROCEDURE — 83605 ASSAY OF LACTIC ACID: CPT | Performed by: INTERNAL MEDICINE

## 2020-07-08 PROCEDURE — 97110 THERAPEUTIC EXERCISES: CPT

## 2020-07-08 PROCEDURE — 87040 BLOOD CULTURE FOR BACTERIA: CPT | Performed by: INTERNAL MEDICINE

## 2020-07-08 PROCEDURE — 25010000002 VANCOMYCIN PER 500 MG

## 2020-07-08 RX ADMIN — LISINOPRIL 40 MG: 40 TABLET ORAL at 08:15

## 2020-07-08 RX ADMIN — TAZOBACTAM SODIUM AND PIPERACILLIN SODIUM 3.38 G: 375; 3 INJECTION, SOLUTION INTRAVENOUS at 01:28

## 2020-07-08 RX ADMIN — POTASSIUM CHLORIDE 40 MEQ: 10 CAPSULE, COATED, EXTENDED RELEASE ORAL at 08:15

## 2020-07-08 RX ADMIN — POLYETHYLENE GLYCOL 3350 17 G: 17 POWDER, FOR SOLUTION ORAL at 08:15

## 2020-07-08 RX ADMIN — ASPIRIN 325 MG: 325 TABLET, COATED ORAL at 08:15

## 2020-07-08 RX ADMIN — AMLODIPINE BESYLATE 10 MG: 10 TABLET ORAL at 08:15

## 2020-07-08 RX ADMIN — TAZOBACTAM SODIUM AND PIPERACILLIN SODIUM 3.38 G: 375; 3 INJECTION, SOLUTION INTRAVENOUS at 08:50

## 2020-07-08 RX ADMIN — LANSOPRAZOLE 30 MG: KIT at 06:02

## 2020-07-08 RX ADMIN — VANCOMYCIN HYDROCHLORIDE 1000 MG: 1 INJECTION, SOLUTION INTRAVENOUS at 11:11

## 2020-07-08 RX ADMIN — DONEPEZIL HYDROCHLORIDE 10 MG: 10 TABLET, FILM COATED ORAL at 08:15

## 2020-07-08 RX ADMIN — POTASSIUM CHLORIDE 40 MEQ: 1.5 POWDER, FOR SOLUTION ORAL at 15:47

## 2020-07-08 NOTE — PROGRESS NOTES
Continued Stay Note  [facility]     Patient Name: Ken Jasso  MRN: [id number]  Today's Date: 7/8/2020    Admit Date: 6/29/2020    Discharge Plan     Row Name 07/08/20 1421       Plan    Plan  TBD    Plan Comments  Patient amenable to Palliative consultation. Family meeting with Palliative scheduled for Thursday @ 11:00am. Patient continues to be unable to participate in therapy and requires 2 person max assist for all PT and OT interventions. Patient refusing blood cultures and white count continues to uptrend. CM will continue to follow.     Final Discharge Disposition Code  30 - still a patient               Expected Discharge Date and Time     Expected Discharge Date Expected Discharge Time    Jul 10, 2020             Sharda Chu

## 2020-07-08 NOTE — PROGRESS NOTES
[facility] Medicine Services  PROGRESS NOTE    Patient Name: Ken Jasso  : 1940  MRN: [id number]    Date of Admission: 2020  Primary Care Physician: Provider, No Known    Subjective   Subjective     CC:  Follow up for dysphagia and right hip fracture     HPI:  Patient feels ok this morning.  Refusing blood cultures, white count continues to uptrend  Long d/w patient about ongoing aspiration and comfort diet, goals of care.  Patient amenable to palliative consultation and have d/w palliative care    Review of Systems  Gen- neg  CV- No chest pain, palpitations  Resp- dyspnea  GI- No N/V/D, abd pain         Objective   Objective     Vital Signs:   Temp:  [97.5 °F (36.4 °C)-99.3 °F (37.4 °C)] 97.5 °F (36.4 °C)  Heart Rate:  [] 76  Resp:  [16-17] 16  BP: (137-147)/(64-78) 137/69  Total (NIH Stroke Scale): 0     Physical Exam:  Constitutional: in distress, coughing, dyspneic  HENT: NCAT, mucous membranes moist  Respiratory:normal effort, rhonchorus breath sounds, on room air  Cardiovascular: sinus tach to the 120s, on tele  Gastrointestinal: Positive bowel sounds, soft, nontender, nondistended  Musculoskeletal: No bilateral ankle edema  Psychiatric: appropriate given situation  Neurologic: Oriented , alert, drowsy   Skin: No rashes, right hip dressing cdi       Results Reviewed:  Results from last 7 days   Lab Units 20  0400 20  0820  0340   WBC 10*3/mm3 21.58* 16.85* 8.94   HEMOGLOBIN g/dL 9.2* 12.4* 10.8*   HEMATOCRIT % 28.6* 39.0 32.9*   PLATELETS 10*3/mm3 329 442 176   PROCALCITONIN ng/mL  --   --  0.91*     Results from last 7 days   Lab Units 20  0328 20  0825 20  2134 20  0340   SODIUM mmol/L 139 136  --  135*   POTASSIUM mmol/L 2.9* 3.6 3.7 3.6   CHLORIDE mmol/L 102 98  --  99   CO2 mmol/L 25.0 25.0  --  23.0   BUN mg/dL 24* 12  --  12   CREATININE mg/dL 0.75* 0.80  --  0.86   GLUCOSE mg/dL 104* 123*  --  80   CALCIUM  mg/dL 8.5* 9.2  --  8.1*   ALT (SGPT) U/L  --  20  --   --    AST (SGOT) U/L  --  38  --   --      Estimated Creatinine Clearance: 63.3 mL/min (A) (by C-G formula based on SCr of 0.75 mg/dL (L)).    Microbiology Results Abnormal     Procedure Component Value - Date/Time    Blood Culture - Blood, Arm, Right [[id number]] Collected:  06/29/20 1931    Lab Status:  Final result Specimen:  Blood from Arm, Right Updated:  07/04/20 2000     Blood Culture No growth at 5 days    Blood Culture - Blood, Arm, Left [[id number]] Collected:  06/29/20 1931    Lab Status:  Final result Specimen:  Blood from Arm, Left Updated:  07/04/20 2000     Blood Culture No growth at 5 days    MRSA Screen, PCR (Inpatient) - Swab, Nares [[id number]]  (Normal) Collected:  06/29/20 2352    Lab Status:  Final result Specimen:  Swab from Nares Updated:  06/30/20 0958     MRSA PCR Negative    Narrative:       MRSA Negative    COVID-19,[facility] IN-HOUSE, NP SWAB IN TRANSPORT MEDIA 8-12 HR TAT - Swab, Nasopharynx [[id number]]  (Normal) Collected:  06/29/20 2004    Lab Status:  Final result Specimen:  Swab from Nasopharynx Updated:  06/30/20 0305     COVID19 Not Detected    Narrative:       Fact sheet for providers: https://www.fda.gov/media/226294/download     Fact sheet for patients: https://www.fda.gov/media/723484/download    Respiratory Panel, PCR - Swab, Nasopharynx [[id number]]  (Normal) Collected:  06/29/20 2004    Lab Status:  Final result Specimen:  Swab from Nasopharynx Updated:  06/29/20 2129     ADENOVIRUS, PCR Not Detected     Coronavirus 229E Not Detected     Coronavirus HKU1 Not Detected     Coronavirus NL63 Not Detected     Coronavirus OC43 Not Detected     Human Metapneumovirus Not Detected     Human Rhinovirus/Enterovirus Not Detected     Influenza B PCR Not Detected     Parainfluenza Virus 1 Not Detected     Parainfluenza Virus 2 Not Detected     Parainfluenza Virus 3 Not Detected     Parainfluenza Virus 4 Not Detected     Bordetella  pertussis pcr Not Detected     Influenza A H1 2009 PCR Not Detected     Chlamydophila pneumoniae PCR Not Detected     Mycoplasma pneumo by PCR Not Detected     Influenza A PCR Not Detected     Influenza A H3 Not Detected     Influenza A H1 Not Detected     RSV, PCR Not Detected     Bordetella parapertussis PCR Not Detected    Narrative:       The coronavirus on the RVP is NOT COVID-19 and is NOT indicative of infection with COVID-19.           Imaging Results (Last 24 Hours)     ** No results found for the last 24 hours. **          Results for orders placed during the hospital encounter of 06/29/20   Adult Transthoracic Echo Complete W/ Cont if Necessary Per Protocol (With Agitated Saline)    Narrative · Estimated EF = 55%.  · Left ventricular systolic function is normal.  · Mild mitral valve regurgitation is present  · Mild to moderate tricuspid valve regurgitation is present.  · Calculated right ventricular systolic pressure from tricuspid   regurgitation is 34 mmHg.  · No intracardiac shunting is seen  · No cardiac source for embolus is seen          I have reviewed the medications:  Scheduled Meds:    [START ON 7/9/2020] Pharmacy Consult  Does not apply Once   amLODIPine 10 mg Oral Q24H   aspirin 325 mg Oral Q12H   atorvastatin 80 mg Oral Nightly   bisacodyl 10 mg Rectal Daily   donepezil 10 mg Oral Daily   lansoprazole 30 mg Oral QAM   lisinopril 40 mg Oral Q24H   piperacillin-tazobactam 3.375 g Intravenous Q8H   polyethylene glycol 17 g Oral Daily   sodium chloride 10 mL Intravenous Q12H   sodium chloride 10 mL Intravenous Q12H   vancomycin 1,000 mg Intravenous Q24H     Continuous Infusions:    lactated ringers 75 mL/hr Last Rate: 75 mL/hr (07/06/20 1251)   niCARdipine 5-15 mg/hr    Pharmacy to dose vancomycin       PRN Meds:.acetaminophen  •  acetaminophen **OR** [DISCONTINUED] acetaminophen  •  bisacodyl  •  labetalol  •  lidocaine PF 1%  •  magnesium sulfate  •  Pharmacy to dose vancomycin  •  potassium  chloride  •  potassium chloride  •  potassium chloride  •  sodium chloride  •  sodium chloride    Assessment/Plan   Assessment & Plan     Active Hospital Problems    Diagnosis  POA   • **Fracture of right hip (CMS/HCC) [S72.001A]  Yes   • Acute febrile illness [R50.9]  Yes   • Dementia (CMS/HCC) [F03.90]  Yes   • H/O: CVA (no deficit) [Z86.73]  Not Applicable      Resolved Hospital Problems   No resolved problems to display.        Brief Hospital Course to date:  Ken Jasso is a 80 y.o. male with PMH of CVA, HTN, dementia, and GERD who was admitted on 6/29 after fall while walking to car, sustaining a right femoral neck fracture, there was concern for stroke and code stroke was called. Imaging negative for acute CVA. Had fever up to 103 on arrival and non-sustained wide complex tachycardia requiring admission to the ICU. Now out of ICU and has been stable  On telemetry floor.     This patient's problems and plans were partially entered by my partner and updated as appropriate by me 07/08/20.         Dysphagia with ongoing aspiration events   Sepsis  Lactic acidosis  -MBS resulted with severe pharyngeal dysphagia  -speech recommending NPO, however, prior provider (Lorenza) as well as myself spoke with Mr. Jasso and he would like to eat, they discussed his code status and he is CODE STATUS: NO CPR/INTUBATION  -- wife aware and agrees with his decision , would like to keep comfort diet ongoing and aware that continued aspirations events are likely  -- RR 7/7 AM-- favored that this is another aspiration event as no other focal source of infection-- fevers noted to as high as 102- CXR reviewed and normal-- will send UA but start on broad spectrum abx and continue for now- given worsening leukocytosis would like to send blood cultures which have been ordered- patient refusing these and long d/w him regarding GOC. Ultimately we have asked palliative to see and have d/w them. Plan for palliative meeting with  patient/wife tomorrow at 11am to clarify ongoing goals and possible hospice referral. Continue IV abx for now    Hypokalemia  -noted at 2.9 today-- replace per protocol      Right femoral neck fracture  -s/p hip percutaneous pinning on 7/1 by Dr. Harp  -PT/OT - wll need SNF-but needs to participate with PT here, if he continues to do poorly may consider palliative or hospice consult  -follow-up with Dr. Harp in 3 weeks, continue aspirin 325 mg BID for 6 weeks for VTE ppx     HTN- continue amlodipine, lisinopril, and atorvastatin   Dementia-continue aricept        DVT Prophylaxis:  scd    Kerri was updated extensively by palliative and I had a d/w with palliative NP on phone. Planning for family meeting tomorrow at 11am with palliative care.       Disposition: I expect the patient to be discharged TBD    CODE STATUS:   Code Status and Medical Interventions:   Ordered at: 07/03/20 0956     Limited Support to NOT Include:    Intubation     Level Of Support Discussed With:    Patient     Code Status:    No CPR     Medical Interventions (Level of Support Prior to Arrest):    Limited         Electronically signed by Lauren Pleitez MD, 07/08/20, 12:10.

## 2020-07-08 NOTE — THERAPY TREATMENT NOTE
Patient Name: Ken Jasso  : 1940    MRN: [id number]                              Today's Date: 2020       Admit Date: 2020    Visit Dx:     ICD-10-CM ICD-9-CM   1. Acute febrile illness R50.9 780.60   2. Acute respiratory failure with hypoxia (CMS/[facility]) J96.01 518.81   3. Ventricular tachycardia (CMS/[facility]) I47.2 427.1   4. Skin tear of right elbow without complication, initial encounter S51.011A 881.01   5. Oropharyngeal dysphagia R13.12 787.22     Patient Active Problem List   Diagnosis   • Acute febrile illness   • Fracture of right hip (CMS/HCC)   • Dementia (CMS/HCC)   • H/O: CVA (no deficit)     Past Medical History:   Diagnosis Date   • Dementia (CMS/HCC)    • Dementia (CMS/HCC)    • Dysphagia    • Esophageal dilatation    • Hiatal hernia    • PNA (pneumonia)    • PNA (pneumonia)    • Pneumonia     [facility] EPISODES    • Prostate CA (CMS/HCC)    • Stroke (CMS/HCC)          Past Surgical History:   Procedure Laterality Date   • HIP PERCUTANEOUS PINNING Right 2020    Procedure: HIP PERCUTANEOUS PINNING;  Surgeon: Sam Harp MD;  Location: [facility], 1928–1983;  Service: Orthopedics;  Laterality: Right;     General Information     Row Name 20 1441          PT Evaluation Time/Intention    Document Type  therapy note (daily note)  (Pended)   -     Mode of Treatment  physical therapy  (Pended)   -     Row Name 20 1441          General Information    Patient Profile Reviewed?  yes  (Pended)   -     Existing Precautions/Restrictions  (S) fall;weight bearing  (Pended)  R LE TTWB; dementia  -     Row Name 20 1441          Cognitive Assessment/Intervention- PT/OT    Orientation Status (Cognition)  oriented to;person;situation  (Pended)   -     Cognitive Assessment/Intervention Comment  Alert, but has intermittent difficulties following commands.   (Pended)   -       User Key  (r) = Recorded By, (t) = Taken By, (c) = Cosigned By    Initials Name  Provider Type    Pat Michaud, PT Student PT Student        Mobility     Row Name 07/08/20 1442          Bed Mobility Assessment/Treatment    Bed Mobility Assessment/Treatment  scooting/bridging;supine-sit  (Pended)   -     Scooting/Bridging [place] (Bed Mobility)  maximum assist (25% patient effort)  (Pended)  VC to weight shift. MaxAx2 with draw sheet due to sequencing difficulties  -     Supine-Sit [place] (Bed Mobility)  maximum assist (25% patient effort);2 person assist;verbal cues  (Pended)  VC for upright posture and NWB in R LE.   -J     Assistive Device (Bed Mobility)  draw sheet;head of bed elevated;bed rails  (Pended)   -     Comment (Bed Mobility)  Pt demonstrated improved ability to advance R LE, but still needs assistance lowering it off the side of the bed.  (Pended)   -     Row Name 07/08/20 1442          Bed-Chair Transfer    Bed-Chair [place] (Transfers)  maximum assist (25% patient effort);2 person assist;verbal cues  (Pended)  2 Stand pivots performed to the R. 1 Stand pivot performed to the the L. VC for upright posture and hand placement  -     Assistive Device (Bed-Chair Transfers)  --  (Pended)  B UE support  -     Row Name 07/08/20 1442          Sit-Stand Transfer    Sit-Stand [place] (Transfers)  maximum assist (25% patient effort);2 person assist;verbal cues  (Pended)  MaxAx2 for postural stability. Pt able to clear his bottom from the chair, but maintains a signficantly flexed forward posture. VC for upright posture.  -     Assistive Device (Sit-Stand Transfers)  other (see comments)  (Pended)  B UE support  -     Row Name 07/08/20 1442          Gait/Stairs Assessment/Training    Comment (Gait/Stairs)  Pt not appropriate at this time.  (Pended)   -     Row Name 07/08/20 1442          Mobility Assessment/Intervention    Extremity Weight-bearing Status  right lower extremity  (Pended)   -J     Right Lower Extremity (Weight-bearing Status)   (S) toe touch weight-bearing (TTWB)  (Pended)   -JG       User Key  (r) = Recorded By, (t) = Taken By, (c) = Cosigned By    Initials Name Provider Type    Pat Michaud PT Student PT Student        Obj/Interventions     Row Name 07/08/20 1449          Therapeutic Exercise    Lower Extremity (Therapeutic Exercise)  gluteal sets;quad sets, bilateral  (Pended)   -JG     Lower Extremity Range of Motion (Therapeutic Exercise)  ankle dorsiflexion/plantar flexion, bilateral;hip abduction/adduction, bilateral  (Pended)   -JG     Exercise Type (Therapeutic Exercise)  AAROM (active assistive range of motion);AROM (active range of motion)  (Pended)   -JG     Position (Therapeutic Exercise)  supine  (Pended)   -JG     Sets/Reps (Therapeutic Exercise)  1 set/10 reps  (Pended)   -JG     Expected Outcome (Therapeutic Exercise)  facilitate normal movement patterns;improve functional stability;improve motor control;improve neuromuscular control  (Pended)   -JG     Comment (Therapeutic Exercise)  Verbal and tactile cues for technique.  (Pended)   -JG     Row Name 07/08/20 1449          Static Sitting Balance    Level of [place] (Unsupported Sitting, Static Balance)  contact guard assist  (Pended)   -JG     Sitting Position (Unsupported Sitting, Static Balance)  sitting on edge of bed  (Pended)   -JG     Time Able to Maintain Position (Unsupported Sitting, Static Balance)  2 to 3 minutes  (Pended)   -JG     Comment (Unsupported Sitting, Static Balance)  Pt maintained a flexed forward posture with his head down. Multiple VC were provided, but he was only able to achieve an upright posture one time, which last ~ 15 seconds due to fatigue.  (Pended)   -JG     Row Name 07/08/20 1449          Static Standing Balance    Level of [place] (Supported Standing, Static Balance)  2 person assist;maximal assist, 25 to 49% patient effort  (Pended)   -JG     Time Able to Maintain Position (Supported Standing, Static Balance)  45 to  60 seconds  (Pended)   -JG     Assistive Device Utilized (Supported Standing, Static Balance)  --  (Pended)  B UE support  -JG     Comment (Supported Standing, Static Balance)  Pt maintained a flexed forward posture with his head down and was unable to attain an upright posture despite multiple VC. He seemed fearful of standing, so he had difficulty releasing his  on the R hand rest from the chair with prolonged standing.   (Pended)   -J       User Key  (r) = Recorded By, (t) = Taken By, (c) = Cosigned By    Initials Name Provider Type    Pat Michaud, PT Student PT Student        Goals/Plan    No documentation.       Clinical Impression     Row Name 07/08/20 1454          Pain Assessment    Additional Documentation  Pain Scale: FACES Pre/Post-Treatment (Group)  (Pended)   -     Row Name 07/08/20 145          Pain Scale: Numbers Pre/Post-Treatment    Pain Intervention(s)  Repositioned;Ambulation/increased activity  (Pended)   -     Row Name 07/08/20 9690          Pain Scale: FACES Pre/Post-Treatment    Pain: FACES Scale, Pretreatment  2-->hurts little bit  (Pended)   -J     Pain: FACES Scale, Post-Treatment  2-->hurts little bit  (Pended)   -     Row Name 07/08/20 1459          Plan of Care Review    Plan of Care Reviewed With  patient  (Pended)   -J     Progress  no change  (Pended)   -J     Outcome Summary  Pt demonstrated improved ability to mobilize his R LE, with the support of his B UE. He required MaxAx2 to transfer from supine to EOB due to weakness and fatigue, however. Pt performed 3 squat pivot transfers that required MaxAx2. Discharge recommendation is skilled nursing facility with PT services.  (Pended)   -     Row Name 07/08/20 1459          Positioning and Restraints    Pre-Treatment Position  in bed  (Pended)   -JG     Post Treatment Position  chair  (Pended)   -JG     In Chair  reclined;call light within reach;encouraged to call for assist;exit alarm on;legs  elevated;compression device  (Pended)   -JG       User Key  (r) = Recorded By, (t) = Taken By, (c) = Cosigned By    Initials Name Provider Type    Pat Michaud PT Student PT Student        Outcome Measures     Row Name 07/08/20 1458          How much help from another person do you currently need...    Turning from your back to your side while in flat bed without using bedrails?  2  (Pended)   -JG     Moving from lying on back to sitting on the side of a flat bed without bedrails?  2  (Pended)   -JG     Moving to and from a bed to a chair (including a wheelchair)?  2  (Pended)   -JG     Standing up from a chair using your arms (e.g., wheelchair, bedside chair)?  2  (Pended)   -JG     Climbing 3-5 steps with a railing?  1  (Pended)   -JG     To walk in hospital room?  1  (Pended)   -JG     AM-PAC 6 Clicks Score (PT)  10  (Pended)   -JG     Row Name 07/08/20 1458          Functional Assessment    Outcome Measure Options  AM-PAC 6 Clicks Basic Mobility (PT)  (Pended)   -JG       User Key  (r) = Recorded By, (t) = Taken By, (c) = Cosigned By    Initials Name Provider Type    Pat Michaud, PT Student PT Student        Physical Therapy Education                 Title: PT OT SLP Therapies (In Progress)     Topic: Physical Therapy (In Progress)     Point: Mobility training (In Progress)     Description:   Instruct learner(s) on safety and technique for assisting patient out of bed, chair or wheelchair.  Instruct in the proper use of assistive devices, such as walker, crutches, cane or brace.              Patient Friendly Description:   It's important to get you on your feet again, but we need to do so in a way that is safe for you. Falling has serious consequences, and your personal safety is the most important thing of all.        When it's time to get out of bed, one of us or a family member will sit next to you on the bed to give you support.     If your doctor or nurse tells you to use a walker,  crutches, a cane, or a brace, be sure you use it every time you get out of bed, even if you think you don't need it.    Learning Progress Summary           Patient Acceptance, E, VU,NR by LIEN at 7/8/2020 1413    Comment:  Pt educated about transfer strategies and surgical precautions.    Acceptance, E,D, NR by AA at 7/6/2020 0827    Acceptance, E, NR by NS at 7/4/2020 1601    Comment:  Patient was educated about WBing precautions and sequencing with transfers.    Acceptance, E, NR by ERICA at 7/3/2020 1154    Acceptance, E,D, NR by AA at 7/2/2020 1447   Family Acceptance, E,D, NR by AA at 7/2/2020 1447                   Point: Home exercise program (Done)     Description:   Instruct learner(s) on appropriate technique for monitoring, assisting and/or progressing patient with therapeutic exercises and activities.              Learning Progress Summary           Patient Acceptance, E, VU,NR by LIEN at 7/8/2020 1413    Comment:  Pt educated about transfer strategies and surgical precautions.    Acceptance, E,D, NR by SYLVIE at 7/6/2020 0827    Acceptance, E, NR by NS at 7/4/2020 1601    Comment:  Patient was educated about WBing precautions and sequencing with transfers.                   Point: Body mechanics (In Progress)     Description:   Instruct learner(s) on proper positioning and spine alignment for patient and/or caregiver during mobility tasks and/or exercises.              Learning Progress Summary           Patient Acceptance, E, VU,NR by LIEN at 7/8/2020 1413    Comment:  Pt educated about transfer strategies and surgical precautions.    Acceptance, E,D, NR by AA at 7/6/2020 0827    Acceptance, E, NR by NS at 7/4/2020 1601    Comment:  Patient was educated about WBing precautions and sequencing with transfers.    Acceptance, E, NR by ERICA at 7/3/2020 1154    Acceptance, E,D, NR by AA at 7/2/2020 1447   Family Acceptance, E,D, NR by AA at 7/2/2020 1447                   Point: Precautions (In Progress)     Description:    Instruct learner(s) on prescribed precautions during mobility and gait tasks              Learning Progress Summary           Patient Acceptance, E, VU,NR by J at 7/8/2020 1413    Comment:  Pt educated about transfer strategies and surgical precautions.    Acceptance, E,D, NR by AA at 7/6/2020 0827    Acceptance, E, NR by NS at 7/4/2020 1601    Comment:  Patient was educated about WBing precautions and sequencing with transfers.    Acceptance, E, NR by  at 7/3/2020 1154    Acceptance, E,D, NR by AA at 7/2/2020 1447   Family Acceptance, E,D, NR by AA at 7/2/2020 1447                               User Key     Initials Effective Dates Name Provider Type Discipline     11/20/18 -  Karine Mcmahon, PT Physical Therapist PT    AA 04/02/18 -  Micaela Tate, PT Physical Therapist PT    NS 09/10/19 -  Aissatou Díaz, PT Physical Therapist PT     05/14/20 -  Pat Baugh, PT Student PT Student PT              PT Recommendation and Plan     Plan of Care Reviewed With: (P) patient  Progress: (P) no change  Outcome Summary: (P) Pt demonstrated improved ability to mobilize his R LE, with the support of his B UE. He required MaxAx2 to transfer from supine to EOB due to weakness and fatigue, however. Pt performed 3 squat pivot transfers that required MaxAx2. Discharge recommendation is skilled nursing facility with PT services.     Time Calculation:   PT Charges     Row Name 07/08/20 1501             Time Calculation    Start Time  1413  (Pended)   -      PT Received On  07/08/20  (Pended)   -      PT Goal Re-Cert Due Date  07/12/20  (Pended)   -         Time Calculation- PT    Total Timed Code Minutes- PT  26 minute(s)  (Pended)   -         Timed Charges    82681 - PT Therapeutic Exercise Minutes  10  (Pended)   -      51192 - PT Therapeutic Activity Minutes  16  (Pended)   -        User Key  (r) = Recorded By, (t) = Taken By, (c) = Cosigned By    Initials Name Provider Type    JG Pat Baugh,  PT Student PT Student        Therapy Charges for Today     Code Description Service Date Service Provider Modifiers Qty    35118592884 HC PT THER PROC EA 15 MIN 7/8/2020 Pat Baugh, PT Student GP 1    10357819688 HC PT THERAPEUTIC ACT EA 15 MIN 7/8/2020 Pat Baugh, PT Student GP 1          PT G-Codes  Outcome Measure Options: (P) AM-PAC 6 Clicks Basic Mobility (PT)  AM-PAC 6 Clicks Score (PT): (P) 10  AM-PAC 6 Clicks Score (OT): 11    Pat Baugh, PT Student  7/8/2020

## 2020-07-08 NOTE — CONSULTS
Palliative Care Initial Consult   Attending Physician: Lauren Pleitez MD  Referring Provider: Dr. Pleitez    Reason for Referral:  hospice referral or discussion and support for patient and family    Code Status:   Code Status and Medical Interventions:   Ordered at: 07/03/20 0956     Limited Support to NOT Include:    Intubation     Level Of Support Discussed With:    Patient     Code Status:    No CPR     Medical Interventions (Level of Support Prior to Arrest):    Limited      Advanced Directives: Advance Directive Status: Patient has advance directive, copy requested   Family/Support: , has 3 children- No family at bedside at time of visit  Goals of Care: TBD. Family meeting set for 11am tomorrow    HPI:   81 yo male with PMH significant for dementia, dysphagia, PNA, Prostate cancer (2004), CVA (2019)- no deficits who initially presented to [facility] on 06/29 after suffering a fall  While ambulating to his car. Work-up revealed R femur fracture and pt was febrile with N/V- COVID-19 negative on 06/29.  There was some concern for stroke r/t left side weakness, however, CT head and CT perfusion were negative. Patient was initially admitted to ICU with sepsis and wide complex tachycardia. Pt was stable enoough to receive surgery on 07/01 for pinning of hip fracture. Pt stableized and was downgraded out of ICU, and planning for SNF, however, had RRT yesterday am r/t acute respiratory decline. Patient with prior speech eval and known severe pharyngeal dysphagia, however, after GOC discussions pt and family elected a comfort diet. Palliative care was consulted this am to help with GOC discussions and support for pt and family.       ROS: denies SOA, pain, anxiety. Nursing reports discomfort with repositioning, but self resolves. LBM 07/08. Incontinent- required straight cath for U/A yesterday per nursing report. Patient denies hunger, but requesting some cool water-provided. Lunch tray in at end of visit- RN notified  "to reposition. RN reports pt eats better with assistance.       Past Medical History:   Diagnosis Date   • Dementia (CMS/[facility])    • Dementia (CMS/[facility]ly [facility])    • Dysphagia    • Esophageal dilatation 2017   • Hiatal hernia    • PNA (pneumonia) 2014   • PNA (pneumonia) 2019   • Pneumonia     [facility] EPISODES    • Prostate CA (CMS/HCC) 2004   • Stroke (CMS/[facility])     2019     Past Surgical History:   Procedure Laterality Date   • HIP PERCUTANEOUS PINNING Right 7/1/2020    Procedure: HIP PERCUTANEOUS PINNING;  Surgeon: Sam Harp MD;  Location: [facility];  Service: Orthopedics;  Laterality: Right;     Social History     Socioeconomic History   • Marital status:      Spouse name: Not on file   • Number of children: Not on file   • Years of education: Not on file   • Highest education level: Not on file   Tobacco Use   • Smoking status: Former Smoker     Types: Cigarettes   • Smokeless tobacco: Never Used   • Tobacco comment: quit smokin 61 years ago   ; has 3 children-2 girls and a boy, 7 grandchildren; retired , 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    No Known Allergies    Current medication reviewed for route, type, dose and frequency and are current per MAR at time of dictation.    Palliative Performance Scale Score:   40%  /66   Pulse 75   Temp 97.8 °F (36.6 °C) (Oral)   Resp 16   Ht 177.8 cm (70\")   Wt 61.8 kg (136 lb 4.8 oz)   SpO2 100%   BMI 19.56 kg/m²     Intake/Output Summary (Last 24 hours) at 7/8/2020 1123  Last data filed at 7/8/2020 0751  Gross per 24 hour   Intake 340 ml   Output 200 ml   Net 140 ml       Physical Exam:      General Appearance:    Sleeping, rouses to verbal stimuli, cooperative, NAD, lying in bed HOB elevated   HEENT:    NC/AT, EOMI, anicteric, MMM   Neck:   supple, trachea midline, no JVD   Lungs:     CTA bilat, respirations regular, even and unlabored on RA    Heart:    RRR, normal S1 and S2, no M/R/G   Abdomen:     Normal bowel sounds, " soft, non-tender, non-distended   Extremities:   Moves all extremities, no edema, dressing to right hip CDI   Pulses:   Pulses palpable and equal bilaterally   Skin:   Warm, dry   Neurologic:   A/Ox3, cooperative   Psych:   Calm, appropriate         Labs:   Results from last 7 days   Lab Units 07/08/20  0400   WBC 10*3/mm3 21.58*   HEMOGLOBIN g/dL 9.2*   HEMATOCRIT % 28.6*   PLATELETS 10*3/mm3 329     Results from last 7 days   Lab Units 07/08/20  0328   SODIUM mmol/L 139   POTASSIUM mmol/L 2.9*   CHLORIDE mmol/L 102   CO2 mmol/L 25.0   BUN mg/dL 24*   CREATININE mg/dL 0.75*   GLUCOSE mg/dL 104*   CALCIUM mg/dL 8.5*     Results from last 7 days   Lab Units 07/08/20  0328 07/07/20  0825   SODIUM mmol/L 139 136   POTASSIUM mmol/L 2.9* 3.6   CHLORIDE mmol/L 102 98   CO2 mmol/L 25.0 25.0   BUN mg/dL 24* 12   CREATININE mg/dL 0.75* 0.80   CALCIUM mg/dL 8.5* 9.2   BILIRUBIN mg/dL  --  1.5*   ALK PHOS U/L  --  84   ALT (SGPT) U/L  --  20   AST (SGOT) U/L  --  38   GLUCOSE mg/dL 104* 123*     Imaging Results (Last 72 Hours)     Procedure Component Value Units Date/Time    XR Chest 1 View [[id number]] Collected:  07/07/20 0848     Updated:  07/07/20 0853    Narrative:       EXAMINATION: XR CHEST 1 VW-      INDICATION: RRT, increased sputum; R50.9-Fever, unspecified;  J96.01-Acute respiratory failure with hypoxia; I47.2-Ventricular  tachycardia; S51.011A-Laceration without foreign body of right elbow,  initial encounter; R13.12-Dysphagia, oropharyngeal phase.      COMPARISON: 06/29/2020.     FINDINGS: Cardiac silhouette within normal limits. Pulmonary vascularity  within normal limits. No focal opacification or consolidation. No  pneumothorax or pleural effusion. Degenerative changes of the spine.           Impression:       No acute cardiopulmonary process specifically no new focal  consolidation of involvement or effusion.     D:  07/07/2020  E:  07/07/2020          FL C Arm During Surgery [[id number]] Collected:  07/02/20  "0835     Updated:  07/06/20 0857    Narrative:       EXAMINATION: FL C ARM DURING SURGERY- 07/01/2020     INDICATION: hip pinning; R50.9-Fever, unspecified; J96.01-Acute  respiratory failure with hypoxia; I47.2-Ventricular tachycardia;  S51.011A-Laceration without foreign body of right elbow, initial  encounter      TECHNIQUE: Intraoperative fluoroscopy for improved localization and  treatment planning     COMPARISON: NONE     FINDINGS: Intraoperative fluoroscopy with total fluoroscopic time usage  2 minutes 12 seconds and 16 representative images saved during right hip  pinning.       Impression:       Intraoperative fluoroscopy utilized during right hip  pinning.     D:  07/02/2020  E:  07/02/2020         This report was finalized on 7/6/2020 8:54 AM by Dr. Manoj Elizondo.             Diagnostics: Reviewed    A:   Patient Active Problem List   Diagnosis   • Acute febrile illness   • Fracture of right hip (CMS/HCC)   • Dementia (CMS/HCC)   • H/O: CVA (no deficit)         S/S:   1. MSK Pain- right femoral neck fx s/p fall 6/29 and percutaneous pinning 07/01   - tylenol 650mg q4h PRN  2. Debility  - PT/OT following  3. Dysphagia  - Currently on comfort diet       P:  Had conversation with patient at bedside explained palliative care and reasoning for consult. Had discussion regarding his GOC and current condition. He confirms DNR/DNI status and states \"I've lived a good life\". He verbalizes he is tired of being poked and prodded, and would rather be at home. Briefly discussed hospice services and he verbalizes \"that sounds pretty good\". Called patient's wife Kerri at [phone] and briefly reviewed my conversation with patient and she verbalizes that what he said \"sounds about right\". She verbalizes understanding of his current condition in particular his dysphagia and that it is likely to be a recurring issue, she would like us to help facilitate a GOC conversation when they both can be present. Meeting set up for " 11am tomorrow. Updated Dr. Pleitez and bedside RN. Currently patient reports overall comfort and only request was for some cool water which was provided. Very sweet patient. Thank you for this consult and allowing us to participate in patient's plan of care. Palliative Care Team will continue to follow patient.     Time spent:60 minutes spent reviewing medical and medication records, assessing and examining patient, discussing with patient/family, answering questions, providing some guidance about a plan and documentation of care, and coordinating care with other healthcare members, with > 50% time spent face to face.     Lena Goldberg, APRN  7/8/2020      EMR Dragon/Transcription disclaimer:  Much of this encounter note is an electronic transcription/translation of spoken language to printed text. Electronic translation of spoken language may permit erroneous, or at times, nonsensical words or phrases to be inadvertently transcribed. Although I have reviewed the note for such errors, some may still exist.

## 2020-07-08 NOTE — PLAN OF CARE
Problem: Patient Care Overview  Goal: Interprofessional Rounds/Family Conf  Outcome: Ongoing (interventions implemented as appropriate)  Flowsheets (Taken 7/8/2020 1300)  Participants: ; advanced practice nurse; nursing; social work/services  Note:   Palliative Team Meeting Attendance 1300: DAVID Boland, Mary Dan, RN, CHPN, Maira Merino [facility], Violette Eagle RN, CHPN and Fide Cárdenas RN/ Hospice. New Palliative Consult from Dr. IVAN Pleitez on 7/8/20 at 0927 for GOC/ACP and support.  Palliative DAVID Chopra saw patient on 7/8/20 at 1123. Patient states pain better, denies feeling anxious, he wants to eat, does better if staff helps him. He wants to stop being poke and prodded and wants to go home. He is ok to go home with Hospice. Lena reports she spoke with his wife and wants a meeting tomorrow to discuss, eating, aspirating and how to manage. Family meeting scheduled for tomorrow at 1100. Patient told Palliative NurseMary that he wants to go home and see more sunsets and sun rises. States he wants to go home to his wife. He states his pain is ok now. Palliative will continue to follow for support, symptom management, discharge planning and goals.

## 2020-07-08 NOTE — PLAN OF CARE
"  Problem: Patient Care Overview  Goal: Plan of Care Review  Flowsheets  Taken 7/8/2020 1831  Progress: improving  Taken 7/8/2020 1800  Plan of Care Reviewed With: patient  Note:   VSS, pt much more alert today, talking and even laughing at times.   Afebrile, 90-95% on room air with no reports of SOB, secretions have decreased but pt still has difficulty while eating and drinking; thickened liquids admin.  Labs monitored closely.  Pt able to work with PT today and [facility]  He was unable to receive 1600 dose of Zosyn d/t loss of IV access and refusal for new.   He eventually let me attempt access at 1800 but it was unsuccessful and he refused another try at this time.   I explained that I had informed charge nurse of situation and that she was contacting staff that can use \"ultrasound\" for IV access.  He states he \"will think about it but don't want anything right now\"    He is pleasant but feels very powerless and hopeless.  Palliative services following and meeting with spouse scheduled for morning.   Pt. Repositioned/turned q2h, refuses heel boots but did allow me to elevate heels off of bed the majority of the day, mepilex to coccyx for precaution and barrier cream applied.  Waffle mattress in use.   Covaderm Dsg to right hip changed today, incision clean and dry.  Discharge planning unknown at this time.  Will continue to monitor and will notify MD RODRÍGUEZ.  Brenna Loyd RN      Problem: Patient Care Overview  Goal: Plan of Care Review  Flowsheets  Taken 7/8/2020 1831  Progress: improving  Taken 7/8/2020 1800  Plan of Care Reviewed With: patient  Note:   VSS, pt much more alert today, talking and even laughing at times.   Afebrile, 90-95% on room air with no reports of SOB, secretions have decreased but pt still has difficulty while eating and drinking; thickened liquids admin.  Labs monitored closely.  Pt able to work with PT today and up to chair with mech lift.  He was unable to receive " "1600 dose of Zosyn d/t loss of IV access and refusal for new.   He eventually let me attempt access at 1800 but it was unsuccessful and he refused another try at this time.   I explained that I had informed charge nurse of situation and that she was contacting staff that can use \"ultrasound\" for IV access.  He states he \"will think about it but don't want anything right now\"    He is pleasant but feels very powerless and hopeless.  Palliative services following and meeting with spouse scheduled for morning.   Pt. Repositioned/turned q2h, refuses heel boots but did allow me to elevate heels off of bed the majority of the day, mepilex to coccyx for precaution and barrier cream applied.  Waffle mattress in use.   Covaderm Dsg to right hip changed today, incision clean and dry.  Discharge planning unknown at this time.  Will continue to monitor and will notify MD RODRÍGUEZ.  Brenna Loyd RN      "

## 2020-07-08 NOTE — PLAN OF CARE
Problem: Patient Care Overview  Goal: Plan of Care Review  Outcome: Ongoing (interventions implemented as appropriate)  Flowsheets (Taken 7/8/2020 7214)  Progress: no change (Pended)  Plan of Care Reviewed With: patient (Pended)  Outcome Summary: Pt demonstrated improved ability to mobilize his R LE with the support of his B UE. He required MaxAx2 to transfer from supine to EOB due to postural weakness and fatigue, however. Pt performed 3 squat pivot transfers that required MaxAx2. Discharge recommendation is skilled nursing facility with PT services. (Pended)

## 2020-07-08 NOTE — PLAN OF CARE
Problem: Fall Risk (Adult)  Goal: Absence of Fall  Flowsheets (Taken 7/7/2020 1821 by Alfred Loyd RN)  Absence of Fall: making progress toward outcome     Problem: Patient Care Overview  Goal: Plan of Care Review  Flowsheets  Taken 7/7/2020 1821 by Alfred Loyd RN  Progress: improving  Taken 7/7/2020 2000 by Lisandro Chavez, RN  Plan of Care Reviewed With: patient  Taken 7/8/2020 0511 by Lisandro Chavez RN  Outcome Summary: Pt rested well during night, reports he is very tired after the events of the dy. makes needs known, but some mild confusion is noted. Denied pain during night. Repositioned with barrier cream applied as a preventative measure.     Problem: Skin Injury Risk (Adult)  Goal: Skin Health and Integrity  Flowsheets (Taken 7/2/2020 1617 by Yamilex Chau, RN)  Skin Health and Integrity: making progress toward outcome     Problem: Dysphagia (Adult)  Goal: Functional/Safe Swallow  Flowsheets (Taken 7/2/2020 1617 by Yamilex Chau, RN)  Functional/Safe Swallow: unable to achieve outcome

## 2020-07-08 NOTE — NURSING NOTE
WOC nurse for Luis report 15 or less    Spoke with GRACE Dalton- has male urinary wrap, RN stated she may be able to get patient to wear heel boots now, earlier he was refusing everything.    No pressure injury skin issues noted, all skin interventions in place, including heel boots, repositioning and waffle.    Please consult WOC nurse if needed.

## 2020-07-09 LAB — VANCOMYCIN TROUGH SERPL-MCNC: 7.1 MCG/ML (ref 5–20)

## 2020-07-09 PROCEDURE — 92526 ORAL FUNCTION THERAPY: CPT

## 2020-07-09 PROCEDURE — 80202 ASSAY OF VANCOMYCIN: CPT

## 2020-07-09 PROCEDURE — 97535 SELF CARE MNGMENT TRAINING: CPT | Performed by: OCCUPATIONAL THERAPIST

## 2020-07-09 PROCEDURE — 97110 THERAPEUTIC EXERCISES: CPT

## 2020-07-09 PROCEDURE — 99024 POSTOP FOLLOW-UP VISIT: CPT | Performed by: ORTHOPAEDIC SURGERY

## 2020-07-09 PROCEDURE — 97530 THERAPEUTIC ACTIVITIES: CPT

## 2020-07-09 PROCEDURE — 99233 SBSQ HOSP IP/OBS HIGH 50: CPT | Performed by: NURSE PRACTITIONER

## 2020-07-09 RX ORDER — VANCOMYCIN HYDROCHLORIDE 1 G/200ML
1000 INJECTION, SOLUTION INTRAVENOUS EVERY 12 HOURS
Status: DISCONTINUED | OUTPATIENT
Start: 2020-07-10 | End: 2020-07-11

## 2020-07-09 RX ORDER — ASPIRIN 325 MG
325 TABLET ORAL 2 TIMES DAILY
Status: DISCONTINUED | OUTPATIENT
Start: 2020-07-09 | End: 2020-07-14 | Stop reason: HOSPADM

## 2020-07-09 RX ADMIN — ASPIRIN 325 MG ORAL TABLET 325 MG: 325 PILL ORAL at 20:38

## 2020-07-09 RX ADMIN — AMLODIPINE BESYLATE 10 MG: 10 TABLET ORAL at 09:08

## 2020-07-09 RX ADMIN — DONEPEZIL HYDROCHLORIDE 10 MG: 10 TABLET, FILM COATED ORAL at 09:08

## 2020-07-09 RX ADMIN — ATORVASTATIN CALCIUM 80 MG: 40 TABLET, FILM COATED ORAL at 20:39

## 2020-07-09 RX ADMIN — ACETAMINOPHEN 650 MG: 325 TABLET, FILM COATED ORAL at 20:47

## 2020-07-09 RX ADMIN — POLYETHYLENE GLYCOL 3350 17 G: 17 POWDER, FOR SOLUTION ORAL at 09:08

## 2020-07-09 RX ADMIN — LISINOPRIL 40 MG: 40 TABLET ORAL at 09:08

## 2020-07-09 RX ADMIN — ASPIRIN 325 MG: 325 TABLET, COATED ORAL at 09:08

## 2020-07-09 NOTE — NURSING NOTE
7-8-20 2015: Unsuccessful IV insertion attempt x2 (w/ & w/o US machine). Patient refuses further IV attempts. Educated on the importance of receiving IV antibiotics. Pt continues to refuse being stuck. Will obtain refusal to comply with plan of care form and place in patient's chart. Primary RN notified.     Marybeth Damian, RN-Charge Nurse

## 2020-07-09 NOTE — PROGRESS NOTES
Continued Stay Note  [facility]     Patient Name: Ken Jasso  MRN: [id number]  Today's Date: 7/9/2020    Admit Date: 6/29/2020    Discharge Plan     Row Name 07/09/20 1537       Plan    Plan  To be determined    Plan Comments  Hospice referral received, chart reviewed. Visit made to pt's room, pt sitting up in recliner, no family present. Pt stated wife has gone home. Telephone call to pt's wife regarding hospice referral, wife stated is aware of the referral. Plan made for hospice liasion to meet with wife and pt tomorrow at 1300. Will continue to follow.         Discharge Codes    No documentation.       Expected Discharge Date and Time     Expected Discharge Date Expected Discharge Time    Jul 10, 2020             Fide Cárdenas RN

## 2020-07-09 NOTE — PLAN OF CARE
Problem: Patient Care Overview  Goal: Plan of Care Review  Outcome: Ongoing (interventions implemented as appropriate)  Flowsheets (Taken 7/9/2020 0810)  Outcome Summary: Pt alert, no pain, and Ox2.  Pt continue to require max A of 2 for all bed mobility and transfers.  Pt did maintain TTWB RLE with PT placing foot under his foot.  Continued significant trunk flexion during transfers.  HEP reviewed with no pain.  Recommend DC to SNF when appropriate

## 2020-07-09 NOTE — PLAN OF CARE
Problem: Patient Care Overview  Goal: Plan of Care Review  Outcome: Ongoing (interventions implemented as appropriate)  Note:   Pt alert and oriented. Some confusion noted at times. Pt right hip dressing is CDI, has been up to chair. Stach on tele, VSS. Plans to return home with hospice. Diet upgraded, Dysphagia 4, thin liquid, no mixed consistencies

## 2020-07-09 NOTE — PLAN OF CARE
Visited with patient per palliative referral.  Patient stated that his george/trust in God is very important and gives him strength during his hospitalization, that although he hopes to get better and go home, he's not afraid to die due to his trust in God's care.  Voodoo background.

## 2020-07-09 NOTE — DISCHARGE PLACEMENT REQUEST
"Ken Valle (80 y.o. Male)   Referred 7/9/2020 by GUERRERO HERNANDEZ  PCP-Unknown  Dx-Respiratory failure with severe pharyngeal dysphasia and ongoing aspiration events  Tested for covid 19 on 6/29, results negative    Date of Birth Social Security Number Address Home Phone MRN    [id number]  [id number]  [facility] [address] [phone] [id number]    Anglican Marital Status          None        Admission Date Admission Type Admitting Provider Attending Provider Department, Room/Bed    6/29/20 Emergency Lauren Pleitez MD Hunter, Sarah M, MD [facility] 3G, S364/1    Discharge Date Discharge Disposition Discharge Destination                       Attending Provider:  Lauren Pleitez MD    Allergies:  No Known Allergies    Isolation:  None   Infection:  None   Code Status:  No CPR    Ht:  177.8 cm (70\")   Wt:  64.2 kg (141 lb 8 oz)    Admission Cmt:  None   Principal Problem:  Fracture of right hip (CMS/[facility]) [S72.001A]                 Active Insurance as of 6/29/2020     Primary Coverage     Payor Plan Insurance Group Employer/Plan Group    HUMANA MEDICARE REPLACEMENT HUMANA MEDICARE REPLACEMENT [id number]     Payor Plan Address Payor Plan Phone Number Payor Plan Fax Number Effective Dates    [address] [address] [phone]  1/1/2018 - None Entered    [facility] [address]       Subscriber Name Subscriber Birth Date Member ID       KEN VALLE 1940 [id number]                 Emergency Contacts      (Rel.) Home Phone Work Phone Mobile Phone    Kerri Valle (Spouse) -- -- [phone]    LAYA VALLE (Son) -- -- [phone]            Insurance Information                HUMANA MEDICARE REPLACEMENT/HUMANA MEDICARE REPLACEMENT Phone: [phone]    Subscriber: Ken Valle Subscriber#: [id number]    Group#: [id number] Precert#: [id number]          Problem List           Codes Noted - Resolved       Hospital    Acute febrile illness ICD-10-CM: " R50.9  ICD-9-CM: 780.60 6/29/2020 - Present    * (Principal) Fracture of right hip (CMS/HCC) ICD-10-CM: S72.001A  ICD-9-CM: 820.8 6/29/2020 - Present    Dementia (CMS/HCC) ICD-10-CM: F03.90  ICD-9-CM: 294.20 6/29/2020 - Present    H/O: CVA (no deficit) ICD-10-CM: Z86.73  ICD-9-CM: V12.54 6/29/2020 - Present             History & Physical      Case, Dot NIETO DO at 06/29/20 2136          INTENSIVIST   INITIAL HOSPITAL VISIT NOTE     Hospital:  LOS: 10 days     Mr. Ken Jasso, 80 y.o. male is seen for a Chief Complaint of:  Chief Complaint   Patient presents with   • Fall   • Stroke       Fracture of right hip (CMS/HCC)    Acute febrile illness    Dementia (CMS/HCC)    H/O: CVA (no deficit)      Subjective   S     Ken Jasso is a 80 y.o. male who  has a past medical history of Dementia (CMS/HCC), Dementia (CMS/HCC), Dysphagia, Esophageal dilatation (2017), Hiatal hernia, PNA (pneumonia) (2014), PNA (pneumonia) (2019), Pneumonia, Prostate CA (CMS/HCC) (2004), and Stroke (CMS/HCC).  He has no deficits from prior CVA.  Reportedly he has a H/O PAF for which he is not on medications.    Today while walking to the car the patient suffered a mechanical fall and suffered a R femur fracture.  He did not have LOC or syncope.  EMS was contacted for concern of focal L sided weakness.  He presented to our ED and was found to have a fever of 103.1 w/ nausea/vomiting.    He receivede a CVA workup in the ED w/ Neurology consultation who recommended initiation of CVA order set and MRI for further work up.         PMH: He  has a past medical history of Dementia (CMS/HCC), Dementia (CMS/HCC), Dysphagia, Esophageal dilatation (2017), Hiatal hernia, PNA (pneumonia) (2014), PNA (pneumonia) (2019), Pneumonia, Prostate CA (CMS/HCC) (2004), and Stroke (CMS/HCC).   PSxH: He  has a past surgical history that includes Hip Percutaneous Pinning (Right, 7/1/2020).      Medications:  No current facility-administered medications on  file prior to encounter.      Current Outpatient Medications on File Prior to Encounter   Medication Sig   • amLODIPine-benazepril (LOTREL) 10-40 MG per capsule Take 1 capsule by mouth Daily.   • donepezil (ARICEPT) 10 MG tablet Take 10 mg by mouth Daily.   • omeprazole (priLOSEC) 20 MG capsule Take 20 mg by mouth Daily.       Allergies: He has No Known Allergies.   FH: His family history is not on file. Patient unable to provide at the time of encounter.   SH: He  reports that he has quit smoking. His smoking use included cigarettes. He has never used smokeless tobacco.     The patient's relevant past medical, surgical and social history were reviewed and updated in Epic as appropriate.     ROS (14):   Review of Systems   Constitutional: Positive for fever. Negative for chills and diaphoresis.   HENT: Negative.    Eyes: Negative.    Respiratory: Positive for choking.    Cardiovascular: Negative.    Gastrointestinal: Positive for nausea and vomiting.   Endocrine: Negative.    Genitourinary: Negative.    Musculoskeletal: Positive for gait problem.   Skin: Negative.    Allergic/Immunologic: Negative.    Neurological: Positive for dizziness, facial asymmetry, speech difficulty and weakness.   Hematological: Negative.    Psychiatric/Behavioral: Positive for confusion.       Objective   O     Vitals    Temp  Min: 97.5 °F (36.4 °C)  Max: 98.3 °F (36.8 °C)  BP  Min: 137/69  Max: 153/81  Pulse  Min: 76  Max: 87  Resp  Min: 14  Max: 16  SpO2  Min: 95 %  Max: 100 % No data recorded     I/O 24 hrs (7:00AM - 6:59 AM)  Intake/Output       07/08/20 0700 - 07/09/20 0659    Intake (ml) 480    Output (ml) 375    Net (ml) 105    Last Weight  64.2 kg (141 lb 8 oz)          Medications (drips):    lactated ringers Last Rate: 75 mL/hr (07/06/20 1251)   niCARdipine    Pharmacy to dose vancomycin        Physical Examination    Telemetry: Normal sinus rhythm.    Constitutional:  No acute distress.  Conversant.   HEENT: Normocephalic and  atraumatic.   Neck:  Normal range of motion. Neck supple.   No JVD present.   No thyromegaly.    Cardiovascular: Regular rate and rhythm.  No murmurs, rub or gallop.  No peripheral edema.   Respiratory: Normal respiratory effort.  Clear to ascultation.   Abdominal:  Soft. No masses.   Non-tender. No distension.   No hepatosplenomegaly.   MSK: Normal muscle strength and tone.   Extremities: No digital cyanosis or clubbing.   Skin: Skin is warm and dry.  No rashes, lesions or ulcers noted.    Neurological:   Alert and Oriented to person and place.    Moves all extremities.   Psychiatric:  Normal affect.   Normal judgment.     Interval: baseline  1a. Level of Consciousness: 0-->Alert, keenly responsive  1b. LOC Questions: 0-->Answers both questions correctly  1c. LOC Commands: 0-->Performs both tasks correctly  2. Best Gaze: 0-->Normal  3. Visual: 0-->No visual loss  4. Facial Palsy: 0-->Normal symmetrical movements  5a. Motor Arm, Left: 0-->No drift, limb holds 90 (or 45) degrees for full 10 secs  5b. Motor Arm, Right: 0-->No drift, limb holds 90 (or 45) degrees for full 10 secs  6a. Motor Leg, Left: 0-->No drift, leg holds 30 degree position for full 5 secs  6b. Motor Leg, Right: 0-->No drift, leg holds 30 degree position for full 5 secs  7. Limb Ataxia: 0-->Absent  8. Sensory: 0-->Normal, no sensory loss  9. Best Language: 0-->No aphasia, normal  10. Dysarthria: 0-->Normal  11. Extinction and Inattention (formerly Neglect): 0-->No abnormality    Total (NIH Stroke Scale): 0       Results from last 7 days   Lab Units 07/08/20  0400 07/07/20  0825   WBC 10*3/mm3 21.58* 16.85*   HEMOGLOBIN g/dL 9.2* 12.4*   MCV fL 81.3 82.1   PLATELETS 10*3/mm3 329 442     Results from last 7 days   Lab Units 07/08/20  0328 07/07/20  0825 07/03/20  2134   SODIUM mmol/L 139 136  --    POTASSIUM mmol/L 2.9* 3.6 3.7   CO2 mmol/L 25.0 25.0  --    CREATININE mg/dL 0.75* 0.80  --    MAGNESIUM mg/dL  --  2.1  --    PHOSPHORUS mg/dL 2.8  --    --      Estimated Creatinine Clearance: 65.8 mL/min (A) (by C-G formula based on SCr of 0.75 mg/dL (L)).  Results from last 7 days   Lab Units 07/07/20  0825   ALK PHOS U/L 84   BILIRUBIN mg/dL 1.5*   ALT (SGPT) U/L 20   AST (SGOT) U/L 38             Images:   06/29/2020      Imaging Results (Last 24 Hours)     Procedure Component Value Units Date/Time    XR Chest 1 View [[id number]] Collected:  07/07/20 0848     Updated:  07/08/20 1757    Narrative:       EXAMINATION: XR CHEST 1 VW-      INDICATION: RRT, increased sputum; R50.9-Fever, unspecified;  J96.01-Acute respiratory failure with hypoxia; I47.2-Ventricular  tachycardia; S51.011A-Laceration without foreign body of right elbow,  initial encounter; R13.12-Dysphagia, oropharyngeal phase.      COMPARISON: 06/29/2020.     FINDINGS: Cardiac silhouette within normal limits. Pulmonary vascularity  within normal limits. No focal opacification or consolidation. No  pneumothorax or pleural effusion. Degenerative changes of the spine.           Impression:       No acute cardiopulmonary process specifically no new focal  consolidation of involvement or effusion.     D:  07/07/2020  E:  07/07/2020     This report was finalized on 7/8/2020 5:54 PM by Dr. Manoj Elizondo.           ECG/EMG Results (last 24 hours)     Procedure Component Value Units Date/Time    ECG 12 Lead [[id number]] Collected:  06/29/20 1907     Updated:  06/29/20 1927    Narrative:       Test Reason : Acute Stroke Protocol (onset < 12 hrs)  Blood Pressure : **/** mmHG  Vent. Rate : 119 BPM     Atrial Rate : 119 BPM     P-R Int : 178 ms          QRS Dur : 090 ms      QT Int : 314 ms       P-R-T Axes : 044 -43 062 degrees     QTc Int : 441 ms    Sinus tachycardia  premature atrial complexes  Left axis deviation  Abnormal ECG  No previous ECGs available  Confirmed by JORGE L HUSSEIN MD ([id number]) on 6/29/2020 7:27:12 PM    Referred By:  bk           Confirmed By:JORGE L HUSSEIN MD          I reviewed the patient's  new laboratory and imaging results.  I independently reviewed the patient's new images.    Assessment/Plan   A / P     Mr. Jasso is an 79yo M with a history of dementia, paroxysmal Afib, and falls who presented to the [facility] ED after a fall while walking to the car. There was concern for left facial droop and left sided weakness and he was brought in as a Code Stroke. CT head and CT Perfusion were negative for stroke. He was noted to have a fever of 103 while in the ED. He and his wife deny any fever prior to presentation or any symptoms of infection. While in the ED, he reportedly had non-sustained wide complex tachycardia which resolved spontaneously and he was started on Amiodarone. He was admitted to the ICU for this possible wide complex tachycardia. He does have a history of frequent falls and he and his wife recently moved into assisted living.       Nutrition:   Diet Dysphagia; III - Pureed With Some Mashed; Nectar / Syrup Thick  Advance Directives:   Code Status and Medical Interventions:   Ordered at: 07/03/20 0956     Limited Support to NOT Include:    Intubation     Level Of Support Discussed With:    Patient     Code Status:    No CPR     Medical Interventions (Level of Support Prior to Arrest):    Limited         Fracture of right hip (CMS/[facility])    Acute febrile illness    Dementia (CMS/[facility])    H/O: CVA (no deficit)      Assessment / Plan:    1. Admit to ICU  2. Hold Amiodarone drip. Monitor for arrhythmias with resumption if needed.   3. Phenergan for nausea. Avoid Reglan and Zofran 2/2 QT prolongation.   4. Empiric ABX for now, check MRSA & await Cx results  5. Check Viral respiratory PCR and COVID swab for new fever.   6. Trend troponin  7. CT of abdomen to eval for source of infection  8. Further stroke workup per Neurology. His deficits appear to have resolved with treatment of his fever.   9. Tylenol for fever  10. Speech evaluation as he failed a bedside dysphagia screen.   11. Mechanical DVT  PPX  12. Ortho Consult in am for right hip fracture    I have updated Mr. Jasso's wife by phone. He does have an advanced directive that states that he would like treatment limited if he has a terminal condition but is otherwise full support. She plans on visiting the hospital when she is cleared to do so.     I discussed the patient's findings and my recommendations with patient, family and nursing staff    I have personally seen, interviewed and examined the patient and verified all the key components of the history, physical examination, assessment and plan with DAVID Kidd and reviewed the note, which reflects my changes and contributions.    Dot James DO  Pulmonary and Critical Care Medicine      Electronically signed by Dot James DO at 07/09/20 0942          Emergency Department Notes      Bhargav Hall MD at 06/29/20 1831      Procedure Orders    1. Critical Care [[id number]] ordered by Bhargav Hall MD at 06/29/20 1952                Subjective   Mr. Jasso is here about one hour s/p fall for no obvious reason. His wife reported to EMS that he seemed confused after the fall and EMS noted left facial droop, so they brought him here as a Code 19. He is complaining of pain in his right hip since the fall and has a bloody left elbow. He does give a history of stroke earlier this year but can't say whether he has any deficits from that. EMS reports a history of dementia and he is definitely disoriented to date, so his history is unreliable. His wife is not here yet to provide further details. We have no prior records on him here.    Wife has arrived. He does have a past history of CVA, apparently discovered on imaging because no reported symptoms, about one year ago. He also has a history of choking and admissions for pneumonia.       History provided by:  Patient and EMS personnel  History limited by:  Dementia      Review of Systems   Unable to perform ROS: Dementia      Constitutional:        Feeling chilly   Musculoskeletal:        Right hip pain   Skin:        Right elbow wound       Past Medical History:   Diagnosis Date   • Dementia (CMS/[facility])    • Dementia (CMS/[facility])    • Dysphagia    • Esophageal dilatation 2017   • Hiatal hernia    • PNA (pneumonia) 2014   • PNA (pneumonia) 2019   • Pneumonia     [place] EPISODES    • Prostate CA (CMS/[facility]) 2004   • Stroke (CMS/[facility])     2019       No Known Allergies    History reviewed. No pertinent surgical history.    History reviewed. No pertinent family history.    Social History     Socioeconomic History   • Marital status:      Spouse name: Not on file   • Number of children: Not on file   • Years of education: Not on file   • Highest education level: Not on file   Tobacco Use   • Smoking status: Unknown If Ever Smoked           Objective   Physical Exam   Constitutional: He appears well-developed and well-nourished. No distress.   Earnest male, answering most questions reasonably, follows most commands   HENT:   Head: Atraumatic.   Airway patent   Eyes: Conjunctivae are normal. No scleral icterus.   Neck: Phonation normal. Neck supple.   Cardiovascular: Normal rate, regular rhythm and normal heart sounds.   Pulmonary/Chest: Effort normal and breath sounds normal. No respiratory distress.   Abdominal: Soft. He exhibits no mass. There is no tenderness.   Musculoskeletal: He exhibits no edema.   He has pain at right hip when attempting to raise leg.  There is a small ecchymosis over his right greater trochanger.     Neurological: He is alert.   Oriented to person and place, not date  Significant tremor both upper extremities  No pronator drift   Mild weakness left face, left   Questionable dysarthria   Skin: Skin is warm and dry.   Wound right elbow, superficial skin loss, about 3x0.5 cms.   Psychiatric: He has a normal mood and affect. His behavior is normal.   Nursing note and vitals reviewed.      Critical Care  Performed  by: Bhargav Hall MD  Authorized by: Bhargav Hall MD     Critical care provider statement:     Critical care time (minutes):  40    Critical care time was exclusive of:  Separately billable procedures and treating other patients and teaching time    Critical care was necessary to treat or prevent imminent or life-threatening deterioration of the following conditions:  Cardiac failure and respiratory failure  Comments:      Critical illness with fever, possibly sepsis, along with unilateral but mild neuro changes, and runs of ventricular tachycardia. Early ordering of antibiotics, initiation of amiodarone. He continues to have runs of V tach but has remained stable hemodynamically. Will continue to observe and treat as needed. Arranged ICU admission.              ED Course  ED Course as of Jun 30 0923   Mon Jun 29, 2020   1838 He has a fever, mild unilateral weakness but gives history of former stroke. Too many confusing variables to warrant tPA at this time. Will get CTA/CTP to see if there is any evidence for acute stroke there.  I did interpret his CT of head and did not see any sign of blood or old stroke by quick look.    [LI]   1921 Since being here, he has had several episodes of a wide complex tachycardia that resolve spontaneously. He remains conscious with these spells. He has persistent gagging vs choking. Amiodarone bolus and infusion ordered for the tachycardia which looks like V Tach on the monitor.    [LI]   1932 He continues to gag and retch. Will try Reglan. Zofran not effective.    [LI]   2011 Decreased V tach activity over the last half hour    [LI]   2052 I have interpreted his hip X-ray as showing a subcapital fracture.    [LI]      ED Course User Index  [LI] Bhargav Hall MD            Recent Results (from the past 24 hour(s))   POC Surgery Labs    Collection Time: 06/29/20  6:35 PM   Result Value Ref Range    Ionized Calcium 1.23 1.20 - 1.32 mmol/L    POC Potassium 4.2 3.5 - 4.9  mmol/L    Sodium 140 138 - 146 mmol/L    Total CO2 29 24 - 29 mmol/L    Hemoglobin 13.9 12.0 - 17.0 g/dL    Hematocrit 41 38 - 51 %    pCO2, Arterial 46.8 (H) 35 - 45 mm Hg    pO2, Arterial 17 (L) 80 - 105 mmHg    Base Excess 3.0000 -5 - 5 mmol/L    O2 Saturation, Arterial 23 (L) 95 - 98 %    pH, Arterial 7.38 7.35 - 7.6 pH units    HCO3, Arterial 27.9 (H) 22 - 26 mmol/L   POC Creatinine    Collection Time: 06/29/20  6:35 PM   Result Value Ref Range    Creatinine 0.90 0.60 - 1.30 mg/dL   Troponin    Collection Time: 06/29/20  6:37 PM   Result Value Ref Range    Troponin T 0.034 (C) 0.000 - 0.030 ng/mL   aPTT    Collection Time: 06/29/20  6:37 PM   Result Value Ref Range    PTT 32.8 24.0 - 37.0 seconds   Light Blue Top    Collection Time: 06/29/20  6:37 PM   Result Value Ref Range    Extra Tube hold for add-on    Green Top (Gel)    Collection Time: 06/29/20  6:37 PM   Result Value Ref Range    Extra Tube Hold for add-ons.    Lavender Top    Collection Time: 06/29/20  6:37 PM   Result Value Ref Range    Extra Tube hold for add-on    Gold Top - SST    Collection Time: 06/29/20  6:37 PM   Result Value Ref Range    Extra Tube Hold for add-ons.    CBC Auto Differential    Collection Time: 06/29/20  6:37 PM   Result Value Ref Range    WBC 10.18 3.40 - 10.80 10*3/mm3    RBC 4.97 4.14 - 5.80 10*6/mm3    Hemoglobin 13.3 13.0 - 17.7 g/dL    Hematocrit 41.3 37.5 - 51.0 %    MCV 83.1 79.0 - 97.0 fL    MCH 26.8 26.6 - 33.0 pg    MCHC 32.2 31.5 - 35.7 g/dL    RDW 14.0 12.3 - 15.4 %    RDW-SD 42.1 37.0 - 54.0 fl    MPV 11.4 6.0 - 12.0 fL    Platelets 227 140 - 450 10*3/mm3    Neutrophil % 76.9 (H) 42.7 - 76.0 %    Lymphocyte % 11.8 (L) 19.6 - 45.3 %    Monocyte % 10.1 5.0 - 12.0 %    Eosinophil % 0.3 0.3 - 6.2 %    Basophil % 0.3 0.0 - 1.5 %    Immature Grans % 0.6 (H) 0.0 - 0.5 %    Neutrophils, Absolute 7.83 (H) 1.70 - 7.00 10*3/mm3    Lymphocytes, Absolute 1.20 0.70 - 3.10 10*3/mm3    Monocytes, Absolute 1.03 (H) 0.10 - 0.90  10*3/mm3    Eosinophils, Absolute 0.03 0.00 - 0.40 10*3/mm3    Basophils, Absolute 0.03 0.00 - 0.20 10*3/mm3    Immature Grans, Absolute 0.06 (H) 0.00 - 0.05 10*3/mm3    nRBC 0.0 0.0 - 0.2 /100 WBC   Comprehensive Metabolic Panel    Collection Time: 06/29/20  6:37 PM   Result Value Ref Range    Glucose 123 (H) 65 - 99 mg/dL    BUN 17 8 - 23 mg/dL    Creatinine 0.97 0.76 - 1.27 mg/dL    Sodium 138 136 - 145 mmol/L    Potassium 4.4 3.5 - 5.2 mmol/L    Chloride 100 98 - 107 mmol/L    CO2 26.0 22.0 - 29.0 mmol/L    Calcium 9.2 8.6 - 10.5 mg/dL    Total Protein 7.4 6.0 - 8.5 g/dL    Albumin 3.90 3.50 - 5.20 g/dL    ALT (SGPT) 12 1 - 41 U/L    AST (SGOT) 19 1 - 40 U/L    Alkaline Phosphatase 109 39 - 117 U/L    Total Bilirubin 0.9 0.2 - 1.2 mg/dL    eGFR Non African Amer 74 >60 mL/min/1.73    Globulin 3.5 gm/dL    A/G Ratio 1.1 g/dL    BUN/Creatinine Ratio 17.5 7.0 - 25.0    Anion Gap 12.0 5.0 - 15.0 mmol/L   Magnesium    Collection Time: 06/29/20  6:37 PM   Result Value Ref Range    Magnesium 1.7 1.6 - 2.4 mg/dL   Procalcitonin    Collection Time: 06/29/20  6:37 PM   Result Value Ref Range    Procalcitonin 0.08 (L) 0.10 - 0.25 ng/mL   Urinalysis With Culture If Indicated - Urine, Clean Catch    Collection Time: 06/29/20  7:24 PM   Result Value Ref Range    Color, UA Yellow Yellow, Straw    Appearance, UA Clear Clear    pH, UA 8.5 (H) 5.0 - 8.0    Specific Gravity, UA 1.011 1.001 - 1.030    Glucose, UA Negative Negative    Ketones, UA Negative Negative    Bilirubin, UA Negative Negative    Blood, UA Negative Negative    Protein, UA Trace (A) Negative    Leuk Esterase, UA Negative Negative    Nitrite, UA Negative Negative    Urobilinogen, UA 1.0 E.U./dL 0.2 - 1.0 E.U./dL   Lactic Acid, Plasma    Collection Time: 06/29/20  7:32 PM   Result Value Ref Range    Lactate 1.8 0.5 - 2.0 mmol/L   POC Glucose Once    Collection Time: 06/29/20  7:38 PM   Result Value Ref Range    Glucose 170 (H) 70 - 130 mg/dL   Respiratory Panel,  PCR - Swab, Nasopharynx    Collection Time: 06/29/20  8:04 PM   Result Value Ref Range    ADENOVIRUS, PCR Not Detected Not Detected    Coronavirus 229E Not Detected Not Detected    Coronavirus HKU1 Not Detected Not Detected    Coronavirus NL63 Not Detected Not Detected    Coronavirus OC43 Not Detected Not Detected    Human Metapneumovirus Not Detected Not Detected    Human Rhinovirus/Enterovirus Not Detected Not Detected    Influenza B PCR Not Detected Not Detected    Parainfluenza Virus 1 Not Detected Not Detected    Parainfluenza Virus 2 Not Detected Not Detected    Parainfluenza Virus 3 Not Detected Not Detected    Parainfluenza Virus 4 Not Detected Not Detected    Bordetella pertussis pcr Not Detected Not Detected    Influenza A H1 2009 PCR Not Detected Not Detected    Chlamydophila pneumoniae PCR Not Detected Not Detected    Mycoplasma pneumo by PCR Not Detected Not Detected    Influenza A PCR Not Detected Not Detected    Influenza A H3 Not Detected Not Detected    Influenza A H1 Not Detected Not Detected    RSV, PCR Not Detected Not Detected    Bordetella parapertussis PCR Not Detected Not Detected   COVID-19, [facility] IN-HOUSE, NP SWAB IN TRANSPORT MEDIA 8-12 HR TAT - Swab, Nasopharynx    Collection Time: 06/29/20  8:04 PM   Result Value Ref Range    COVID19 Not Detected Not Detected - Ref. Range   Troponin    Collection Time: 06/30/20 12:36 AM   Result Value Ref Range    Troponin T 0.030 0.000 - 0.030 ng/mL   POC Glucose Once    Collection Time: 06/30/20  5:39 AM   Result Value Ref Range    Glucose 104 70 - 130 mg/dL   CBC Auto Differential    Collection Time: 06/30/20  6:05 AM   Result Value Ref Range    WBC 11.21 (H) 3.40 - 10.80 10*3/mm3    RBC 4.74 4.14 - 5.80 10*6/mm3    Hemoglobin 13.0 13.0 - 17.7 g/dL    Hematocrit 42.2 37.5 - 51.0 %    MCV 89.0 79.0 - 97.0 fL    MCH 27.4 26.6 - 33.0 pg    MCHC 30.8 (L) 31.5 - 35.7 g/dL    RDW 14.8 12.3 - 15.4 %    RDW-SD 48.6 37.0 - 54.0 fl    MPV 11.0 6.0 - 12.0 fL     Platelets 174 140 - 450 10*3/mm3    Neutrophil % 69.4 42.7 - 76.0 %    Lymphocyte % 13.3 (L) 19.6 - 45.3 %    Monocyte % 14.2 (H) 5.0 - 12.0 %    Eosinophil % 1.6 0.3 - 6.2 %    Basophil % 0.6 0.0 - 1.5 %    Immature Grans % 0.9 (H) 0.0 - 0.5 %    Neutrophils, Absolute 7.78 (H) 1.70 - 7.00 10*3/mm3    Lymphocytes, Absolute 1.49 0.70 - 3.10 10*3/mm3    Monocytes, Absolute 1.59 (H) 0.10 - 0.90 10*3/mm3    Eosinophils, Absolute 0.18 0.00 - 0.40 10*3/mm3    Basophils, Absolute 0.07 0.00 - 0.20 10*3/mm3    Immature Grans, Absolute 0.10 (H) 0.00 - 0.05 10*3/mm3    nRBC 0.0 0.0 - 0.2 /100 WBC   Basic Metabolic Panel    Collection Time: 06/30/20  6:05 AM   Result Value Ref Range    Glucose 111 (H) 65 - 99 mg/dL    BUN 18 8 - 23 mg/dL    Creatinine 0.92 0.76 - 1.27 mg/dL    Sodium 134 (L) 136 - 145 mmol/L    Potassium 4.1 3.5 - 5.2 mmol/L    Chloride 99 98 - 107 mmol/L    CO2 25.0 22.0 - 29.0 mmol/L    Calcium 8.8 8.6 - 10.5 mg/dL    eGFR Non African Amer 79 >60 mL/min/1.73    BUN/Creatinine Ratio 19.6 7.0 - 25.0    Anion Gap 10.0 5.0 - 15.0 mmol/L   Hemoglobin A1c    Collection Time: 06/30/20  6:05 AM   Result Value Ref Range    Hemoglobin A1C 5.60 4.80 - 5.60 %   TSH    Collection Time: 06/30/20  6:05 AM   Result Value Ref Range    TSH 2.160 0.270 - 4.200 uIU/mL   Magnesium    Collection Time: 06/30/20  6:05 AM   Result Value Ref Range    Magnesium 2.3 1.6 - 2.4 mg/dL   Phosphorus    Collection Time: 06/30/20  6:05 AM   Result Value Ref Range    Phosphorus 4.0 2.5 - 4.5 mg/dL   Troponin    Collection Time: 06/30/20  6:05 AM   Result Value Ref Range    Troponin T 0.021 0.000 - 0.030 ng/mL   Lipid Panel    Collection Time: 06/30/20  6:05 AM   Result Value Ref Range    Total Cholesterol 154 0 - 200 mg/dL    Triglycerides 69 0 - 150 mg/dL    HDL Cholesterol 46 40 - 60 mg/dL    LDL Cholesterol  94 0 - 100 mg/dL    VLDL Cholesterol 13.8 mg/dL    LDL/HDL Ratio 2.05    Procalcitonin    Collection Time: 06/30/20  6:05 AM   Result  Value Ref Range    Procalcitonin 1.65 (H) 0.10 - 0.25 ng/mL     Note: In addition to lab results from this visit, the labs listed above may include labs taken at another facility or during a different encounter within the last 24 hours. Please correlate lab times with ED admission and discharge times for further clarification of the services performed during this visit.    XR Hip With or Without Pelvis 2 - 3 View Right   Final Result   Laterally impacted subcapital right femoral neck fracture.      Signer Name: JAIME Díaz MD    Signed: 6/29/2020 9:02 PM    Workstation Name: [facility]      XR Chest 1 View   Final Result   No acute cardiopulmonary findings.      Signer Name: JAIME Díaz MD    Signed: 6/29/2020 7:36 PM    Workstation Name: [facility]      CT Cerebral Perfusion With & Without Contrast   Preliminary Result   No reversible ischemia within a specific vascular territory              CT Angiogram Head   Preliminary Result   1. CTA neck demonstrates atherosclerotic involvement including calcific   disease creating 25% right and 20% left luminal narrowing as measured by   NASCET criteria.   2. CTA head demonstrates minimal atherosclerotic involvement of the   distal internal carotid arteries without hemodynamically significant   stenosis aneurysm or occlusion with calcific disease burden associated   as well as moderate stenosis of calcific disease burden and involvement   of the left V4 segment focal area distal left vertebral artery without   distinct occlusion in patency observed of the [place] is otherwise unremarkable without occlusion in particular the   MCA distributions are widely patent.              CT Angiogram Neck   Preliminary Result   1. CTA neck demonstrates atherosclerotic involvement including calcific   disease creating 25% right and 20% left luminal narrowing as  measured by   NASCET criteria.   2. CTA head demonstrates minimal atherosclerotic involvement of the   distal internal carotid arteries without hemodynamically significant   stenosis aneurysm or occlusion with calcific disease burden associated   as well as moderate stenosis of calcific disease burden and involvement   of the left V4 segment focal area distal left vertebral artery without   distinct occlusion in patency observed of the [place] is otherwise unremarkable without occlusion in particular the   MCA distributions are widely patent.              CT Chest Without Contrast   Preliminary Result   Low lung volumes with hypoventilatory effects including   moderate subsegmental dependent atelectasis in the dependent portions   however no focal consolidation or opacification to suggest acute   paraspinal process or airspace disease. Negative for pneumonia.              CT Head Without Contrast Stroke Protocol   Preliminary Result   No acute cardiopulmonary process. Specifically no acute   intracranial hemorrhage.       Scan performed on 06/29/2020 at 1816 hours. Scan report given to   treatment team in person by Dr. Elizondo at scanner on 06/29/2020 at 1820   hours              CT Abdomen Pelvis Without Contrast    (Results Pending)   MRI Brain Without Contrast    (Results Pending)     Vitals:    06/30/20 0745 06/30/20 0800 06/30/20 0815 06/30/20 0900   BP: 124/76 132/76 134/79 140/77   BP Location:       Patient Position:       Pulse: 65 62 69 64   Resp:  20     Temp:  98 °F (36.7 °C)     TempSrc:  Oral     SpO2: 94% 92% 95% 96%   Weight:       Height:         Medications   sodium chloride 0.9 % flush 10 mL (has no administration in time range)   sodium chloride 0.9 % flush 10 mL (10 mL Intravenous Given 6/30/20 0810)   sodium chloride 0.9 % flush 10 mL (has no administration in time range)   atorvastatin (LIPITOR) tablet 80 mg (80 mg Oral Not Given 6/30/20 0325)   niCARdipine (CARDENE)  20 mg in 200 mL NS (0.1 mg/mL) infusion (5 mg/hr Intravenous Not Given 6/30/20 0254)   aspirin tablet 325 mg ( Oral Not Given:  See Alt 6/30/20 0535)     Or   aspirin suppository 300 mg (300 mg Rectal Given 6/30/20 0535)   sodium chloride 0.9 % flush 10 mL (10 mL Intravenous Given 6/30/20 0810)   sodium chloride 0.9 % flush 10 mL (has no administration in time range)   acetaminophen (TYLENOL) tablet 650 mg (has no administration in time range)   lactated ringers infusion (75 mL/hr Intravenous New Bag 6/29/20 2207)   piperacillin-tazobactam (ZOSYN) 3.375 g in iso-osmotic dextrose 50 ml (premix) (3.375 g Intravenous New Bag 6/30/20 0253)   Pharmacy to dose vancomycin (has no administration in time range)   vancomycin (VANCOCIN) in iso-osmotic dextrose IVPB 1 g (premix) 200 mL (1,000 mg Intravenous New Bag 6/30/20 0809)   !vanc trough 7/1@0830, do not hang dose until lab drawn and pharmacy reviewed (has no administration in time range)   ondansetron (ZOFRAN) injection 4 mg (4 mg Intravenous Given 6/29/20 1820)   iopamidol (ISOVUE-370) 76 % injection 150 mL (115 mL Intravenous Given 6/29/20 1852)   amiodarone in dextrose 5% (NEXTERONE) loading dose 150mg/100mL (0 mg Intravenous Stopped 6/29/20 1926)   piperacillin-tazobactam (ZOSYN) 3.375 g in iso-osmotic dextrose 50 ml (premix) (0 g Intravenous Stopped 6/29/20 2018)   vancomycin 1500 mg/500 mL 0.9% NS IVPB ([facility]) (1,500 mg Intravenous New Bag 6/29/20 2019)   acetaminophen (TYLENOL) suppository 650 mg (650 mg Rectal Given 6/29/20 1933)   Tdap (BOOSTRIX) injection 0.5 mL (0.5 mL Intramuscular Given 6/29/20 2002)   metoclopramide (REGLAN) injection 10 mg (10 mg Intravenous Given 6/29/20 1952)     ECG/EMG Results (last 24 hours)     Procedure Component Value Units Date/Time    ECG 12 Lead [[id number]] Collected:  06/29/20 1907     Updated:  06/29/20 1927    Narrative:       Test Reason : Acute Stroke Protocol (onset < 12 hrs)  Blood Pressure : **/** mmHG  Vent. Rate : 119  BPM     Atrial Rate : 119 BPM     P-R Int : 178 ms          QRS Dur : 090 ms      QT Int : 314 ms       P-R-T Axes : 044 -43 062 degrees     QTc Int : 441 ms    Sinus tachycardia  premature atrial complexes  Left axis deviation  Abnormal ECG  No previous ECGs available  Confirmed by HBARGAV HALL MD ([id number]) on 6/29/2020 7:27:12 PM    Referred By:  bk           Confirmed By:BHARGAV HALL MD        ECG 12 Lead   Final Result   Test Reason : WCT, eval QTc   Blood Pressure : **/** mmHG   Vent. Rate : 084 BPM     Atrial Rate : 084 BPM      P-R Int : 178 ms          QRS Dur : 088 ms       QT Int : 372 ms       P-R-T Axes : 029 -35 041 degrees      QTc Int : 439 ms      Normal sinus rhythm   Left axis deviation   Abnormal ECG   When compared with ECG of 29-JUN-2020 19:07,   premature atrial complexes are no longer present   Confirmed by GONZALO FORTUNE MD ([id number]) on 6/30/2020 7:36:08 AM      Referred By:   CASE           Confirmed By:GONZALO FORTUNE MD      ECG 12 Lead   Final Result   Test Reason : Acute Stroke Protocol (onset < 12 hrs)   Blood Pressure : **/** mmHG   Vent. Rate : 119 BPM     Atrial Rate : 119 BPM      P-R Int : 178 ms          QRS Dur : 090 ms       QT Int : 314 ms       P-R-T Axes : 044 -43 062 degrees      QTc Int : 441 ms      Sinus tachycardia   premature atrial complexes   Left axis deviation   Abnormal ECG   No previous ECGs available   Confirmed by BHARGAV HALL MD ([id number]) on 6/29/2020 7:27:12 PM      Referred By:  bk           Confirmed By:BHARGAV HALL MD      ECG 12 Lead    (Results Pending)                                         MDM    Final diagnoses:   Acute febrile illness   Acute respiratory failure with hypoxia (CMS/HCC)   Ventricular tachycardia (CMS/HCC)   Skin tear of right elbow without complication, initial encounter            Bhargav Hall MD  06/29/20 2012       Bhargav Hall MD  06/30/20 0923      Electronically signed by Bhargav Hall MD at 06/30/20 0923          Current Facility-Administered Medications   Medication Dose Route Frequency Provider Last Rate Last Dose   • !Vancomcyin trough 7/9 @ 0930. Please hold 1000 vancomycin dose until pharmacy evaluates level   Does not apply Once Fco Joseph, [facility]       • acetaminophen (TYLENOL) suppository 650 mg  650 mg Rectal Q4H PRN Lazaro Lozada APRN   650 mg at 07/07/20 0910   • acetaminophen (TYLENOL) tablet 650 mg  650 mg Oral Q4H PRN Rodney Banks MD   650 mg at 07/06/20 2022   • amLODIPine (NORVASC) tablet 10 mg  10 mg Oral Q24H Tash Britt MD   10 mg at 07/09/20 0908   • aspirin EC tablet 325 mg  325 mg Oral Q12H Sam Harp MD   325 mg at 07/09/20 0908   • atorvastatin (LIPITOR) tablet 80 mg  80 mg Oral Nightly Rodney Banks MD   80 mg at 07/07/20 2127   • bisacodyl (DULCOLAX) EC tablet 10 mg  10 mg Oral Daily PRN Tash Britt MD   5 mg at 07/06/20 0839   • bisacodyl (DULCOLAX) suppository 10 mg  10 mg Rectal Daily Tash Britt MD   10 mg at 07/07/20 0840   • donepezil (ARICEPT) tablet 10 mg  10 mg Oral Daily Tash Britt MD   10 mg at 07/09/20 0908   • labetalol (NORMODYNE,TRANDATE) injection 10 mg  10 mg Intravenous Q2H PRN Lauren Pleitez MD   10 mg at 07/07/20 0840   • lactated ringers infusion  75 mL/hr Intravenous Continuous Rodney Banks MD 75 mL/hr at 07/06/20 1251 75 mL/hr at 07/06/20 1251   • lansoprazole (FIRST) oral suspension 30 mg  30 mg Oral QAM Tash Britt MD   30 mg at 07/08/20 0602   • lidocaine PF 1% (XYLOCAINE) injection 0.5 mL  0.5 mL Injection Once PRN Keith Tuttle MD       • lisinopril (PRINIVIL,ZESTRIL) tablet 40 mg  40 mg Oral Q24H Tash Britt MD   40 mg at 07/09/20 0908   • magnesium sulfate 4g/100mL (PREMIX) infusion  4 g Intravenous PRN Rodney Banks MD   4 g at 07/01/20 1039   • niCARdipine (CARDENE) 20 mg in 200 mL NS (0.1 mg/mL) infusion  5-15 mg/hr Intravenous Titrated Rodney Banks MD       • Pharmacy to dose vancomycin   Does not  apply Continuous PRN Lauren Pleitez MD       • piperacillin-tazobactam (ZOSYN) 3.375 g in iso-osmotic dextrose 50 ml (premix)  3.375 g Intravenous Q8H Lauren Pleitez MD   3.375 g at 07/08/20 0850   • polyethylene glycol 3350 powder (packet)  17 g Oral Daily Tash Britt MD   17 g at 07/09/20 0908   • potassium chloride (KLOR-CON) packet 40 mEq  40 mEq Oral PRN Tash Britt MD   40 mEq at 07/08/20 1547   • potassium chloride (MICRO-K) CR capsule 40 mEq  40 mEq Oral PRN Tash Britt MD   40 mEq at 07/08/20 0815   • potassium chloride 10 mEq in 100 mL IVPB  10 mEq Intravenous Q1H PRN Rodney Banks  mL/hr at 07/03/20 1653 10 mEq at 07/03/20 1653   • sodium chloride 0.9 % flush 10 mL  10 mL Intravenous Q12H Rodney Banks MD   Stopped at 07/04/20 0806   • sodium chloride 0.9 % flush 10 mL  10 mL Intravenous PRN Rodney Banks MD       • sodium chloride 0.9 % flush 10 mL  10 mL Intravenous Q12H Keith Tuttle MD   10 mL at 07/07/20 0841   • sodium chloride 0.9 % flush 10 mL  10 mL Intravenous PRN Keith Tuttle MD       • [START ON 7/10/2020] vancomycin (VANCOCIN) in iso-osmotic dextrose IVPB 1 g (premix) 200 mL  1,000 mg Intravenous Q12H Fco Joseph [facility]            Physician Progress Notes (last 72 hours) (Notes from 07/06/20 1542 through 07/09/20 1542)      Sam Harp MD at 07/09/20 1134        Postoperative day #9 from a right hip percutaneous screw fixation    The patient is sitting up in the bed eating his breakfast this morning.  He appears more comfortable.  We had a good discussion this morning    Examination:  No pain with hip logrolling or gentle hip flexion.  His incision is well-appearing EHL FHL tib ant and gastrocsoleus intact    Plan:  Please see prior note for comprehensive plan regarding his postoperative care from an orthopedic perspective.  Remain on aspirin 325 mg twice daily for the next 6 weeks total following surgery.  I will see him back in follow-up 3 weeks  following the day of surgery for further evaluation.    He will remain touchdown weightbearing on the right lower extremity with assist devices and assistance as needed.    He is now being followed by palliative care and multiple discussions have been held with his family along the way for the primary team.    Please contact me with any further questions or concerns.    Electronically signed by Sam Harp MD at 20 1136     Carolina Colindres APRN at 20 0803              [facility] Medicine Services  PROGRESS NOTE    Patient Name: Ken Jasso  : 1940  MRN: [id number]    Date of Admission: 2020  Primary Care Physician: Provider, No Known    Subjective   Subjective   CC:  Follow up for dysphagia and right hip fracture     HPI:  Patient sitting up in chair eating lunch in NAD.  There was some confusion about patient being discharged home with hospice today and going to [facility].  Jessica with hospice spoke with wife and had the understanding that patient was going home with home hospice and just put in the hospice consult.  Patient wants to go home. +BM x2.  Wife is in the room    Review of Systems  Gen- No fevers, chills  CV- No chest pain, palpitations  Resp- No cough, dyspnea  GI- No N/V/D, abd pain  All other systems have been reviewed and the pertinent positives and negatives are listed above the HPI or ROS    Objective   Objective   Vital Signs:   Temp:  [97.5 °F (36.4 °C)-98.3 °F (36.8 °C)] 98.3 °F (36.8 °C)  Heart Rate:  [75-87] 87  Resp:  [14-16] 16  BP: (137-153)/(65-92) 149/79  Total (NIH Stroke Scale): 0  Physical Exam:  Constitutional: Alert, WD, WN male in NAD  Eyes: EOMI, sclerae anicteric, no conjunctival injection  Head: NCAT  ENT: [place], moist mucous membranes   Respiratory: Non-labored, symmetrical chest expansion, CTAB  Cardiovascular: RRR, no M/R/G, +DP pulses bilaterally  Gastrointestinal: Soft, NT, ND +BS  Musculoskeletal: OH; no LE  edema bilaterally; right thigh incision CDI  Neurologic: Oriented x4, strength symmetric in all extremities, follows all commands, speech clear  Skin: No rashes on exposed skin  Psychiatric: Pleasant and cooperative; normal affect    Results Reviewed:  Results from last 7 days   Lab Units 07/08/20  0400 07/07/20  0825   WBC 10*3/mm3 21.58* 16.85*   HEMOGLOBIN g/dL 9.2* 12.4*   HEMATOCRIT % 28.6* 39.0   PLATELETS 10*3/mm3 329 442     Results from last 7 days   Lab Units 07/08/20  0328 07/07/20  0825 07/03/20  2134   SODIUM mmol/L 139 136  --    POTASSIUM mmol/L 2.9* 3.6 3.7   CHLORIDE mmol/L 102 98  --    CO2 mmol/L 25.0 25.0  --    BUN mg/dL 24* 12  --    CREATININE mg/dL 0.75* 0.80  --    GLUCOSE mg/dL 104* 123*  --    CALCIUM mg/dL 8.5* 9.2  --    ALT (SGPT) U/L  --  20  --    AST (SGOT) U/L  --  38  --      Estimated Creatinine Clearance: 65.8 mL/min (A) (by C-G formula based on SCr of 0.75 mg/dL (L)).    Microbiology Results Abnormal     Procedure Component Value - Date/Time    Blood Culture - Blood, Arm, Right [[id number]] Collected:  06/29/20 1931    Lab Status:  Final result Specimen:  Blood from Arm, Right Updated:  07/04/20 2000     Blood Culture No growth at 5 days    Blood Culture - Blood, Arm, Left [[id number]] Collected:  06/29/20 1931    Lab Status:  Final result Specimen:  Blood from Arm, Left Updated:  07/04/20 2000     Blood Culture No growth at 5 days    MRSA Screen, PCR (Inpatient) - Swab, Nares [[id number]]  (Normal) Collected:  06/29/20 2352    Lab Status:  Final result Specimen:  Swab from Nares Updated:  06/30/20 0958     MRSA PCR Negative    Narrative:       MRSA Negative    COVID-19,[facility] IN-HOUSE, NP SWAB IN TRANSPORT MEDIA 8-12 HR TAT - Swab, Nasopharynx [[id number]]  (Normal) Collected:  06/29/20 2004    Lab Status:  Final result Specimen:  Swab from Nasopharynx Updated:  06/30/20 0305     COVID19 Not Detected    Narrative:       Fact sheet for providers:  https://www.fda.gov/media/671816/download     Fact sheet for patients: https://www.fda.gov/media/937528/download    Respiratory Panel, PCR - Swab, Nasopharynx [[id number]]  (Normal) Collected:  06/29/20 2004    Lab Status:  Final result Specimen:  Swab from Nasopharynx Updated:  06/29/20 2129     ADENOVIRUS, PCR Not Detected     Coronavirus 229E Not Detected     Coronavirus HKU1 Not Detected     Coronavirus NL63 Not Detected     Coronavirus OC43 Not Detected     Human Metapneumovirus Not Detected     Human Rhinovirus/Enterovirus Not Detected     Influenza B PCR Not Detected     Parainfluenza Virus 1 Not Detected     Parainfluenza Virus 2 Not Detected     Parainfluenza Virus 3 Not Detected     Parainfluenza Virus 4 Not Detected     Bordetella pertussis pcr Not Detected     Influenza A H1 2009 PCR Not Detected     Chlamydophila pneumoniae PCR Not Detected     Mycoplasma pneumo by PCR Not Detected     Influenza A PCR Not Detected     Influenza A H3 Not Detected     Influenza A H1 Not Detected     RSV, PCR Not Detected     Bordetella parapertussis PCR Not Detected    Narrative:       The coronavirus on the RVP is NOT COVID-19 and is NOT indicative of infection with COVID-19.           Imaging Results (Last 24 Hours)     Procedure Component Value Units Date/Time    XR Chest 1 View [[id number]] Collected:  07/07/20 0848     Updated:  07/08/20 1757    Narrative:       EXAMINATION: XR CHEST 1 VW-      INDICATION: RRT, increased sputum; R50.9-Fever, unspecified;  J96.01-Acute respiratory failure with hypoxia; I47.2-Ventricular  tachycardia; S51.011A-Laceration without foreign body of right elbow,  initial encounter; R13.12-Dysphagia, oropharyngeal phase.      COMPARISON: 06/29/2020.     FINDINGS: Cardiac silhouette within normal limits. Pulmonary vascularity  within normal limits. No focal opacification or consolidation. No  pneumothorax or pleural effusion. Degenerative changes of the spine.           Impression:       No  acute cardiopulmonary process specifically no new focal  consolidation of involvement or effusion.     D:  07/07/2020  E:  07/07/2020     This report was finalized on 7/8/2020 5:54 PM by Dr. Manoj Elizondo.             Results for orders placed during the hospital encounter of 06/29/20   Adult Transthoracic Echo Complete W/ Cont if Necessary Per Protocol (With Agitated Saline)    Narrative · Estimated EF = 55%.  · Left ventricular systolic function is normal.  · Mild mitral valve regurgitation is present  · Mild to moderate tricuspid valve regurgitation is present.  · Calculated right ventricular systolic pressure from tricuspid   regurgitation is 34 mmHg.  · No intracardiac shunting is seen  · No cardiac source for embolus is seen          I have reviewed the medications:  Scheduled Meds:    Pharmacy Consult  Does not apply Once   amLODIPine 10 mg Oral Q24H   aspirin 325 mg Oral Q12H   atorvastatin 80 mg Oral Nightly   bisacodyl 10 mg Rectal Daily   donepezil 10 mg Oral Daily   lansoprazole 30 mg Oral QAM   lisinopril 40 mg Oral Q24H   piperacillin-tazobactam 3.375 g Intravenous Q8H   polyethylene glycol 17 g Oral Daily   sodium chloride 10 mL Intravenous Q12H   sodium chloride 10 mL Intravenous Q12H   vancomycin 1,000 mg Intravenous Q24H     Continuous Infusions:    lactated ringers 75 mL/hr Last Rate: 75 mL/hr (07/06/20 1251)   niCARdipine 5-15 mg/hr    Pharmacy to dose vancomycin       PRN Meds:.acetaminophen  •  acetaminophen **OR** [DISCONTINUED] acetaminophen  •  bisacodyl  •  labetalol  •  lidocaine PF 1%  •  magnesium sulfate  •  Pharmacy to dose vancomycin  •  potassium chloride  •  potassium chloride  •  potassium chloride  •  sodium chloride  •  sodium chloride    Assessment/Plan   Assessment & Plan     Active Hospital Problems    Diagnosis  POA   • **Fracture of right hip (CMS/HCC) [S72.001A]  Yes   • Acute febrile illness [R50.9]  Yes   • Dementia (CMS/HCC) [F03.90]  Yes   • H/O: CVA (no deficit)  [Z86.73]  Not Applicable      Resolved Hospital Problems   No resolved problems to display.     Brief Hospital Course to date:  Ken Jasso is a 80 y.o. male with PMH of CVA, HTN, dementia, and GERD who was admitted on 6/29 after fall while walking to car, sustaining a right femoral neck fracture, there was concern for stroke and code stroke was called. Imaging negative for acute CVA. Had fever up to 103 on arrival and non-sustained wide complex tachycardia requiring admission to the ICU. Now out of ICU and has been stable  On telemetry floor.     These problems are new to me today    This patient's problems and plans were partially entered by my partner and updated as appropriate by me 07/09/20.    Dysphagia with ongoing aspiration events   Sepsis  Lactic acidosis  -MBS resulted with severe pharyngeal dysphagia  -speech recommending NPO, however, prior provider (Lorenza) as well as myself spoke with Mr. Jasso and he would like to eat, they discussed his code status and he is CODE STATUS: NO CPR/INTUBATION  -- wife aware and agrees with his decision , would like to keep comfort diet ongoing and aware that continued aspirations events are likely  -- RR 7/7 AM-- favored that this is another aspiration event as no other focal source of infection-- fevers noted to as high as 102- CXR reviewed and normal-- will send UA but start on broad spectrum abx and continue for now- given worsening leukocytosis would like to send blood cultures which have been ordered- patient refusing these and long d/w him regarding GOC. Ultimately we have asked palliative to see and have d/w them. Plan for palliative meeting with patient/wife tomorrow at 11am to clarify ongoing goals and possible hospice referral. Continue IV abx for now    Hypokalemia  -noted at 2.9 today-- replace per protocol      Right femoral neck fracture  -s/p hip percutaneous pinning on 7/1 by Dr. Harp  -PT/OT - wll need SNF-but needs to participate with PT  here, if he continues to do poorly may consider palliative or hospice consult  -follow-up with Dr. Harp in 3 weeks, continue aspirin 325 mg BID for 6 weeks for VTE ppx     HTN- continue amlodipine, lisinopril, and atorvastatin     Dementia-continue aricept      DVT Prophylaxis:  SCD    Disposition: I expect the patient to be discharged Home with Hospice; Hospice order placed today, but wife needs to get people to help her because she states that she cannot take care of him herself    CODE STATUS:   Code Status and Medical Interventions:   Ordered at: 20 0956     Limited Support to NOT Include:    Intubation     Level Of Support Discussed With:    Patient     Code Status:    No CPR     Medical Interventions (Level of Support Prior to Arrest):    Limited     More than 50% of time spent counseling on current illness and plan of care. Case discussed with: Patient, patient's wife, , hospice  Total time spent face to face with the patient was 25 minutes.  Total time of the encounter was 35 minutes.      Electronically signed by DAVID Andrews, 20, 08:03.        Electronically signed by Carolina Colindres APRN at 20 1208     Lauren Pleitez MD at 20 1210              [facility] Medicine Services  PROGRESS NOTE    Patient Name: Ken Jasso  : 1940  MRN: [id number]    Date of Admission: 2020  Primary Care Physician: Provider, No Known    Subjective   Subjective     CC:  Follow up for dysphagia and right hip fracture     HPI:  Patient feels ok this morning.  Refusing blood cultures, white count continues to uptrend  Long d/w patient about ongoing aspiration and comfort diet, goals of care.  Patient amenable to palliative consultation and have d/w palliative care    Review of Systems  Gen- neg  CV- No chest pain, palpitations  Resp- dyspnea  GI- No N/V/D, abd pain         Objective   Objective     Vital Signs:   Temp:  [97.5 °F (36.4 °C)-99.3 °F  (37.4 °C)] 97.5 °F (36.4 °C)  Heart Rate:  [] 76  Resp:  [16-17] 16  BP: (137-147)/(64-78) 137/69  Total (NIH Stroke Scale): 0     Physical Exam:  Constitutional: in distress, coughing, dyspneic  HENT: NCAT, mucous membranes moist  Respiratory:normal effort, rhonchorus breath sounds, on room air  Cardiovascular: sinus tach to the 120s, on tele  Gastrointestinal: Positive bowel sounds, soft, nontender, nondistended  Musculoskeletal: No bilateral ankle edema  Psychiatric: appropriate given situation  Neurologic: Oriented , alert, drowsy   Skin: No rashes, right hip dressing cdi       Results Reviewed:  Results from last 7 days   Lab Units 07/08/20  0400 07/07/20  0825 07/02/20  0340   WBC 10*3/mm3 21.58* 16.85* 8.94   HEMOGLOBIN g/dL 9.2* 12.4* 10.8*   HEMATOCRIT % 28.6* 39.0 32.9*   PLATELETS 10*3/mm3 329 442 176   PROCALCITONIN ng/mL  --   --  0.91*     Results from last 7 days   Lab Units 07/08/20  0328 07/07/20  0825 07/03/20  2134 07/02/20  0340   SODIUM mmol/L 139 136  --  135*   POTASSIUM mmol/L 2.9* 3.6 3.7 3.6   CHLORIDE mmol/L 102 98  --  99   CO2 mmol/L 25.0 25.0  --  23.0   BUN mg/dL 24* 12  --  12   CREATININE mg/dL 0.75* 0.80  --  0.86   GLUCOSE mg/dL 104* 123*  --  80   CALCIUM mg/dL 8.5* 9.2  --  8.1*   ALT (SGPT) U/L  --  20  --   --    AST (SGOT) U/L  --  38  --   --      Estimated Creatinine Clearance: 63.3 mL/min (A) (by C-G formula based on SCr of 0.75 mg/dL (L)).    Microbiology Results Abnormal     Procedure Component Value - Date/Time    Blood Culture - Blood, Arm, Right [[id number]] Collected:  06/29/20 1931    Lab Status:  Final result Specimen:  Blood from Arm, Right Updated:  07/04/20 2000     Blood Culture No growth at 5 days    Blood Culture - Blood, Arm, Left [[id number]] Collected:  06/29/20 1931    Lab Status:  Final result Specimen:  Blood from Arm, Left Updated:  07/04/20 2000     Blood Culture No growth at 5 days    MRSA Screen, PCR (Inpatient) - Swab, Nares [[id number]]   (Normal) Collected:  06/29/20 2352    Lab Status:  Final result Specimen:  Swab from Nares Updated:  06/30/20 0958     MRSA PCR Negative    Narrative:       MRSA Negative    COVID-19,[facility] IN-HOUSE, NP SWAB IN TRANSPORT MEDIA 8-12 HR TAT - Swab, Nasopharynx [[id number]]  (Normal) Collected:  06/29/20 2004    Lab Status:  Final result Specimen:  Swab from Nasopharynx Updated:  06/30/20 0305     COVID19 Not Detected    Narrative:       Fact sheet for providers: https://www.fda.gov/media/956564/download     Fact sheet for patients: https://www.fda.gov/media/827706/download    Respiratory Panel, PCR - Swab, Nasopharynx [[id number]]  (Normal) Collected:  06/29/20 2004    Lab Status:  Final result Specimen:  Swab from Nasopharynx Updated:  06/29/20 2129     ADENOVIRUS, PCR Not Detected     Coronavirus 229E Not Detected     Coronavirus HKU1 Not Detected     Coronavirus NL63 Not Detected     Coronavirus OC43 Not Detected     Human Metapneumovirus Not Detected     Human Rhinovirus/Enterovirus Not Detected     Influenza B PCR Not Detected     Parainfluenza Virus 1 Not Detected     Parainfluenza Virus 2 Not Detected     Parainfluenza Virus 3 Not Detected     Parainfluenza Virus 4 Not Detected     Bordetella pertussis pcr Not Detected     Influenza A H1 2009 PCR Not Detected     Chlamydophila pneumoniae PCR Not Detected     Mycoplasma pneumo by PCR Not Detected     Influenza A PCR Not Detected     Influenza A H3 Not Detected     Influenza A H1 Not Detected     RSV, PCR Not Detected     Bordetella parapertussis PCR Not Detected    Narrative:       The coronavirus on the RVP is NOT COVID-19 and is NOT indicative of infection with COVID-19.           Imaging Results (Last 24 Hours)     ** No results found for the last 24 hours. **          Results for orders placed during the hospital encounter of 06/29/20   Adult Transthoracic Echo Complete W/ Cont if Necessary Per Protocol (With Agitated Saline)    Narrative · Estimated EF =  55%.  · Left ventricular systolic function is normal.  · Mild mitral valve regurgitation is present  · Mild to moderate tricuspid valve regurgitation is present.  · Calculated right ventricular systolic pressure from tricuspid   regurgitation is 34 mmHg.  · No intracardiac shunting is seen  · No cardiac source for embolus is seen          I have reviewed the medications:  Scheduled Meds:    [START ON 7/9/2020] Pharmacy Consult  Does not apply Once   amLODIPine 10 mg Oral Q24H   aspirin 325 mg Oral Q12H   atorvastatin 80 mg Oral Nightly   bisacodyl 10 mg Rectal Daily   donepezil 10 mg Oral Daily   lansoprazole 30 mg Oral QAM   lisinopril 40 mg Oral Q24H   piperacillin-tazobactam 3.375 g Intravenous Q8H   polyethylene glycol 17 g Oral Daily   sodium chloride 10 mL Intravenous Q12H   sodium chloride 10 mL Intravenous Q12H   vancomycin 1,000 mg Intravenous Q24H     Continuous Infusions:    lactated ringers 75 mL/hr Last Rate: 75 mL/hr (07/06/20 1251)   niCARdipine 5-15 mg/hr    Pharmacy to dose vancomycin       PRN Meds:.acetaminophen  •  acetaminophen **OR** [DISCONTINUED] acetaminophen  •  bisacodyl  •  labetalol  •  lidocaine PF 1%  •  magnesium sulfate  •  Pharmacy to dose vancomycin  •  potassium chloride  •  potassium chloride  •  potassium chloride  •  sodium chloride  •  sodium chloride    Assessment/Plan   Assessment & Plan     Active Hospital Problems    Diagnosis  POA   • **Fracture of right hip (CMS/HCC) [S72.001A]  Yes   • Acute febrile illness [R50.9]  Yes   • Dementia (CMS/[facility]) [F03.90]  Yes   • H/O: CVA (no deficit) [Z86.73]  Not Applicable      Resolved Hospital Problems   No resolved problems to display.        Brief Hospital Course to date:  Ken Jasso is a 80 y.o. male with PMH of CVA, HTN, dementia, and GERD who was admitted on 6/29 after fall while walking to car, sustaining a right femoral neck fracture, there was concern for stroke and code stroke was called. Imaging negative for acute  CVA. Had fever up to 103 on arrival and non-sustained wide complex tachycardia requiring admission to the ICU. Now out of ICU and has been stable  On telemetry floor.     This patient's problems and plans were partially entered by my partner and updated as appropriate by me 07/08/20.         Dysphagia with ongoing aspiration events   Sepsis  Lactic acidosis  -MBS resulted with severe pharyngeal dysphagia  -speech recommending NPO, however, prior provider (Lorenza) as well as myself spoke with Mr. Jasso and he would like to eat, they discussed his code status and he is CODE STATUS: NO CPR/INTUBATION  -- wife aware and agrees with his decision , would like to keep comfort diet ongoing and aware that continued aspirations events are likely  -- RR 7/7 AM-- favored that this is another aspiration event as no other focal source of infection-- fevers noted to as high as 102- CXR reviewed and normal-- will send UA but start on broad spectrum abx and continue for now- given worsening leukocytosis would like to send blood cultures which have been ordered- patient refusing these and long d/w him regarding GOC. Ultimately we have asked palliative to see and have d/w them. Plan for palliative meeting with patient/wife tomorrow at 11am to clarify ongoing goals and possible hospice referral. Continue IV abx for now    Hypokalemia  -noted at 2.9 today-- replace per protocol      Right femoral neck fracture  -s/p hip percutaneous pinning on 7/1 by Dr. Harp  -PT/OT - wll need SNF-but needs to participate with PT here, if he continues to do poorly may consider palliative or hospice consult  -follow-up with Dr. Harp in 3 weeks, continue aspirin 325 mg BID for 6 weeks for VTE ppx     HTN- continue amlodipine, lisinopril, and atorvastatin   Dementia-continue aricept        DVT Prophylaxis:  scd    Kerri was updated extensively by palliative and I had a d/w with palliative NP on phone. Planning for family meeting tomorrow at 11am  with palliative care.       Disposition: I expect the patient to be discharged TBD    CODE STATUS:   Code Status and Medical Interventions:   Ordered at: 07/03/20 0956     Limited Support to NOT Include:    Intubation     Level Of Support Discussed With:    Patient     Code Status:    No CPR     Medical Interventions (Level of Support Prior to Arrest):    Limited         Electronically signed by Lauren Pleitez MD, 07/08/20, 12:10.        Electronically signed by Lauren Pleitez MD at 07/08/20 1216     Sam Harp MD at 07/07/20 1502          Referring Provider: No ref. provider found     Orthopedic Surgery Note - Sam Harp MD    Subjective:  The patient had an eventful course with a rapid response this morning.  He is now on broad-spectrum antibiotics for possible sepsis.    No issues with his right hip.  He does still have some distal pain no groin pain or no proximal hip pain.  This may be referred pain in his distal femur x-rays prior and his knee x-rays prior show knee arthritis but no distal fracture.  He is having difficulty ambulating with physical therapy given his ongoing medical issues and dementia.      Medications/Allergies:    Current Facility-Administered Medications:   •  [START ON 7/9/2020] !Vancomcyin trough 7/9 @ 0930. Please hold 1000 vancomycin dose until pharmacy evaluates level, , Does not apply, Once, Fco Joseph, [facility]  •  acetaminophen (TYLENOL) suppository 650 mg, 650 mg, Rectal, Q4H PRN, Lazaro Lozada, APRN, 650 mg at 07/07/20 0910  •  acetaminophen (TYLENOL) tablet 650 mg, 650 mg, Oral, Q4H PRN, 650 mg at 07/06/20 2022 **OR** [DISCONTINUED] acetaminophen (TYLENOL) suppository 650 mg, 650 mg, Rectal, Q4H PRN, Lazaro Lozada, APRN  •  amLODIPine (NORVASC) tablet 10 mg, 10 mg, Oral, Q24H, Tash Britt MD, 10 mg at 07/06/20 0839  •  aspirin EC tablet 325 mg, 325 mg, Oral, Q12H, Sam Harp MD, 325 mg at 07/06/20 2022  •  atorvastatin (LIPITOR) tablet  80 mg, 80 mg, Oral, Nightly, Rodney Banks MD, 80 mg at 07/06/20 2022  •  bisacodyl (DULCOLAX) EC tablet 10 mg, 10 mg, Oral, Daily PRN, Tash Britt MD, 5 mg at 07/06/20 0839  •  bisacodyl (DULCOLAX) suppository 10 mg, 10 mg, Rectal, Daily, Tash Britt MD, 10 mg at 07/07/20 0840  •  donepezil (ARICEPT) tablet 10 mg, 10 mg, Oral, Daily, Tash Britt MD, 10 mg at 07/06/20 0840  •  labetalol (NORMODYNE,TRANDATE) injection 10 mg, 10 mg, Intravenous, Q2H PRN, Lauren Pleitez MD, 10 mg at 07/07/20 0840  •  lactated ringers infusion, 75 mL/hr, Intravenous, Continuous, Rodney Banks MD, Last Rate: 75 mL/hr at 07/06/20 1251, 75 mL/hr at 07/06/20 1251  •  lansoprazole (FIRST) oral suspension 30 mg, 30 mg, Oral, QAM, Tash Britt MD, 30 mg at 07/07/20 0619  •  lidocaine PF 1% (XYLOCAINE) injection 0.5 mL, 0.5 mL, Injection, Once PRN, Keith Tuttle MD  •  lisinopril (PRINIVIL,ZESTRIL) tablet 40 mg, 40 mg, Oral, Q24H, Tash Britt MD, 40 mg at 07/06/20 0840  •  magnesium sulfate 4g/100mL (PREMIX) infusion, 4 g, Intravenous, PRN, Rodney Banks MD, 4 g at 07/01/20 1039  •  niCARdipine (CARDENE) 20 mg in 200 mL NS (0.1 mg/mL) infusion, 5-15 mg/hr, Intravenous, Titrated, Rodney Banks MD  •  Pharmacy to dose vancomycin, , Does not apply, Continuous PRN, Lauren Pleitez MD  •  piperacillin-tazobactam (ZOSYN) 3.375 g in iso-osmotic dextrose 50 ml (premix), 3.375 g, Intravenous, Q8H, Lauren Pleitez MD  •  polyethylene glycol 3350 powder (packet), 17 g, Oral, Daily, Tash Britt MD, 17 g at 07/06/20 0838  •  potassium chloride (KLOR-CON) packet 40 mEq, 40 mEq, Oral, PRN, Tash Britt MD  •  potassium chloride (MICRO-K) CR capsule 40 mEq, 40 mEq, Oral, PRN, Tash Britt MD  •  potassium chloride 10 mEq in 100 mL IVPB, 10 mEq, Intravenous, Q1H PRN, Rodney Banks MD, Last Rate: 100 mL/hr at 07/03/20 1653, 10 mEq at 07/03/20 1653  •  sodium chloride 0.9 % flush 10 mL, 10 mL, Intravenous, Q12H,  Rodney Banks MD, Stopped at 07/04/20 0806  •  sodium chloride 0.9 % flush 10 mL, 10 mL, Intravenous, PRN, Rodney Banks MD  •  sodium chloride 0.9 % flush 10 mL, 10 mL, Intravenous, Q12H, Keith Tuttle MD, 10 mL at 07/07/20 0841  •  sodium chloride 0.9 % flush 10 mL, 10 mL, Intravenous, PRN, Keith Tuttle MD  •  [START ON 7/8/2020] vancomycin (VANCOCIN) in iso-osmotic dextrose IVPB 1 g (premix) 200 mL, 1,000 mg, Intravenous, Q24H, Fco Joseph RPH  No Known Allergies    Objective:  Vital Signs   Temp:  [98 °F (36.7 °C)-102.8 °F (39.3 °C)] 99.8 °F (37.7 °C)  Heart Rate:  [] 113  Resp:  [16-18] 17  BP: (146-190)/() 158/70    Results Review:   I reviewed the patient's new clinical results.  Results from last 7 days   Lab Units 07/07/20  0825   WBC 10*3/mm3 16.85*   HEMOGLOBIN g/dL 12.4*   HEMATOCRIT % 39.0   PLATELETS 10*3/mm3 442     Results from last 7 days   Lab Units 07/07/20  0825   SODIUM mmol/L 136   POTASSIUM mmol/L 3.6   CHLORIDE mmol/L 98   CO2 mmol/L 25.0   BUN mg/dL 12   CREATININE mg/dL 0.80   GLUCOSE mg/dL 123*   CALCIUM mg/dL 9.2         Results from last 7 days   Lab Units 07/07/20  0825   SODIUM mmol/L 136   POTASSIUM mmol/L 3.6   CHLORIDE mmol/L 98   CO2 mmol/L 25.0   BUN mg/dL 12   CREATININE mg/dL 0.80   CALCIUM mg/dL 9.2   ALK PHOS U/L 84   ALT (SGPT) U/L 20   AST (SGOT) U/L 38   GLUCOSE mg/dL 123*       Ct Abdomen Pelvis Without Contrast    Result Date: 6/30/2020  EXAMINATION: CT ABDOMEN PELVIS WO CONTRAST- 06/30/2020  INDICATION: R50.9-Fever, unspecified; J96.01-Acute respiratory failure with hypoxia; I47.2-Ventricular tachycardia; S51.011A-Laceration without foreign body of right elbow, initial encounter;  vomiting, sepsis, infection  TECHNIQUE: Multiple axial CT imaging was obtained of the abdomen and pelvis without the administration of intravenous contrast.  The radiation dose reduction device was turned on for each scan per the ALARA (As Low as Reasonably Achievable)  protocol.  COMPARISON: NONE  FINDINGS: Some increased markings identified lung bases bilaterally concerning for possible infiltrate. Aspiration cannot be excluded and clinical correlation is needed. The liver is homogeneous in appearance with vicarious excretion of contrast into the gallbladder. There is residual contrast seen in the renal collecting systems bilaterally. The spleen is unremarkable. The adrenal glands within normal limits. Pancreas is homogeneous. There is decompression identified of the stomach with possible mild wall thickening. Clinical correlation is needed. No abdominal or retroperitoneal lymphadenopathy. The abdominal portion of the gastrointestinal tract within normal limits. No free fluid or free air. No abnormal mass or fluid collections identified.  Pelvis: The pelvic organs are unremarkable. The pelvic portion of the gastrointestinal tract within normal limits. The appendix is normal. No free fluid or free air. No abnormal mass or fluid collections identified. Contrast seen in the renal collecting systems bilaterally. No evidence of obstruction. The bony structures reveal no evidence of osseous abnormality. Degenerative changes seen within the spine and pelvis.      Atelectatic changes or infiltrate seen at the lung bases bilaterally for which clinical correlation is needed. Mild wall thickening of the stomach which may be related to incomplete distention. Clinical correlation is needed. Stool within the colon. The remainder of the abdomen and pelvis is grossly unremarkable. No obstruction to the kidneys with residual contrast seen in the renal collecting systems as well as the ureters and bladder from prior examination.  D:  06/30/2020 E:  06/30/2020  This report was finalized on 6/30/2020 4:48 PM by Dr. Cher Macias MD.      Ct Angiogram Head    Result Date: 7/1/2020  EXAMINATION: CT ANGIOGRAM NECK-, CT ANGIOGRAM HEAD-  INDICATION: Stroke.  TECHNIQUE: CT angiogram head and neck  with and without intravenous contrast administration. 2-D and 3-D reconstructions performed.  The radiation dose reduction device was turned on for each scan per the ALARA (As Low as Reasonably Achievable) protocol.  COMPARISON: CT cerebral perfusion and CT head noncontrast performed concurrently.  FINDINGS: CTA NECK: Normal three vessel arch with patent great vessel origins. Proximal subclavian arteries are patent. Vertebral arteries demonstrate grossly symmetric appearance of the vertebral artery caliber and flow signal of opacification without hemodynamically significant stenosis, aneurysm or occlusion with tortuosity of the vertebral arteries bilateral exiting the foramina, however no luminal irregularity or defect. Calcification within the V4 intradural segment left vertebral discussed further below the dedicated CTA head portion. Carotids demonstrate grossly normal course and branching pattern with atherosclerotic involvement including calcific disease creating 25% right and 20% left luminal narrowing as measured by NASCET criteria with patency to the distal internal carotid arteries intracranial segments discussed further below in the dedicated CTA head portion. Cervical soft tissues unremarkable for acute findings without bulky cervical adenopathy. Thyroid is homogeneous. Lung apices are grossly clear.  CTA HEAD: Distal internal carotid arteries demonstrate mild irregularity of atherosclerotic involvement and calcific disease burden with mild luminal stenosis, however no hemodynamically significant stenosis, aneurysm or occlusion. Anterior cerebral arteries are patent without hemodynamically significant stenosis, aneurysm or occlusion. Middle cerebral arteries are patent without hemodynamically significant stenosis, aneurysm or occlusion. Vertebrobasilar system and posterior cerebral arteries demonstrate at least moderate stenosis of calcific disease involvement left vertebral distal V4 segment, however,  patency observed of the basilar artery and basilar apex with posterior cerebral arteries widely patent bilaterally without hemodynamically significant stenosis, aneurysm or occlusion. Visualized venous structures are unremarkable with superior sagittal sinus widely patent.      1. CTA neck demonstrates atherosclerotic involvement including calcific disease creating 25% right and 20% left luminal narrowing as measured by NASCET criteria. 2. CTA head demonstrates minimal atherosclerotic involvement of the distal internal carotid arteries without hemodynamically significant stenosis, aneurysm or occlusion with calcific disease burden associated as well as moderate stenosis of calcific disease burden and involvement of the left V4 segment focal area distal left vertebral artery without distinct occlusion and patency observed of the [place]. [place] is otherwise unremarkable without occlusion in particular the MCA distributions are widely patent.  D:  06/29/2020 E:  06/30/2020  This report was finalized on 7/1/2020 2:56 PM by Dr. Manoj Elizondo.      Xr Femur 2 View Right    Result Date: 7/2/2020  EXAMINATION: XR FEMUR 2 VW RIGHT-  INDICATION: Right hip fracture; R50.9-Fever, unspecified; J96.01-Acute respiratory failure with hypoxia; I47.2-Ventricular tachycardia; S51.011A-Laceration without foreign body of right elbow, initial encounter.  COMPARISON: None.  FINDINGS: Two views of the right femur reveal fracture seen of the right femoral neck. Slight impaction identified with mild overlapping of the fracture fragments. The remainder of the femur is unremarkable. The acetabulum is intact with some mild degenerative changes identified.         Right femoral neck fracture. The remainder of the femur is grossly unremarkable with spurring of the patella. There is minimal degenerative changes seen within the acetabulum with no significant displacement.  D:  07/01/2020 E:  07/01/2020  This report was finalized  on 7/2/2020 8:11 PM by Dr. Cher Macias MD.      Ct Angiogram Neck    Result Date: 7/1/2020  EXAMINATION: CT ANGIOGRAM NECK-, CT ANGIOGRAM HEAD-  INDICATION: Stroke.  TECHNIQUE: CT angiogram head and neck with and without intravenous contrast administration. 2-D and 3-D reconstructions performed.  The radiation dose reduction device was turned on for each scan per the ALARA (As Low as Reasonably Achievable) protocol.  COMPARISON: CT cerebral perfusion and CT head noncontrast performed concurrently.  FINDINGS: CTA NECK: Normal three vessel arch with patent great vessel origins. Proximal subclavian arteries are patent. Vertebral arteries demonstrate grossly symmetric appearance of the vertebral artery caliber and flow signal of opacification without hemodynamically significant stenosis, aneurysm or occlusion with tortuosity of the vertebral arteries bilateral exiting the foramina, however no luminal irregularity or defect. Calcification within the V4 intradural segment left vertebral discussed further below the dedicated CTA head portion. Carotids demonstrate grossly normal course and branching pattern with atherosclerotic involvement including calcific disease creating 25% right and 20% left luminal narrowing as measured by NASCET criteria with patency to the distal internal carotid arteries intracranial segments discussed further below in the dedicated CTA head portion. Cervical soft tissues unremarkable for acute findings without bulky cervical adenopathy. Thyroid is homogeneous. Lung apices are grossly clear.  CTA HEAD: Distal internal carotid arteries demonstrate mild irregularity of atherosclerotic involvement and calcific disease burden with mild luminal stenosis, however no hemodynamically significant stenosis, aneurysm or occlusion. Anterior cerebral arteries are patent without hemodynamically significant stenosis, aneurysm or occlusion. Middle cerebral arteries are patent without hemodynamically  significant stenosis, aneurysm or occlusion. Vertebrobasilar system and posterior cerebral arteries demonstrate at least moderate stenosis of calcific disease involvement left vertebral distal V4 segment, however, patency observed of the basilar artery and basilar apex with posterior cerebral arteries widely patent bilaterally without hemodynamically significant stenosis, aneurysm or occlusion. Visualized venous structures are unremarkable with superior sagittal sinus widely patent.      1. CTA neck demonstrates atherosclerotic involvement including calcific disease creating 25% right and 20% left luminal narrowing as measured by NASCET criteria. 2. CTA head demonstrates minimal atherosclerotic involvement of the distal internal carotid arteries without hemodynamically significant stenosis, aneurysm or occlusion with calcific disease burden associated as well as moderate stenosis of calcific disease burden and involvement of the left V4 segment focal area distal left vertebral artery without distinct occlusion and patency observed of the [place]. [place] is otherwise unremarkable without occlusion in particular the MCA distributions are widely patent.  D:  06/29/2020 E:  06/30/2020  This report was finalized on 7/1/2020 2:56 PM by Dr. Manoj Elizondo.      Ct Chest Without Contrast    Result Date: 7/1/2020  EXAMINATION: CT CHEST WO CONTRAST- 06/29/2020  INDICATION: low sats, fever  TECHNIQUE: CT chest without intravenous contrast  The radiation dose reduction device was turned on for each scan per the ALARA (As Low as Reasonably Achievable) protocol.  COMPARISON: NONE  FINDINGS: Thyroid is homogeneous in attenuation. No bulky mediastinal adenopathy. Central airways are patent. Esophagus in normal course and caliber. Atherosclerotic nonaneurysmal thoracic aorta.  Cardiac size within normal limits and without pericardial effusion. Extended lung windows demonstrate mild to moderate subsegmental dependent  atelectasis without focal consolidation or opacification. No nodule or mass. No pleural effusion. Degenerative changes of the thoracic spine without aggressive osseous lesion. No soft tissue body wall findings of concern. Visualized portions of the upper abdomen grossly unremarkable.      Low lung volumes with hypoventilatory effects including moderate subsegmental dependent atelectasis in the dependent portions however no focal consolidation or opacification to suggest acute parenchymal process or airspace disease. Negative for pneumonia.  D:  06/29/2020 E:  06/30/2020   This report was finalized on 7/1/2020 2:56 PM by Dr. Manoj Elizondo.      Fl Video Swallow With Speech Single Contrast    Result Date: 7/2/2020  EXAMINATION: FL VIDEO SWALLOW W SPEECH SINGLE-CONTRAST-  INDICATION: dysphagia; R50.9-Fever, unspecified; J96.01-Acute respiratory failure with hypoxia; I47.2-Ventricular tachycardia; S51.011A-Laceration without foreign body of right elbow, initial encounter; R13.10-Dysphagia, unspecified  TECHNIQUE: 48 seconds of fluoroscopic time was used for this exam. 8 associated fluoroscopic loops were saved. The patient was evaluated in the seated lateral position while taking a variety of consistencies of barium by mouth under the direction of speech pathology.  COMPARISON: NONE  FINDINGS: 48 seconds of fluoroscopy provided for a modified barium swallow. Please see speech therapy report for full details and recommendations.       Fluoroscopy provided for a modified barium swallow. Please see speech therapy report for full details and recommendations.    This report was finalized on 7/2/2020 8:13 PM by Dr. Cher Macias MD.      Xr Chest 1 View    Result Date: 7/7/2020  EXAMINATION: XR CHEST 1 VW-  INDICATION: RRT, increased sputum; R50.9-Fever, unspecified; J96.01-Acute respiratory failure with hypoxia; I47.2-Ventricular tachycardia; S51.011A-Laceration without foreign body of right elbow, initial encounter;  R13.12-Dysphagia, oropharyngeal phase.  COMPARISON: 06/29/2020.  FINDINGS: Cardiac silhouette within normal limits. Pulmonary vascularity within normal limits. No focal opacification or consolidation. No pneumothorax or pleural effusion. Degenerative changes of the spine.         No acute cardiopulmonary process specifically no new focal consolidation of involvement or effusion.  D:  07/07/2020 E:  07/07/2020       Xr Chest 1 View    Result Date: 6/29/2020  CR Chest 1 Vw INDICATION: Acute stroke protocol. COMPARISON:  None available. FINDINGS: Single portable AP view(s) of the chest.  The heart and mediastinal contours are normal. The lungs are clear. No pneumothorax or pleural effusion.     No acute cardiopulmonary findings. Signer Name: JAIME Díaz MD  Signed: 6/29/2020 7:36 PM  Workstation Name: [facility]-  [facility] C Arm During Surgery    Result Date: 7/6/2020  EXAMINATION: FL C ARM DURING SURGERY- 07/01/2020  INDICATION: hip pinning; R50.9-Fever, unspecified; J96.01-Acute respiratory failure with hypoxia; I47.2-Ventricular tachycardia; S51.011A-Laceration without foreign body of right elbow, initial encounter  TECHNIQUE: Intraoperative fluoroscopy for improved localization and treatment planning  COMPARISON: NONE  FINDINGS: Intraoperative fluoroscopy with total fluoroscopic time usage 2 minutes 12 seconds and 16 representative images saved during right hip pinning.      Intraoperative fluoroscopy utilized during right hip pinning.  D:  07/02/2020 E:  07/02/2020    This report was finalized on 7/6/2020 8:54 AM by Dr. Manoj Elizondo.      Ct Head Without Contrast Stroke Protocol    Result Date: 7/1/2020  EXAMINATION: CT HEAD WO CONTRAST STROKE PROTOCOL-  INDICATION: Stroke.  TECHNIQUE: CT head without intravenous contrast following stroke protocol.  The radiation dose reduction device was turned on for each scan per the ALARA (As Low as Reasonably Achievable) protocol.   COMPARISON: None.  FINDINGS: Midline structures are symmetric without evidence of mass, mass effect or midline shift demonstrating at least moderately advanced low-attenuation of the periventricular and deep white matter of chronic small vessel ischemic disease. No intraaxial hemorrhage or extraaxial fluid collection. Globes and orbits are unremarkable. Visualized paranasal sinuses and mastoid air cells are grossly clear and well pneumatized. Calvarium intact.      No acute cardiopulmonary process. Specifically no acute intracranial hemorrhage.  NOTE: Scan performed on 06/29/2020 at 1816 hours. Scan report given to treatment team in person by Dr. Elizondo at scanner on 06/29/2020 at 1820 hours.  D:  06/29/2020 E:  06/30/2020   This report was finalized on 7/1/2020 2:56 PM by Dr. Manoj Elizondo.      Ct Cerebral Perfusion With & Without Contrast    Result Date: 7/1/2020  EXAMINATION: CT CEREBRAL PERFUSION W WO CONTRAST-  INDICATION: Stroke.  TECHNIQUE: CT cerebral perfusion with and without intravenous contrast. Multiple parametric maps including mean transit time, time to drain, cerebral blood flow and cerebral blood volume performed.  The radiation dose reduction device was turned on for each scan per the ALARA (As Low as Reasonably Achievable) protocol.  COMPARISON: CT head stroke protocol immediately prior.  FINDINGS: Grossly symmetric and normal correlating parametric maps without perfusion defect of abnormality. No reversible ischemia within a specific vascular territory is identified.      No reversible ischemia within a specific vascular territory.  D:  06/29/2020 E:  06/30/2020   This report was finalized on 7/1/2020 2:56 PM by Dr. Manoj Elizondo.      Xr Hip With Or Without Pelvis 2 - 3 View Right    Result Date: 7/1/2020  CR Hip Uni Comp Min 2 Vws RT INDICATION: Status post percutaneous screws. Right hip fracture. COMPARISON: 6/29/2020 FINDINGS: AP and frog-leg lateral view(s) of the right hip. There is been the  interval placement of 3 percutaneous screws. No evidence of hardware failure. The position of the screws appears satisfactory.     Satisfactory postoperative exam. Signer Name: Lisandro Colby MD  Signed: 7/1/2020 9:16 PM  Workstation [facility]  [facility]    Xr Hip With Or Without Pelvis 2 - 3 View Right    Result Date: 6/29/2020  CR Hip Uni Comp Min 2 Vws RT INDICATION: Pain after a fall today. COMPARISON: None. FINDINGS: AP and frog-leg lateral view(s) of the right hip.  Laterally impacted subcapital right femoral neck fracture with up to 9 mm of suspected impaction. No pelvic fracture. No significant arthrosis right hip. Left hip unremarkable. Contrast distended bladder. No bone erosion or destruction.      Laterally impacted subcapital right femoral neck fracture. Signer Name: JAIME Díaz MD  Signed: 6/29/2020 9:02 PM  Workstation Name: [facility]  [facility]       No results found for: ACANTHNAEG, AFBCX, BPERTUSSISCX, BLOODCX  No results found for: BCIDPCR, CXREFLEX, CSFCX, CULTURETIS  No results found for: CULTURES, HSVCX, URCX  No results found for: EYECULTURE, GCCX, LABHSV  No results found for: LEGIONELLA, MRSACX, MUMPSCX, MYCOPLASCX  No results found for: NOCARDIACX, STOOLCX  No results found for: THROATCX, UNSTIMCULT, URINECX, CULTURE, VZVCULTUR  No results found for: VIRALCULTU, WOUNDCX    Microbiology Results (last 10 days)     Procedure Component Value - Date/Time    MRSA Screen, PCR (Inpatient) - Swab, Nares [[id number]]  (Normal) Collected:  06/29/20 2352    Lab Status:  Final result Specimen:  Swab from Nares Updated:  06/30/20 0958     MRSA PCR Negative    Narrative:       MRSA Negative    Respiratory Panel, PCR - Swab, Nasopharynx [[id number]]  (Normal) Collected:  06/29/20 2004    Lab Status:  Final result Specimen:  Swab from Nasopharynx Updated:  06/29/20 2129     ADENOVIRUS, PCR Not Detected     Coronavirus 229E Not Detected      Coronavirus HKU1 Not Detected     Coronavirus NL63 Not Detected     Coronavirus OC43 Not Detected     Human Metapneumovirus Not Detected     Human Rhinovirus/Enterovirus Not Detected     Influenza B PCR Not Detected     Parainfluenza Virus 1 Not Detected     Parainfluenza Virus 2 Not Detected     Parainfluenza Virus 3 Not Detected     Parainfluenza Virus 4 Not Detected     Bordetella pertussis pcr Not Detected     Influenza A H1 2009 PCR Not Detected     Chlamydophila pneumoniae PCR Not Detected     Mycoplasma pneumo by PCR Not Detected     Influenza A PCR Not Detected     Influenza A H3 Not Detected     Influenza A H1 Not Detected     RSV, PCR Not Detected     Bordetella parapertussis PCR Not Detected    Narrative:       The coronavirus on the RVP is NOT COVID-19 and is NOT indicative of infection with COVID-19.     COVID-19,[facility] IN-HOUSE, NP SWAB IN TRANSPORT MEDIA 8-12 HR TAT - Swab, Nasopharynx [[id number]]  (Normal) Collected:  06/29/20 2004    Lab Status:  Final result Specimen:  Swab from Nasopharynx Updated:  06/30/20 0305     COVID19 Not Detected    Narrative:       Fact sheet for providers: https://www.fda.gov/media/020501/download     Fact sheet for patients: https://www.fda.gov/media/337807/download    Blood Culture - Blood, Arm, Right [[id number]] Collected:  06/29/20 1931    Lab Status:  Final result Specimen:  Blood from Arm, Right Updated:  07/04/20 2000     Blood Culture No growth at 5 days    Blood Culture - Blood, Arm, Left [[id number]] Collected:  06/29/20 1931    Lab Status:  Final result Specimen:  Blood from Arm, Left Updated:  07/04/20 2000     Blood Culture No growth at 5 days             Physical Exam:  General: comfortable  Vascular: 2+ pulses, symmetric; digits are pink and well perfused    Extremities: Right hip incision is excellent appearing he has some mild bruising distally to the incision, the dressing is clean as well  No pain with logrolling able to flex at the hip.  He does report  pain more distal and near his knee.  He does have knee joint arthritis and some tenderness palpation with joint line    Neurologic: sensation to light touch is intact distally    Dermatologic: Excellent appearing incision    No specific point tenderness or bony tenderness        I appreciate the medical management of the admitting internal medicine team.       The patient is okay for discharge from orthopedic perspective once cleared by the medical team and home or appropriate transfer is arranged.  Please contact me if there are any questions.  He has continued ongoing medical issues with his dysphasia and work-up for sepsis he is currently on broad-spectrum antibiotics.  His right hip is well-appearing.  He will need to be up and ambulate with physical therapy with assistance when able from a medical perspective.     POD #: 7     Anemia - acute blood loss anemia following hip fracture, minimal surgical blood loss    Pain control -  better controlled     Dressing - clean and can be changed PRN.  Okay for RN to replace daily and as needed prior to discharge if dressing is saturated.        Antibiotics - complete 24 hour postoperative course of IV antibioitics for surgical prophylaxis but on broad spectrum for possible sepsis at this time     Diet - per primary team; swallowing difficulties     Activity -   Touchdown weight bearing with assistance and support devices (walker, wheelchair) as needed     DVT Prophylaxis - I recommend daily Aspirin 325mg by mouth BID for 6 weeks; SCDs and ISI hose as an inpatient     PT/OT Consult - Touchdown weight bearing     Follow-up - the  will need to call my office to arrange for a discharge follow-up appointment in 3 weeks.  I discussed the patient's findings and my recommendations with patient           Please contact me personally at [phone] for any questions.    Please have the schedulers call our office at [phone] if a clinic appointment is  needed.    Sam Harp MD, FAAOS  Orthopedic Surgeon  Fellowship-trained Shoulder and Elbow Surgeon  [facility]  Orthopedics and Sports Medicine  [address]  [place] [address]        Sam Harp MD  20  15:05    Electronically signed by Sam Harp MD at 20 1510     Lauren Pleitez MD at 20 1151              [facility] Medicine Services  PROGRESS NOTE    Patient Name: Ken Jasso  : 1940  MRN: [id number]    Date of Admission: 2020  Primary Care Physician: Provider, No Known    Subjective   Subjective     CC:  Follow up for dysphagia and right hip fracture     HPI:  Rapid response this morning for hypoxia, fever, tremors.  Patient seen and evaluated at bedside. Symptoms have improved- he is alert, drowsy.  VSS improved. Fever to 102.1, patient with significant mucus/sputum production.   D/w wife via phone and again verified that she would like to keep him comfort diet/DNR/DNI.    Review of Systems  Gen- as above  CV- No chest pain, palpitations  Resp- as above  GI- No N/V/D, abd pain         Objective   Objective     Vital Signs:   Temp:  [98 °F (36.7 °C)-102.8 °F (39.3 °C)] 99.8 °F (37.7 °C)  Heart Rate:  [] 113  Resp:  [16-18] 17  BP: (146-190)/() 158/70  Total (NIH Stroke Scale): 0     Physical Exam:  Constitutional: in distress, coughing, dyspneic  HENT: NCAT, mucous membranes moist  Respiratory: labored, rhonchorus breath sounds, on 2 LNC  Cardiovascular: sinus tach to the 120s, on tele  Gastrointestinal: Positive bowel sounds, soft, nontender, nondistended  Musculoskeletal: No bilateral ankle edema  Psychiatric: appropriate given situation  Neurologic: Oriented , alert, drowsy   Skin: No rashes, right hip dressing cdi       Results Reviewed:  Results from last 7 days   Lab Units 20  0825 20  0340 20  0400 20  0250   WBC 10*3/mm3 16.85* 8.94 8.72  --     HEMOGLOBIN g/dL 12.4* 10.8* 10.9*  --    HEMATOCRIT % 39.0 32.9* 34.0*  --    PLATELETS 10*3/mm3 442 176 170  --    PROCALCITONIN ng/mL  --  0.91*  --  1.60*     Results from last 7 days   Lab Units 07/07/20  0825 07/03/20  2134 07/02/20  0340 07/01/20  0250   SODIUM mmol/L 136  --  135* 135*   POTASSIUM mmol/L 3.6 3.7 3.6 3.6   CHLORIDE mmol/L 98  --  99 100   CO2 mmol/L 25.0  --  23.0 24.0   BUN mg/dL 12  --  12 17   CREATININE mg/dL 0.80  --  0.86 1.01   GLUCOSE mg/dL 123*  --  80 97   CALCIUM mg/dL 9.2  --  8.1* 8.1*   ALT (SGPT) U/L 20  --   --   --    AST (SGOT) U/L 38  --   --   --    PROBNP pg/mL  --   --   --  1,196.0     Estimated Creatinine Clearance: 64.1 mL/min (by C-G formula based on SCr of 0.8 mg/dL).    Microbiology Results Abnormal     Procedure Component Value - Date/Time    Blood Culture - Blood, Arm, Right [[id number]] Collected:  06/29/20 1931    Lab Status:  Final result Specimen:  Blood from Arm, Right Updated:  07/04/20 2000     Blood Culture No growth at 5 days    Blood Culture - Blood, Arm, Left [[id number]] Collected:  06/29/20 1931    Lab Status:  Final result Specimen:  Blood from Arm, Left Updated:  07/04/20 2000     Blood Culture No growth at 5 days    MRSA Screen, PCR (Inpatient) - Swab, Nares [[id number]]  (Normal) Collected:  06/29/20 2352    Lab Status:  Final result Specimen:  Swab from Nares Updated:  06/30/20 0958     MRSA PCR Negative    Narrative:       MRSA Negative    COVID-19,[facility]-HOUSE, NP SWAB IN TRANSPORT MEDIA 8-12 HR TAT - Swab, Nasopharynx [[id number]]  (Normal) Collected:  06/29/20 2004    Lab Status:  Final result Specimen:  Swab from Nasopharynx Updated:  06/30/20 0305     COVID19 Not Detected    Narrative:       Fact sheet for providers: https://www.fda.gov/media/546321/download     Fact sheet for patients: https://www.fda.gov/media/322262/download    Respiratory Panel, PCR - Swab, Nasopharynx [[id number]]  (Normal) Collected:  06/29/20 2004    Lab Status:   Final result Specimen:  Swab from Nasopharynx Updated:  06/29/20 2129     ADENOVIRUS, PCR Not Detected     Coronavirus 229E Not Detected     Coronavirus HKU1 Not Detected     Coronavirus NL63 Not Detected     Coronavirus OC43 Not Detected     Human Metapneumovirus Not Detected     Human Rhinovirus/Enterovirus Not Detected     Influenza B PCR Not Detected     Parainfluenza Virus 1 Not Detected     Parainfluenza Virus 2 Not Detected     Parainfluenza Virus 3 Not Detected     Parainfluenza Virus 4 Not Detected     Bordetella pertussis pcr Not Detected     Influenza A H1 2009 PCR Not Detected     Chlamydophila pneumoniae PCR Not Detected     Mycoplasma pneumo by PCR Not Detected     Influenza A PCR Not Detected     Influenza A H3 Not Detected     Influenza A H1 Not Detected     RSV, PCR Not Detected     Bordetella parapertussis PCR Not Detected    Narrative:       The coronavirus on the RVP is NOT COVID-19 and is NOT indicative of infection with COVID-19.           Imaging Results (Last 24 Hours)     Procedure Component Value Units Date/Time    XR Chest 1 View [[id number]] Collected:  07/07/20 0848     Updated:  07/07/20 0853    Narrative:       EXAMINATION: XR CHEST 1 VW-      INDICATION: RRT, increased sputum; R50.9-Fever, unspecified;  J96.01-Acute respiratory failure with hypoxia; I47.2-Ventricular  tachycardia; S51.011A-Laceration without foreign body of right elbow,  initial encounter; R13.12-Dysphagia, oropharyngeal phase.      COMPARISON: 06/29/2020.     FINDINGS: Cardiac silhouette within normal limits. Pulmonary vascularity  within normal limits. No focal opacification or consolidation. No  pneumothorax or pleural effusion. Degenerative changes of the spine.           Impression:       No acute cardiopulmonary process specifically no new focal  consolidation of involvement or effusion.     D:  07/07/2020  E:  07/07/2020                Results for orders placed during the hospital encounter of 06/29/20   Adult  Transthoracic Echo Complete W/ Cont if Necessary Per Protocol (With Agitated Saline)    Narrative · Estimated EF = 55%.  · Left ventricular systolic function is normal.  · Mild mitral valve regurgitation is present  · Mild to moderate tricuspid valve regurgitation is present.  · Calculated right ventricular systolic pressure from tricuspid   regurgitation is 34 mmHg.  · No intracardiac shunting is seen  · No cardiac source for embolus is seen          I have reviewed the medications:  Scheduled Meds:    [START ON 7/9/2020] Pharmacy Consult  Does not apply Once   amLODIPine 10 mg Oral Q24H   aspirin 325 mg Oral Q12H   atorvastatin 80 mg Oral Nightly   bisacodyl 10 mg Rectal Daily   donepezil 10 mg Oral Daily   lansoprazole 30 mg Oral QAM   lisinopril 40 mg Oral Q24H   piperacillin-tazobactam 3.375 g Intravenous Q8H   polyethylene glycol 17 g Oral Daily   sodium chloride 10 mL Intravenous Q12H   sodium chloride 10 mL Intravenous Q12H   [START ON 7/8/2020] vancomycin 1,000 mg Intravenous Q24H   vancomycin 1,500 mg Intravenous Once     Continuous Infusions:    lactated ringers 75 mL/hr Last Rate: 75 mL/hr (07/06/20 1251)   niCARdipine 5-15 mg/hr    Pharmacy to dose vancomycin       PRN Meds:.acetaminophen  •  acetaminophen **OR** [DISCONTINUED] acetaminophen  •  bisacodyl  •  labetalol  •  lidocaine PF 1%  •  magnesium sulfate  •  Pharmacy to dose vancomycin  •  potassium chloride  •  potassium chloride  •  potassium chloride  •  sodium chloride  •  sodium chloride    Assessment/Plan   Assessment & Plan     Active Hospital Problems    Diagnosis  POA   • **Fracture of right hip (CMS/[facility]) [S72.001A]  Yes   • Acute febrile illness [R50.9]  Yes   • Dementia (CMS/[facility]) [F03.90]  Yes   • H/O: CVA (no deficit) [Z86.73]  Not Applicable      Resolved Hospital Problems   No resolved problems to display.        Brief Hospital Course to date:  Ken Jasso is a 80 y.o. male with PMH of CVA, HTN, dementia, and GERD who was  admitted on 6/29 after fall while walking to car, sustaining a right femoral neck fracture, there was concern for stroke and code stroke was called. Imaging negative for acute CVA. Had fever up to 103 on arrival and non-sustained wide complex tachycardia requiring admission to the ICU. Now out of ICU and has been stable  On telemetry floor.     This patient's problems and plans were partially entered by my partner and updated as appropriate by me 07/07/20.         Dysphagia with ongoing aspiration events   Sepsis  Lactic acidosis  -MBS resulted with severe pharyngeal dysphagia  -speech recommending NPO, however, prior provider (Lorenza) as well as myself today spoke with Mr. Jasso and he would like to eat, they discussed his code status and he is CODE STATUS: NO CPR/INTUBATION  -- wife aware and agrees with his decision , would like to keep comfort diet ongoing and aware that continued aspirations events are likely  -- RR as above today 7/7 AM-- favored that this is another aspiration event as no other focal source of infection-- fevers noted to as high as 102- CXR reviewed and normal-- will send UA but start on broad spectrum abx and continue for now- if worsening respiratory status will consider CT chest      Right femoral neck fracture  -s/p hip percutaneous pinning on 7/1 by Dr. Harp  -PT/OT - wll need SNF-but needs to participate with PT here, if he continues to do poorly may consider palliative or hospice consult  -follow-up with Dr. Harp in 3 weeks, continue aspirin 325 mg BID for 6 weeks for VTE ppx     HTN- continue amlodipine, lisinopril, and atorvastatin   Dementia-continue aricept        DVT Prophylaxis:  scd    Daily Care Communication  Due to current limited visitation policies, an attempt will be made daily to update patient's identified best point-of-contact(s)   Contact: ishan   Relation: spouse   Type of communication (phone or televideo): on phone    Time of communication: 1140   Notes (if  applicable): updated to events of this morning including RR as well as confirmed code status/wishes to be comfort      Disposition: I expect the patient to be discharged to rehab     CODE STATUS:   Code Status and Medical Interventions:   Ordered at: 07/03/20 0956     Limited Support to NOT Include:    Intubation     Level Of Support Discussed With:    Patient     Code Status:    No CPR     Medical Interventions (Level of Support Prior to Arrest):    Limited         Electronically signed by Lauren Pleitez MD, 07/07/20, 11:51.        Electronically signed by Lauren Pleitez MD at 07/07/20 1157          Consult Notes (last 72 hours) (Notes from 07/06/20 1542 through 07/09/20 1542)      Lena Goldberg APRN at 07/08/20 1123      Consult Orders    1. Inpatient Palliative Care MD Consult [[id number]] ordered by Laurne Pleitez MD at 07/08/20 0927                Palliative Care Initial Consult   Attending Physician: Lauren Pleitez MD  Referring Provider: Dr. Pleitez    Reason for Referral:  hospice referral or discussion and support for patient and family    Code Status:   Code Status and Medical Interventions:   Ordered at: 07/03/20 0956     Limited Support to NOT Include:    Intubation     Level Of Support Discussed With:    Patient     Code Status:    No CPR     Medical Interventions (Level of Support Prior to Arrest):    Limited      Advanced Directives: Advance Directive Status: Patient has advance directive, copy requested   Family/Support: , has 3 children- No family at bedside at time of visit  Goals of Care: TBD. Family meeting set for 11am tomorrow    HPI:   81 yo male with PMH significant for dementia, dysphagia, PNA, Prostate cancer (2004), CVA (2019)- no deficits who initially presented to [facility] on 06/29 after suffering a fall  While ambulating to his car. Work-up revealed R femur fracture and pt was febrile with N/V- COVID-19 negative on 06/29.  There was some concern for stroke r/t left side  weakness, however, CT head and CT perfusion were negative. Patient was initially admitted to ICU with sepsis and wide complex tachycardia. Pt was stable enoough to receive surgery on 07/01 for pinning of hip fracture. Pt stableized and was downgraded out of ICU, and planning for SNF, however, had RRT yesterday am r/t acute respiratory decline. Patient with prior speech eval and known severe pharyngeal dysphagia, however, after GOC discussions pt and family elected a comfort diet. Palliative care was consulted this am to help with GOC discussions and support for pt and family.       ROS: denies SOA, pain, anxiety. Nursing reports discomfort with repositioning, but self resolves. LBM 07/08. Incontinent- required straight cath for U/A yesterday per nursing report. Patient denies hunger, but requesting some cool water-provided. Lunch tray in at end of visit- RN notified to reposition. RN reports pt eats better with assistance.       Past Medical History:   Diagnosis Date   • Dementia (CMS/[facility])    • Dementia (CMS/[facility])    • Dysphagia    • Esophageal dilatation 2017   • Hiatal hernia    • PNA (pneumonia) 2014   • PNA (pneumonia) 2019   • Pneumonia     [place] EPISODES    • Prostate CA (CMS/HCC) 2004   • Stroke (CMS/HCC)     2019     Past Surgical History:   Procedure Laterality Date   • HIP PERCUTANEOUS PINNING Right 7/1/2020    Procedure: HIP PERCUTANEOUS PINNING;  Surgeon: Sam Harp MD;  Location: [facility];  Service: Orthopedics;  Laterality: Right;     Social History     Socioeconomic History   • Marital status:      Spouse name: Not on file   • Number of children: Not on file   • Years of education: Not on file   • Highest education level: Not on file   Tobacco Use   • Smoking status: Former Smoker     Types: Cigarettes   • Smokeless tobacco: Never Used   • Tobacco comment: quit smokin 61 years ago   ; has 3 children-2 girls and a boy, 7 grandchildren; retired , drove livestock-  "traveled in all 50 states.    No Known Allergies    Current medication reviewed for route, type, dose and frequency and are current per MAR at time of dictation.    Palliative Performance Scale Score:   40%  /66   Pulse 75   Temp 97.8 °F (36.6 °C) (Oral)   Resp 16   Ht 177.8 cm (70\")   Wt 61.8 kg (136 lb 4.8 oz)   SpO2 100%   BMI 19.56 kg/m²      Intake/Output Summary (Last 24 hours) at 7/8/2020 1123  Last data filed at 7/8/2020 0751  Gross per 24 hour   Intake 340 ml   Output 200 ml   Net 140 ml       Physical Exam:      General Appearance:    Sleeping, rouses to verbal stimuli, cooperative, NAD, lying in bed HOB elevated   HEENT:    NC/AT, EOMI, anicteric, MMM   Neck:   supple, trachea midline, no JVD   Lungs:     CTA bilat, respirations regular, even and unlabored on RA    Heart:    RRR, normal S1 and S2, no M/R/G   Abdomen:     Normal bowel sounds, soft, non-tender, non-distended   Extremities:   Moves all extremities, no edema, dressing to right hip CDI   Pulses:   Pulses palpable and equal bilaterally   Skin:   Warm, dry   Neurologic:   A/Ox3, cooperative   Psych:   Calm, appropriate         Labs:   Results from last 7 days   Lab Units 07/08/20  0400   WBC 10*3/mm3 21.58*   HEMOGLOBIN g/dL 9.2*   HEMATOCRIT % 28.6*   PLATELETS 10*3/mm3 329     Results from last 7 days   Lab Units 07/08/20  0328   SODIUM mmol/L 139   POTASSIUM mmol/L 2.9*   CHLORIDE mmol/L 102   CO2 mmol/L 25.0   BUN mg/dL 24*   CREATININE mg/dL 0.75*   GLUCOSE mg/dL 104*   CALCIUM mg/dL 8.5*     Results from last 7 days   Lab Units 07/08/20  0328 07/07/20  0825   SODIUM mmol/L 139 136   POTASSIUM mmol/L 2.9* 3.6   CHLORIDE mmol/L 102 98   CO2 mmol/L 25.0 25.0   BUN mg/dL 24* 12   CREATININE mg/dL 0.75* 0.80   CALCIUM mg/dL 8.5* 9.2   BILIRUBIN mg/dL  --  1.5*   ALK PHOS U/L  --  84   ALT (SGPT) U/L  --  20   AST (SGOT) U/L  --  38   GLUCOSE mg/dL 104* 123*     Imaging Results (Last 72 Hours)     Procedure Component Value Units " Date/Time    XR Chest 1 View [[id number]] Collected:  07/07/20 0848     Updated:  07/07/20 0853    Narrative:       EXAMINATION: XR CHEST 1 VW-      INDICATION: RRT, increased sputum; R50.9-Fever, unspecified;  J96.01-Acute respiratory failure with hypoxia; I47.2-Ventricular  tachycardia; S51.011A-Laceration without foreign body of right elbow,  initial encounter; R13.12-Dysphagia, oropharyngeal phase.      COMPARISON: 06/29/2020.     FINDINGS: Cardiac silhouette within normal limits. Pulmonary vascularity  within normal limits. No focal opacification or consolidation. No  pneumothorax or pleural effusion. Degenerative changes of the spine.           Impression:       No acute cardiopulmonary process specifically no new focal  consolidation of involvement or effusion.     D:  07/07/2020  E:  07/07/2020          FL C Arm During Surgery [[id number]] Collected:  07/02/20 0835     Updated:  07/06/20 0857    Narrative:       EXAMINATION: FL C ARM DURING SURGERY- 07/01/2020     INDICATION: hip pinning; R50.9-Fever, unspecified; J96.01-Acute  respiratory failure with hypoxia; I47.2-Ventricular tachycardia;  S51.011A-Laceration without foreign body of right elbow, initial  encounter      TECHNIQUE: Intraoperative fluoroscopy for improved localization and  treatment planning     COMPARISON: NONE     FINDINGS: Intraoperative fluoroscopy with total fluoroscopic time usage  2 minutes 12 seconds and 16 representative images saved during right hip  pinning.       Impression:       Intraoperative fluoroscopy utilized during right hip  pinning.     D:  07/02/2020  E:  07/02/2020         This report was finalized on 7/6/2020 8:54 AM by Dr. Manoj Elizondo.             Diagnostics: Reviewed    A:   Patient Active Problem List   Diagnosis   • Acute febrile illness   • Fracture of right hip (CMS/HCC)   • Dementia (CMS/HCC)   • H/O: CVA (no deficit)         S/S:   1. MSK Pain- right femoral neck fx s/p fall 6/29 and percutaneous pinning  "07/01   - tylenol 650mg q4h PRN  2. Debility  - PT/OT following  3. Dysphagia  - Currently on comfort diet       P:  Had conversation with patient at bedside explained palliative care and reasoning for consult. Had discussion regarding his GOC and current condition. He confirms DNR/DNI status and states \"I've lived a good life\". He verbalizes he is tired of being poked and prodded, and would rather be at home. Briefly discussed hospice services and he verbalizes \"that sounds pretty good\". Called patient's wife Kerri at [phone] and briefly reviewed my conversation with patient and she verbalizes that what he said \"sounds about right\". She verbalizes understanding of his current condition in particular his dysphagia and that it is likely to be a recurring issue, she would like us to help facilitate a GOC conversation when they both can be present. Meeting set up for 11am tomorrow. Updated Dr. Pleitez and bedside RN. Currently patient reports overall comfort and only request was for some cool water which was provided. Very sweet patient. Thank you for this consult and allowing us to participate in patient's plan of care. Palliative Care Team will continue to follow patient.     Time spent:60 minutes spent reviewing medical and medication records, assessing and examining patient, discussing with patient/family, answering questions, providing some guidance about a plan and documentation of care, and coordinating care with other healthcare members, with > 50% time spent face to face.     Lena Goldberg, APRN  7/8/2020      EMR Dragon/Transcription disclaimer:  Much of this encounter note is an electronic transcription/translation of spoken language to printed text. Electronic translation of spoken language may permit erroneous, or at times, nonsensical words or phrases to be inadvertently transcribed. Although I have reviewed the note for such errors, some may still exist.    Electronically signed by Yusuf, " Lena Karus, APRN at 07/08/20 8451

## 2020-07-09 NOTE — PROGRESS NOTES
Postoperative day #9 from a right hip percutaneous screw fixation    The patient is sitting up in the bed eating his breakfast this morning.  He appears more comfortable.  We had a good discussion this morning    Examination:  No pain with hip logrolling or gentle hip flexion.  His incision is well-appearing EHL FHL tib ant and gastrocsoleus intact    Plan:  Please see prior note for comprehensive plan regarding his postoperative care from an orthopedic perspective.  Remain on aspirin 325 mg twice daily for the next 6 weeks total following surgery.  I will see him back in follow-up 3 weeks following the day of surgery for further evaluation.    He will remain touchdown weightbearing on the right lower extremity with assist devices and assistance as needed.    He is now being followed by palliative care and multiple discussions have been held with his family along the way for the primary team.    Please contact me with any further questions or concerns.

## 2020-07-09 NOTE — PROGRESS NOTES
[facility] Medicine Services  PROGRESS NOTE    Patient Name: Ken Jasso  : 1940  MRN: [id number]    Date of Admission: 2020  Primary Care Physician: Provider, No Known    Subjective   Subjective   CC:  Follow up for dysphagia and right hip fracture     HPI:  Patient sitting up in chair eating lunch in NAD.  There was some confusion about patient being discharged home with hospice today and going to [facility].  Jessica with hospice spoke with wife and had the understanding that patient was going home with home hospice and just put in the hospice consult.  Patient wants to go home. +BM x2.  Wife is in the room    Review of Systems  Gen- No fevers, chills  CV- No chest pain, palpitations  Resp- No cough, dyspnea  GI- No N/V/D, abd pain  All other systems have been reviewed and the pertinent positives and negatives are listed above the HPI or ROS    Objective   Objective   Vital Signs:   Temp:  [97.5 °F (36.4 °C)-98.3 °F (36.8 °C)] 98.3 °F (36.8 °C)  Heart Rate:  [75-87] 87  Resp:  [14-16] 16  BP: (137-153)/(65-92) 149/79  Total (NIH Stroke Scale): 0  Physical Exam:  Constitutional: Alert, WD, WN male in NAD  Eyes: EOMI, sclerae anicteric, no conjunctival injection  Head: NCAT  ENT: [place], moist mucous membranes   Respiratory: Non-labored, symmetrical chest expansion, CTAB  Cardiovascular: RRR, no M/R/G, +DP pulses bilaterally  Gastrointestinal: Soft, NT, ND +BS  Musculoskeletal: OH; no LE edema bilaterally; right thigh incision CDI  Neurologic: Oriented x4, strength symmetric in all extremities, follows all commands, speech clear  Skin: No rashes on exposed skin  Psychiatric: Pleasant and cooperative; normal affect    Results Reviewed:  Results from last 7 days   Lab Units 20  0400 20  0825   WBC 10*3/mm3 21.58* 16.85*   HEMOGLOBIN g/dL 9.2* 12.4*   HEMATOCRIT % 28.6* 39.0   PLATELETS 10*3/mm3 329 442     Results from last 7 days   Lab Units 20  5746  07/07/20  0825 07/03/20  2134   SODIUM mmol/L 139 136  --    POTASSIUM mmol/L 2.9* 3.6 3.7   CHLORIDE mmol/L 102 98  --    CO2 mmol/L 25.0 25.0  --    BUN mg/dL 24* 12  --    CREATININE mg/dL 0.75* 0.80  --    GLUCOSE mg/dL 104* 123*  --    CALCIUM mg/dL 8.5* 9.2  --    ALT (SGPT) U/L  --  20  --    AST (SGOT) U/L  --  38  --      Estimated Creatinine Clearance: 65.8 mL/min (A) (by C-G formula based on SCr of 0.75 mg/dL (L)).    Microbiology Results Abnormal     Procedure Component Value - Date/Time    Blood Culture - Blood, Arm, Right [[id number]] Collected:  06/29/20 1931    Lab Status:  Final result Specimen:  Blood from Arm, Right Updated:  07/04/20 2000     Blood Culture No growth at 5 days    Blood Culture - Blood, Arm, Left [[id number]] Collected:  06/29/20 1931    Lab Status:  Final result Specimen:  Blood from Arm, Left Updated:  07/04/20 2000     Blood Culture No growth at 5 days    MRSA Screen, PCR (Inpatient) - Swab, Nares [[id number]]  (Normal) Collected:  06/29/20 2352    Lab Status:  Final result Specimen:  Swab from Nares Updated:  06/30/20 0958     MRSA PCR Negative    Narrative:       MRSA Negative    COVID-19,[facility] IN-HOUSE, NP SWAB IN TRANSPORT MEDIA 8-12 HR TAT - Swab, Nasopharynx [[id number]]  (Normal) Collected:  06/29/20 2004    Lab Status:  Final result Specimen:  Swab from Nasopharynx Updated:  06/30/20 0305     COVID19 Not Detected    Narrative:       Fact sheet for providers: https://www.fda.gov/media/438899/download     Fact sheet for patients: https://www.fda.gov/media/577096/download    Respiratory Panel, PCR - Swab, Nasopharynx [[id number]]  (Normal) Collected:  06/29/20 2004    Lab Status:  Final result Specimen:  Swab from Nasopharynx Updated:  06/29/20 2129     ADENOVIRUS, PCR Not Detected     Coronavirus 229E Not Detected     Coronavirus HKU1 Not Detected     Coronavirus NL63 Not Detected     Coronavirus OC43 Not Detected     Human Metapneumovirus Not Detected     Human  Rhinovirus/Enterovirus Not Detected     Influenza B PCR Not Detected     Parainfluenza Virus 1 Not Detected     Parainfluenza Virus 2 Not Detected     Parainfluenza Virus 3 Not Detected     Parainfluenza Virus 4 Not Detected     Bordetella pertussis pcr Not Detected     Influenza A H1 2009 PCR Not Detected     Chlamydophila pneumoniae PCR Not Detected     Mycoplasma pneumo by PCR Not Detected     Influenza A PCR Not Detected     Influenza A H3 Not Detected     Influenza A H1 Not Detected     RSV, PCR Not Detected     Bordetella parapertussis PCR Not Detected    Narrative:       The coronavirus on the RVP is NOT COVID-19 and is NOT indicative of infection with COVID-19.           Imaging Results (Last 24 Hours)     Procedure Component Value Units Date/Time    XR Chest 1 View [[id number]] Collected:  07/07/20 0848     Updated:  07/08/20 1757    Narrative:       EXAMINATION: XR CHEST 1 VW-      INDICATION: RRT, increased sputum; R50.9-Fever, unspecified;  J96.01-Acute respiratory failure with hypoxia; I47.2-Ventricular  tachycardia; S51.011A-Laceration without foreign body of right elbow,  initial encounter; R13.12-Dysphagia, oropharyngeal phase.      COMPARISON: 06/29/2020.     FINDINGS: Cardiac silhouette within normal limits. Pulmonary vascularity  within normal limits. No focal opacification or consolidation. No  pneumothorax or pleural effusion. Degenerative changes of the spine.           Impression:       No acute cardiopulmonary process specifically no new focal  consolidation of involvement or effusion.     D:  07/07/2020  E:  07/07/2020     This report was finalized on 7/8/2020 5:54 PM by Dr. Manoj Elizondo.             Results for orders placed during the hospital encounter of 06/29/20   Adult Transthoracic Echo Complete W/ Cont if Necessary Per Protocol (With Agitated Saline)    Narrative · Estimated EF = 55%.  · Left ventricular systolic function is normal.  · Mild mitral valve regurgitation is present  ·  Mild to moderate tricuspid valve regurgitation is present.  · Calculated right ventricular systolic pressure from tricuspid   regurgitation is 34 mmHg.  · No intracardiac shunting is seen  · No cardiac source for embolus is seen          I have reviewed the medications:  Scheduled Meds:    Pharmacy Consult  Does not apply Once   amLODIPine 10 mg Oral Q24H   aspirin 325 mg Oral Q12H   atorvastatin 80 mg Oral Nightly   bisacodyl 10 mg Rectal Daily   donepezil 10 mg Oral Daily   lansoprazole 30 mg Oral QAM   lisinopril 40 mg Oral Q24H   piperacillin-tazobactam 3.375 g Intravenous Q8H   polyethylene glycol 17 g Oral Daily   sodium chloride 10 mL Intravenous Q12H   sodium chloride 10 mL Intravenous Q12H   vancomycin 1,000 mg Intravenous Q24H     Continuous Infusions:    lactated ringers 75 mL/hr Last Rate: 75 mL/hr (07/06/20 1251)   niCARdipine 5-15 mg/hr    Pharmacy to dose vancomycin       PRN Meds:.acetaminophen  •  acetaminophen **OR** [DISCONTINUED] acetaminophen  •  bisacodyl  •  labetalol  •  lidocaine PF 1%  •  magnesium sulfate  •  Pharmacy to dose vancomycin  •  potassium chloride  •  potassium chloride  •  potassium chloride  •  sodium chloride  •  sodium chloride    Assessment/Plan   Assessment & Plan     Active Hospital Problems    Diagnosis  POA   • **Fracture of right hip (CMS/HCC) [S72.001A]  Yes   • Acute febrile illness [R50.9]  Yes   • Dementia (CMS/HCC) [F03.90]  Yes   • H/O: CVA (no deficit) [Z86.73]  Not Applicable      Resolved Hospital Problems   No resolved problems to display.     Brief Hospital Course to date:  Ken Jasso is a 80 y.o. male with PMH of CVA, HTN, dementia, and GERD who was admitted on 6/29 after fall while walking to car, sustaining a right femoral neck fracture, there was concern for stroke and code stroke was called. Imaging negative for acute CVA. Had fever up to 103 on arrival and non-sustained wide complex tachycardia requiring admission to the ICU. Now out of ICU  and has been stable  On telemetry floor.     These problems are new to me today    This patient's problems and plans were partially entered by my partner and updated as appropriate by me 07/09/20.    Dysphagia with ongoing aspiration events   Sepsis  Lactic acidosis  -MBS resulted with severe pharyngeal dysphagia  -speech recommending NPO, however, prior provider (Lorenza) as well as myself spoke with Mr. Jasso and he would like to eat, they discussed his code status and he is CODE STATUS: NO CPR/INTUBATION  -- wife aware and agrees with his decision , would like to keep comfort diet ongoing and aware that continued aspirations events are likely  -- RR 7/7 AM-- favored that this is another aspiration event as no other focal source of infection-- fevers noted to as high as 102- CXR reviewed and normal-- will send UA but start on broad spectrum abx and continue for now- given worsening leukocytosis would like to send blood cultures which have been ordered- patient refusing these and long d/w him regarding GOC. Ultimately we have asked palliative to see and have d/w them. Plan for palliative meeting with patient/wife tomorrow at 11am to clarify ongoing goals and possible hospice referral. Continue IV abx for now    Hypokalemia  -noted at 2.9 today-- replace per protocol      Right femoral neck fracture  -s/p hip percutaneous pinning on 7/1 by Dr. Harp  -PT/OT - wll need SNF-but needs to participate with PT here, if he continues to do poorly may consider palliative or hospice consult  -follow-up with Dr. Harp in 3 weeks, continue aspirin 325 mg BID for 6 weeks for VTE ppx     HTN- continue amlodipine, lisinopril, and atorvastatin     Dementia-continue aricept      DVT Prophylaxis:  SCD    Disposition: I expect the patient to be discharged Home with Hospice; Hospice order placed today, but wife needs to get people to help her because she states that she cannot take care of him herself    CODE STATUS:   Code  Status and Medical Interventions:   Ordered at: 07/03/20 0956     Limited Support to NOT Include:    Intubation     Level Of Support Discussed With:    Patient     Code Status:    No CPR     Medical Interventions (Level of Support Prior to Arrest):    Limited     More than 50% of time spent counseling on current illness and plan of care. Case discussed with: Patient, patient's wife, , hospice  Total time spent face to face with the patient was 25 minutes.  Total time of the encounter was 35 minutes.      Electronically signed by DAVID Andrews, 07/09/20, 08:03.

## 2020-07-09 NOTE — THERAPY TREATMENT NOTE
Patient Name: Ken Jasso  : 1940    MRN: [id number]                              Today's Date: 2020       Admit Date: 2020    Visit Dx:     ICD-10-CM ICD-9-CM   1. Acute febrile illness R50.9 780.60   2. Acute respiratory failure with hypoxia (CMS/[facility]) J96.01 518.81   3. Ventricular tachycardia (CMS/[facility]) I47.2 427.1   4. Skin tear of right elbow without complication, initial encounter S51.011A 881.01   5. Oropharyngeal dysphagia R13.12 787.22     Patient Active Problem List   Diagnosis   • Acute febrile illness   • Fracture of right hip (CMS/HCC)   • Dementia (CMS/HCC)   • H/O: CVA (no deficit)     Past Medical History:   Diagnosis Date   • Dementia (CMS/HCC)    • Dementia (CMS/HCC)    • Dysphagia    • Esophageal dilatation    • Hiatal hernia    • PNA (pneumonia)    • PNA (pneumonia)    • Pneumonia     [facility] EPISODES    • Prostate CA (CMS/HCC)    • Stroke (CMS/HCC)          Past Surgical History:   Procedure Laterality Date   • HIP PERCUTANEOUS PINNING Right 2020    Procedure: HIP PERCUTANEOUS PINNING;  Surgeon: Sam Harp MD;  Location: [facility];  Service: Orthopedics;  Laterality: Right;     General Information     Row Name 20          PT Evaluation Time/Intention    Document Type  therapy note (daily note)  -AA     Mode of Treatment  physical therapy  -AA     Row Name 20          General Information    Patient Profile Reviewed?  yes  -AA     Existing Precautions/Restrictions  fall;weight bearing TTWB RLE, dementia  -AA     Row Name 20          Cognitive Assessment/Intervention- PT/OT    Orientation Status (Cognition)  oriented to;person;place  -AA     Row Name 20          Safety Issues, Functional Mobility    Impairments Affecting Function (Mobility)  balance;cognition;endurance/activity tolerance;pain;range of motion (ROM);strength  -AA       User Key  (r) = Recorded By, (t) = Taken By, (c) = Cosigned By     Initials Name Provider Type    Micaela Pickering, PT Physical Therapist        Mobility     Row Name 07/09/20 0810          Bed Mobility Assessment/Treatment    Bed Mobility Assessment/Treatment  scooting/bridging;supine-sit  -AA     Scooting/Bridging [place] (Bed Mobility)  maximum assist (25% patient effort);verbal cues  -AA     Supine-Sit [place] (Bed Mobility)  maximum assist (25% patient effort);2 person assist;verbal cues  -AA     Assistive Device (Bed Mobility)  bed rails;draw sheet;head of bed elevated  -AA     Comment (Bed Mobility)  Pt demonstrate ability to advance BLE to EOB, A to advance off EOB  -AA     Row Name 07/09/20 0810          Transfer Assessment/Treatment    Comment (Transfers)  Pt able to maintain TTWB RLE with PT placed foot under pt foot; continues to demonstrate significant trunk flexion during transfers and unable to correct  -AA     Row Name 07/09/20 0810          Bed-Chair Transfer    Bed-Chair [place] (Transfers)  maximum assist (25% patient effort);2 person assist;verbal cues  -AA     Assistive Device (Bed-Chair Transfers)  other (see comments) BUE support and gait belt  -AA     Row Name 07/09/20 0810          Sit-Stand Transfer    Sit-Stand [place] (Transfers)  maximum assist (25% patient effort);2 person assist;verbal cues  -AA     Assistive Device (Sit-Stand Transfers)  other (see comments) BUE support and gait belt  -AA     Row Name 07/09/20 0810          Gait/Stairs Assessment/Training    Comment (Gait/Stairs)  pt unable  -AA     Row Name 07/09/20 0810          Mobility Assessment/Intervention    Extremity Weight-bearing Status  right lower extremity  -AA     Right Lower Extremity (Weight-bearing Status)  toe touch weight-bearing (TTWB)  -AA       User Key  (r) = Recorded By, (t) = Taken By, (c) = Cosigned By    Initials Name Provider Type    Micaela Pickering PT Physical Therapist        Obj/Interventions     Row Name 07/09/20 0810          Therapeutic  Exercise    Lower Extremity (Therapeutic Exercise)  gluteal sets;heel slides, bilateral;LAQ (long arc quad), bilateral;marching while seated;quad sets, bilateral;SLR (straight leg raise), bilateral  -AA     Lower Extremity Range of Motion (Therapeutic Exercise)  hip abduction/adduction, bilateral;ankle dorsiflexion/plantar flexion, bilateral  -AA     Exercise Type (Therapeutic Exercise)  AROM (active range of motion);AAROM (active assistive range of motion)  -AA     Position (Therapeutic Exercise)  seated  -AA     Sets/Reps (Therapeutic Exercise)  15  -AA     Row Name 07/09/20 0810          Static Sitting Balance    Level of [place] (Unsupported Sitting, Static Balance)  contact guard assist  -AA     Sitting Position (Unsupported Sitting, Static Balance)  sitting on edge of bed  -AA     Row Name 07/09/20 0810          Static Standing Balance    Level of [place] (Supported Standing, Static Balance)  maximal assist, 25 to 49% patient effort;2 person assist  -AA     Assistive Device Utilized (Supported Standing, Static Balance)  other (see comments) BUe support and gait belt  -AA     Row Name 07/09/20 0810          Dynamic Standing Balance    Level of [place], Reaches Outside Midline (Standing, Dynamic Balance)  maximal assist, 25 to 49% patient effort;2 person assist  -AA     Time Able to Maintain Position, Reaches Outside Midline (Standing, Dynamic Balance)  less than 15 seconds  -AA     Assistive Device Utilized (Supported Standing, Dynamic Balance)  other (see comments) BUe support and gait belt  -AA       User Key  (r) = Recorded By, (t) = Taken By, (c) = Cosigned By    Initials Name Provider Type    Micaela Pickering PT Physical Therapist        Goals/Plan    No documentation.       Clinical Impression     Row Name 07/09/20 0810          Pain Assessment    Additional Documentation  Pain Scale: Numbers Pre/Post-Treatment (Group)  -AA     Row Name 07/09/20 0810          Pain Scale: Numbers  Pre/Post-Treatment    Pain Scale: Numbers, Pretreatment  0/10 - no pain  -AA     Pain Scale: Numbers, Post-Treatment  0/10 - no pain  -AA     Pain Intervention(s)  Repositioned;Ambulation/increased activity  -AA     Row Name 07/09/20 0810          Plan of Care Review    Plan of Care Reviewed With  patient  -AA     Progress  improving  -AA     Row Name 07/09/20 0810          Positioning and Restraints    Pre-Treatment Position  in bed  -AA     Post Treatment Position  chair  -AA     In Chair  notified nsg;exit alarm on;waffle cushion;reclined;on mechanical lift sling;with OT;call light within reach;encouraged to call for assist SCDs  -AA       User Key  (r) = Recorded By, (t) = Taken By, (c) = Cosigned By    Initials Name Provider Type    Micaela Pickering PT Physical Therapist        Outcome Measures     Row Name 07/09/20 0810          How much help from another person do you currently need...    Turning from your back to your side while in flat bed without using bedrails?  2  -AA     Moving from lying on back to sitting on the side of a flat bed without bedrails?  2  -AA     Moving to and from a bed to a chair (including a wheelchair)?  2  -AA     Standing up from a chair using your arms (e.g., wheelchair, bedside chair)?  2  -AA     Climbing 3-5 steps with a railing?  1  -AA     To walk in hospital room?  1  -AA     AM-PAC 6 Clicks Score (PT)  10  -AA     Row Name 07/09/20 0810          Functional Assessment    Outcome Measure Options  AM-PAC 6 Clicks Basic Mobility (PT)  -AA       User Key  (r) = Recorded By, (t) = Taken By, (c) = Cosigned By    Initials Name Provider Type    Micaela Pickering PT Physical Therapist        Physical Therapy Education                 Title: PT OT SLP Therapies (In Progress)     Topic: Physical Therapy (In Progress)     Point: Mobility training (In Progress)     Description:   Instruct learner(s) on safety and technique for assisting patient out of bed, chair or wheelchair.   Instruct in the proper use of assistive devices, such as walker, crutches, cane or brace.              Patient Friendly Description:   It's important to get you on your feet again, but we need to do so in a way that is safe for you. Falling has serious consequences, and your personal safety is the most important thing of all.        When it's time to get out of bed, one of us or a family member will sit next to you on the bed to give you support.     If your doctor or nurse tells you to use a walker, crutches, a cane, or a brace, be sure you use it every time you get out of bed, even if you think you don't need it.    Learning Progress Summary           Patient Acceptance, E,D, NR by AA at 7/9/2020 0810    Acceptance, E, VU,NR by LIEN at 7/8/2020 1413    Comment:  Pt educated about transfer strategies and surgical precautions.    Acceptance, E,D, NR by AA at 7/6/2020 0827    Acceptance, E, NR by NS at 7/4/2020 1601    Comment:  Patient was educated about WBing precautions and sequencing with transfers.    Acceptance, E, NR by ERICA at 7/3/2020 1154    Acceptance, E,D, NR by AA at 7/2/2020 1447   Family Acceptance, E,D, NR by AA at 7/2/2020 1447                   Point: Home exercise program (In Progress)     Description:   Instruct learner(s) on appropriate technique for monitoring, assisting and/or progressing patient with therapeutic exercises and activities.              Learning Progress Summary           Patient Acceptance, E,D, NR by AA at 7/9/2020 0810    Acceptance, E, VU,NR by LIEN at 7/8/2020 1413    Comment:  Pt educated about transfer strategies and surgical precautions.    Acceptance, E,D, NR by AA at 7/6/2020 0827    Acceptance, E, NR by NS at 7/4/2020 1601    Comment:  Patient was educated about WBing precautions and sequencing with transfers.                   Point: Body mechanics (In Progress)     Description:   Instruct learner(s) on proper positioning and spine alignment for patient and/or caregiver  during mobility tasks and/or exercises.              Learning Progress Summary           Patient Acceptance, E,D, NR by AA at 7/9/2020 0810    Acceptance, E, VU,NR by LIEN at 7/8/2020 1413    Comment:  Pt educated about transfer strategies and surgical precautions.    Acceptance, E,D, NR by AA at 7/6/2020 0827    Acceptance, E, NR by NS at 7/4/2020 1601    Comment:  Patient was educated about WBing precautions and sequencing with transfers.    Acceptance, E, NR by ERICA at 7/3/2020 1154    Acceptance, E,D, NR by AA at 7/2/2020 1447   Family Acceptance, E,D, NR by AA at 7/2/2020 1447                   Point: Precautions (In Progress)     Description:   Instruct learner(s) on prescribed precautions during mobility and gait tasks              Learning Progress Summary           Patient Acceptance, E,D, NR by AA at 7/9/2020 0810    Acceptance, E, VU,NR by LIEN at 7/8/2020 1413    Comment:  Pt educated about transfer strategies and surgical precautions.    Acceptance, E,D, NR by AA at 7/6/2020 0827    Acceptance, E, NR by NS at 7/4/2020 1601    Comment:  Patient was educated about WBing precautions and sequencing with transfers.    Acceptance, E, NR by ERICA at 7/3/2020 1154    Acceptance, E,D, NR by AA at 7/2/2020 1447   Family Acceptance, E,D, NR by AA at 7/2/2020 1447                               User Key     Initials Effective Dates Name Provider Type Discipline     11/20/18 -  Karine Mcmahon, PT Physical Therapist PT    AA 04/02/18 -  Micaela Tate, PT Physical Therapist PT    NS 09/10/19 -  Aissatou Díaz, PT Physical Therapist PT    J 05/14/20 -  Pat Baugh, PT Student PT Student PT              PT Recommendation and Plan  Planned Therapy Interventions (PT Eval): balance training, bed mobility training, gait training, home exercise program, patient/family education, strengthening, transfer training  Outcome Summary/Treatment Plan (PT)  Anticipated Discharge Disposition (PT): skilled nursing facility  Plan of  Care Reviewed With: patient  Progress: improving  Outcome Summary: Pt alert, no pain, and Ox2.  Pt continue to require max A of 2 for all bed mobility and transfers.  Pt did maintain TTWB RLE with PT placing foot under his foot.  Continued significant trunk flexion during transfers.  HEP reviewed with no pain.  Recommend DC to SNF when appropriate     Time Calculation:   PT Charges     Row Name 07/09/20 0810             Time Calculation    Start Time  0810  -AA      PT Received On  07/09/20  -AA      PT Goal Re-Cert Due Date  07/12/20  -AA         Time Calculation- PT    Total Timed Code Minutes- PT  24 minute(s)  -AA         Timed Charges    86706 - PT Therapeutic Exercise Minutes  10  -AA      10964 - PT Therapeutic Activity Minutes  14  -AA        User Key  (r) = Recorded By, (t) = Taken By, (c) = Cosigned By    Initials Name Provider Type    Micaela Pickering, DANIEL Physical Therapist        Therapy Charges for Today     Code Description Service Date Service Provider Modifiers Qty    73842615825 HC PT THER PROC EA 15 MIN 7/9/2020 Micaela Tate, PT GP 1    35227874392 HC PT THERAPEUTIC ACT EA 15 MIN 7/9/2020 Micaela Tate, PT GP 1          PT G-Codes  Outcome Measure Options: AM-PAC 6 Clicks Daily Activity (OT)  AM-PAC 6 Clicks Score (PT): 10  AM-PAC 6 Clicks Score (OT): 14    Micaela Tate PT  7/9/2020

## 2020-07-09 NOTE — PLAN OF CARE
Problem: Fall Risk (Adult)  Goal: Absence of Fall  Flowsheets (Taken 7/7/2020 1821 by Alfred Loyd RN)  Absence of Fall: making progress toward outcome     Problem: Patient Care Overview  Goal: Plan of Care Review  Flowsheets  Taken 7/8/2020 1831 by Alfred Loyd RN  Progress: improving  Taken 7/8/2020 2000 by Lisandro Chavez, RN  Plan of Care Reviewed With: patient  Taken 7/9/2020 0342 by Lisandro Chavez RN  Outcome Summary: Pt rested well during night, denied pain to staff. Was unable to tolerate PO medications, began to cough and was unable to swollow with thickened liquids. IV had infiltrated on dayshift but staff was unable to place. Nightshift attempted several times using doppler but was also unsuccessful. Pt declined further IV placement attempts.     Problem: Skin Injury Risk (Adult)  Goal: Skin Health and Integrity  Flowsheets (Taken 7/2/2020 1617 by Yamilex Chau, RN)  Skin Health and Integrity: making progress toward outcome     Problem: Dysphagia (Adult)  Goal: Functional/Safe Swallow  Flowsheets (Taken 7/2/2020 1617 by Yamilex Chau, RN)  Functional/Safe Swallow: unable to achieve outcome  Goal: Compensatory Techniques to Improve Safety/Function with Swallowing  Flowsheets (Taken 7/2/2020 1617 by Yamilex Chau, RN)  Compensatory Techniques to Improve Safety/Function with Swallowing: unable to achieve outcome

## 2020-07-09 NOTE — PROGRESS NOTES
"                  Clinical Nutrition     Reason for Visit:   Follow-up protocol    Patient Name: Ken Jasso  YOB: 1940  MRN: [id number]  Date of Encounter: 07/09/20 10:16  Admission date: 6/29/2020     Nutrition Assessment   Assessment     Admission diagnosis  R femur fracture s/p fall    Additional diagnosis/conditions/procedures this admission  R/O CVA  (7/1) s/p R hip percutaneous pinning  Fever  (7/7) RRT - hypoxia, fever  Lactic acidosis  Sepsis  Ongoing aspiration events  Hypokalemia    (6/30) SLP eval - mechanical soft with no mixed consistencies, nectar thick liquids  (7/2) SLP eval - puree with some mashed, nectar thick liquids  (7/2) SLP MBS - Safest option is NPO w/ alternative means of nutrition. Given advanced age + hx dementia, may consider comfort diet, pending POC. Discussed w/ Dr. Britt who plans to speak w/ family. If comfort diet desired, consider dysphagia level III (puree/mashed) diet w/ allowance of soft solids, as tolerated, and thin vs nectar-thick liquids, as tolerated.    Additional PMH/procedures:  Esophageal dilation  Dysphagia  Hiatal hernia  Pneumonia  Dementia  CVA    Reported/Observed/Food/Nutrition Related History:      Patient with believed ongoing aspiration events. On Comfort diet per family/GOC request. Hospice plan for family meeting today at 11 am. Patient wanting to eat, does better with assistance per RN.    Anthropometrics     Height: 177.8 cm (70\")  Last filed wt: Weight: 64.2 kg (141 lb 8 oz) (07/09/20 0549)  Weight Method: Bed scale    BMI: BMI (Calculated): 20.3  Normal: 18.5-24.9kg/m2    Ideal Body Weight (IBW) (kg): 76.48  Admission wt: 160 lb  Method obtained: estimated weight per charting 6/29    Labs reviewed     Results from last 7 days   Lab Units 07/08/20  0328 07/07/20  0825 07/03/20  2134   GLUCOSE mg/dL 104* 123*  --    BUN mg/dL 24* 12  --    CREATININE mg/dL 0.75* 0.80  --    SODIUM mmol/L 139 136  --    CHLORIDE mmol/L 102 98  --  "   POTASSIUM mmol/L 2.9* 3.6 3.7   PHOSPHORUS mg/dL 2.8  --   --    MAGNESIUM mg/dL  --  2.1  --    ALT (SGPT) U/L  --  20  --      Results from last 7 days   Lab Units 07/07/20  0825   ALBUMIN g/dL 3.10*       Results from last 7 days   Lab Units 07/07/20  0815   GLUCOSE mg/dL 110     Lab Results   Lab Value Date/Time    HGBA1C 5.60 06/30/2020 0605       Medications reviewed   Pertinent: IV zosyn, dulcolax, aricept, miralax, IV vancocin    Intake/Output 24 hrs (7:00AM - 6:59 AM)     Intake & Output (last day)       07/08 0701 - 07/09 0700 07/09 0701 - 07/10 0700    P.O. 480 240    IV Piggyback      Total Intake(mL/kg) 480 (7.5) 240 (3.7)    Urine (mL/kg/hr) 375 (0.2)     Stool      Total Output 375     Net +105 +240          Urine Unmeasured Occurrence 1 x     Stool Unmeasured Occurrence 2 x         Current Nutrition Prescription     PO: Diet Dysphagia; III - Pureed With Some Mashed; Nectar / Syrup Thick   Oral Nutrition Supplement: Magic Cup x2/d  Intake: 32% x 7 meals    Nutrition Diagnosis     7/6 updated 7/9  Problem Inadequate oral intake   Etiology Dysphagia modifications / comfort diet / clinical status   Signs/Symptoms PO Intake (32% x 7 meals)   Status: ongoing    6/30 updated 7/2, 7/6, 7/9  Problem Swallowing difficulty   Etiology Dysphagia   Signs/Symptoms SLP eval - 7/2, puree/mashed, NTL   Status: ongoing    Nutrition Intervention   1.  Follow treatment progress, Care plan reviewed  2.  Cont to send Magic Cup supplement for increased kcal/protein needs  3. Patient opted for comfort diet at this time; declined aggressive nutrition interventions.  4.  staff to honor food preferences as able within comfort diet modifications    Goal:   General: Nutrition support treatment / Home with hospice  PO: Comfort diet; Honor food preferences      Monitoring/Evaluation:   Per protocol, PO intake, Supplement intake, Symptoms, POC/GOC, Swallow function    Will Continue to follow per protocol    Carolina COTTER  EDDIE Farrell, LD  Time Spent: 25 minutes

## 2020-07-09 NOTE — PLAN OF CARE
Problem: Patient Care Overview  Goal: Plan of Care Review  Outcome: Ongoing (interventions implemented as appropriate)  Flowsheets (Taken 7/9/2020 5471)  Progress: improving  Plan of Care Reviewed With: patient  Note:   SLP treatment completed. Will continue to address dysphagia - comfort diet. We will follow up and make adjustments as needed. Pt desires thin liquids and appeared to comfortably tolerate at the bedside. Pt aware of risks. Will change diet to L4, thins. Please see note for further details and recommendations.

## 2020-07-09 NOTE — PLAN OF CARE
Problem: Patient Care Overview  Goal: Plan of Care Review  Flowsheets  Taken 7/9/2020 0812  Progress: improving  Plan of Care Reviewed With: patient  Taken 7/9/2020 0911  Outcome Summary: Pt alert, Ox2 and no c/o pain. He completed bed mobility with max assist x2, transfer training with max assist x2 yet improved ability to maintain TTWB RLE. He completed UB dressing with mod assist, grooming with supervision and self-feeding with supervision. Recommend SNF-level rehab.

## 2020-07-09 NOTE — THERAPY TREATMENT NOTE
Acute Care - Speech Language Pathology   Swallow Treatment Note [facility]     Patient Name: Ken Jasso  : 1940  MRN: [id number]  Today's Date: 2020               Admit Date: 2020    Visit Dx:      ICD-10-CM ICD-9-CM   1. Acute febrile illness R50.9 780.60   2. Acute respiratory failure with hypoxia (CMS/[facility]) J96.01 518.81   3. Ventricular tachycardia (CMS/[facility]) I47.2 427.1   4. Skin tear of right elbow without complication, initial encounter S51.011A 881.01   5. Oropharyngeal dysphagia R13.12 787.22     Patient Active Problem List   Diagnosis   • Acute febrile illness   • Fracture of right hip (CMS/[facility])   • Dementia (CMS/[facility])   • H/O: CVA (no deficit)       Therapy Treatment  Rehabilitation Treatment Summary     Row Name 20 1401 20 0812          Treatment Time/Intention    Discipline  speech language pathologist  -AW  occupational therapist  -AR     Document Type  therapy note (daily note)  -AW  therapy note (daily note)  -AR     Subjective Information  no complaints  -AW  no complaints  -AR     Mode of Treatment  speech-language pathology  -AW  --     Patient/Family Observations  no family present  -AW  --     Patient Effort  good  -AW  --     Existing Precautions/Restrictions  --  (S) fall;weight bearing TTWB RLE, dementia  -AR     Recorded by [AW] Macie Fatima MS CCC-SLP 20 1448 [AR] Mallory Coleman, OT 20 0835     Row Name 20 0812             Vital Signs    Pre Patient Position  Supine  -AR      Intra Patient Position  Standing  -AR      Post Patient Position  Sitting  -AR      Recorded by [AR] Mallory Coleman, OT 20 0857      Row Name 20 0812             Cognitive Assessment/Intervention- PT/OT    Affect/Mental Status (Cognitive)  WFL  -AR      Orientation Status (Cognition)  oriented to;person;place  -AR      Follows Commands (Cognition)  WFL  -AR      Cognitive Function (Cognitive)  memory deficit;safety deficit  -AR      Memory  Deficit (Cognitive)  mild deficit  -AR      Safety Deficit (Cognitive)  moderate deficit  -AR      Recorded by [AR] Mallory Coleman, OT 07/09/20 0835      Row Name 07/09/20 0812             Safety Issues, Functional Mobility    Safety Issues Affecting Function (Mobility)  safety precaution awareness;safety precautions follow-through/compliance;sequencing abilities  -AR      Impairments Affecting Function (Mobility)  balance;cognition;endurance/activity tolerance;pain;range of motion (ROM);strength  -AR      Recorded by [AR] Mallory Coleman, OT 07/09/20 0835      Row Name 07/09/20 0812             Mobility Assessment/Intervention    Extremity Weight-bearing Status  right lower extremity  -AR      Right Lower Extremity (Weight-bearing Status)  (S) toe touch weight-bearing (TTWB)  -AR      Recorded by [AR] Mallory Coleman, OT 07/09/20 0835      Row Name 07/09/20 0812             Bed Mobility Assessment/Treatment    Bed Mobility Assessment/Treatment  scooting/bridging;supine-sit  -AR      Scooting/Bridging [place] (Bed Mobility)  maximum assist (25% patient effort);verbal cues  -AR      Supine-Sit [place] (Bed Mobility)  maximum assist (25% patient effort);2 person assist;verbal cues  -AR      Bed Mobility, Safety Issues  decreased use of legs for bridging/pushing;unable to safely maintain weight bearing restrictions  -AR      Assistive Device (Bed Mobility)  bed rails;draw sheet;head of bed elevated  -AR      Recorded by [AR] Mallory Coleman, OT 07/09/20 0857      Row Name 07/09/20 0812             Functional Mobility    Functional Mobility- Ind. Level  unable to perform  -AR      Recorded by [AR] Mallory Coleman, OT 07/09/20 0857      Row Name 07/09/20 0812             Transfer Assessment/Treatment    Transfer Assessment/Treatment  sit-stand transfer;stand-sit transfer  -AR      Maintains Weight-bearing Status (Transfers)  verbal cues to maintain  -AR2      Comment (Transfers)  Reinforced  TTWB RLE, pt with improved ability to maintain. Pt unable to achieve upright stance.   -AR2      Recorded by [AR] Mallory Coleman, OT 07/09/20 0857  [AR2] Mallory Coleman, OT 07/09/20 0910      Row Name 07/09/20 0812             Bed-Chair Transfer    Bed-Chair [place] (Transfers)  maximum assist (25% patient effort);2 person assist;verbal cues  -AR      Assistive Device (Bed-Chair Transfers)  other (see comments) BUE support  -AR      Recorded by [AR] Mallory Coleman, OT 07/09/20 0910      Row Name 07/09/20 0812             Sit-Stand Transfer    Sit-Stand [place] (Transfers)  maximum assist (25% patient effort);2 person assist;verbal cues  -AR      Assistive Device (Sit-Stand Transfers)  other (see comments) BUE support  -AR      Recorded by [AR] Mallory Coleman, OT 07/09/20 0910      Row Name 07/09/20 0812             Stand-Sit Transfer    Stand-Sit [place] (Transfers)  maximum assist (25% patient effort);2 person assist;verbal cues  -AR      Assistive Device (Stand-Sit Transfers)  other (see comments) BUE support  -AR      Recorded by [AR] Mallory Coleman, OT 07/09/20 0910      Row Name 07/09/20 0812             ADL Assessment/Intervention    82503 - OT Self Care/Mgmt Minutes  14  -AR      BADL Assessment/Intervention  upper body dressing;lower body dressing;grooming;feeding  -AR      Recorded by [AR] Mallory Coleman OT 07/09/20 0910      Row Name 07/09/20 0812             Upper Body Dressing Assessment/Training    Upper Body Dressing [place] Level  don;pajama/robe;moderate assist (50% patient effort)  -AR      Upper Body Dressing Position  edge of bed sitting  -AR      Recorded by [AR] Mallory Coleman OT 07/09/20 0910      Row Name 07/09/20 0812             Grooming Assessment/Training    [place] Level (Grooming)  wash face, hands;supervision  -AR      Grooming Position  supported sitting  -AR      Recorded by [AR] Mallory Coleman, OT 07/09/20 0910       Row Name 07/09/20 0812             Self-Feeding Assessment/Training    [place] Level (Feeding)  feeding skills;supervision  -AR      Self-Feeding Assess/Train, Spillage Amount  minimal  -AR      Position (Self-Feeding)  supported sitting  -AR      Recorded by [AR] Mallory Coleman, OT 07/09/20 0910      Row Name 07/09/20 0812             BADL Safety/Performance    Impairments, BADL Safety/Performance  balance;cognition;endurance/activity tolerance;maintains weight bearing status;range of motion;strength;trunk/postural control  -AR      Cognitive Impairments, BADL Safety/Performance  problem solving/reasoning;safety precaution awareness;safety precaution follow-through;sequencing abilities  -AR      Skilled BADL Treatment/Intervention  adaptive equipment training;compensatory training  -AR      Recorded by [AR] Mallory Coleman OT 07/09/20 0910      Row Name 07/09/20 0812             Motor Skills Assessment/Interventions    Additional Documentation  Balance (Group);Balance Interventions (Group)  -AR      Recorded by [AR] Mallory Coleman OT 07/09/20 0910      Row Name 07/09/20 0812             Balance    Balance  static sitting balance;static standing balance  -AR      Recorded by [AR] Mallory Coleman OT 07/09/20 0910      Row Name 07/09/20 0812             Static Sitting Balance    Level of [place] (Unsupported Sitting, Static Balance)  contact guard assist  -AR      Sitting Position (Unsupported Sitting, Static Balance)  sitting on edge of bed  -AR      Recorded by [AR] Mallory Coleman OT 07/09/20 0910      Row Name 07/09/20 0812             Static Standing Balance    Level of [place] (Supported Standing, Static Balance)  maximal assist, 25 to 49% patient effort;2 person assist  -AR      Time Able to Maintain Position (Supported Standing, Static Balance)  1 to 2 minutes  -AR      Assistive Device Utilized (Supported Standing, Static Balance)  other (see comments) BUE support  -AR       Recorded by [AR] Mallory Coleman, OT 07/09/20 0910      Row Name 07/09/20 0812             Positioning and Restraints    Pre-Treatment Position  in bed  -AR      Post Treatment Position  chair  -AR      In Chair  reclined;call light within reach;encouraged to call for assist;exit alarm on;legs elevated;compression device  -AR      Recorded by [AR] Mallory Coleman, OT 07/09/20 0910      Row Name 07/09/20 0812             Pain Scale: Numbers Pre/Post-Treatment    Pain Scale: Numbers, Pretreatment  0/10 - no pain  -AR      Pain Scale: Numbers, Post-Treatment  0/10 - no pain  -AR      Recorded by [AR] Mallory Coleman, OT 07/09/20 0910      Row Name                Wound 06/29/20 1930 Right elbow Skin Tear    Wound - Properties Group Date first assessed: 06/29/20 [AB] Time first assessed: 1930 [AB] Present on Hospital Admission: Y [AB] Side: Right [AB] Location: elbow [AB] Primary Wound Type: Skin tear [AB] Additional Comments: CLEANED AND DRESSING PLACED  [AB] Recorded by:  [AB] Efrem Mei RN 06/29/20 1953    Row Name                Wound 07/01/20 1848 Right lateral hip Incision    Wound - Properties Group Date first assessed: 07/01/20 [HB] Time first assessed: 1848 [HB] Side: Right [HB] Orientation: lateral [HB] Location: hip [HB] Primary Wound Type: Incision [HB] Recorded by:  [HB] Mini Pitts RN 07/01/20 1848    Row Name 07/09/20 0812             Plan of Care Review    Plan of Care Reviewed With  patient  -AR      Progress  improving  -AR      Recorded by [AR] Mallory Coleman, OT 07/09/20 0910      Row Name 07/09/20 0812             Outcome Summary/Treatment Plan (OT)    Daily Summary of Progress (OT)  progress towards functional goals is fair  -AR      Anticipated Discharge Disposition (OT)  skilled nursing facility  -AR      Recorded by [AR] Mallory Coleman, OT 07/09/20 0910        User Key  (r) = Recorded By, (t) = Taken By, (c) = Cosigned By    Initials Name Effective Dates  Discipline    Mallory Sinclair, OT 06/22/15 -  OT    Macie Gill MS CCC-SLP 06/22/15 -  SLP    Efrem Oreilly RN 06/16/16 -  Nurse    Mini Mansfield, RN 02/21/20 -  Nurse          Outcome Summary         SLP GOALS     Row Name 07/09/20 1400             Oral Nutrition/Hydration Goal 1 (SLP)    Oral Nutrition/Hydration Goal 1, SLP  Pt will comfortably tolerate L4 and thin lqiuid diet  -AW      Time Frame (Oral Nutrition/Hydration Goal 1, SLP)  short term goal (STG);2 days  -AW      Barriers (Oral Nutrition/Hydration Goal 1, SLP)  Gave pt thins, which he tolerated well. Cracker was more difficult - coughing noted after liquid wash. Pt understands aspiration risk with all textures and does want to pursue thin liquids. Will change diet to L4, thins, as pt should still avoid mixed consistencies. Will monitor diet tolerance and make adjustments as needed. IP hospice has been consulted.   -AW      Progress/Outcomes (Oral Nutrition/Hydration Goal 1, SLP)  goal revised this date;other (see comments) pt has been on L3 and NT - little intake, doesn't like  -AW        User Key  (r) = Recorded By, (t) = Taken By, (c) = Cosigned By    Initials Name Provider Type    Macie Gill MS CCC-SLP Speech and Language Pathologist          EDUCATION  The patient has been educated in the following areas:   Dysphagia.    SLP Recommendation and Plan                                Time Calculation:   Time Calculation- SLP     Row Name 07/09/20 1452             Time Calculation- SLP    SLP Start Time  1330  -AW      SLP Received On  07/09/20  -AW        User Key  (r) = Recorded By, (t) = Taken By, (c) = Cosigned By    Initials Name Provider Type    Macie Gill MS CCC-SLP Speech and Language Pathologist          Therapy Charges for Today     Code Description Service Date Service Provider Modifiers Qty    [id number]  ST TREATMENT SWALLOW 2 7/9/2020 Macie Fatima, MS CCC-SLP GN 1                 Macie VICTORIA  MS Kushal CCC-SLP  7/9/2020

## 2020-07-09 NOTE — PLAN OF CARE
Problem: Patient Care Overview  Goal: Interprofessional Rounds/Family Conf  Outcome: Ongoing (interventions implemented as appropriate)   [facility] Palliative Care Interdisciplinary Rounds - Palliative Care Team members present: GUERRERO Felipe APRN; JULIEN Dan RN, [facility]; AMANDO Saleh RN, IVAN Merino [facility], [facility]; JESSICA Cárdenas RN, [facility]  Continue plan of care. Unknown disposition plan at this time.  Problem: Palliative Care (Adult)  Goal: Identify Related Risk Factors and Signs and Symptoms  Outcome: Ongoing (interventions implemented as appropriate)  Goal: Maximized Comfort  Outcome: Ongoing (interventions implemented as appropriate)  Goal: Enhanced Quality of Life  Outcome: Ongoing (interventions implemented as appropriate)  Note:   Pt up in chair, wife at side helping with washing, shaving, cleaning teeth. Pt reports he is feeling better and ready to go home with his wife. Family meeting at 1100 to discus disposition. Palliative will follow for continued symptom management and ongoing GOC discussion.

## 2020-07-09 NOTE — THERAPY TREATMENT NOTE
Acute Care - Occupational Therapy Treatment Note  [facility]     Patient Name: Ken Jasso  : 1940  MRN: [id number]  Today's Date: 2020             Admit Date: 2020       ICD-10-CM ICD-9-CM   1. Acute febrile illness R50.9 780.60   2. Acute respiratory failure with hypoxia (CMS/HCC) J96.01 518.81   3. Ventricular tachycardia (CMS/HCC) I47.2 427.1   4. Skin tear of right elbow without complication, initial encounter S51.011A 881.01   5. Oropharyngeal dysphagia R13.12 787.22     Patient Active Problem List   Diagnosis   • Acute febrile illness   • Fracture of right hip (CMS/HCC)   • Dementia (CMS/HCC)   • H/O: CVA (no deficit)     Past Medical History:   Diagnosis Date   • Dementia (CMS/HCC)    • Dementia (CMS/HCC)    • Dysphagia    • Esophageal dilatation    • Hiatal hernia    • PNA (pneumonia)    • PNA (pneumonia)    • Pneumonia     CHOKING EPISODES    • Prostate CA (CMS/HCC)    • Stroke (CMS/HCC)          Past Surgical History:   Procedure Laterality Date   • HIP PERCUTANEOUS PINNING Right 2020    Procedure: HIP PERCUTANEOUS PINNING;  Surgeon: Sam Harp MD;  Location: [facility];  Service: Orthopedics;  Laterality: Right;       Therapy Treatment    Rehabilitation Treatment Summary     Row Name 2012             Treatment Time/Intention    Discipline  occupational therapist  -AR      Document Type  therapy note (daily note)  -AR      Subjective Information  no complaints  -AR      Existing Precautions/Restrictions  (S) fall;weight bearing TTWB RLE, dementia  -AR      Recorded by [AR] Mallory Coleman OT 20 0835      Row Name 20 0812             Vital Signs    Pre Patient Position  Supine  -AR      Intra Patient Position  Standing  -AR      Post Patient Position  Sitting  -AR      Recorded by [AR] Mallory Coleman OT 20 0857      Row Name 20 0812             Cognitive Assessment/Intervention- PT/OT    Affect/Mental  Status (Cognitive)  WFL  -AR      Orientation Status (Cognition)  oriented to;person;place  -AR      Follows Commands (Cognition)  WFL  -AR      Cognitive Function (Cognitive)  memory deficit;safety deficit  -AR      Memory Deficit (Cognitive)  mild deficit  -AR      Safety Deficit (Cognitive)  moderate deficit  -AR      Recorded by [AR] Mallory Coleman, OT 07/09/20 0835      Row Name 07/09/20 0812             Safety Issues, Functional Mobility    Safety Issues Affecting Function (Mobility)  safety precaution awareness;safety precautions follow-through/compliance;sequencing abilities  -AR      Impairments Affecting Function (Mobility)  balance;cognition;endurance/activity tolerance;pain;range of motion (ROM);strength  -AR      Recorded by [AR] Mallory Coleman OT 07/09/20 0835      Row Name 07/09/20 0812             Mobility Assessment/Intervention    Extremity Weight-bearing Status  right lower extremity  -AR      Right Lower Extremity (Weight-bearing Status)  (S) toe touch weight-bearing (TTWB)  -AR      Recorded by [AR] Mallory Coleman OT 07/09/20 0835      Row Name 07/09/20 0812             Bed Mobility Assessment/Treatment    Bed Mobility Assessment/Treatment  scooting/bridging;supine-sit  -AR      Scooting/Bridging [place] (Bed Mobility)  maximum assist (25% patient effort);verbal cues  -AR      Supine-Sit [place] (Bed Mobility)  maximum assist (25% patient effort);2 person assist;verbal cues  -AR      Bed Mobility, Safety Issues  decreased use of legs for bridging/pushing;unable to safely maintain weight bearing restrictions  -AR      Assistive Device (Bed Mobility)  bed rails;draw sheet;head of bed elevated  -AR      Recorded by [AR] Mallory Coleman OT 07/09/20 0857      Row Name 07/09/20 0812             Functional Mobility    Functional Mobility- Ind. Level  unable to perform  -AR      Recorded by [AR] Mallory Coleman OT 07/09/20 0857      Row Name 07/09/20 0812              Transfer Assessment/Treatment    Transfer Assessment/Treatment  sit-stand transfer;stand-sit transfer  -AR      Maintains Weight-bearing Status (Transfers)  verbal cues to maintain  -AR2      Comment (Transfers)  Reinforced TTWB RLE, pt with improved ability to maintain. Pt unable to achieve upright stance.   -AR2      Recorded by [AR] Mallory Coleman, OT 07/09/20 0857  [AR2] Mallory Coleman, OT 07/09/20 0910      Row Name 07/09/20 0812             Bed-Chair Transfer    Bed-Chair [place] (Transfers)  maximum assist (25% patient effort);2 person assist;verbal cues  -AR      Assistive Device (Bed-Chair Transfers)  other (see comments) BUE support  -AR      Recorded by [AR] Mallory Coleman, OT 07/09/20 0910      Row Name 07/09/20 0812             Sit-Stand Transfer    Sit-Stand [place] (Transfers)  maximum assist (25% patient effort);2 person assist;verbal cues  -AR      Assistive Device (Sit-Stand Transfers)  other (see comments) BUE support  -AR      Recorded by [AR] Mallory Coleman, OT 07/09/20 0910      Row Name 07/09/20 0812             Stand-Sit Transfer    Stand-Sit [place] (Transfers)  maximum assist (25% patient effort);2 person assist;verbal cues  -AR      Assistive Device (Stand-Sit Transfers)  other (see comments) BUE support  -AR      Recorded by [AR] Mallory Coleman, OT 07/09/20 0910      Row Name 07/09/20 0812             ADL Assessment/Intervention    66433 - OT Self Care/Mgmt Minutes  14  -AR      BADL Assessment/Intervention  upper body dressing;lower body dressing;grooming;feeding  -AR      Recorded by [AR] Mallory Coleman, OT 07/09/20 0910      Row Name 07/09/20 0812             Upper Body Dressing Assessment/Training    Upper Body Dressing [place] Level  don;pajama/robe;moderate assist (50% patient effort)  -AR      Upper Body Dressing Position  edge of bed sitting  -AR      Recorded by [AR] Mallory Coleman, OT 07/09/20 0910      Row Name 07/09/20  0812             Grooming Assessment/Training    [place] Level (Grooming)  wash face, hands;supervision  -AR      Grooming Position  supported sitting  -AR      Recorded by [AR] Mallory Coleman, OT 07/09/20 0910      Row Name 07/09/20 0812             Self-Feeding Assessment/Training    [place] Level (Feeding)  feeding skills;supervision  -AR      Self-Feeding Assess/Train, Spillage Amount  minimal  -AR      Position (Self-Feeding)  supported sitting  -AR      Recorded by [AR] Mallory Coleman, OT 07/09/20 0910      Row Name 07/09/20 0812             BADL Safety/Performance    Impairments, BADL Safety/Performance  balance;cognition;endurance/activity tolerance;maintains weight bearing status;range of motion;strength;trunk/postural control  -AR      Cognitive Impairments, BADL Safety/Performance  problem solving/reasoning;safety precaution awareness;safety precaution follow-through;sequencing abilities  -AR      Skilled BADL Treatment/Intervention  adaptive equipment training;compensatory training  -AR      Recorded by [AR] Mallory Coleman, OT 07/09/20 0910      Row Name 07/09/20 0812             Motor Skills Assessment/Interventions    Additional Documentation  Balance (Group);Balance Interventions (Group)  -AR      Recorded by [AR] Mallory Coleman, OT 07/09/20 0910      Row Name 07/09/20 0812             Balance    Balance  static sitting balance;static standing balance  -AR      Recorded by [AR] Mallory Coleman, OT 07/09/20 0910      Row Name 07/09/20 0812             Static Sitting Balance    Level of [place] (Unsupported Sitting, Static Balance)  contact guard assist  -AR      Sitting Position (Unsupported Sitting, Static Balance)  sitting on edge of bed  -AR      Recorded by [AR] Mallory Coleman, OT 07/09/20 0910      Row Name 07/09/20 0812             Static Standing Balance    Level of [place] (Supported Standing, Static Balance)  maximal assist, 25 to 49% patient  effort;2 person assist  -AR      Time Able to Maintain Position (Supported Standing, Static Balance)  1 to 2 minutes  -AR      Assistive Device Utilized (Supported Standing, Static Balance)  other (see comments) BUE support  -AR      Recorded by [AR] Mallory Coleman, OT 07/09/20 0910      Row Name 07/09/20 0812             Positioning and Restraints    Pre-Treatment Position  in bed  -AR      Post Treatment Position  chair  -AR      In Chair  reclined;call light within reach;encouraged to call for assist;exit alarm on;legs elevated;compression device  -AR      Recorded by [AR] Mallory Coleman, OT 07/09/20 0910      Row Name 07/09/20 0812             Pain Scale: Numbers Pre/Post-Treatment    Pain Scale: Numbers, Pretreatment  0/10 - no pain  -AR      Pain Scale: Numbers, Post-Treatment  0/10 - no pain  -AR      Recorded by [AR] Mallory Coleman, OT 07/09/20 0910      Row Name                Wound 06/29/20 1930 Right elbow Skin Tear    Wound - Properties Group Date first assessed: 06/29/20 [AB] Time first assessed: 1930 [AB] Present on Hospital Admission: Y [AB] Side: Right [AB] Location: elbow [AB] Primary Wound Type: Skin tear [AB] Additional Comments: CLEANED AND DRESSING PLACED  [AB] Recorded by:  [AB] Efrem Mei RN 06/29/20 1953    Row Name                Wound 07/01/20 1848 Right lateral hip Incision    Wound - Properties Group Date first assessed: 07/01/20 [HB] Time first assessed: 1848 [HB] Side: Right [HB] Orientation: lateral [HB] Location: hip [HB] Primary Wound Type: Incision [HB] Recorded by:  [HB] Mini Pitts, RN 07/01/20 1848    Row Name 07/09/20 0812             Plan of Care Review    Plan of Care Reviewed With  patient  -AR      Progress  improving  -AR      Recorded by [AR] Mallory Coleman OT 07/09/20 0910      Row Name 07/09/20 0812             Outcome Summary/Treatment Plan (OT)    Daily Summary of Progress (OT)  progress towards functional goals is fair  -AR      Anticipated  Discharge Disposition (OT)  skilled nursing facility  -AR      Recorded by [AR] JaredLizbethMallorykimber Connell, OT 07/09/20 0910        User Key  (r) = Recorded By, (t) = Taken By, (c) = Cosigned By    Initials Name Effective Dates Discipline    AR JaredMallory Becki, OT 06/22/15 -  OT    AB Efrem Mei RN 06/16/16 -  Nurse    HB Mini Pitts RN 02/21/20 -  Nurse        Wound 06/29/20 1930 Right elbow Skin Tear (Active)   Dressing Appearance open to air 7/8/2020  8:00 PM   Closure None 7/8/2020  8:00 PM   Base dry;red;scab 7/8/2020  8:00 PM   Periwound Temperature warm 7/8/2020  8:00 PM   Periwound Skin Turgor soft 7/8/2020  8:00 PM   Edges irregular 7/8/2020  8:00 PM   Drainage Amount none 7/8/2020  8:00 PM   Dressing Care, Wound open to air 7/8/2020  8:00 PM   Periwound Care, Wound absorptive dressing applied 7/8/2020 10:00 AM       Wound 07/01/20 1848 Right lateral hip Incision (Active)   Dressing Appearance no drainage;dressing loose 7/8/2020  8:00 PM   Closure AMY 7/8/2020  8:00 PM   Base dressing in place, unable to visualize 7/8/2020  8:00 PM   Periwound intact;dry 7/8/2020  8:00 PM   Periwound Temperature warm 7/8/2020  8:00 PM   Periwound Skin Turgor soft 7/8/2020  8:00 PM   Drainage Amount none 7/8/2020  8:00 PM   Dressing Care, Wound border dressing 7/8/2020  8:00 PM     Rehab Goal Summary     Row Name 07/09/20 0812             Transfer Goal 1 (OT)    Progress/Outcome (Transfer Goal 1, OT)  goal ongoing  -AR         Dressing Goal 1 (OT)    Progress/Outcome (Dressing Goal 1, OT)  goal ongoing  -AR         Strength Goal 1 (OT)    Progress/Outcome (Strength Goal 1, OT)  goal ongoing  -AR          Activity Tolerance Goal 1 (OT)    Progress/Outcome (Activity Tolerance Goal 1, OT)  goal ongoing  -AR        User Key  (r) = Recorded By, (t) = Taken By, (c) = Cosigned By    Initials Name Provider Type Discipline    AR Jared, Mallory Becki, OT Occupational Therapist OT        Occupational Therapy Education                  Title: PT OT SLP Therapies (In Progress)     Topic: Occupational Therapy (In Progress)     Point: ADL training (In Progress)     Description:   Instruct learner(s) on proper safety adaptation and remediation techniques during self care or transfers.   Instruct in proper use of assistive devices.              Learning Progress Summary           Patient Eager, E,TB,D,H, VU,NR by AR at 7/9/2020 0812    Eager, E,TB,D, NR by AR at 7/6/2020 0828    Acceptance, E,D, NR by JY at 7/2/2020 1457   Family Acceptance, E,D, NR by JY at 7/2/2020 1457                   Point: Home exercise program (In Progress)     Description:   Instruct learner(s) on appropriate technique for monitoring, assisting and/or progressing therapeutic exercises/activities.              Learning Progress Summary           Patient Eager, E,TB,D,H, VU,NR by AR at 7/9/2020 0812    Eager, E,TB,D, NR by AR at 7/6/2020 0828    Acceptance, E,D, NR by JY at 7/2/2020 1457   Family Acceptance, E,D, NR by JY at 7/2/2020 1457                   Point: Precautions (In Progress)     Description:   Instruct learner(s) on prescribed precautions during self-care and functional transfers.              Learning Progress Summary           Patient Eager, E,TB,D,H, VU,NR by AR at 7/9/2020 0812    Eager, E,TB,D, NR by AR at 7/6/2020 0828    Acceptance, E,D, NR by JY at 7/2/2020 1457   Family Acceptance, E,D, NR by JY at 7/2/2020 1457                   Point: Body mechanics (In Progress)     Description:   Instruct learner(s) on proper positioning and spine alignment during self-care, functional mobility activities and/or exercises.              Learning Progress Summary           Patient Eager, E,TB,D,H, VU,NR by AR at 7/9/2020 0812    Eager, E,TB,D, NR by AR at 7/6/2020 0828    Acceptance, E,D, NR by JY at 7/2/2020 1457   Family Acceptance, E,D, NR by JY at 7/2/2020 1457                               User Key     Initials Effective Dates Name Provider Type Discipline    AR  06/22/15 -  Mallory Coleman OT Occupational Therapist OT    JY 01/29/20 -  Pat Montes OT Occupational Therapist OT                OT Recommendation and Plan  Outcome Summary/Treatment Plan (OT)  Daily Summary of Progress (OT): progress towards functional goals is fair  Anticipated Discharge Disposition (OT): skilled nursing facility  Daily Summary of Progress (OT): progress towards functional goals is fair  Plan of Care Review  Plan of Care Reviewed With: patient  Plan of Care Reviewed With: patient  Outcome Summary: Pt alert, Ox2 and no c/o pain. He completed bed mobility with max assist x2, transfer training with max assist x2 yet improved ability to maintain TTWB RLE. He completed UB dressing with mod assist, grooming with supervision and self-feeding with supervision. Recommend SNF-level rehab.  Outcome Measures     Row Name 07/09/20 0812             How much help from another is currently needed...    Putting on and taking off regular lower body clothing?  2  -AR      Bathing (including washing, rinsing, and drying)  2  -AR      Toileting (which includes using toilet bed pan or urinal)  2  -AR      Putting on and taking off regular upper body clothing  2  -AR      Taking care of personal grooming (such as brushing teeth)  3  -AR      Eating meals  3  -AR      AM-PAC 6 Clicks Score (OT)  14  -AR         Functional Assessment    Outcome Measure Options  AM-PAC 6 Clicks Daily Activity (OT)  -AR        User Key  (r) = Recorded By, (t) = Taken By, (c) = Cosigned By    Initials Name Provider Type    Mallory Sinclair OT Occupational Therapist           Time Calculation:   Time Calculation- OT     Row Name 07/09/20 0812             Time Calculation- OT    OT Start Time  0812  -AR      Total Timed Code Minutes- OT  14 minute(s)  -AR      OT Received On  07/09/20  -AR      OT Goal Re-Cert Due Date  07/19/20  -AR         Timed Charges    83338 - OT Self Care/Mgmt Minutes  14  -AR        User Key  (r) =  Recorded By, (t) = Taken By, (c) = Cosigned By    Initials Name Provider Type    Mallory Sinclair OT Occupational Therapist        Therapy Charges for Today     Code Description Service Date Service Provider Modifiers Qty    [id number] HC OT SELF CARE/MGMT/TRAIN EA 15 MIN 7/9/2020 Mallory Coleman OT GO 1               Mallory Coleman OT  7/9/2020

## 2020-07-09 NOTE — PROGRESS NOTES
Pharmacy Consult-Vancomycin Dosing  Ken Jasso is a  80 y.o. male receiving vancomycin therapy.     Indication: sepsis  Consulting Provider: hospitalist  ID Consult: no    Goal Trough: 15-20 mcg/mL    Current Antimicrobial Therapy  Anti-Infectives (From admission, onward)      Ordered     Dose/Rate Route Frequency Start Stop    20 0908  vancomycin (VANCOCIN) in iso-osmotic dextrose IVPB 1 g (premix) 200 mL  Review   Ordering Provider:  Fco Joseph RPH    1,000 mg  over 60 Minutes Intravenous Every 24 Hours 20 1000 07/15/20 0959    20 0827  piperacillin-tazobactam (ZOSYN) 3.375 g in iso-osmotic dextrose 50 ml (premix)  Review   Ordering Provider:  Lauren Pleitez MD    3.375 g  over 4 Hours Intravenous Every 8 Hours 20 1600 20 1559    20 0908  vancomycin 1500 mg/500 mL 0.9% NS IVPB ([facility])     Ordering Provider:  Fco Joseph RPH    1,500 mg  over 90 Minutes Intravenous Once 20 1000 20 1238    20 0827  piperacillin-tazobactam (ZOSYN) 3.375 g in iso-osmotic dextrose 50 ml (premix)     Ordering Provider:  Lauren Pleitez MD    3.375 g  over 30 Minutes Intravenous Once 20 0900 20 0936    20 0827  Pharmacy to dose vancomycin  Review   Ordering Provider:  Lauren Pleitez MD     Does not apply Continuous PRN 20 0827 20 0826    20 2113  ceFAZolin in dextrose (ANCEF) IVPB solution 2 g     Ordering Provider:  Sam Harp MD    2 g  200 mL/hr over 30 Minutes Intravenous Every 8 Hours 20 0000 20 0852    20 2204  piperacillin-tazobactam (ZOSYN) 3.375 g in iso-osmotic dextrose 50 ml (premix)     Jeannette Guerrero [facility] let the order  on 20 1433.   Ordering Provider:  Rodney Banks MD    3.375 g  over 4 Hours Intravenous Every 8 Hours 20 0300 20 0259    20 1915  piperacillin-tazobactam (ZOSYN) 3.375 g in iso-osmotic dextrose 50 ml (premix)     Ordering Provider:  Rafael  "Bhargav SANDOVAL MD    3.375 g  over 30 Minutes Intravenous Once 06/29/20 1917 06/29/20 2018 06/29/20 1915  vancomycin 1500 mg/500 mL 0.9% NS IVPB ([facility])     Ordering Provider:  Bhargav Hall MD    20 mg/kg × 72.6 kg  over 90 Minutes Intravenous Once 06/29/20 1917 06/29/20 2149            Allergies  Allergies as of 06/29/2020    (No Known Allergies)       Labs    Results from last 7 days   Lab Units 07/08/20  0328 07/07/20  0825   BUN mg/dL 24* 12   CREATININE mg/dL 0.75* 0.80       Results from last 7 days   Lab Units 07/08/20  0400 07/07/20  0825   WBC 10*3/mm3 21.58* 16.85*       Evaluation of Dosing     Last Dose Received in the ED/Outside Facility: No  Is Patient on Dialysis or Renal Replacement: No    Ht - 177.8 cm (70\")  Wt - 64.2 kg (141 lb 8 oz)    Estimated Creatinine Clearance: 65.8 mL/min (A) (by C-G formula based on SCr of 0.75 mg/dL (L)).    Intake & Output (last 3 days)         07/06 0701 - 07/07 0700 07/07 0701 - 07/08 0700 07/08 0701 - 07/09 0700 07/09 0701 - 07/10 0700    P.O. 1100 100 480 360    I.V. (mL/kg) 1000 (16)       IV Piggyback 50 550      Total Intake(mL/kg) 2150 (34.3) 650 (10.5) 480 (7.5) 360 (5.6)    Urine (mL/kg/hr) 650 (0.4) 200 (0.1) 375 (0.2) 500 (0.9)    Stool  0      Total Output 650 200 375 500    Net +1500 +450 +105 -140            Urine Unmeasured Occurrence 5 x 8 x 1 x     Stool Unmeasured Occurrence  2 x 2 x             Microbiology and Radiology  Microbiology Results (last 10 days)       Procedure Component Value - Date/Time    MRSA Screen, PCR (Inpatient) - Swab, Nares [[id number]]  (Normal) Collected:  06/29/20 2352    Lab Status:  Final result Specimen:  Swab from Nares Updated:  06/30/20 0958     MRSA PCR Negative    Narrative:       MRSA Negative    Respiratory Panel, PCR - Swab, Nasopharynx [[id number]]  (Normal) Collected:  06/29/20 2004    Lab Status:  Final result Specimen:  Swab from Nasopharynx Updated:  06/29/20 2126     ADENOVIRUS, PCR Not Detected     Coronavirus " 229E Not Detected     Coronavirus HKU1 Not Detected     Coronavirus NL63 Not Detected     Coronavirus OC43 Not Detected     Human Metapneumovirus Not Detected     Human Rhinovirus/Enterovirus Not Detected     Influenza B PCR Not Detected     Parainfluenza Virus 1 Not Detected     Parainfluenza Virus 2 Not Detected     Parainfluenza Virus 3 Not Detected     Parainfluenza Virus 4 Not Detected     Bordetella pertussis pcr Not Detected     Influenza A H1 2009 PCR Not Detected     Chlamydophila pneumoniae PCR Not Detected     Mycoplasma pneumo by PCR Not Detected     Influenza A PCR Not Detected     Influenza A H3 Not Detected     Influenza A H1 Not Detected     RSV, PCR Not Detected     Bordetella parapertussis PCR Not Detected    Narrative:       The coronavirus on the RVP is NOT COVID-19 and is NOT indicative of infection with COVID-19.     COVID-19,[facility] IN-HOUSE, NP SWAB IN TRANSPORT MEDIA 8-12 HR TAT - Swab, Nasopharynx [[id number]]  (Normal) Collected:  06/29/20 2004    Lab Status:  Final result Specimen:  Swab from Nasopharynx Updated:  06/30/20 0305     COVID19 Not Detected    Narrative:       Fact sheet for providers: https://www.fda.gov/media/758435/download     Fact sheet for patients: https://www.fda.gov/media/464171/download    Blood Culture - Blood, Arm, Right [[id number]] Collected:  06/29/20 1931    Lab Status:  Final result Specimen:  Blood from Arm, Right Updated:  07/04/20 2000     Blood Culture No growth at 5 days    Blood Culture - Blood, Arm, Left [[id number]] Collected:  06/29/20 1931    Lab Status:  Final result Specimen:  Blood from Arm, Left Updated:  07/04/20 2000     Blood Culture No growth at 5 days            Evaluation of Level            Results from last 7 days   Lab Units 07/09/20  0945   VANCOMYCIN TR mcg/mL 7.10            Assessment/Plan:  1. Pharmacy to dose vancomycin for sepsis. Goal trough 15-20 mcg/mL.    2. Patient received prior treatment with vancomycin earlier in admission  from 6/29-7/1 for sepsis/PNA, was discontinued following negative MRSA nares PCR. Patient clinically worsening, concern for recurrent aspirations. Loading dose of vancomycin 1500 mg (~24mg/kg) IV given 7/7 and initiated maintenance dose of vancomycin 1000 mg (~16mg/kg) IV Q24hr.    3. Vancomycin trough drawn appropriately, is subtherapeutic at 7.1 mcg/mL. New blood cultures pending. Noted that pt no longer has IV access, did not get Vancomycin dose today. Currently undergoing GOC discussion. If get IV access tomorrow, will have an increased dose of vancomycin 1000 mg Q12H ordered. No new labs to review today, last Scr was 0.75. Will continue to monitor.    4. No trough currently ordered, awaiting GOC discussion. Will order if needed.    5. Pharmacy will continue to monitor renal function, cultures and sensitivities, and clinical status to adjust regimen as necessary.      Fco Joseph, PharmD  Pharmacy Resident  7/9/2020  15:36

## 2020-07-09 NOTE — PROGRESS NOTES
"Palliative Care Progress Note    Date of Admission: 6/29/2020    Code Status:   Current Code Status     Date Active Code Status Order ID Comments User Context       7/3/2020 0956 No CPR [id number]  Tash Britt MD Inpatient       Questions for Current Code Status     Question Answer Comment    Code Status No CPR     Medical Interventions (Level of Support Prior to Arrest) Limited     Limited Support to NOT Include Intubation     Level Of Support Discussed With Patient         Advance Directive: none on medical record  Surrogate decision maker: spouse, Kerri    Subjective:   Patient sitting up in chair.  No reports of pain, nausea or anxiety.   Reports some mild dyspnea.  No appetite but does not like the food.   Patient requiring 2 person maximum assistance with therapy.    Reviewed current scheduled and prn medications for route, type, dose, and frequency.  Reviewed medical record.    lactated ringers 75 mL/hr Last Rate: 75 mL/hr (07/06/20 1251)   niCARdipine 5-15 mg/hr    Pharmacy to dose vancomycin       acetaminophen  •  acetaminophen **OR** [DISCONTINUED] acetaminophen  •  bisacodyl  •  labetalol  •  lidocaine PF 1%  •  magnesium sulfate  •  Pharmacy to dose vancomycin  •  potassium chloride  •  potassium chloride  •  potassium chloride  •  sodium chloride  •  sodium chloride    Objective:  PPS 30%  /79 (BP Location: Left arm, Patient Position: Lying)   Pulse 102   Temp 98.3 °F (36.8 °C) (Oral)   Resp 16   Ht 177.8 cm (70\")   Wt 64.2 kg (141 lb 8 oz)   SpO2 100%   BMI 20.30 kg/m²    Intake & Output (last day)       07/08 0701 - 07/09 0700 07/09 0701 - 07/10 0700    P.O. 480 360    IV Piggyback      Total Intake(mL/kg) 480 (7.5) 360 (5.6)    Urine (mL/kg/hr) 375 (0.2) 500 (0.9)    Stool      Total Output 375 500    Net +105 -140          Urine Unmeasured Occurrence 1 x     Stool Unmeasured Occurrence 2 x         Lab Results (last 24 hours)     Procedure Component Value Units Date/Time    " Vancomycin, Trough [[id number]]  (Normal) Collected:  07/09/20 0945    Specimen:  Blood Updated:  07/09/20 1033     Vancomycin Trough 7.10 mcg/mL     Urinalysis With Culture If Indicated - Urine, Clean Catch [[id number]]  (Abnormal) Collected:  07/08/20 1816    Specimen:  Urine, Clean Catch Updated:  07/08/20 1822     Color, UA Yellow     Appearance, UA Clear     pH, UA 7.0     Specific Gravity, UA 1.019     Glucose, UA Negative     Ketones, UA Trace     Bilirubin, UA Negative     Blood, UA Negative     Protein, UA 30 mg/dL (1+)     Leuk Esterase, UA Trace     Nitrite, UA Negative     Urobilinogen, UA 4.0 E.U./dL    Urinalysis, Microscopic Only - Urine, Clean Catch [[id number]]  (Abnormal) Collected:  07/08/20 1816    Specimen:  Urine, Clean Catch Updated:  07/08/20 1822     RBC, UA 3-6 /HPF      WBC, UA 0-2 /HPF      Bacteria, UA None Seen /HPF      Squamous Epithelial Cells, UA 0-2 /HPF      Hyaline Casts, UA 0-6 /LPF      Methodology Automated Microscopy    Lactic Acid, Plasma [[id number]]  (Normal) Collected:  07/08/20 1509    Specimen:  Blood Updated:  07/08/20 1549     Lactate 1.3 mmol/L      Comment: Falsely depressed results may occur on samples drawn from patients receiving N-Acetylcysteine (NAC) or Metamizole.       Blood Culture - Blood, Arm, Right [[id number]] Collected:  07/08/20 1509    Specimen:  Blood from Arm, Right Updated:  07/08/20 1539    Blood Culture - Blood, Arm, Left [[id number]] Collected:  07/08/20 1509    Specimen:  Blood from Arm, Left Updated:  07/08/20 1539        Imaging Results (Last 24 Hours)     Procedure Component Value Units Date/Time    XR Chest 1 View [[id number]] Collected:  07/07/20 0848     Updated:  07/08/20 1757    Narrative:       EXAMINATION: XR CHEST 1 VW-      INDICATION: RRT, increased sputum; R50.9-Fever, unspecified;  J96.01-Acute respiratory failure with hypoxia; I47.2-Ventricular  tachycardia; S51.011A-Laceration without foreign body of right elbow,  initial encounter;  R13.12-Dysphagia, oropharyngeal phase.      COMPARISON: 06/29/2020.     FINDINGS: Cardiac silhouette within normal limits. Pulmonary vascularity  within normal limits. No focal opacification or consolidation. No  pneumothorax or pleural effusion. Degenerative changes of the spine.           Impression:       No acute cardiopulmonary process specifically no new focal  consolidation of involvement or effusion.     D:  07/07/2020  E:  07/07/2020     This report was finalized on 7/8/2020 5:54 PM by Dr. Manoj Elizondo.           Physical Exam:  Gen: NAD, up in chair  Skin: warm, dry   Eyes: PRATEEK, conjunctiva clear and moist   Resp/thorax: even effort, non labored, CTA   CV: RRR   ABD: soft, non tender, non distended  Ext: no edema, no redness   Neuro: awake, answers direct questions, no myoclonus     Reviewed labs and diagnostic results.   Lab Results   Component Value Date    HGBA1C 5.60 06/30/2020     Results from last 7 days   Lab Units 07/08/20  0400   WBC 10*3/mm3 21.58*   HEMOGLOBIN g/dL 9.2*   HEMATOCRIT % 28.6*   PLATELETS 10*3/mm3 329     Results from last 7 days   Lab Units 07/08/20  0328 07/07/20  0825   SODIUM mmol/L 139 136   POTASSIUM mmol/L 2.9* 3.6   CHLORIDE mmol/L 102 98   CO2 mmol/L 25.0 25.0   BUN mg/dL 24* 12   CREATININE mg/dL 0.75* 0.80   CALCIUM mg/dL 8.5* 9.2   BILIRUBIN mg/dL  --  1.5*   ALK PHOS U/L  --  84   ALT (SGPT) U/L  --  20   AST (SGOT) U/L  --  38   GLUCOSE mg/dL 104* 123*       Impression: 80 y.o. male with Right femoral fracture s/p repair 7//1, acute respiratory hypoxia failure, dementia    Plan:   Dysphagia - severe pharyngeal per MBS. continue to monitor with comfort diet.  Reviewed with wife about allowing patient to taste foods he may request without swallowing them.   Nursing report patient was able to take some po meds.   Recommend streamlining po meds to only needed ones with aspiration risk.    Goals of care - patient shared his goals are to return home and not come back to  the hospital. He says that will be able to manage at home.    Reviewed with wife current status with dysphagia and aspiration events. Wife shared it is getting worse. When asked wife how she would manage at home, she shared her children would assist.  However, with further questioning and discussion unsure how much care will be needed.   Talked about returning home vs SNF placement vs home with therapy vs home with hospice care.  Wife agreed that home with hospice care would be best fit.  Reviewed that patient is not making any progress with therapy. Will make referral for hospice case manager to meet with wife.  Patient and wife live in an independent living section of [facility] in [place].  Palliative team continue support to patient and family.    Time: 50 minutes     Jessica Stone, APRN  [phone]  07/09/20  15:21

## 2020-07-10 PROCEDURE — 92526 ORAL FUNCTION THERAPY: CPT

## 2020-07-10 PROCEDURE — 99232 SBSQ HOSP IP/OBS MODERATE 35: CPT | Performed by: INTERNAL MEDICINE

## 2020-07-10 PROCEDURE — 97530 THERAPEUTIC ACTIVITIES: CPT | Performed by: PHYSICAL THERAPIST

## 2020-07-10 RX ADMIN — ASPIRIN 325 MG ORAL TABLET 325 MG: 325 PILL ORAL at 09:44

## 2020-07-10 RX ADMIN — ASPIRIN 325 MG ORAL TABLET 325 MG: 325 PILL ORAL at 22:12

## 2020-07-10 RX ADMIN — AMLODIPINE BESYLATE 10 MG: 10 TABLET ORAL at 09:44

## 2020-07-10 RX ADMIN — ACETAMINOPHEN 650 MG: 325 TABLET, FILM COATED ORAL at 10:23

## 2020-07-10 RX ADMIN — BISACODYL 10 MG: 10 SUPPOSITORY RECTAL at 09:45

## 2020-07-10 RX ADMIN — ACETAMINOPHEN 650 MG: 325 TABLET, FILM COATED ORAL at 06:02

## 2020-07-10 RX ADMIN — DONEPEZIL HYDROCHLORIDE 10 MG: 10 TABLET, FILM COATED ORAL at 09:44

## 2020-07-10 RX ADMIN — LANSOPRAZOLE 30 MG: KIT at 06:02

## 2020-07-10 RX ADMIN — LISINOPRIL 40 MG: 40 TABLET ORAL at 09:44

## 2020-07-10 RX ADMIN — ACETAMINOPHEN 650 MG: 325 TABLET, FILM COATED ORAL at 22:12

## 2020-07-10 RX ADMIN — POLYETHYLENE GLYCOL 3350 17 G: 17 POWDER, FOR SOLUTION ORAL at 09:44

## 2020-07-10 RX ADMIN — ATORVASTATIN CALCIUM 80 MG: 40 TABLET, FILM COATED ORAL at 22:12

## 2020-07-10 NOTE — PLAN OF CARE
Problem: Patient Care Overview  Goal: Plan of Care Review  Outcome: Ongoing (interventions implemented as appropriate)  Flowsheets (Taken 7/9/2020 2047 by Francesca Phelps RN)  Plan of Care Reviewed With: patient  Note:   SLP treatment completed. Will sign-off as patient is tolerating preferred comfort diet without s/s of discomfort at this time.  Please see note for further details and recommendations.

## 2020-07-10 NOTE — PLAN OF CARE
Problem: Patient Care Overview  Goal: Plan of Care Review  Outcome: Ongoing (interventions implemented as appropriate)  Flowsheets  Taken 7/10/2020 1608  Progress: no change  Taken 7/10/2020 1425  Plan of Care Reviewed With: patient;spouse  Outcome Summary: Pt required max assist x 2 for STS. Attempted first STS but patient had significant posterior lean with standing and unable to maintain TTWB on R LE. Attempted second STS and patient's posture somewhat improved, patient unable to sequence steps to bed, therefore performed stand pivot transfer from chair to bed. Pt unable to maintain TTWB on R LE with transfer. Pt required max assist x 2 for sit to supine to return back to bed.

## 2020-07-10 NOTE — PROGRESS NOTES
Continued Stay Note  [facility]     Patient Name: Ken Jasso  MRN: [id number]  Today's Date: 7/10/2020    Admit Date: 6/29/2020    Discharge Plan     Row Name 07/10/20 0911       Plan    Plan  TBD    Patient/Family in Agreement with Plan  yes    Plan Comments  CM is continuing to follow Mr. Jasso.  Hospice has been consulted and will be meeting with the patient and family today at 1pm.  Please contact CM if any assistance is needed for DC needs.  .    Final Discharge Disposition Code  50 - home with hospice        Discharge Codes    No documentation.       Expected Discharge Date and Time     Expected Discharge Date Expected Discharge Time    Jul 13, 2020             Abida Florence RN

## 2020-07-10 NOTE — PLAN OF CARE
Problem: Patient Care Overview  Goal: Interprofessional Rounds/Family Conf  Flowsheets (Taken 7/10/2020 8642)  Summary: 1300 Palliative Care Interdisciplinary Rounds - Palliative Care Team members present:  GERSON Escalante DO; GUERRERO Stone APRN; JULIEN Dan RN, PN; [facility]; Hospice CM EDELMRIA Bejarano RN  Participants: advanced practice nurse; nursing; physician; social work/services

## 2020-07-10 NOTE — PROGRESS NOTES
"Palliative Care Progress Note    Date of Admission: 6/29/2020    Code Status:   Current Code Status     Date Active Code Status Order ID Comments User Context       7/3/2020 0956 No CPR [id number]  Tash Britt MD Inpatient       Questions for Current Code Status     Question Answer Comment    Code Status No CPR     Medical Interventions (Level of Support Prior to Arrest) Limited     Limited Support to NOT Include Intubation     Level Of Support Discussed With Patient         Subjective:  Patient up in chair. No reports of pain, dyspnea, nausea.   Has eaten about 1/2 of meals today per nursing.    Reviewed current scheduled and prn medications for route, type, dose, and frequency.  Reviewed medical record    lactated ringers 75 mL/hr Last Rate: 75 mL/hr (07/06/20 1251)   niCARdipine 5-15 mg/hr    Pharmacy to dose vancomycin       acetaminophen  •  acetaminophen **OR** [DISCONTINUED] acetaminophen  •  bisacodyl  •  labetalol  •  lidocaine PF 1%  •  magnesium sulfate  •  Pharmacy to dose vancomycin  •  potassium chloride  •  potassium chloride  •  potassium chloride  •  sodium chloride  •  sodium chloride    Objective:  PPS 40%  /67   Pulse 72   Temp 97.3 °F (36.3 °C) (Oral)   Resp 16   Ht 177.8 cm (70\")   Wt 63.2 kg (139 lb 6.4 oz)   SpO2 97%   BMI 20.00 kg/m²    Intake & Output (last day)       07/09 0701 - 07/10 0700 07/10 0701 - 07/11 0700    P.O. 480 200    Total Intake(mL/kg) 480 (7.6) 200 (3.2)    Urine (mL/kg/hr) 1500 (1) 500 (0.8)    Stool  0    Total Output 1500 500    Net -1020 -300          Stool Unmeasured Occurrence  1 x        Lab Results (last 24 hours)     Procedure Component Value Units Date/Time    Blood Culture - Blood, Arm, Left [[id number]] Collected:  07/08/20 1509    Specimen:  Blood from Arm, Left Updated:  07/10/20 1545     Blood Culture No growth at 2 days    Blood Culture - Blood, Arm, Right [[id number]] Collected:  07/08/20 1509    Specimen:  Blood from Arm, Right Updated:  " 07/10/20 1545     Blood Culture No growth at 2 days        Imaging Results (Last 24 Hours)     ** No results found for the last 24 hours. **        Physical Exam:  Gen: NAD, up in chair  Skin: warm, dry   Eyes: PRATEEK, conjunctiva clear and moist   Resp/thorax: even effort, non labored, CTA   CV: RRR   ABD: soft, non distended, nontender  Ext: no edema, no redness   Neuro: awake, replies same statements to direct questions, no myoclonus     Reviewed labs and diagnostic results.   Lab Results   Component Value Date    HGBA1C 5.60 06/30/2020     Results from last 7 days   Lab Units 07/08/20  0400   WBC 10*3/mm3 21.58*   HEMOGLOBIN g/dL 9.2*   HEMATOCRIT % 28.6*   PLATELETS 10*3/mm3 329     Results from last 7 days   Lab Units 07/08/20  0328 07/07/20  0825   SODIUM mmol/L 139 136   POTASSIUM mmol/L 2.9* 3.6   CHLORIDE mmol/L 102 98   CO2 mmol/L 25.0 25.0   BUN mg/dL 24* 12   CREATININE mg/dL 0.75* 0.80   CALCIUM mg/dL 8.5* 9.2   BILIRUBIN mg/dL  --  1.5*   ALK PHOS U/L  --  84   ALT (SGPT) U/L  --  20   AST (SGOT) U/L  --  38   GLUCOSE mg/dL 104* 123*       Impression: 80 y.o. male with acute hypoxic respiratory failure, recurrent aspiration, dementia, Right femoral neck fracture s/p repair 7/1    Plan:   Dysphagia - severe pharyngeal per MBS, changes made to diet for patient comfort.     Goals of care discussion - met with wife and son, Darius, at bedside along with hospice case manager, Arely Bejarano.  Reviewed current clinical status,  possible disposition plans, care giving needs at home, hospice plan of care, SNF placement and possible options at [facility], [facility] in [place].   Family agrees patient's aspiration/dysphagia is worsening and son is aware of how much the patient wants to return home. Discussed further options with therapy at home with either home health or hospice.     Son, Darius, is going to inquire about options at [facility]. Patient's daughter is making arrangements to stay with  patient/wife at their home and provide assistance. Informed son/wife focus would be for them to put together a disposition plan that is doable for the patient and family.    Palliative care team continue to provide support to patient and family.  Time: 50 minutes     DAVID Verdugo  [phone]  07/10/20  16:41

## 2020-07-10 NOTE — PROGRESS NOTES
Continued Stay Note  [facility]     Patient Name: Ken Jasso  MRN: [id number]  Today's Date: 7/10/2020    Admit Date: 6/29/2020    Discharge Plan     Row Name 07/10/20 1408       Plan    Plan  Undetermined    Plan Comments  Visit made to pt room alongside DAVID Martinez.  Pt was alert, son and wife at bedside.  Goals of care discussed.  DC options discussed.  Family will communicate with Devin Riley and other family members to work out feasibility of pt going home with total care.  Hospice will follow up Monday.  Hospice 24h number given to family.  CM updated.  Please, call [phone] if I can be of further assistance.             Arely Bejarano RN

## 2020-07-10 NOTE — PLAN OF CARE
Problem: Patient Care Overview  Goal: Plan of Care Review  Outcome: Ongoing (interventions implemented as appropriate)  Flowsheets  Taken 7/10/2020 0357  Progress: no change  Outcome Summary: Pt alert and oriented.On tele, NSR. Mild pain in right hip. Pain controlled with tylenol. Turned throughout the night. Pt looking forward to going home with hospice. Will continue to monitor.  Taken 7/9/2020 2047  Plan of Care Reviewed With: patient

## 2020-07-10 NOTE — PROGRESS NOTES
[facility] Medicine Services  PROGRESS NOTE    Patient Name: Ken Jasso  : 1940  MRN: [id number]    Date of Admission: 2020  Primary Care Physician: Provider, No Known    Subjective   Subjective     CC:  Right hip fx    HPI:  Asking when he can go home, plan hopefullly with hospice but awaiting family decision, no complaints, doing ok    Review of Systems  Gen- No fevers, chills  CV- No chest pain, palpitations  Resp- No cough, dyspnea  GI- No N/V/D, abd pain      Objective   Objective     Vital Signs:   Temp:  [97.3 °F (36.3 °C)-98.2 °F (36.8 °C)] 97.3 °F (36.3 °C)  Heart Rate:  [74-94] 84  Resp:  [14-16] 16  BP: (144-161)/(69-81) 144/69  Total (NIH Stroke Scale): 0     Physical Exam:  Constitutional: No acute distress, awake, alert  HENT: NCAT, mucous membranes moist  Respiratory: Clear to auscultation bilaterally, respiratory effort normal   Cardiovascular: RRR, no murmurs  Gastrointestinal: Positive bowel sounds, soft, nontender, thin  Musculoskeletal: No edema, right thigh incision  Psychiatric: Appropriate affect, cooperative  Neurologic: Oriented x 3, strength symmetric in all extremities, Cranial Nerves grossly intact to confrontation, speech clear  Skin: No rashes    Results Reviewed:  Results from last 7 days   Lab Units 20  0400 20  0825   WBC 10*3/mm3 21.58* 16.85*   HEMOGLOBIN g/dL 9.2* 12.4*   HEMATOCRIT % 28.6* 39.0   PLATELETS 10*3/mm3 329 442     Results from last 7 days   Lab Units 20  0328 20  0825 20  2134   SODIUM mmol/L 139 136  --    POTASSIUM mmol/L 2.9* 3.6 3.7   CHLORIDE mmol/L 102 98  --    CO2 mmol/L 25.0 25.0  --    BUN mg/dL 24* 12  --    CREATININE mg/dL 0.75* 0.80  --    GLUCOSE mg/dL 104* 123*  --    CALCIUM mg/dL 8.5* 9.2  --    ALT (SGPT) U/L  --  20  --    AST (SGOT) U/L  --  38  --      Estimated Creatinine Clearance: 64.7 mL/min (A) (by C-G formula based on SCr of 0.75 mg/dL (L)).    Microbiology  Results Abnormal     Procedure Component Value - Date/Time    Blood Culture - Blood, Arm, Left [[id number]] Collected:  07/08/20 1509    Lab Status:  Preliminary result Specimen:  Blood from Arm, Left Updated:  07/09/20 1545     Blood Culture No growth at 24 hours    Blood Culture - Blood, Arm, Right [[id number]] Collected:  07/08/20 1509    Lab Status:  Preliminary result Specimen:  Blood from Arm, Right Updated:  07/09/20 1545     Blood Culture No growth at 24 hours    Blood Culture - Blood, Arm, Right [[id number]] Collected:  06/29/20 1931    Lab Status:  Final result Specimen:  Blood from Arm, Right Updated:  07/04/20 2000     Blood Culture No growth at 5 days    Blood Culture - Blood, Arm, Left [[id number]] Collected:  06/29/20 1931    Lab Status:  Final result Specimen:  Blood from Arm, Left Updated:  07/04/20 2000     Blood Culture No growth at 5 days    MRSA Screen, PCR (Inpatient) - Swab, Nares [[id number]]  (Normal) Collected:  06/29/20 2352    Lab Status:  Final result Specimen:  Swab from Nares Updated:  06/30/20 0958     MRSA PCR Negative    Narrative:       MRSA Negative    COVID-19,[facility] IN-HOUSE, NP SWAB IN TRANSPORT MEDIA 8-12 HR TAT - Swab, Nasopharynx [[id number]]  (Normal) Collected:  06/29/20 2004    Lab Status:  Final result Specimen:  Swab from Nasopharynx Updated:  06/30/20 0305     COVID19 Not Detected    Narrative:       Fact sheet for providers: https://www.fda.gov/media/880790/download     Fact sheet for patients: https://www.fda.gov/media/683170/download    Respiratory Panel, PCR - Swab, Nasopharynx [[id number]]  (Normal) Collected:  06/29/20 2004    Lab Status:  Final result Specimen:  Swab from Nasopharynx Updated:  06/29/20 2129     ADENOVIRUS, PCR Not Detected     Coronavirus 229E Not Detected     Coronavirus HKU1 Not Detected     Coronavirus NL63 Not Detected     Coronavirus OC43 Not Detected     Human Metapneumovirus Not Detected     Human Rhinovirus/Enterovirus Not Detected      Influenza B PCR Not Detected     Parainfluenza Virus 1 Not Detected     Parainfluenza Virus 2 Not Detected     Parainfluenza Virus 3 Not Detected     Parainfluenza Virus 4 Not Detected     Bordetella pertussis pcr Not Detected     Influenza A H1 2009 PCR Not Detected     Chlamydophila pneumoniae PCR Not Detected     Mycoplasma pneumo by PCR Not Detected     Influenza A PCR Not Detected     Influenza A H3 Not Detected     Influenza A H1 Not Detected     RSV, PCR Not Detected     Bordetella parapertussis PCR Not Detected    Narrative:       The coronavirus on the RVP is NOT COVID-19 and is NOT indicative of infection with COVID-19.           Imaging Results (Last 24 Hours)     ** No results found for the last 24 hours. **          Results for orders placed during the hospital encounter of 06/29/20   Adult Transthoracic Echo Complete W/ Cont if Necessary Per Protocol (With Agitated Saline)    Narrative · Estimated EF = 55%.  · Left ventricular systolic function is normal.  · Mild mitral valve regurgitation is present  · Mild to moderate tricuspid valve regurgitation is present.  · Calculated right ventricular systolic pressure from tricuspid   regurgitation is 34 mmHg.  · No intracardiac shunting is seen  · No cardiac source for embolus is seen          I have reviewed the medications:  Scheduled Meds:  Pharmacy Consult  Does not apply Once   amLODIPine 10 mg Oral Q24H   aspirin 325 mg Oral BID   atorvastatin 80 mg Oral Nightly   bisacodyl 10 mg Rectal Daily   donepezil 10 mg Oral Daily   lansoprazole 30 mg Oral QAM   lisinopril 40 mg Oral Q24H   piperacillin-tazobactam 3.375 g Intravenous Q8H   polyethylene glycol 17 g Oral Daily   sodium chloride 10 mL Intravenous Q12H   sodium chloride 10 mL Intravenous Q12H   vancomycin 1,000 mg Intravenous Q12H     Continuous Infusions:  lactated ringers 75 mL/hr Last Rate: 75 mL/hr (07/06/20 1251)   niCARdipine 5-15 mg/hr    Pharmacy to dose vancomycin       PRN  Meds:.acetaminophen  •  acetaminophen **OR** [DISCONTINUED] acetaminophen  •  bisacodyl  •  labetalol  •  lidocaine PF 1%  •  magnesium sulfate  •  Pharmacy to dose vancomycin  •  potassium chloride  •  potassium chloride  •  potassium chloride  •  sodium chloride  •  sodium chloride    Assessment/Plan   Assessment & Plan     Active Hospital Problems    Diagnosis  POA   • **Fracture of right hip (CMS/HCC) [S72.001A]  Yes   • Acute febrile illness [R50.9]  Yes   • Dementia (CMS/[facility]) [F03.90]  Yes   • H/O: CVA (no deficit) [Z86.73]  Not Applicable      Resolved Hospital Problems   No resolved problems to display.        Brief Hospital Course to date:  Ken Jasso is a 80 y.o. male with PMH of CVA, HTN, dementia, and GERD who was admitted on 6/29 after fall while walking to car, sustaining a right femoral neck fracture, there was concern for stroke and code stroke was called. Imaging negative for acute CVA. Had fever up to 103 on arrival and non-sustained wide complex tachycardia requiring admission to the ICU. Now out of ICU and has been stable  On telemetry floor.        This patient's problems and plans were partially entered by my partner and updated as appropriate by me 07/10/20.     Dysphagia with ongoing aspiration events   Sepsis  Lactic acidosis  -MBS resulted with severe pharyngeal dysphagia  -speech recommending NPO, however, prior provider (Lorenza) as well as myself spoke with Mr. Jasso and he would like to eat, they discussed his code status and he is CODE STATUS: NO CPR/INTUBATION  -- wife aware and agrees with his decision , would like to keep comfort diet ongoing and aware that continued aspirations events are likely  -- RR 7/7 AM-- favored that this is another aspiration event as no other focal source of infection-- fevers noted to as high as 102- CXR reviewed and normal-- will send UA but start on broad spectrum abx and continue for now- given worsening leukocytosis would like to send blood  cultures which have been ordered- patient refusing these and long d/w him regarding GOC. Ultimately we have asked palliative to see and have d/w them.   --await final decision from family regarding hospice      Hypokalemia  -noted at 2.9 today-- replace per protocol   -no labs being drawn currently    Right femoral neck fracture  -s/p hip percutaneous pinning on 7/1 by Dr. Harp  -PT/OT - awaiting decision regarding hospice, palliative  -follow-up with Dr. Harp in 3 weeks, continue aspirin 325 mg BID for 6 weeks for VTE ppx     HTN- continue amlodipine, lisinopril, and atorvastatin      Dementia-continue aricept      DVT Prophylaxis:  Asa 325 BID    Disposition: I expect the patient to be discharged pending family decision    CODE STATUS:   Code Status and Medical Interventions:   Ordered at: 07/03/20 0956     Limited Support to NOT Include:    Intubation     Level Of Support Discussed With:    Patient     Code Status:    No CPR     Medical Interventions (Level of Support Prior to Arrest):    Limited         Electronically signed by Margie Dorado DO, 07/10/20, 15:11.

## 2020-07-10 NOTE — THERAPY DISCHARGE NOTE
Acute Care - Speech Language Pathology   Swallow Treatment Note/Discharge    [place]     Patient Name: Ken Jasso  : 1940  MRN: [id number]  Today's Date: 7/10/2020               Admit Date: 2020    Visit Dx:      ICD-10-CM ICD-9-CM   1. Acute febrile illness R50.9 780.60   2. Acute respiratory failure with hypoxia (CMS/[facility]) J96.01 518.81   3. Ventricular tachycardia (CMS/[facility]) I47.2 427.1   4. Skin tear of right elbow without complication, initial encounter S51.011A 881.01   5. Oropharyngeal dysphagia R13.12 787.22     Patient Active Problem List   Diagnosis   • Acute febrile illness   • Fracture of right hip (CMS/[facility])   • Dementia (CMS/[facility])   • H/O: CVA (no deficit)       Therapy Treatment  Rehabilitation Treatment Summary     Row Name 07/10/20 0900             Treatment Time/Intention    Discipline  speech language pathologist  -      Document Type  therapy note (daily note)  -CH      Subjective Information  no complaints  -      Mode of Treatment  speech-language pathology  -      Patient/Family Observations  no family present  -CH      Therapy Frequency (Swallow)  5 days per week  -CH      Therapy Frequency (SLP SLC)  evaluation only  -      Patient Effort  good  -CH      Recorded by [CH] Ny Rendon MS CCC-SLP 07/10/20 1332      Row Name 07/10/20 0900             Pain Assessment    Additional Documentation  Pain Scale: FACES Pre/Post-Treatment (Group);Pain Scale: Numbers Pre/Post-Treatment (Group)  -CH      Recorded by [CH] Ny Rendon MS CCC-SLP 07/10/20 1332      Row Name 07/10/20 0900             Pain Scale: Numbers Pre/Post-Treatment    Pain Scale: Numbers, Pretreatment  0/10 - no pain  -CH      Pain Scale: Numbers, Post-Treatment  0/10 - no pain  -CH      Recorded by [] Ny Rendon MS CCC-SLP 07/10/20 1332      Row Name 07/10/20 0900             Pain Scale: FACES Pre/Post-Treatment    Pain: FACES Scale, Pretreatment  0-->no hurt  -CH      Pain: FACES  Scale, Post-Treatment  0-->no hurt  -CH      Recorded by [CH] Ny Rendon MS CCC-SLP 07/10/20 1332      Row Name                Wound 06/29/20 1930 Right elbow Skin Tear    Wound - Properties Group Date first assessed: 06/29/20 [AB] Time first assessed: 1930 [AB] Present on Hospital Admission: Y [AB] Side: Right [AB] Location: elbow [AB] Primary Wound Type: Skin tear [AB] Additional Comments: CLEANED AND DRESSING PLACED  [AB] Recorded by:  [AB] Efrem Mei RN 06/29/20 1953    Row Name                Wound 07/01/20 1848 Right lateral hip Incision    Wound - Properties Group Date first assessed: 07/01/20 [HB] Time first assessed: 1848 [HB] Side: Right [HB] Orientation: lateral [HB] Location: hip [HB] Primary Wound Type: Incision [HB] Recorded by:  [HB] Mini Pitts RN 07/01/20 1848    Row Name 07/10/20 0900             Outcome Summary/Treatment Plan (SLP)    Daily Summary of Progress (SLP)  progress toward functional goals as expected  -CH      Plan for Continued Treatment (SLP)  Patient currently on Lvl 4 and thin liquids/comfort diet. He was noted to have increased/perseverative mastication with soft solids. No s/s of discomfort with current diet or with thin liquids. Occassional throat clear. Patient prefers to remain on soft chewable diet and thin liquids. Will sign off at this time.   -CH      Anticipated Dischage Disposition (SLP)  unknown  -CH      Reason for Discharge  all goals and outcomes met, no further needs identified  -CH      Recorded by [CH] Ny Rendon MS CCC-SLP 07/10/20 0932        User Key  (r) = Recorded By, (t) = Taken By, (c) = Cosigned By    Initials Name Effective Dates Discipline    AB Efrem Mei RN 06/16/16 -  Nurse    CH Ny Rendon MS CCC-SLP 02/14/19 -  SLP     Mini Pitts RN 02/21/20 -  Nurse        Outcome Summary  Outcome Summary/Treatment Plan (SLP)  Daily Summary of Progress (SLP): progress toward functional goals as expected (07/10/20 0900 :  Ny Rendon, MS CCC-SLP)  Plan for Continued Treatment (SLP): Patient currently on Lvl 4 and thin liquids/comfort diet. He was noted to have increased/perseverative mastication with soft solids. No s/s of discomfort with current diet or with thin liquids. Occassional throat clear. Patient prefers to remain on soft chewable diet and thin liquids. Will sign off at this time.  (07/10/20 0900 : Ny Rendon, MS CCC-SLP)  Anticipated Dischage Disposition (SLP): unknown (07/10/20 0900 : Ny Rendon, MS CCC-SLP)  Reason for Discharge: all goals and outcomes met, no further needs identified (07/10/20 0900 : Ny Rendon, MS CCC-SLP)  SLP GOALS     Row Name 07/10/20 0900 07/09/20 1400          Oral Nutrition/Hydration Goal 1 (SLP)    Oral Nutrition/Hydration Goal 1, SLP  Pt will comfortably tolerate L4 and thin lqiuid diet  -CH  Pt will comfortably tolerate L4 and thin lqiuid diet  -AW     Time Frame (Oral Nutrition/Hydration Goal 1, SLP)  short term goal (STG);2 days  -CH  short term goal (STG);2 days  -AW     Barriers (Oral Nutrition/Hydration Goal 1, SLP)  Increased mastication with soft solids, however patient would prefer to remain on current diet. No s/s of discomfort with lvl 4 and thin liquids. occassional TC observed. Will sign off at this time.   -CH  Gave pt thins, which he tolerated well. Cracker was more difficult - coughing noted after liquid wash. Pt understands aspiration risk with all textures and does want to pursue thin liquids. Will change diet to L4, thins, as pt should still avoid mixed consistencies. Will monitor diet tolerance and make adjustments as needed. IP hospice has been consulted.   -AW     Progress/Outcomes (Oral Nutrition/Hydration Goal 1, SLP)  goal met  -CH  goal revised this date;other (see comments) pt has been on L3 and NT - little intake, doesn't like  -AW       User Key  (r) = Recorded By, (t) = Taken By, (c) = Cosigned By    Initials Name Provider Type    AW  Macie Fatima MS CCC-SLP Speech and Language Pathologist    Ny Mckee MS CCC-SLP Speech and Language Pathologist          EDUCATION  The patient has been educated in the following areas:   Dysphagia (Swallowing Impairment) Modified Diet Instruction.    SLP Recommendation and Plan  Daily Summary of Progress (SLP): progress toward functional goals as expected     Plan for Continued Treatment (SLP): Patient currently on Lvl 4 and thin liquids/comfort diet. He was noted to have increased/perseverative mastication with soft solids. No s/s of discomfort with current diet or with thin liquids. Occassional throat clear. Patient prefers to remain on soft chewable diet and thin liquids. Will sign off at this time.   Anticipated Dischage Disposition (SLP): unknown        Reason for Discharge: all goals and outcomes met, no further needs identified           Time Calculation:   Time Calculation- SLP     Row Name 07/10/20 1334             Time Calculation- SLP    SLP Start Time  0900  -      SLP Received On  07/10/20  -        User Key  (r) = Recorded By, (t) = Taken By, (c) = Cosigned By    Initials Name Provider Type    Ny Mckee MS CCC-SLP Speech and Language Pathologist          Therapy Charges for Today     Code Description Service Date Service Provider Modifiers Qty    13798841862 HC ST TREATMENT SWALLOW 3 7/10/2020 Ny Rendon MS CCC-SLP GN 1               SLP Discharge Summary  Anticipated Dischage Disposition (SLP): unknown  Reason for Discharge: all goals and outcomes met, no further needs identified    Ny Rendon MS CCC-SLP  7/10/2020

## 2020-07-10 NOTE — THERAPY TREATMENT NOTE
Patient Name: Ken Jasso  : 1940    MRN: [id number]                              Today's Date: 7/10/2020       Admit Date: 2020    Visit Dx:     ICD-10-CM ICD-9-CM   1. Acute febrile illness R50.9 780.60   2. Acute respiratory failure with hypoxia (CMS/[facility]) J96.01 518.81   3. Ventricular tachycardia (CMS/[facility]) I47.2 427.1   4. Skin tear of right elbow without complication, initial encounter S51.011A 881.01   5. Oropharyngeal dysphagia R13.12 787.22     Patient Active Problem List   Diagnosis   • Acute febrile illness   • Fracture of right hip (CMS/HCC)   • Dementia (CMS/HCC)   • H/O: CVA (no deficit)     Past Medical History:   Diagnosis Date   • Dementia (CMS/HCC)    • Dementia (CMS/HCC)    • Dysphagia    • Esophageal dilatation    • Hiatal hernia    • PNA (pneumonia)    • PNA (pneumonia)    • Pneumonia     [facility] EPISODES    • Prostate CA (CMS/HCC)    • Stroke (CMS/HCC)          Past Surgical History:   Procedure Laterality Date   • HIP PERCUTANEOUS PINNING Right 2020    Procedure: HIP PERCUTANEOUS PINNING;  Surgeon: Sam Harp MD;  Location: [facility];  Service: Orthopedics;  Laterality: Right;     General Information     Row Name 07/10/20 1425          PT Evaluation Time/Intention    Document Type  therapy note (daily note)  -CS     Mode of Treatment  physical therapy;individual therapy  -CS     Row Name 07/10/20 1425          General Information    Patient Profile Reviewed?  yes  -CS     Existing Precautions/Restrictions  fall;weight bearing;other (see comments) TTWB RLE, dementia  -CS     Row Name 07/10/20 1425          Cognitive Assessment/Intervention- PT/OT    Orientation Status (Cognition)  oriented to;person;place  -CS     Cognitive Assessment/Intervention Comment  Pt is alert but does have difficulty sequencing and following commands at times  -CS     Row Name 07/10/20 1425          Safety Issues, Functional Mobility    Safety Issues Affecting  Function (Mobility)  safety precautions follow-through/compliance;safety precaution awareness;insight into deficits/self awareness;ability to follow commands;sequencing abilities;awareness of need for assistance  -CS     Impairments Affecting Function (Mobility)  balance;cognition;endurance/activity tolerance;motor control;pain;range of motion (ROM);strength  -CS       User Key  (r) = Recorded By, (t) = Taken By, (c) = Cosigned By    Initials Name Provider Type    CS Grace Bailey, PT Physical Therapist        Mobility     Row Name 07/10/20 1425          Bed Mobility Assessment/Treatment    Bed Mobility Assessment/Treatment  sit-supine  -CS     Sit-Supine [place] (Bed Mobility)  verbal cues;maximum assist (25% patient effort);2 person assist  -CS     Assistive Device (Bed Mobility)  head of bed elevated;bed rails;draw sheet  -CS     Comment (Bed Mobility)  VC's on sequencing for sit to supine but patient unable to follow and required max assist x 2 for sit to supine.  -     Row Name 07/10/20 1422          Transfer Assessment/Treatment    Comment (Transfers)  VC's to push off of chair to stand and to reach back for bed to sit. VC's to maintain upright posture with standing. VC's to maintain TTWB on R LE with STS and transfer. Attempted first STS but patient had significant posterior lean with standing and unable to maintain WB. Provided verbal cues and attempted second stand and patient able to maintain improved upright posture. Pt unable to sequence steps to bed, therefore performed stand pivot transfer to bed. Pt unable to maintain TTWB on R LE even with cues.   -     Row Name 07/10/20 1425          Bed-Chair Transfer    Bed-Chair [place] (Transfers)  verbal cues;maximum assist (25% patient effort);2 person assist  -CS     Assistive Device (Bed-Chair Transfers)  other (see comments) BUE support  -     Row Name 07/10/20 1422          Sit-Stand Transfer    Sit-Stand [place] (Transfers)   maximum assist (25% patient effort);2 person assist;verbal cues  -CS     Assistive Device (Sit-Stand Transfers)  other (see comments) BUE support  -     Row Name 07/10/20 1425          Gait/Stairs Assessment/Training    Comment (Gait/Stairs)  Pt unable to perform at this time.  -CS     Row Name 07/10/20 1425          Mobility Assessment/Intervention    Extremity Weight-bearing Status  right lower extremity  -CS     Right Lower Extremity (Weight-bearing Status)  (S) toe touch weight-bearing (TTWB)  -       User Key  (r) = Recorded By, (t) = Taken By, (c) = Cosigned By    Initials Name Provider Type    CS Grace Bailey, PT Physical Therapist        Obj/Interventions     Row Name 07/10/20 1425          Therapeutic Exercise    Lower Extremity (Therapeutic Exercise)  gluteal sets;heel slides, right;quad sets, right;SLR (straight leg raise), right  -CS     Lower Extremity Range of Motion (Therapeutic Exercise)  hip abduction/adduction, right;ankle dorsiflexion/plantar flexion, bilateral  -     Exercise Type (Therapeutic Exercise)  isometric contraction, static;AAROM (active assistive range of motion);AROM (active range of motion);isotonic contraction, concentric  -CS     Position (Therapeutic Exercise)  supine  -CS     Sets/Reps (Therapeutic Exercise)  15 reps each  -CS     Comment (Therapeutic Exercise)  VC's and tactile cues on technique  -     Row Name 07/10/20 1425          Static Sitting Balance    Level of [place] (Unsupported Sitting, Static Balance)  contact guard assist  -CS     Sitting Position (Unsupported Sitting, Static Balance)  sitting on edge of bed  -CS     Time Able to Maintain Position (Unsupported Sitting, Static Balance)  1 to 2 minutes  -     Row Name 07/10/20 1425          Static Standing Balance    Level of [place] (Supported Standing, Static Balance)  maximal assist, 25 to 49% patient effort;2 person assist  -CS     Time Able to Maintain Position (Supported Standing,  Static Balance)  30 to 45 seconds  -     Assistive Device Utilized (Supported Standing, Static Balance)  other (see comments) BUE support  -CS     Comment (Supported Standing, Static Balance)  Pt has significant posterior lean with standing and unable to come to full upright position. Pt unable to maintain TTWB on R LE with standing.  -     Row Name 07/10/20 1425          Dynamic Standing Balance    Level of [place], Reaches Outside Midline (Standing, Dynamic Balance)  maximal assist, 25 to 49% patient effort;2 person assist  -     Time Able to Maintain Position, Reaches Outside Midline (Standing, Dynamic Balance)  less than 15 seconds  -     Assistive Device Utilized (Supported Standing, Dynamic Balance)  other (see comments) BUE support and assist at gait belt  -       User Key  (r) = Recorded By, (t) = Taken By, (c) = Cosigned By    Initials Name Provider Type    Grace Maya, PT Physical Therapist        Goals/Plan    No documentation.       Clinical Impression     [facility] Name 07/10/20 1425          Pain Assessment    Additional Documentation  Pain Scale: Numbers Pre/Post-Treatment (Group)  -CS     Row Name 07/10/20 1425          Pain Scale: Numbers Pre/Post-Treatment    Pain Scale: Numbers, Pretreatment  0/10 - no pain  -CS     Pain Scale: Numbers, Post-Treatment  0/10 - no pain  -CS     Pain Intervention(s)  Ambulation/increased activity;Repositioned  -     Row Name 07/10/20 1425          Plan of Care Review    Plan of Care Reviewed With  patient;spouse  -     Progress  no change  -     Outcome Summary  Pt required max assist x 2 for STS. Attempted first STS but patient had significant posterior lean with standing and unable to maintain TTWB on R LE. Attempted second STS and patient's posture somewhat improved, patient unable to sequence steps to bed, therefore performed stand pivot transfer from bed to chair. Pt unable to maintain TTWB on R LE with transfer. Pt required max assist  x 2 for sit to supine.  -CS     Row Name 07/10/20 1425          Physical Therapy Clinical Impression    Criteria for Skilled Interventions Met (PT Clinical Impression)  yes;treatment indicated  -CS     Rehab Potential (PT Clinical Summary)  fair, will monitor progress closely  -CS     Row Name 07/10/20 1425          Positioning and Restraints    Pre-Treatment Position  sitting in chair/recliner  -CS     Post Treatment Position  bed  -CS     In Chair  notified nsg;reclined;call light within reach;encouraged to call for assist;exit alarm on;with family/caregiver;compression device  -CS       User Key  (r) = Recorded By, (t) = Taken By, (c) = Cosigned By    Initials Name Provider Type    Grace Maya PT Physical Therapist        Outcome Measures     Row Name 07/10/20 1425          How much help from another person do you currently need...    Turning from your back to your side while in flat bed without using bedrails?  2  -CS     Moving from lying on back to sitting on the side of a flat bed without bedrails?  2  -CS     Moving to and from a bed to a chair (including a wheelchair)?  2  -CS     Standing up from a chair using your arms (e.g., wheelchair, bedside chair)?  2  -CS     Climbing 3-5 steps with a railing?  1  -CS     To walk in hospital room?  1  -CS     AM-PAC 6 Clicks Score (PT)  10  -CS     Row Name 07/10/20 1425          Functional Assessment    Outcome Measure Options  AM-PAC 6 Clicks Basic Mobility (PT)  -CS       User Key  (r) = Recorded By, (t) = Taken By, (c) = Cosigned By    Initials Name Provider Type    Grace Maya PT Physical Therapist        Physical Therapy Education                 Title: PT OT SLP Therapies (In Progress)     Topic: Physical Therapy (In Progress)     Point: Mobility training (In Progress)     Description:   Instruct learner(s) on safety and technique for assisting patient out of bed, chair or wheelchair.  Instruct in the proper use of assistive devices,  such as walker, crutches, cane or brace.              Patient Friendly Description:   It's important to get you on your feet again, but we need to do so in a way that is safe for you. Falling has serious consequences, and your personal safety is the most important thing of all.        When it's time to get out of bed, one of us or a family member will sit next to you on the bed to give you support.     If your doctor or nurse tells you to use a walker, crutches, a cane, or a brace, be sure you use it every time you get out of bed, even if you think you don't need it.    Learning Progress Summary           Patient Acceptance, E,D, VU,NR by CS at 7/10/2020 1425    Comment:  Educated patient on WB status, safe hand placement with transfers, sequencing with transfers, bed mobility, and ther ex.    Acceptance, E,D, NR by AA at 7/9/2020 0810    Acceptance, E, VU,NR by JG at 7/8/2020 1413    Comment:  Pt educated about transfer strategies and surgical precautions.    Acceptance, E,D, NR by AA at 7/6/2020 0827    Acceptance, E, NR by NS at 7/4/2020 1601    Comment:  Patient was educated about WBing precautions and sequencing with transfers.    Acceptance, E, NR by ERICA at 7/3/2020 1154    Acceptance, E,D, NR by AA at 7/2/2020 1447   Family Acceptance, E,D, NR by AA at 7/2/2020 1447                   Point: Home exercise program (Done)     Description:   Instruct learner(s) on appropriate technique for monitoring, assisting and/or progressing patient with therapeutic exercises and activities.              Learning Progress Summary           Patient Acceptance, E,D, VU,NR by CS at 7/10/2020 1425    Comment:  Educated patient on WB status, safe hand placement with transfers, sequencing with transfers, bed mobility, and ther ex.    Acceptance, E,D, NR by AA at 7/9/2020 0810    Acceptance, E, VU,NR by JG at 7/8/2020 1413    Comment:  Pt educated about transfer strategies and surgical precautions.    Acceptance, E,D, NR by AA at  7/6/2020 0827    Acceptance, E, NR by NS at 7/4/2020 1601    Comment:  Patient was educated about WBing precautions and sequencing with transfers.                   Point: Body mechanics (In Progress)     Description:   Instruct learner(s) on proper positioning and spine alignment for patient and/or caregiver during mobility tasks and/or exercises.              Learning Progress Summary           Patient Acceptance, E,D, VU,NR by CS at 7/10/2020 1425    Comment:  Educated patient on WB status, safe hand placement with transfers, sequencing with transfers, bed mobility, and ther ex.    Acceptance, E,D, NR by AA at 7/9/2020 0810    Acceptance, E, VU,NR by LIEN at 7/8/2020 1413    Comment:  Pt educated about transfer strategies and surgical precautions.    Acceptance, E,D, NR by AA at 7/6/2020 0827    Acceptance, E, NR by NS at 7/4/2020 1601    Comment:  Patient was educated about WBing precautions and sequencing with transfers.    Acceptance, E, NR by ERICA at 7/3/2020 1154    Acceptance, E,D, NR by AA at 7/2/2020 1447   Family Acceptance, E,D, NR by AA at 7/2/2020 1447                   Point: Precautions (In Progress)     Description:   Instruct learner(s) on prescribed precautions during mobility and gait tasks              Learning Progress Summary           Patient Acceptance, E,D, VU,NR by  at 7/10/2020 1425    Comment:  Educated patient on WB status, safe hand placement with transfers, sequencing with transfers, bed mobility, and ther ex.    Acceptance, E,D, NR by AA at 7/9/2020 0810    Acceptance, E, VU,NR by JJULIO at 7/8/2020 1413    Comment:  Pt educated about transfer strategies and surgical precautions.    Acceptance, E,D, NR by AA at 7/6/2020 0827    Acceptance, E, NR by NS at 7/4/2020 1601    Comment:  Patient was educated about WBing precautions and sequencing with transfers.    Acceptance, E, NR by ERICA at 7/3/2020 1154    Acceptance, E,D, NR by AA at 7/2/2020 1447   Family Acceptance, E,D, NR by AA at  7/2/2020 1447                               User Key     Initials Effective Dates Name Provider Type Discipline    EJ 11/20/18 -  Karine Mcmahon, PT Physical Therapist PT    AA 04/02/18 -  Micaela Tate, PT Physical Therapist PT    CS 03/26/19 -  Grace Bailey, PT Physical Therapist PT    NS 09/10/19 -  Aissatou Díaz, PT Physical Therapist PT    JG 05/14/20 -  Pat Baugh, PT Student PT Student PT              PT Recommendation and Plan  Planned Therapy Interventions (PT Eval): balance training, bed mobility training, home exercise program, gait training, neuromuscular re-education, patient/family education, ROM (range of motion), strengthening, transfer training  Outcome Summary/Treatment Plan (PT)  Anticipated Discharge Disposition (PT): skilled nursing facility  Plan of Care Reviewed With: patient, spouse  Progress: no change  Outcome Summary: Pt required max assist x 2 for STS. Attempted first STS but patient had significant posterior lean with standing and unable to maintain TTWB on R LE. Attempted second STS and patient's posture somewhat improved, patient unable to sequence steps to bed, therefore performed stand pivot transfer from bed to chair. Pt unable to maintain TTWB on R LE with transfer. Pt required max assist x 2 for sit to supine.     Time Calculation:   PT Charges     Row Name 07/10/20 1425             Time Calculation    Start Time  1425  -      PT Received On  07/10/20  -         Time Calculation- PT    Total Timed Code Minutes- PT  16 minute(s)  -CS         Timed Charges    39373 - PT Therapeutic Exercise Minutes  6  -CS      67720 - PT Therapeutic Activity Minutes  10  -CS        User Key  (r) = Recorded By, (t) = Taken By, (c) = Cosigned By    Initials Name Provider Type    CS Grace Bailey, PT Physical Therapist        Therapy Charges for Today     Code Description Service Date Service Provider Modifiers Qty    19463297755 HC PT THERAPEUTIC ACT EA 15 MIN 7/10/2020  Grace Bailey, PT GP 1    42916400576 HC PT THER SUPP EA 15 MIN 7/10/2020 Grace Bailey, PT GP 1          PT G-Codes  Outcome Measure Options: AM-PAC 6 Clicks Basic Mobility (PT)  AM-PAC 6 Clicks Score (PT): 10  AM-PAC 6 Clicks Score (OT): 14    Grace Bailey PT  7/10/2020

## 2020-07-11 ENCOUNTER — APPOINTMENT (OUTPATIENT)
Dept: GENERAL RADIOLOGY | Facility: HOSPITAL | Age: 80
End: 2020-07-11

## 2020-07-11 PROCEDURE — 71045 X-RAY EXAM CHEST 1 VIEW: CPT

## 2020-07-11 PROCEDURE — 25010000002 ONDANSETRON PER 1 MG: Performed by: INTERNAL MEDICINE

## 2020-07-11 PROCEDURE — 99232 SBSQ HOSP IP/OBS MODERATE 35: CPT | Performed by: INTERNAL MEDICINE

## 2020-07-11 PROCEDURE — 25010000002 PIPERACILLIN SOD-TAZOBACTAM PER 1 G: Performed by: INTERNAL MEDICINE

## 2020-07-11 PROCEDURE — 87040 BLOOD CULTURE FOR BACTERIA: CPT | Performed by: INTERNAL MEDICINE

## 2020-07-11 PROCEDURE — 97110 THERAPEUTIC EXERCISES: CPT | Performed by: PHYSICAL THERAPIST

## 2020-07-11 RX ORDER — SODIUM CHLORIDE 9 MG/ML
75 INJECTION, SOLUTION INTRAVENOUS CONTINUOUS
Status: DISCONTINUED | OUTPATIENT
Start: 2020-07-11 | End: 2020-07-14

## 2020-07-11 RX ORDER — ONDANSETRON 2 MG/ML
4 INJECTION INTRAMUSCULAR; INTRAVENOUS EVERY 6 HOURS PRN
Status: DISCONTINUED | OUTPATIENT
Start: 2020-07-11 | End: 2020-07-14 | Stop reason: HOSPADM

## 2020-07-11 RX ORDER — ONDANSETRON 4 MG/1
4 TABLET, FILM COATED ORAL EVERY 6 HOURS PRN
Status: DISCONTINUED | OUTPATIENT
Start: 2020-07-11 | End: 2020-07-14 | Stop reason: HOSPADM

## 2020-07-11 RX ADMIN — ASPIRIN 325 MG ORAL TABLET 325 MG: 325 PILL ORAL at 09:24

## 2020-07-11 RX ADMIN — LISINOPRIL 40 MG: 40 TABLET ORAL at 09:24

## 2020-07-11 RX ADMIN — BISACODYL 10 MG: 5 TABLET, COATED ORAL at 06:30

## 2020-07-11 RX ADMIN — ACETAMINOPHEN 650 MG: 325 TABLET, FILM COATED ORAL at 06:30

## 2020-07-11 RX ADMIN — AMLODIPINE BESYLATE 10 MG: 10 TABLET ORAL at 09:24

## 2020-07-11 RX ADMIN — TAZOBACTAM SODIUM AND PIPERACILLIN SODIUM 3.38 G: 375; 3 INJECTION, SOLUTION INTRAVENOUS at 17:59

## 2020-07-11 RX ADMIN — LANSOPRAZOLE 30 MG: KIT at 06:30

## 2020-07-11 RX ADMIN — DONEPEZIL HYDROCHLORIDE 10 MG: 10 TABLET, FILM COATED ORAL at 09:25

## 2020-07-11 RX ADMIN — SODIUM CHLORIDE 75 ML/HR: 9 INJECTION, SOLUTION INTRAVENOUS at 18:05

## 2020-07-11 RX ADMIN — POLYETHYLENE GLYCOL 3350 17 G: 17 POWDER, FOR SOLUTION ORAL at 09:25

## 2020-07-11 RX ADMIN — ONDANSETRON 4 MG: 2 INJECTION INTRAMUSCULAR; INTRAVENOUS at 17:49

## 2020-07-11 NOTE — PROGRESS NOTES
Pharmacy to dose Zosyn for possible aspiration pneumonia in 81 yo M, crcl 64.5.  Dosed as 3.375 gram q8h std extended interval. (Pt was previously on zosyn, last dose 7/8/20).  Pharmacy to monitor.

## 2020-07-11 NOTE — PLAN OF CARE
Problem: Patient Care Overview  Goal: Plan of Care Review  Outcome: Ongoing (interventions implemented as appropriate)  Flowsheets  Taken 7/11/2020 1649  Progress: improving  Taken 7/11/2020 4642  Plan of Care Reviewed With: patient  Outcome Summary: Unable to perform bed mobility and transfer today due to patient being in bed very cold and tremoring and refusing transfer. Pt was agreeable and motivated to perform exercises in bed. Pt able to tolerate exercises with no reports of increase in pain, needed cues on sequencing exercises. Nsg aware of patient being cold and tremor. Will continue to progress as able.

## 2020-07-11 NOTE — PLAN OF CARE
Problem: Patient Care Overview  Goal: Plan of Care Review  Outcome: Ongoing (interventions implemented as appropriate)  Flowsheets (Taken 7/11/2020 0657)  Progress: no change  Plan of Care Reviewed With: patient  Outcome Summary: Pt alert and oriented. VSS. On tele, NSR. Pain controlled with tylenol. Pt slept well this night. Hospice will follow pt on Monday. Pt family considering home w/ hospice vs. [facility] [place].

## 2020-07-11 NOTE — THERAPY TREATMENT NOTE
Patient Name: Ken Jasso  : 1940    MRN: [id number]                              Today's Date: 2020       Admit Date: 2020    Visit Dx:     ICD-10-CM ICD-9-CM   1. Acute febrile illness R50.9 780.60   2. Acute respiratory failure with hypoxia (CMS/[facility]) J96.01 518.81   3. Ventricular tachycardia (CMS/[facility]) I47.2 427.1   4. Skin tear of right elbow without complication, initial encounter S51.011A 881.01   5. Oropharyngeal dysphagia R13.12 787.22     Patient Active Problem List   Diagnosis   • Acute febrile illness   • Fracture of right hip (CMS/HCC)   • Dementia (CMS/HCC)   • H/O: CVA (no deficit)     Past Medical History:   Diagnosis Date   • Dementia (CMS/HCC)    • Dementia (CMS/HCC)    • Dysphagia    • Esophageal dilatation    • Hiatal hernia    • PNA (pneumonia)    • PNA (pneumonia)    • Pneumonia     [facility] EPISOD[facility]    • Prostate CA (CMS/HCC)    • Stroke (CMS/HCC)          Past Surgical History:   Procedure Laterality Date   • HIP PERCUTANEOUS PINNING Right 2020    Procedure: HIP PERCUTANEOUS PINNING;  Surgeon: Sam Harp MD;  Location: [facility];  Service: Orthopedics;  Laterality: Right;     General Information     Row Name 20 1545          PT Evaluation Time/Intention    Document Type  therapy note (daily note)  -CS     Mode of Treatment  physical therapy;individual therapy  -CS     Row Name 20 1545          General Information    Patient Profile Reviewed?  yes  -CS     Existing Precautions/Restrictions  fall;other (see comments) TTWB R LE, dementia  -CS     Row Name 20 1545          Cognitive Assessment/Intervention- PT/OT    Orientation Status (Cognition)  oriented to;person;place  -CS     Row Name 20 1545          Safety Issues, Functional Mobility    Safety Issues Affecting Function (Mobility)  safety precautions follow-through/compliance;safety precaution awareness;insight into deficits/self awareness;ability to follow  commands;awareness of need for assistance;judgment;sequencing abilities;problem solving  -CS     Impairments Affecting Function (Mobility)  balance;cognition;endurance/activity tolerance;motor control;pain;range of motion (ROM);strength  -CS       User Key  (r) = Recorded By, (t) = Taken By, (c) = Cosigned By    Initials Name Provider Type    Grace Maya, DANIEL Physical Therapist        Mobility     Row Name 07/11/20 1545          Bed Mobility Assessment/Treatment    Comment (Bed Mobility)  Unable to perform bed mobility due to patient refusing due to being very cold and tremoring. Pt agreeable to exercises in bed.  -[facility] Name 07/11/20 1545          Transfer Assessment/Treatment    Comment (Transfers)  Unable to perform bed mobility due to patient refusing due to being very cold and tremoring. Pt agreeable to exercises in bed.  -CS     Row Name 07/11/20 1545          Bed-Chair Transfer    Bed-Chair [place] (Transfers)  not tested  -CS     Row Name 07/11/20 154          Sit-Stand Transfer    Sit-Stand [place] (Transfers)  not tested  -CS     Row Name 07/11/20 154          Gait/Stairs Assessment/Training    [place] Level (Gait)  unable to assess  -CS     Row Name 07/11/20 1545          Mobility Assessment/Intervention    Extremity Weight-bearing Status  right lower extremity  -     Right Lower Extremity (Weight-bearing Status)  (S) toe touch weight-bearing (TTWB)  -       User Key  (r) = Recorded By, (t) = Taken By, (c) = Cosigned By    Initials Name Provider Type    Grace Maya, PT Physical Therapist        Obj/Interventions     Row Name 07/11/20 1545          Therapeutic Exercise    Lower Extremity (Therapeutic Exercise)  gluteal sets;heel slides, right;quad sets, right;SLR (straight leg raise), right  -     Lower Extremity Range of Motion (Therapeutic Exercise)  hip abduction/adduction, right;ankle dorsiflexion/plantar flexion, bilateral  -     Exercise Type  (Therapeutic Exercise)  isometric contraction, static;AROM (active range of motion);isotonic contraction, concentric;AAROM (active assistive range of motion)  -CS     Position (Therapeutic Exercise)  supine  -CS     Sets/Reps (Therapeutic Exercise)  15 reps each  -CS     Comment (Therapeutic Exercise)  Verbal and tactile cues on technique.   -CS       User Key  (r) = Recorded By, (t) = Taken By, (c) = Cosigned By    Initials Name Provider Type    CS Grace Bailey, PT Physical Therapist        Goals/Plan     Row Name 07/11/20 1545          Bed Mobility Goal 1 (PT)    Activity/Assistive Device (Bed Mobility Goal 1, PT)  bed mobility activities, all  -CS     [place] Level/Cues Needed (Bed Mobility Goal 1, PT)  minimum assist (75% or more patient effort)  -CS     Time Frame (Bed Mobility Goal 1, PT)  10 days  -CS     Progress/Outcomes (Bed Mobility Goal 1, PT)  goal ongoing  -CS     Row Name 07/11/20 1545          Transfer Goal 1 (PT)    Activity/Assistive Device (Transfer Goal 1, PT)  sit-to-stand/stand-to-sit;bed-to-chair/chair-to-bed  -CS     [place] Level/Cues Needed (Transfer Goal 1, PT)  minimum assist (75% or more patient effort);2 person assist  -CS     Time Frame (Transfer Goal 1, PT)  10 days  -CS     Progress/Outcome (Transfer Goal 1, PT)  goal ongoing  -CS     Row Name 07/11/20 1545          Gait Training Goal 1 (PT)    Activity/Assistive Device (Gait Training Goal 1, PT)  gait (walking locomotion);assistive device use  -CS     [place] Level (Gait Training Goal 1, PT)  moderate assist (50-74% patient effort);2 person assist  -CS     Distance (Gait Goal 1, PT)  10'  -CS     Time Frame (Gait Training Goal 1, PT)  10 days  -CS     Progress/Outcome (Gait Training Goal 1, PT)  goal ongoing  -CS       User Key  (r) = Recorded By, (t) = Taken By, (c) = Cosigned By    Initials Name Provider Type    CS Grace Bailey PT Physical Therapist        Clinical Impression     Row Name 07/11/20  1545          Pain Assessment    Additional Documentation  Pain Scale: Numbers Pre/Post-Treatment (Group)  -     Row Name 07/11/20 1545          Pain Scale: Numbers Pre/Post-Treatment    Pain Scale: Numbers, Pretreatment  0/10 - no pain  -CS     Pain Scale: Numbers, Post-Treatment  0/10 - no pain  -CS     Pre/Post Treatment Pain Comment  No complaints of pain, reports of being very cold.  -CS     Pain Intervention(s)  Repositioned;Ambulation/increased activity  -     Row Name 07/11/20 1545          Plan of Care Review    Plan of Care Reviewed With  patient  -CS     Progress  no change  -CS     Outcome Summary  Unable to perform bed mobility and transfer today due to patient being in bed very cold and tremoring and refusing transfer. Pt was agreeable and motivated to perform exercises in bed. Pt able to tolerate exercises with no reports of increase in pain, needed cues on sequencing exercises. Nsg aware of patient being cold and tremor. Will continue to progress as able.  -     Row Name 07/11/20 1545          Physical Therapy Clinical Impression    Criteria for Skilled Interventions Met (PT Clinical Impression)  yes;treatment indicated  -CS     Rehab Potential (PT Clinical Summary)  fair, will monitor progress closely  -     Row Name 07/11/20 1545          Vital Signs    Pre Systolic BP Rehab  162  -CS     Pre Treatment Diastolic BP  118  -CS     Intra Systolic BP Rehab  162  -CS     Intra Treatment Diastolic BP  91  -CS     Post Systolic BP Rehab  160  -CS     Post Treatment Diastolic BP  86  -CS     Pretreatment Heart Rate (beats/min)  225 Nsg was made aware and put new sensors on and monitored patient  -CS     Posttreatment Heart Rate (beats/min)  110  -CS     Pre SpO2 (%)  69 Nsg made aware and monitored patient and placed new sensor  -CS     O2 Delivery Pre Treatment  supplemental O2  -CS     Post SpO2 (%)  100  -CS     O2 Delivery Post Treatment  room air  -     Row Name 07/11/20 1545           Positioning and Restraints    Pre-Treatment Position  in bed  -CS     Post Treatment Position  bed  -CS     In Bed  notified nsg;fowlers;call light within reach;encouraged to call for assist;exit alarm on;SCD pump applied  -CS       User Key  (r) = Recorded By, (t) = Taken By, (c) = Cosigned By    Initials Name Provider Type    Grace Maya PT Physical Therapist        Outcome Measures     Row Name 07/11/20 1545          How much help from another person do you currently need...    Turning from your back to your side while in flat bed without using bedrails?  2  -CS     Moving from lying on back to sitting on the side of a flat bed without bedrails?  2  -CS     Moving to and from a bed to a chair (including a wheelchair)?  2  -CS     Standing up from a chair using your arms (e.g., wheelchair, bedside chair)?  2  -CS     Climbing 3-5 steps with a railing?  1  -CS     To walk in hospital room?  1  -CS     AM-PAC 6 Clicks Score (PT)  10  -CS     Row Name 07/11/20 1545          Functional Assessment    Outcome Measure Options  AM-PAC 6 Clicks Basic Mobility (PT)  -CS       User Key  (r) = Recorded By, (t) = Taken By, (c) = Cosigned By    Initials Name Provider Type    Grace Maya PT Physical Therapist        Physical Therapy Education                 Title: PT OT SLP Therapies (In Progress)     Topic: Physical Therapy (In Progress)     Point: Mobility training (In Progress)     Description:   Instruct learner(s) on safety and technique for assisting patient out of bed, chair or wheelchair.  Instruct in the proper use of assistive devices, such as walker, crutches, cane or brace.              Patient Friendly Description:   It's important to get you on your feet again, but we need to do so in a way that is safe for you. Falling has serious consequences, and your personal safety is the most important thing of all.        When it's time to get out of bed, one of us or a family member will sit next to  you on the bed to give you support.     If your doctor or nurse tells you to use a walker, crutches, a cane, or a brace, be sure you use it every time you get out of bed, even if you think you don't need it.    Learning Progress Summary           Patient Acceptance, E,D, VU,NR by CS at 7/11/2020 1545    Comment:  Educated patient on importance of exercise and exercise technique.    Acceptance, E,D, VU,NR by CS at 7/10/2020 1425    Comment:  Educated patient on WB status, safe hand placement with transfers, sequencing with transfers, bed mobility, and ther ex.    Acceptance, E,D, NR by AA at 7/9/2020 0810    Acceptance, E, VU,NR by LIEN at 7/8/2020 1413    Comment:  Pt educated about transfer strategies and surgical precautions.    Acceptance, E,D, NR by AA at 7/6/2020 0827    Acceptance, E, NR by NS at 7/4/2020 1601    Comment:  Patient was educated about WBing precautions and sequencing with transfers.    Acceptance, E, NR by ERICA at 7/3/2020 1154    Acceptance, E,D, NR by AA at 7/2/2020 1447   Family Acceptance, E,D, NR by AA at 7/2/2020 1447                   Point: Home exercise program (Done)     Description:   Instruct learner(s) on appropriate technique for monitoring, assisting and/or progressing patient with therapeutic exercises and activities.              Learning Progress Summary           Patient Acceptance, E,D, VU,NR by CS at 7/11/2020 1545    Comment:  Educated patient on importance of exercise and exercise technique.    Acceptance, E,D, VU,NR by CS at 7/10/2020 1425    Comment:  Educated patient on WB status, safe hand placement with transfers, sequencing with transfers, bed mobility, and ther ex.    Acceptance, E,D, NR by AA at 7/9/2020 0810    Acceptance, E, VU,NR by LIEN at 7/8/2020 1413    Comment:  Pt educated about transfer strategies and surgical precautions.    Acceptance, E,D, NR by AA at 7/6/2020 0827    Acceptance, E, NR by NS at 7/4/2020 1601    Comment:  Patient was educated about WBing  precautions and sequencing with transfers.                   Point: Body mechanics (In Progress)     Description:   Instruct learner(s) on proper positioning and spine alignment for patient and/or caregiver during mobility tasks and/or exercises.              Learning Progress Summary           Patient Acceptance, E,D, VU,NR by CS at 7/11/2020 1545    Comment:  Educated patient on importance of exercise and exercise technique.    Acceptance, E,D, VU,NR by CS at 7/10/2020 1425    Comment:  Educated patient on WB status, safe hand placement with transfers, sequencing with transfers, bed mobility, and ther ex.    Acceptance, E,D, NR by AA at 7/9/2020 0810    Acceptance, E, VU,NR by JJULIO at 7/8/2020 1413    Comment:  Pt educated about transfer strategies and surgical precautions.    Acceptance, E,D, NR by AA at 7/6/2020 0827    Acceptance, E, NR by NS at 7/4/2020 1601    Comment:  Patient was educated about WBing precautions and sequencing with transfers.    Acceptance, E, NR by ERICA at 7/3/2020 1154    Acceptance, E,D, NR by AA at 7/2/2020 1447   Family Acceptance, E,D, NR by AA at 7/2/2020 1447                   Point: Precautions (In Progress)     Description:   Instruct learner(s) on prescribed precautions during mobility and gait tasks              Learning Progress Summary           Patient Acceptance, E,D, VU,NR by CS at 7/11/2020 1545    Comment:  Educated patient on importance of exercise and exercise technique.    Acceptance, E,D, VU,NR by CS at 7/10/2020 1425    Comment:  Educated patient on WB status, safe hand placement with transfers, sequencing with transfers, bed mobility, and ther ex.    Acceptance, E,D, NR by AA at 7/9/2020 0810    Acceptance, E, VU,NR by JJULIO at 7/8/2020 1413    Comment:  Pt educated about transfer strategies and surgical precautions.    Acceptance, E,D, NR by AA at 7/6/2020 0827    Acceptance, E, NR by NS at 7/4/2020 1601    Comment:  Patient was educated about WBing precautions and  sequencing with transfers.    Acceptance, E, NR by ERICA at 7/3/2020 1154    Acceptance, E,D, NR by AA at 7/2/2020 1447   Family Acceptance, E,D, NR by AA at 7/2/2020 1447                               User Key     Initials Effective Dates Name Provider Type Discipline    EJ 11/20/18 -  Karine Mcmahon, PT Physical Therapist PT    AA 04/02/18 -  Micaela Tate, PT Physical Therapist PT    CS 03/26/19 -  Grace Bailey, PT Physical Therapist PT    NS 09/10/19 -  Aissatou Díaz, PT Physical Therapist PT    JG 05/14/20 -  Pat Baugh, PT Student PT Student PT              PT Recommendation and Plan  Planned Therapy Interventions (PT Eval): balance training, bed mobility training, gait training, home exercise program, neuromuscular re-education, patient/family education, ROM (range of motion), strengthening, transfer training  Outcome Summary/Treatment Plan (PT)  Anticipated Discharge Disposition (PT): skilled nursing facility  Plan of Care Reviewed With: patient  Progress: improving  Outcome Summary: Unable to perform bed mobility and transfer today due to patient being in bed very cold and tremoring and refusing transfer. Pt was agreeable and motivated to perform exercises in bed. Pt able to tolerate exercises with no reports of increase in pain, needed cues on sequencing exercises. Nsg aware of patient being cold and tremor. Will continue to progress as able.     Time Calculation:   PT Charges     Row Name 07/11/20 1545             Time Calculation    Start Time  1545  -CS      PT Received On  07/11/20  -         Time Calculation- PT    Total Timed Code Minutes- PT  10 minute(s)  -CS         Timed Charges    65645 - PT Therapeutic Exercise Minutes  10  -CS        User Key  (r) = Recorded By, (t) = Taken By, (c) = Cosigned By    Initials Name Provider Type    CS Grace Bailey, PT Physical Therapist        Therapy Charges for Today     Code Description Service Date Service Provider Modifiers Qty     21709384654 HC PT THERAPEUTIC ACT EA 15 MIN 7/10/2020 Grace Bailey, PT GP 1    62767645900 HC PT THER SUPP EA 15 MIN 7/10/2020 Grace Bailey, PT GP 1    80635095992 HC PT THER PROC EA 15 MIN 7/11/2020 Grace Bailey, PT GP 1          PT G-Codes  Outcome Measure Options: AM-PAC 6 Clicks Basic Mobility (PT)  AM-PAC 6 Clicks Score (PT): 10  AM-PAC 6 Clicks Score (OT): 14    Grace Bailey, PT  7/11/2020

## 2020-07-11 NOTE — PROGRESS NOTES
[facility] Medicine Services  PROGRESS NOTE    Patient Name: Ken Jasso  : 1940  MRN: [id number]    Date of Admission: 2020  Primary Care Physician: Provider, No Known    Subjective   Subjective     CC:  Right hip fx    HPI:  Spoke with his wife on the phone this morning, she is wanting to pursue rehab.  Patient is confused at baseline, not eating much    Review of Systems  Gen- No fevers, chills  CV- No chest pain, palpitations  Resp- No cough, dyspnea  GI- No N/V/D, abd pain      Objective   Objective     Vital Signs:   Temp:  [97.3 °F (36.3 °C)-98 °F (36.7 °C)] 97.6 °F (36.4 °C)  Heart Rate:  [72-97] 97  Resp:  [16] 16  BP: (135-155)/(67-81) 150/79  Total (NIH Stroke Scale): 0     Physical Exam:  Constitutional: No acute distress, pleasantly confused at baseline  HENT: NCAT, mucous membranes moist  Respiratory: Clear to auscultation bilaterally, respiratory effort normal   Cardiovascular: RRR, no murmurs  Gastrointestinal: Positive bowel sounds, soft, nontender, nondistended  Musculoskeletal: right hip - cdi  Psychiatric: Appropriate affect, cooperative  Neurologic: Oriented to self speech clear  Skin: No rashes    Results Reviewed:  Results from last 7 days   Lab Units 20  0400 20  0825   WBC 10*3/mm3 21.58* 16.85*   HEMOGLOBIN g/dL 9.2* 12.4*   HEMATOCRIT % 28.6* 39.0   PLATELETS 10*3/mm3 329 442     Results from last 7 days   Lab Units 20  0328 20  0825   SODIUM mmol/L 139 136   POTASSIUM mmol/L 2.9* 3.6   CHLORIDE mmol/L 102 98   CO2 mmol/L 25.0 25.0   BUN mg/dL 24* 12   CREATININE mg/dL 0.75* 0.80   GLUCOSE mg/dL 104* 123*   CALCIUM mg/dL 8.5* 9.2   ALT (SGPT) U/L  --  20   AST (SGOT) U/L  --  38     Estimated Creatinine Clearance: 64.5 mL/min (A) (by C-G formula based on SCr of 0.75 mg/dL (L)).    Microbiology Results Abnormal     Procedure Component Value - Date/Time    Blood Culture - Blood, Arm, Left [[id number]] Collected:  20  1509    Lab Status:  Preliminary result Specimen:  Blood from Arm, Left Updated:  07/10/20 1545     Blood Culture No growth at 2 days    Blood Culture - Blood, Arm, Right [[id number]] Collected:  07/08/20 1509    Lab Status:  Preliminary result Specimen:  Blood from Arm, Right Updated:  07/10/20 1545     Blood Culture No growth at 2 days    Blood Culture - Blood, Arm, Right [[id number]] Collected:  06/29/20 1931    Lab Status:  Final result Specimen:  Blood from Arm, Right Updated:  07/04/20 2000     Blood Culture No growth at 5 days    Blood Culture - Blood, Arm, Left [[id number]] Collected:  06/29/20 1931    Lab Status:  Final result Specimen:  Blood from Arm, Left Updated:  07/04/20 2000     Blood Culture No growth at 5 days    MRSA Screen, PCR (Inpatient) - Swab, Nares [[id number]]  (Normal) Collected:  06/29/20 2352    Lab Status:  Final result Specimen:  Swab from Nares Updated:  06/30/20 0958     MRSA PCR Negative    Narrative:       MRSA Negative    COVID-19,[facility] IN-HOUSE, NP SWAB IN TRANSPORT MEDIA 8-12 HR TAT - Swab, Nasopharynx [[id number]]  (Normal) Collected:  06/29/20 2004    Lab Status:  Final result Specimen:  Swab from Nasopharynx Updated:  06/30/20 0305     COVID19 Not Detected    Narrative:       Fact sheet for providers: https://www.fda.gov/media/493508/download     Fact sheet for patients: https://www.fda.gov/media/478348/download    Respiratory Panel, PCR - Swab, Nasopharynx [[id number]]  (Normal) Collected:  06/29/20 2004    Lab Status:  Final result Specimen:  Swab from Nasopharynx Updated:  06/29/20 2129     ADENOVIRUS, PCR Not Detected     Coronavirus 229E Not Detected     Coronavirus HKU1 Not Detected     Coronavirus NL63 Not Detected     Coronavirus OC43 Not Detected     Human Metapneumovirus Not Detected     Human Rhinovirus/Enterovirus Not Detected     Influenza B PCR Not Detected     Parainfluenza Virus 1 Not Detected     Parainfluenza Virus 2 Not Detected     Parainfluenza Virus 3 Not  Detected     Parainfluenza Virus 4 Not Detected     Bordetella pertussis pcr Not Detected     Influenza A H1 2009 PCR Not Detected     Chlamydophila pneumoniae PCR Not Detected     Mycoplasma pneumo by PCR Not Detected     Influenza A PCR Not Detected     Influenza A H3 Not Detected     Influenza A H1 Not Detected     RSV, PCR Not Detected     Bordetella parapertussis PCR Not Detected    Narrative:       The coronavirus on the RVP is NOT COVID-19 and is NOT indicative of infection with COVID-19.           Imaging Results (Last 24 Hours)     ** No results found for the last 24 hours. **          Results for orders placed during the hospital encounter of 06/29/20   Adult Transthoracic Echo Complete W/ Cont if Necessary Per Protocol (With Agitated Saline)    Narrative · Estimated EF = 55%.  · Left ventricular systolic function is normal.  · Mild mitral valve regurgitation is present  · Mild to moderate tricuspid valve regurgitation is present.  · Calculated right ventricular systolic pressure from tricuspid   regurgitation is 34 mmHg.  · No intracardiac shunting is seen  · No cardiac source for embolus is seen          I have reviewed the medications:  Scheduled Meds:  Pharmacy Consult  Does not apply Once   amLODIPine 10 mg Oral Q24H   aspirin 325 mg Oral BID   atorvastatin 80 mg Oral Nightly   bisacodyl 10 mg Rectal Daily   donepezil 10 mg Oral Daily   lansoprazole 30 mg Oral QAM   lisinopril 40 mg Oral Q24H   piperacillin-tazobactam 3.375 g Intravenous Q8H   polyethylene glycol 17 g Oral Daily   sodium chloride 10 mL Intravenous Q12H   sodium chloride 10 mL Intravenous Q12H   vancomycin 1,000 mg Intravenous Q12H     Continuous Infusions:  lactated ringers 75 mL/hr Last Rate: 75 mL/hr (07/06/20 1251)   niCARdipine 5-15 mg/hr    Pharmacy to dose vancomycin       PRN Meds:.acetaminophen  •  acetaminophen **OR** [DISCONTINUED] acetaminophen  •  bisacodyl  •  labetalol  •  lidocaine PF 1%  •  magnesium sulfate  •   Pharmacy to dose vancomycin  •  potassium chloride  •  potassium chloride  •  potassium chloride  •  sodium chloride  •  sodium chloride    Assessment/Plan   Assessment & Plan     Active Hospital Problems    Diagnosis  POA   • **Fracture of right hip (CMS/[facility]) [S72.001A]  Yes   • Acute febrile illness [R50.9]  Yes   • Dementia (CMS/[facility]) [F03.90]  Yes   • H/O: CVA (no deficit) [Z86.73]  Not Applicable      Resolved Hospital Problems   No resolved problems to display.        Brief Hospital Course to date:  Ken Jasso is a 80 y.o. male with PMH of CVA, HTN, dementia, and GERD who was admitted on 6/29 after fall while walking to car, sustaining a right femoral neck fracture, there was concern for stroke and code stroke was called. Imaging negative for acute CVA. Had fever up to 103 on arrival and non-sustained wide complex tachycardia requiring admission to the ICU. Now out of ICU and has been stable  On telemetry floor.         This patient's problems and plans were partially entered by my partner and updated as appropriate by me 07/11/20.     Dysphagia with ongoing aspiration events   Sepsis  Lactic acidosis  -MBS resulted with severe pharyngeal dysphagia  -speech recommending NPO, however, prior provider (Lorenza) as well as myself spoke with Mr. Jasso and he would like to eat, they discussed his code status and he is CODE STATUS: NO CPR/INTUBATION  -- wife aware and patient is currently refusing peg  -- RR 7/7 AM-- favored that this is another aspiration event as no other focal source of infection-- fevers noted to as high as 102- CXR reviewed and normal, UA ok, has been off antibiotics, continue to monitor        Hypokalemia  - recheck labs in am     Right femoral neck fracture  -s/p hip percutaneous pinning on 7/1 by Dr. Harp  -PT/OT - family now wants rehab, restart PT/OT  -follow-up with Dr. Harp in 3 weeks, continue aspirin 325 mg BID for 6 weeks for VTE ppx     HTN- continue amlodipine,  lisinopril, and atorvastatin      Dementia-continue aricept      DVT Prophylaxis:  Asa 325 BID     Disposition: I expect the patient to be discharged to rehab, family currently refusing palliative     DVT Prophylaxis:  BID ASA  Discussed plan of care with his wife Kerri    Disposition: I expect the patient to be discharged pending rehab referrals      CODE STATUS:   Code Status and Medical Interventions:   Ordered at: 07/03/20 0956     Limited Support to NOT Include:    Intubation     Level Of Support Discussed With:    Patient     Code Status:    No CPR     Medical Interventions (Level of Support Prior to Arrest):    Limited         Electronically signed by Margie Dorado DO, 07/11/20, 10:39.

## 2020-07-12 LAB
ALBUMIN SERPL-MCNC: 2.8 G/DL (ref 3.5–5.2)
ANION GAP SERPL CALCULATED.3IONS-SCNC: 11 MMOL/L (ref 5–15)
BASOPHILS # BLD AUTO: 0.06 10*3/MM3 (ref 0–0.2)
BASOPHILS NFR BLD AUTO: 0.4 % (ref 0–1.5)
BUN SERPL-MCNC: 21 MG/DL (ref 8–23)
BUN/CREAT SERPL: 23.1 (ref 7–25)
CALCIUM SPEC-SCNC: 8.7 MG/DL (ref 8.6–10.5)
CHLORIDE SERPL-SCNC: 103 MMOL/L (ref 98–107)
CO2 SERPL-SCNC: 27 MMOL/L (ref 22–29)
CREAT SERPL-MCNC: 0.91 MG/DL (ref 0.76–1.27)
DEPRECATED RDW RBC AUTO: 41.1 FL (ref 37–54)
EOSINOPHIL # BLD AUTO: 0.03 10*3/MM3 (ref 0–0.4)
EOSINOPHIL NFR BLD AUTO: 0.2 % (ref 0.3–6.2)
ERYTHROCYTE [DISTWIDTH] IN BLOOD BY AUTOMATED COUNT: 13.9 % (ref 12.3–15.4)
GFR SERPL CREATININE-BSD FRML MDRD: 80 ML/MIN/1.73
GLUCOSE SERPL-MCNC: 99 MG/DL (ref 65–99)
HCT VFR BLD AUTO: 29.9 % (ref 37.5–51)
HGB BLD-MCNC: 9.5 G/DL (ref 13–17.7)
IMM GRANULOCYTES # BLD AUTO: 0.06 10*3/MM3 (ref 0–0.05)
IMM GRANULOCYTES NFR BLD AUTO: 0.4 % (ref 0–0.5)
LYMPHOCYTES # BLD AUTO: 1.39 10*3/MM3 (ref 0.7–3.1)
LYMPHOCYTES NFR BLD AUTO: 10.2 % (ref 19.6–45.3)
MCH RBC QN AUTO: 25.9 PG (ref 26.6–33)
MCHC RBC AUTO-ENTMCNC: 31.8 G/DL (ref 31.5–35.7)
MCV RBC AUTO: 81.5 FL (ref 79–97)
MONOCYTES # BLD AUTO: 1.21 10*3/MM3 (ref 0.1–0.9)
MONOCYTES NFR BLD AUTO: 8.9 % (ref 5–12)
NEUTROPHILS NFR BLD AUTO: 10.86 10*3/MM3 (ref 1.7–7)
NEUTROPHILS NFR BLD AUTO: 79.9 % (ref 42.7–76)
NRBC BLD AUTO-RTO: 0 /100 WBC (ref 0–0.2)
PHOSPHATE SERPL-MCNC: 3.8 MG/DL (ref 2.5–4.5)
PLAT MORPH BLD: NORMAL
PLATELET # BLD AUTO: 343 10*3/MM3 (ref 140–450)
PMV BLD AUTO: 11 FL (ref 6–12)
POTASSIUM SERPL-SCNC: 3.2 MMOL/L (ref 3.5–5.2)
RBC # BLD AUTO: 3.67 10*6/MM3 (ref 4.14–5.8)
RBC MORPH BLD: NORMAL
SODIUM SERPL-SCNC: 141 MMOL/L (ref 136–145)
WBC # BLD AUTO: 13.61 10*3/MM3 (ref 3.4–10.8)
WBC MORPH BLD: NORMAL

## 2020-07-12 PROCEDURE — 97530 THERAPEUTIC ACTIVITIES: CPT | Performed by: PHYSICAL THERAPIST

## 2020-07-12 PROCEDURE — 92610 EVALUATE SWALLOWING FUNCTION: CPT

## 2020-07-12 PROCEDURE — 85025 COMPLETE CBC W/AUTO DIFF WBC: CPT | Performed by: INTERNAL MEDICINE

## 2020-07-12 PROCEDURE — 85007 BL SMEAR W/DIFF WBC COUNT: CPT | Performed by: INTERNAL MEDICINE

## 2020-07-12 PROCEDURE — 25010000002 PIPERACILLIN SOD-TAZOBACTAM PER 1 G: Performed by: INTERNAL MEDICINE

## 2020-07-12 PROCEDURE — 80069 RENAL FUNCTION PANEL: CPT | Performed by: INTERNAL MEDICINE

## 2020-07-12 PROCEDURE — 99232 SBSQ HOSP IP/OBS MODERATE 35: CPT | Performed by: INTERNAL MEDICINE

## 2020-07-12 PROCEDURE — 25010000003 POTASSIUM CHLORIDE 10 MEQ/100ML SOLUTION: Performed by: INTERNAL MEDICINE

## 2020-07-12 RX ADMIN — TAZOBACTAM SODIUM AND PIPERACILLIN SODIUM 3.38 G: 375; 3 INJECTION, SOLUTION INTRAVENOUS at 17:43

## 2020-07-12 RX ADMIN — POTASSIUM CHLORIDE 10 MEQ: 7.46 INJECTION, SOLUTION INTRAVENOUS at 17:43

## 2020-07-12 RX ADMIN — TAZOBACTAM SODIUM AND PIPERACILLIN SODIUM 3.38 G: 375; 3 INJECTION, SOLUTION INTRAVENOUS at 00:58

## 2020-07-12 RX ADMIN — POTASSIUM CHLORIDE 10 MEQ: 7.46 INJECTION, SOLUTION INTRAVENOUS at 13:53

## 2020-07-12 RX ADMIN — POTASSIUM CHLORIDE 10 MEQ: 7.46 INJECTION, SOLUTION INTRAVENOUS at 18:45

## 2020-07-12 RX ADMIN — POTASSIUM CHLORIDE 10 MEQ: 7.46 INJECTION, SOLUTION INTRAVENOUS at 10:55

## 2020-07-12 RX ADMIN — POTASSIUM CHLORIDE 10 MEQ: 7.46 INJECTION, SOLUTION INTRAVENOUS at 08:10

## 2020-07-12 RX ADMIN — TAZOBACTAM SODIUM AND PIPERACILLIN SODIUM 3.38 G: 375; 3 INJECTION, SOLUTION INTRAVENOUS at 08:10

## 2020-07-12 RX ADMIN — POTASSIUM CHLORIDE 10 MEQ: 7.46 INJECTION, SOLUTION INTRAVENOUS at 16:15

## 2020-07-12 RX ADMIN — SODIUM CHLORIDE 75 ML/HR: 9 INJECTION, SOLUTION INTRAVENOUS at 13:53

## 2020-07-12 RX ADMIN — POTASSIUM CHLORIDE 10 MEQ: 7.46 INJECTION, SOLUTION INTRAVENOUS at 12:11

## 2020-07-12 RX ADMIN — POTASSIUM CHLORIDE 10 MEQ: 7.46 INJECTION, SOLUTION INTRAVENOUS at 09:27

## 2020-07-12 RX ADMIN — ASPIRIN 325 MG ORAL TABLET 325 MG: 325 PILL ORAL at 20:28

## 2020-07-12 RX ADMIN — ATORVASTATIN CALCIUM 80 MG: 40 TABLET, FILM COATED ORAL at 20:28

## 2020-07-12 NOTE — THERAPY RE-EVALUATION
Acute Care - Speech Language Pathology   Swallow Re-Evaluation [facility]     Patient Name: Ken Jasso  : 1940  MRN: [id number]  Today's Date: 2020               Admit Date: 2020    Visit Dx:     ICD-10-CM ICD-9-CM   1. Acute febrile illness R50.9 780.60   2. Acute respiratory failure with hypoxia (CMS/[facility]) J96.01 518.81   3. Ventricular tachycardia (CMS/[facility]) I47.2 427.1   4. Skin tear of right elbow without complication, initial encounter S51.011A 881.01   5. Oropharyngeal dysphagia R13.12 787.22     Patient Active Problem List   Diagnosis   • Acute febrile illness   • Fracture of right hip (CMS/[facility])   • Dementia (CMS/[facility])   • H/O: CVA (no deficit)     Past Medical History:   Diagnosis Date   • Dementia (CMS/[facility])    • Dementia (CMS/[facility])    • Dysphagia    • Esophageal dilatation    • Hiatal hernia    • PNA (pneumonia)    • PNA (pneumonia)    • Pneumonia     [place] EPISODES    • Prostate CA (CMS/[facility])    • Stroke (CMS/H[facility])          Past Surgical History:   Procedure Laterality Date   • HIP PERCUTANEOUS PINNING Right 2020    Procedure: HIP PERCUTANEOUS PINNING;  Surgeon: Sam Harp MD;  Location: [facility];  Service: Orthopedics;  Laterality: Right;        SWALLOW EVALUATION (last 72 hours)      SLP Adult Swallow Evaluation     Row Name 20 1000                   Rehab Evaluation    Document Type  re-evaluation  -AW        Subjective Information  no complaints  -AW        Patient Observations  alert;cooperative  -AW        Patient/Family Observations  no family present, but had discussion with wife on phone prior to seeing pt. She does want pt to hvae repeat swallow study, but maintain some form of PO.  -AW        Patient Effort  good  -AW        Symptoms Noted During/After Treatment  none  -AW           General Information    Patient Profile Reviewed  yes  -AW        Current Method of Nutrition  NPO  -AW        Prior Level of Function-Swallowing  no  "diet consistency restrictions  -AW        Plans/Goals Discussed with  patient  -AW        Barriers to Rehab  medically complex  -AW        Patient's Goals for Discharge  return to regular diet pt wants thin liquids but wants to \"get better'   -AW        Family Goals for Discharge  patient able to return to PO diet;other (see comments) wife will make sure decision after MBS, wants diet today  -AW           Pain Assessment    Additional Documentation  Pain Scale: FACES Pre/Post-Treatment (Group)  -AW           Pain Scale: FACES Pre/Post-Treatment    Pain: FACES Scale, Pretreatment  0-->no hurt  -AW        Pain: FACES Scale, Post-Treatment  0-->no hurt  -AW           Oral Motor and Function    Dentition Assessment  natural, present and adequate no denture adhesive - not staying in well  -AW        Secretion Management  WNL/WFL  -AW        Mucosal Quality  moist, healthy  -AW        Volitional Swallow  delayed  -AW        Volitional Cough  WFL  -AW           Oral Musculature and Cranial Nerve Assessment    Oral Motor General Assessment  WFL  -AW           General Eating/Swallowing Observations    Eating/Swallowing Skills  self-fed  -AW        Positioning During Eating  upright in bed;upright in chair  -AW        Utensils Used  spoon;straw  -AW        Consistencies Trialed  thin liquids;pudding thick;nectar/syrup-thick liquids  -AW           Respiratory    Respiratory Status  WFL  -AW           Clinical Swallow Eval    Oral Prep Phase  WFL  -AW        Oral Transit  WFL  -AW        Oral Residue  WFL  -AW        Pharyngeal Phase  suspected pharyngeal impairment  -AW        Esophageal Phase  unremarkable  -AW        Clinical Swallow Evaluation Summary  pt exh coughing with thins, no s/s with nectar or pudding. did not test solid textures due to risk. Will examine on MBS study. SLP will f/u with wife by phone following MBS to give results/recs and determine dysphagia POC.   -AW           Pharyngeal Phase Concerns    " Pharyngeal Phase Concerns  cough  -AW        Wet Vocal Quality  thin  -AW        Cough  thin  -AW        Throat Clear  thin  -AW        Pharyngeal Phase Concerns, Comment  worse performance with thins than at end of last week  -AW           Clinical Impression    SLP Swallowing Diagnosis  pharyngeal dysfunction  -AW        Functional Impact  risk of aspiration/pneumonia  -AW        Rehab Potential/Prognosis, Swallowing  re-evaluate goals as necessary  -AW        Swallow Criteria for Skilled Therapeutic Interventions Met  demonstrates skilled criteria  -AW           Recommendations    Therapy Frequency (Swallow)  PRN  -AW        Predicted Duration Therapy Intervention (Days)  until discharge  -AW        SLP Diet Recommendation  puree;nectar thick liquids  -AW        Recommended Diagnostics  reassess via VFSS (MBS)  -AW        Recommended Precautions and Strategies  upright posture during/after eating;small bites of food and sips of liquid  -AW        SLP Rec. for Method of Medication Administration  meds crushed;with pudding or applesauce  -AW        Monitor for Signs of Aspiration  notify SLP if any concerns  -AW        Anticipated Dischage Disposition (SLP)  [facility] nursing [facility]  -AW          User Key  (r) = Recorded By, (t) = Taken By, (c) = Cosigned By    Initials Name Effective Dates    AW Macie Fatima MS CCC-SLP 06/22/15 -           EDUCATION  The patient has been educated in the following areas:   Dysphagia (Swallowing Impairment).    SLP Recommendation and Plan  SLP Swallowing Diagnosis: pharyngeal dysfunction  SLP Diet Recommendation: puree, nectar thick liquids  Recommended Precautions and Strategies: upright posture during/after eating, small bites of food and sips of liquid  SLP Rec. for Method of Medication Administration: meds crushed, with pudding or applesauce     Monitor for Signs of Aspiration: notify SLP if any concerns  Recommended Diagnostics: reassess via VFSS (MBS)  Swallow Criteria  for Skilled Therapeutic Interventions Met: demonstrates skilled criteria  Anticipated Dischage Disposition (SLP): skilled nursing facility  Rehab Potential/Prognosis, Swallowing: re-evaluate goals as necessary  Therapy Frequency (Swallow): PRN  Predicted Duration Therapy Intervention (Days): until discharge       Plan of Care Reviewed With: patient, spouse  Progress: improving    SLP GOALS     Row Name 07/10/20 0900 07/09/20 1400          Oral Nutrition/Hydration Goal 1 (SLP)    Oral Nutrition/Hydration Goal 1, SLP  Pt will comfortably tolerate L4 and thin lqiuid diet  -CH  Pt will comfortably tolerate L4 and thin lqiuid diet  -AW     Time Frame (Oral Nutrition/Hydration Goal 1, SLP)  short term goal (STG);2 days  -CH  short term goal (STG);2 days  -AW     Barriers (Oral Nutrition/Hydration Goal 1, SLP)  Increased mastication with soft solids, however patient would prefer to remain on current diet. No s/s of discomfort with lvl 4 and thin liquids. occassional TC observed. Will sign off at this time.   -  Gave pt thins, which he tolerated well. Cracker was more difficult - coughing noted after liquid wash. Pt understands aspiration risk with all textures and does want to pursue thin liquids. Will change diet to L4, thins, as pt should still avoid mixed consistencies. Will monitor diet tolerance and make adjustments as needed. IP hospice has been consulted.   -AW     Progress/Outcomes (Oral Nutrition/Hydration Goal 1, SLP)  goal met  -  goal revised this date;other (see comments) pt has been on L3 and NT - little intake, doesn't like  -AW       User Key  (r) = Recorded By, (t) = Taken By, (c) = Cosigned By    Initials Name Provider Type    Macie Gill, MS CCC-SLP Speech and Language Pathologist    Ny Mckee MS CCC-SLP Speech and Language Pathologist             Time Calculation:   Time Calculation- SLP     Row Name 07/12/20 1025             Time Calculation- SLP    SLP Start Time  0915  -KARIN       SLP Received On  07/12/20  -KARIN        User Key  (r) = Recorded By, (t) = Taken By, (c) = Cosigned By    Initials Name Provider Type    Macie Gill MS CCC-SLP Speech and Language Pathologist          Therapy Charges for Today     Code Description Service Date Service Provider Modifiers Qty    [id number]  ST EVAL ORAL PHARYNG SWALLOW 6 7/12/2020 Macie Fatima MS CCC-SLP GN 1               Macie Fatima MS CCC-SLP  7/12/2020

## 2020-07-12 NOTE — SIGNIFICANT NOTE
07/12/20 0919   SLP Deferred Reason   SLP Deferred Reason Routine  (New consult received for dysphagia as POC has changed. RN to call when family present so SLP may discuss goals of care with them.)

## 2020-07-12 NOTE — PLAN OF CARE
Problem: Patient Care Overview  Goal: Plan of Care Review  Outcome: Ongoing (interventions implemented as appropriate)  Flowsheets (Taken 7/12/2020 1025)  Progress: improving  Plan of Care Reviewed With: patient; spouse  Note:   SLP re-evaluation completed. Will continue to address dysphagia. MBS tomorrow am. Please see note for further details and recommendations.

## 2020-07-12 NOTE — PLAN OF CARE
Problem: Patient Care Overview  Goal: Plan of Care Review  Outcome: Ongoing (interventions implemented as appropriate)  Flowsheets  Taken 7/12/2020 0505  Progress: improving  Outcome Summary: Pt alert and oriented. VSS. On tele, NSR to sinus tachycardia. No complaints of pain. NPO for swallow eval to be done, possible aspiration. Xray completed yesterday evening- bronchopneumonia. Pt family has decided to pursue rehab. [employer] to be drawn this am. Turned Q2. Will continue to monitor.   Taken 7/11/2020 2012  Plan of Care Reviewed With: patient

## 2020-07-12 NOTE — THERAPY TREATMENT NOTE
Patient Name: Ken Jasso  : 1940    MRN: [id number]                              Today's Date: 2020       Admit Date: 2020    Visit Dx:     ICD-10-CM ICD-9-CM   1. Acute febrile illness R50.9 780.60   2. Acute respiratory failure with hypoxia (CMS/[facility]) J96.01 518.81   3. Ventricular tachycardia (CMS/[facility]) I47.2 427.1   4. Skin tear of right elbow without complication, initial encounter S51.011A 881.01   5. Oropharyngeal dysphagia R13.12 787.22     Patient Active Problem List   Diagnosis   • Acute febrile illness   • Fracture of right hip (CMS/HCC)   • Dementia (CMS/HCC)   • H/O: CVA (no deficit)     Past Medical History:   Diagnosis Date   • Dementia (CMS/HCC)    • Dementia (CMS/HCC)    • Dysphagia    • Esophageal dilatation    • Hiatal hernia    • PNA (pneumonia)    • PNA (pneumonia)    • Pneumonia     [facility] EPISODES    • Prostate CA (CMS/HCC)    • Stroke (CMS/HCC)          Past Surgical History:   Procedure Laterality Date   • HIP PERCUTANEOUS PINNING Right 2020    Procedure: HIP PERCUTANEOUS PINNING;  Surgeon: Sam Harp MD;  Location: [facility];  Service: Orthopedics;  Laterality: Right;     General Information     Row Name 20 7684          PT Evaluation Time/Intention    Document Type  progress note/recertification  -CS     Mode of Treatment  physical therapy;individual therapy  -CS     Row Name 20 2829          General Information    Patient Profile Reviewed?  yes  -CS     Existing Precautions/Restrictions  fall;other (see comments) TTWB RLE, dementia  -CS     Row Name 20 7322          Cognitive Assessment/Intervention- PT/OT    Orientation Status (Cognition)  oriented to;person  -CS     Row Name 20 2330          Safety Issues, Functional Mobility    Safety Issues Affecting Function (Mobility)  safety precautions follow-through/compliance;safety precaution awareness;ability to follow commands;insight into deficits/self  awareness;awareness of need for assistance;judgment;problem solving;sequencing abilities;unable to maintain weight-bearing restrictions  -CS     Impairments Affecting Function (Mobility)  balance;cognition;coordination;endurance/activity tolerance;motor control;pain;range of motion (ROM);strength  -CS       User Key  (r) = Recorded By, (t) = Taken By, (c) = Cosigned By    Initials Name Provider Type    CS Grace Bailey, DANIEL Physical Therapist        Mobility     Row Name 07/12/20 8280          Bed Mobility Assessment/Treatment    Bed Mobility Assessment/Treatment  sit-supine;scooting/bridging  -CS     Scooting/Bridging [place] (Bed Mobility)  verbal cues;maximum assist (25% patient effort);2 person assist  -CS     Sit-Supine [place] (Bed Mobility)  verbal cues;maximum assist (25% patient effort);2 person assist  -CS     Assistive Device (Bed Mobility)  draw sheet  -CS     Comment (Bed Mobility)  VC's for sequencing to perform sit to supine and patient attempted to move LE's onto bed but unable and required max assist x 2 to perform sit to supine.  -CS     Row Name 07/12/20 8430          Transfer Assessment/Treatment    Comment (Transfers)  VC's to push off of chair to stand and to reach back for bed to sit. Pt can barely sit back far enough on bed and requires max x 2 to scoot further onto bed. Pt unable to maintain WB status on R LE with transfer. Pt unable to sequence to take a step from chair to bed, requiring squat pivot transfer.   -CS     Row Name 07/12/20 4320          Bed-Chair Transfer    Bed-Chair [place] (Transfers)  verbal cues;maximum assist (25% patient effort);2 person assist  -CS     Assistive Device (Bed-Chair Transfers)  other (see comments) BUE support  -CS     Row Name 07/12/20 3850          Sit-Stand Transfer    Sit-Stand [place] (Transfers)  verbal cues;maximum assist (25% patient effort);2 person assist  -CS     Assistive Device (Sit-Stand Transfers)  other (see  comments) B UE support  -     Row Name 07/12/20 1350          Gait/Stairs Assessment/Training    [place] Level (Gait)  unable to assess  -     Row Name 07/12/20 [facility]0          Mobility Assessment/Intervention    Extremity Weight-bearing Status  right lower extremity  -CS     Right Lower Extremity (Weight-bearing Status)  (S) toe touch weight-bearing (TTWB)  -       User Key  (r) = Recorded By, (t) = Taken By, (c) = Cosigned By    Initials Name Provider Type    CS Grace Bailey, DANIEL Physical Therapist        Obj/Interventions     Row Name 07/12/20 [facility]0          Therapeutic Exercise    Lower Extremity (Therapeutic Exercise)  gluteal sets;heel slides, right;quad sets, right;SLR (straight leg raise), right  -CS     Lower Extremity Range of Motion (Therapeutic Exercise)  hip abduction/adduction, right;ankle dorsiflexion/plantar flexion, bilateral  -CS     Exercise Type (Therapeutic Exercise)  isometric contraction, static;AAROM (active assistive range of motion);AROM (active range of motion);isotonic contraction, concentric  -CS     Position (Therapeutic Exercise)  supine  -CS     Sets/Reps (Therapeutic Exercise)  15 reps each  -CS     Comment (Therapeutic Exercise)  VC's on technique. Pt needed cues to continue with exercises as he would perform 3-4 reps and stop.  -     Row Name 07/12/20 [facility]          Static Sitting Balance    Level of [place] (Unsupported Sitting, Static Balance)  contact guard assist  -CS     Sitting Position (Unsupported Sitting, Static Balance)  sitting on edge of bed  -CS     Time Able to Maintain Position (Unsupported Sitting, Static Balance)  1 to 2 minutes  -CS     Comment (Unsupported Sitting, Static Balance)  Pt has significant forward flexed posture with sitting and requires several cues to maintain upright posture.  -     Row Name 07/12/20 1350          Static Standing Balance    Level of [place] (Supported Standing, Static Balance)  maximal assist, 25 to 49%  patient effort;2 person assist  -CS     Time Able to Maintain Position (Supported Standing, Static Balance)  15 to 30 seconds  -CS     Assistive Device Utilized (Supported Standing, Static Balance)  other (see comments) BUE support  -CS     Row Name 07/12/20 1350          Dynamic Standing Balance    Level of [place], Reaches Outside Midline (Standing, Dynamic Balance)  maximal assist, 25 to 49% patient effort;2 person assist  -CS     Time Able to Maintain Position, Reaches Outside Midline (Standing, Dynamic Balance)  less than 15 seconds  -CS     Assistive Device Utilized (Supported Standing, Dynamic Balance)  other (see comments) BUE support and assist at gait belt  -CS       User Key  (r) = Recorded By, (t) = Taken By, (c) = Cosigned By    Initials Name Provider Type    Grace Maya, PT Physical Therapist        Goals/Plan     Row Name 07/12/20 1350          Bed Mobility Goal 1 (PT)    Activity/Assistive Device (Bed Mobility Goal 1, PT)  bed mobility activities, all  -CS     [place] Level/Cues Needed (Bed Mobility Goal 1, PT)  minimum assist (75% or more patient effort)  -CS     Time Frame (Bed Mobility Goal 1, PT)  10 days  -CS     Progress/Outcomes (Bed Mobility Goal 1, PT)  goal ongoing  -     Row Name 07/12/20 1350          Transfer Goal 1 (PT)    Activity/Assistive Device (Transfer Goal 1, PT)  sit-to-stand/stand-to-sit;bed-to-chair/chair-to-bed  -CS     [place] Level/Cues Needed (Transfer Goal 1, PT)  minimum assist (75% or more patient effort);2 person assist  -CS     Time Frame (Transfer Goal 1, PT)  10 days  -CS     Progress/Outcome (Transfer Goal 1, PT)  goal ongoing  -     Row Name 07/12/20 1350          Gait Training Goal 1 (PT)    Activity/Assistive Device (Gait Training Goal 1, PT)  gait (walking locomotion);assistive device use  -CS     [place] Level (Gait Training Goal 1, PT)  moderate assist (50-74% patient effort);2 person assist  -CS     Distance (Gait Goal 1,  PT)  10'  -CS     Time Frame (Gait Training Goal 1, PT)  10 days  -CS     Progress/Outcome (Gait Training Goal 1, PT)  goal ongoing  -CS       User Key  (r) = Recorded By, (t) = Taken By, (c) = Cosigned By    Initials Name Provider Type    Grace Maya, DANIEL Physical Therapist        Clinical Impression     Row Name 07/12/20 135          Pain Assessment    Additional Documentation  Pain Scale: Numbers Pre/Post-Treatment (Group)  -CS     Row Name 07/12/20 3620          Pain Scale: Numbers Pre/Post-Treatment    Pain Scale: Numbers, Pretreatment  0/10 - no pain  -CS     Pain Scale: Numbers, Post-Treatment  0/10 - no pain  -CS     Pain Intervention(s)  Repositioned;Ambulation/increased activity  -CS     Row Name 07/12/20 6750          Plan of Care Review    Plan of Care Reviewed With  patient;spouse  -CS     Progress  no change  -CS     Outcome Summary  Pt able to perform STS and chair to bed transfer with max assist x 2. Pt unable to come to full upright position with standing due to posterior lean despite cues to stand upright. Pt unable to maintain TTWB on R LE with transfer. Pt requires max assist x 2 for sit to supine in bed. Pt did tolerate exercises with no reports of increase in pain.  -CS     Row Name 07/12/20 1350          Physical Therapy Clinical Impression    Criteria for Skilled Interventions Met (PT Clinical Impression)  yes;treatment indicated  -CS     Rehab Potential (PT Clinical Summary)  fair, will monitor progress closely  -CS     Row Name 07/12/20 1539          Positioning and Restraints    Pre-Treatment Position  sitting in chair/recliner  -CS     Post Treatment Position  bed  -CS     In Bed  notified nsg;fowlers;call light within reach;encouraged to call for assist;exit alarm on;with family/caregiver;waffle boots/both  -CS       User Key  (r) = Recorded By, (t) = Taken By, (c) = Cosigned By    Initials Name Provider Type    Grace Maya, DANIEL Physical Therapist        Outcome  Measures     Row Name 07/12/20 1350          How much help from another person do you currently need...    Turning from your back to your side while in flat bed without using bedrails?  2  -CS     Moving from lying on back to sitting on the side of a flat bed without bedrails?  2  -CS     Moving to and from a bed to a chair (including a wheelchair)?  2  -CS     Standing up from a chair using your arms (e.g., wheelchair, bedside chair)?  2  -CS     Climbing 3-5 steps with a railing?  1  -CS     To walk in hospital room?  1  -CS     AM-PAC 6 Clicks Score (PT)  10  -CS     Row Name 07/12/20 1350          Functional Assessment    Outcome Measure Options  AM-PAC 6 Clicks Basic Mobility (PT)  -CS       User Key  (r) = Recorded By, (t) = Taken By, (c) = Cosigned By    Initials Name Provider Type    CS Grace Bailey, PT Physical Therapist        Physical Therapy Education                 Title: PT OT SLP Therapies (In Progress)     Topic: Physical Therapy (In Progress)     Point: Mobility training (In Progress)     Description:   Instruct learner(s) on safety and technique for assisting patient out of bed, chair or wheelchair.  Instruct in the proper use of assistive devices, such as walker, crutches, cane or brace.              Patient Friendly Description:   It's important to get you on your feet again, but we need to do so in a way that is safe for you. Falling has serious consequences, and your personal safety is the most important thing of all.        When it's time to get out of bed, one of us or a family member will sit next to you on the bed to give you support.     If your doctor or nurse tells you to use a walker, crutches, a cane, or a brace, be sure you use it every time you get out of bed, even if you think you don't need it.    Learning Progress Summary           Patient Acceptance, E,D, VU,NR by  at 7/12/2020 1350    Comment:  Educated patient on WB status, safe hand placement with transfers,  sequencing with transfer, bed mobility sequencing, and ther ex    Acceptance, E,D, VU,NR by CS at 7/11/2020 1545    Comment:  Educated patient on importance of exercise and exercise technique.    Acceptance, E,D, VU,NR by CS at 7/10/2020 1425    Comment:  Educated patient on WB status, safe hand placement with transfers, sequencing with transfers, bed mobility, and ther ex.    Acceptance, E,D, NR by AA at 7/9/2020 0810    Acceptance, E, VU,NR by JG at 7/8/2020 1413    Comment:  Pt educated about transfer strategies and surgical precautions.    Acceptance, E,D, NR by AA at 7/6/2020 0827    Acceptance, E, NR by NS at 7/4/2020 1601    Comment:  Patient was educated about WBing precautions and sequencing with transfers.    Acceptance, E, NR by ERICA at 7/3/2020 1154    Acceptance, E,D, NR by AA at 7/2/2020 1447   Family Acceptance, E,D, NR by AA at 7/2/2020 1447                   Point: Home exercise program (Done)     Description:   Instruct learner(s) on appropriate technique for monitoring, assisting and/or progressing patient with therapeutic exercises and activities.              Learning Progress Summary           Patient Acceptance, E,D, VU,NR by CS at 7/12/2020 1350    Comment:  Educated patient on WB status, safe hand placement with transfers, sequencing with transfer, bed mobility sequencing, and ther ex    Acceptance, E,D, VU,NR by CS at 7/11/2020 1545    Comment:  Educated patient on importance of exercise and exercise technique.    Acceptance, E,D, VU,NR by CS at 7/10/2020 1425    Comment:  Educated patient on WB status, safe hand placement with transfers, sequencing with transfers, bed mobility, and ther ex.    Acceptance, E,D, NR by AA at 7/9/2020 0810    Acceptance, E, VU,NR by JG at 7/8/2020 1413    Comment:  Pt educated about transfer strategies and surgical precautions.    Acceptance, E,D, NR by AA at 7/6/2020 0827    Acceptance, E, NR by NS at 7/4/2020 1601    Comment:  Patient was educated about WBing  precautions and sequencing with transfers.                   Point: Body mechanics (In Progress)     Description:   Instruct learner(s) on proper positioning and spine alignment for patient and/or caregiver during mobility tasks and/or exercises.              Learning Progress Summary           Patient Acceptance, E,D, VU,NR by CS at 7/12/2020 1350    Comment:  Educated patient on WB status, safe hand placement with transfers, sequencing with transfer, bed mobility sequencing, and ther ex    Acceptance, E,D, VU,NR by CS at 7/11/2020 1545    Comment:  Educated patient on importance of exercise and exercise technique.    Acceptance, E,D, VU,NR by CS at 7/10/2020 1425    Comment:  Educated patient on WB status, safe hand placement with transfers, sequencing with transfers, bed mobility, and ther ex.    Acceptance, E,D, NR by AA at 7/9/2020 0810    Acceptance, E, VU,NR by JG at 7/8/2020 1413    Comment:  Pt educated about transfer strategies and surgical precautions.    Acceptance, E,D, NR by AA at 7/6/2020 0827    Acceptance, E, NR by NS at 7/4/2020 1601    Comment:  Patient was educated about WBing precautions and sequencing with transfers.    Acceptance, E, NR by ERICA at 7/3/2020 1154    Acceptance, E,D, NR by AA at 7/2/2020 1447   Family Acceptance, E,D, NR by AA at 7/2/2020 1447                   Point: Precautions (In Progress)     Description:   Instruct learner(s) on prescribed precautions during mobility and gait tasks              Learning Progress Summary           Patient Acceptance, E,D, VU,NR by CS at 7/12/2020 1350    Comment:  Educated patient on WB status, safe hand placement with transfers, sequencing with transfer, bed mobility sequencing, and ther ex    Acceptance, E,D, VU,NR by CS at 7/11/2020 1545    Comment:  Educated patient on importance of exercise and exercise technique.    Acceptance, E,D, VU,NR by CS at 7/10/2020 1425    Comment:  Educated patient on WB status, safe hand placement with  transfers, sequencing with transfers, bed mobility, and ther ex.    Acceptance, E,D, NR by AA at 7/9/2020 0810    Acceptance, E, VU,NR by JJULIO at 7/8/2020 1413    Comment:  Pt educated about transfer strategies and surgical precautions.    Acceptance, E,D, NR by AA at 7/6/2020 0827    Acceptance, E, NR by NS at 7/4/2020 1601    Comment:  Patient was educated about WBing precautions and sequencing with transfers.    Acceptance, E, NR by EJ at 7/3/2020 1154    Acceptance, E,D, NR by AA at 7/2/2020 1447   Family Acceptance, E,D, NR by AA at 7/2/2020 1447                               User Key     Initials Effective Dates Name Provider Type Discipline    EJ 11/20/18 -  Karine Mcmahon, PT Physical Therapist PT    AA 04/02/18 -  Micaela Tate, PT Physical Therapist PT     03/26/19 -  Grace Bailey, PT Physical Therapist PT    NS 09/10/19 -  Aissatou Díaz, PT Physical Therapist PT    LIEN 05/14/20 -  Pat Baugh, PT Student PT Student PT              PT Recommendation and Plan  Planned Therapy Interventions (PT Eval): balance training, gait training, bed mobility training, home exercise program, neuromuscular re-education, patient/family education, strengthening, ROM (range of motion), transfer training  Outcome Summary/Treatment Plan (PT)  Anticipated Discharge Disposition (PT): skilled nursing facility  Plan of Care Reviewed With: patient, spouse  Progress: no change  Outcome Summary: Pt able to perform STS and chair to bed transfer with max assist x 2. Pt unable to come to full upright position with standing due to posterior lean despite cues to stand upright. Pt unable to maintain TTWB on R LE with transfer. Pt requires max assist x 2 for sit to supine in bed. Pt did tolerate exercises with no reports of increase in pain.     Time Calculation:   PT Charges     Row Name 07/12/20 1350             Time Calculation    Start Time  1350  -CS      PT Received On  07/12/20  -CS      PT Goal Re-Cert Due Date   07/22/20  -CS         Time Calculation- PT    Total Timed Code Minutes- PT  18 minute(s)  -CS         Timed Charges    36587 - PT Therapeutic Exercise Minutes  8  -CS      05765 - PT Therapeutic Activity Minutes  10  -CS        User Key  (r) = Recorded By, (t) = Taken By, (c) = Cosigned By    Initials Name Provider Type    CS Grace Bailey, PT Physical Therapist        Therapy Charges for Today     Code Description Service Date Service Provider Modifiers Qty    94276298635  PT THER PROC EA 15 MIN 7/11/2020 Grace Bailey, PT GP 1    66098589827 HC PT THERAPEUTIC ACT EA 15 MIN 7/12/2020 Grace Bailey, PT GP 1          PT G-Codes  Outcome Measure Options: AM-PAC 6 Clicks Basic Mobility (PT)  AM-PAC 6 Clicks Score (PT): 10  AM-PAC 6 Clicks Score (OT): 14    Grace Bailey PT  7/12/2020

## 2020-07-12 NOTE — PLAN OF CARE
Problem: Patient Care Overview  Goal: Plan of Care Review  Outcome: Ongoing (interventions implemented as appropriate)  Flowsheets  Taken 7/12/2020 1515  Progress: no change  Taken 7/12/2020 1350  Plan of Care Reviewed With: patient;spouse  Outcome Summary: Pt able to perform STS and chair to bed transfer with max assist x 2. Pt unable to come to full upright position with standing due to posterior lean despite cues to stand upright. Pt unable to maintain TTWB on R LE with transfer. Pt requires max assist x 2 for sit to supine in bed. Pt did tolerate exercises with no reports of increase in pain.

## 2020-07-12 NOTE — PROGRESS NOTES
[facility] Medicine Services  PROGRESS NOTE    Patient Name: Ken Jasso  : 1940  MRN: [id number]    Date of Admission: 2020  Primary Care Physician: Provider, No Known    Subjective   Subjective     CC:  cva    HPI:  Awaiting speech to see him. He is upset that the plans keep changing - his wife wants him to go to rehab as he cannot care for himself and she cannot take care of him    Review of Systems  Gen- No fevers, chills  CV- No chest pain, palpitations  Resp- No cough, dyspnea  GI- No N/V/D, abd pain       Objective   Objective     Vital Signs:   Temp:  [97.6 °F (36.4 °C)-99.3 °F (37.4 °C)] 97.6 °F (36.4 °C)  Heart Rate:  [] 88  Resp:  [16-20] 16  BP: (141-162)/(71-99) 145/75  Total (NIH Stroke Scale): 0     Physical Exam:  Constitutional: No acute distress, confused at baseline  HENT: NCAT, mucous membranes moist  Respiratory: Clear to auscultation bilaterally, respiratory effort normal   Cardiovascular: RRR, no murmurs  Gastrointestinal: Positive bowel sounds, soft, nontender, thin  Musculoskeletal: No bilateral ankle edema  Psychiatric: Appropriate affect, cooperative  Neurologic: Oriented to self, speech clear  Skin: No rashes    Results Reviewed:  Results from last 7 days   Lab Units 20  0538 20  0400 20  0825   WBC 10*3/mm3 13.61* 21.58* 16.85*   HEMOGLOBIN g/dL 9.5* 9.2* 12.4*   HEMATOCRIT % 29.9* 28.6* 39.0   PLATELETS 10*3/mm3 343 329 442     Results from last 7 days   Lab Units 20  0538 20  0328 20  0825   SODIUM mmol/L 141 139 136   POTASSIUM mmol/L 3.2* 2.9* 3.6   CHLORIDE mmol/L 103 102 98   CO2 mmol/L 27.0 25.0 25.0   BUN mg/dL 21 24* 12   CREATININE mg/dL 0.91 0.75* 0.80   GLUCOSE mg/dL 99 104* 123*   CALCIUM mg/dL 8.7 8.5* 9.2   ALT (SGPT) U/L  --   --  20   AST (SGOT) U/L  --   --  38     Estimated Creatinine Clearance: 56.9 mL/min (by C-G formula based on SCr of 0.91 mg/dL).    Microbiology Results  Abnormal     Procedure Component Value - Date/Time    Blood Culture - Blood, Arm, Left [[id number]] Collected:  07/08/20 1509    Lab Status:  Preliminary result Specimen:  Blood from Arm, Left Updated:  07/11/20 1545     Blood Culture No growth at 3 days    Blood Culture - Blood, Arm, Right [[id number]] Collected:  07/08/20 1509    Lab Status:  Preliminary result Specimen:  Blood from Arm, Right Updated:  07/11/20 1545     Blood Culture No growth at 3 days    Blood Culture - Blood, Arm, Right [[id number]] Collected:  06/29/20 1931    Lab Status:  Final result Specimen:  Blood from Arm, Right Updated:  07/04/20 2000     Blood Culture No growth at 5 days    Blood Culture - Blood, Arm, Left [[id number]] Collected:  06/29/20 1931    Lab Status:  Final result Specimen:  Blood from Arm, Left Updated:  07/04/20 2000     Blood Culture No growth at 5 days    MRSA Screen, PCR (Inpatient) - Swab, Nares [[id number]]  (Normal) Collected:  06/29/20 2352    Lab Status:  Final result Specimen:  Swab from Nares Updated:  06/30/20 0958     MRSA PCR Negative    Narrative:       MRSA Negative    COVID-19,[facility], NP SWAB IN TRANSPORT MEDIA 8-12 HR TAT - Swab, Nasopharynx [[id number]]  (Normal) Collected:  06/29/20 2004    Lab Status:  Final result Specimen:  Swab from Nasopharynx Updated:  06/30/20 0305     COVID19 Not Detected    Narrative:       Fact sheet for providers: https://www.fda.gov/media/793753/download     Fact sheet for patients: https://www.fda.gov/media/654350/download    Respiratory Panel, PCR - Swab, Nasopharynx [[id number]]  (Normal) Collected:  06/29/20 2004    Lab Status:  Final result Specimen:  Swab from Nasopharynx Updated:  06/29/20 2129     ADENOVIRUS, PCR Not Detected     Coronavirus 229E Not Detected     Coronavirus HKU1 Not Detected     Coronavirus NL63 Not Detected     Coronavirus OC43 Not Detected     Human Metapneumovirus Not Detected     Human Rhinovirus/Enterovirus Not Detected     Influenza B PCR Not  Detected     Parainfluenza Virus 1 Not Detected     Parainfluenza Virus 2 Not Detected     Parainfluenza Virus 3 Not Detected     Parainfluenza Virus 4 Not Detected     Bordetella pertussis pcr Not Detected     Influenza A H1 2009 PCR Not Detected     Chlamydophila pneumoniae PCR Not Detected     Mycoplasma pneumo by PCR Not Detected     Influenza A PCR Not Detected     Influenza A H3 Not Detected     Influenza A H1 Not Detected     RSV, PCR Not Detected     Bordetella parapertussis PCR Not Detected    Narrative:       The coronavirus on the RVP is NOT COVID-19 and is NOT indicative of infection with COVID-19.           Imaging Results (Last 24 Hours)     Procedure Component Value Units Date/Time    XR Chest 1 View [[id number]] Collected:  07/11/20 1814     Updated:  07/11/20 1902    Narrative:          EXAMINATION: XR CHEST 1 VW - 07/11/2020     INDICATION: Possible aspiration, fever.  R50.9-Fever, unspecified;  J96.01-Acute respiratory failure with hypoxia; I47.2-Ventricular  tachycardia; S51.011A-Laceration without foreign body of right elbow,  initial encounter; R13.12-Dysphagia, oropharyngeal phase      COMPARISON: Chest x-ray dated 07/07/2020     FINDINGS: Cardiac size unchanged with opacifications in the right  midlung fairly  patchy in appearance consistent with acute airspace  disease such as bronchopneumonia. No pneumothorax or pleural effusion.  Degenerative changes of the spine.           Impression:       Opacifications of the right midlung fairly confluent  consistent with acute airspace disease such as bronchopneumonia. No  pleural effusion.     DICTATED:   07/11/2020  EDITED/ls :   07/11/2020      This report was finalized on 7/11/2020 6:59 PM by Dr. Manoj Elizondo.             Results for orders placed during the hospital encounter of 06/29/20   Adult Transthoracic Echo Complete W/ Cont if Necessary Per Protocol (With Agitated Saline)    Narrative · Estimated EF = 55%.  · Left ventricular systolic  function is normal.  · Mild mitral valve regurgitation is present  · Mild to moderate tricuspid valve regurgitation is present.  · Calculated right ventricular systolic pressure from tricuspid   regurgitation is 34 mmHg.  · No intracardiac shunting is seen  · No cardiac source for embolus is seen          I have reviewed the medications:  Scheduled Meds:  amLODIPine 10 mg Oral Q24H   aspirin 325 mg Oral BID   atorvastatin 80 mg Oral Nightly   bisacodyl 10 mg Rectal Daily   donepezil 10 mg Oral Daily   lansoprazole 30 mg Oral QAM   lisinopril 40 mg Oral Q24H   piperacillin-tazobactam 3.375 g Intravenous Q8H   polyethylene glycol 17 g Oral Daily   sodium chloride 10 mL Intravenous Q12H   sodium chloride 10 mL Intravenous Q12H     Continuous Infusions:  Pharmacy Consult - Pharmacy to dose     sodium chloride 75 mL/hr Last Rate: 75 mL/hr (07/11/20 1805)     PRN Meds:.•  acetaminophen  •  acetaminophen **OR** [DISCONTINUED] acetaminophen  •  bisacodyl  •  labetalol  •  lidocaine PF 1%  •  magnesium sulfate  •  ondansetron  •  ondansetron  •  Pharmacy Consult - Pharmacy to dose  •  potassium chloride  •  potassium chloride  •  potassium chloride  •  sodium chloride  •  sodium chloride    Assessment/Plan   Assessment & Plan     Active Hospital Problems    Diagnosis  POA   • **Fracture of right hip (CMS/HCC) [S72.001A]  Yes   • Acute febrile illness [R50.9]  Yes   • Dementia (CMS/HCC) [F03.90]  Yes   • H/O: CVA (no deficit) [Z86.73]  Not Applicable      Resolved Hospital Problems   No resolved problems to display.        Brief Hospital Course to date:  Ken Jasso is a 80 y.o. male with PMH of CVA, HTN, dementia, and GERD who was admitted on 6/29 after fall while walking to car, sustaining a right femoral neck fracture, there was concern for stroke and code stroke was called. Imaging negative for acute CVA. Had fever up to 103 on arrival and non-sustained wide complex tachycardia requiring admission to the ICU. Now  out of ICU and has been stable  On telemetry floor.         This patient's problems and plans were partially entered by my partner and updated as appropriate by me 07/11/20.     Dysphagia with ongoing aspiration events   - chest xray with right sided infiltrate  - zosyn restarted 7/11  - speech re-eval and comfort diet stopped, see speech orders    Sepsis  Lactic acidosis  - CODE STATUS: NO CPR/INTUBATION  -- wife aware and patient is currently refusing peg  -- RR 7/7 AM-- favored that this is another aspiration event as no other focal source of infection-- fevers noted to as high as 102- CXR reviewed and normal, UA ok, has been off antibiotics, continue to monitor  -- with recurrent aspiration but now that his wife is refusing hospice, diet changed to prevent recurrent aspiration     Hypokalemia  - replace per protocol     Right femoral neck fracture  -s/p hip percutaneous pinning on 7/1 by Dr. Harp  -PT/OT - family now wants rehab, restart PT/OT  -follow-up with Dr. Harp in 3 weeks, continue aspirin 325 mg BID for 6 weeks for VTE ppx     HTN- continue amlodipine, lisinopril, and atorvastatin      Dementia-continue aricept      DVT Prophylaxis:  Asa 325 BID    Disposition: I expect the patient to be discharged TBD, discussed with  that now need insurance to start pre-auth    CODE STATUS:   Code Status and Medical Interventions:   Ordered at: 07/03/20 0956     Limited Support to NOT Include:    Intubation     Level Of Support Discussed With:    Patient     Code Status:    No CPR     Medical Interventions (Level of Support Prior to Arrest):    Limited         Electronically signed by Margie Dorado DO, 07/12/20, 13:30.

## 2020-07-12 NOTE — PROGRESS NOTES
Continued Stay Note  [facility]     Patient Name: Ken Jasso  MRN: [id number]  Today's Date: 7/12/2020    Admit Date: 6/29/2020    Discharge Plan     Row Name 07/12/20 1455       Plan    Plan  SNF, skilled    Plan Comments  CM spoke with pt and bedside and wife Kerri via phone. CM received call this morning from Dr. Dorado and pt and wife now do not want Hospice services and want to go to rehab and then return home. CM spoke with Kerri via phone and she would like for pt to go to in rehab prior to returning home. CM reviewed facilities with Kerri via phone and she would like referrals to [facility] in [place] and [facility]. Kerri understands referrals will be made Monday moring and once pt has a bed offer an insurance precert will be required. CM spoke with pt at bedside regarding discharge plans and he is agreeable to rehab at [facility] or [facility]. Pt has no further needs at this time, CM will make referrals Monday 07/13/2020 and continue to follow for discharge needs.     Final Discharge Disposition Code  03 - skilled nursing facility (SNF)        Discharge Codes    No documentation.       Expected Discharge Date and Time     Expected Discharge Date Expected Discharge Time    Jul 13, 2020             Zainab Noland RN

## 2020-07-13 ENCOUNTER — APPOINTMENT (OUTPATIENT)
Dept: GENERAL RADIOLOGY | Facility: HOSPITAL | Age: 80
End: 2020-07-13

## 2020-07-13 LAB
ALBUMIN SERPL-MCNC: 2.8 G/DL (ref 3.5–5.2)
ANION GAP SERPL CALCULATED.3IONS-SCNC: 8 MMOL/L (ref 5–15)
BACTERIA SPEC AEROBE CULT: NORMAL
BACTERIA SPEC AEROBE CULT: NORMAL
BASOPHILS # BLD AUTO: 0.04 10*3/MM3 (ref 0–0.2)
BASOPHILS NFR BLD AUTO: 0.5 % (ref 0–1.5)
BUN SERPL-MCNC: 18 MG/DL (ref 8–23)
BUN/CREAT SERPL: 22.2 (ref 7–25)
CALCIUM SPEC-SCNC: 8.6 MG/DL (ref 8.6–10.5)
CHLORIDE SERPL-SCNC: 103 MMOL/L (ref 98–107)
CO2 SERPL-SCNC: 28 MMOL/L (ref 22–29)
CREAT SERPL-MCNC: 0.81 MG/DL (ref 0.76–1.27)
DEPRECATED RDW RBC AUTO: 41.4 FL (ref 37–54)
EOSINOPHIL # BLD AUTO: 0.22 10*3/MM3 (ref 0–0.4)
EOSINOPHIL NFR BLD AUTO: 2.9 % (ref 0.3–6.2)
ERYTHROCYTE [DISTWIDTH] IN BLOOD BY AUTOMATED COUNT: 14 % (ref 12.3–15.4)
GFR SERPL CREATININE-BSD FRML MDRD: 91 ML/MIN/1.73
GLUCOSE SERPL-MCNC: 94 MG/DL (ref 65–99)
HCT VFR BLD AUTO: 28.5 % (ref 37.5–51)
HGB BLD-MCNC: 9.1 G/DL (ref 13–17.7)
IMM GRANULOCYTES # BLD AUTO: 0.09 10*3/MM3 (ref 0–0.05)
IMM GRANULOCYTES NFR BLD AUTO: 1.2 % (ref 0–0.5)
LYMPHOCYTES # BLD AUTO: 1.35 10*3/MM3 (ref 0.7–3.1)
LYMPHOCYTES NFR BLD AUTO: 18 % (ref 19.6–45.3)
MCH RBC QN AUTO: 26 PG (ref 26.6–33)
MCHC RBC AUTO-ENTMCNC: 31.9 G/DL (ref 31.5–35.7)
MCV RBC AUTO: 81.4 FL (ref 79–97)
MONOCYTES # BLD AUTO: 1.16 10*3/MM3 (ref 0.1–0.9)
MONOCYTES NFR BLD AUTO: 15.4 % (ref 5–12)
NEUTROPHILS NFR BLD AUTO: 4.65 10*3/MM3 (ref 1.7–7)
NEUTROPHILS NFR BLD AUTO: 62 % (ref 42.7–76)
NRBC BLD AUTO-RTO: 0 /100 WBC (ref 0–0.2)
PHOSPHATE SERPL-MCNC: 2.9 MG/DL (ref 2.5–4.5)
PLATELET # BLD AUTO: 300 10*3/MM3 (ref 140–450)
PMV BLD AUTO: 10.9 FL (ref 6–12)
POTASSIUM SERPL-SCNC: 3.7 MMOL/L (ref 3.5–5.2)
RBC # BLD AUTO: 3.5 10*6/MM3 (ref 4.14–5.8)
SODIUM SERPL-SCNC: 139 MMOL/L (ref 136–145)
WBC # BLD AUTO: 7.51 10*3/MM3 (ref 3.4–10.8)

## 2020-07-13 PROCEDURE — 97530 THERAPEUTIC ACTIVITIES: CPT

## 2020-07-13 PROCEDURE — 85025 COMPLETE CBC W/AUTO DIFF WBC: CPT | Performed by: INTERNAL MEDICINE

## 2020-07-13 PROCEDURE — 80069 RENAL FUNCTION PANEL: CPT | Performed by: INTERNAL MEDICINE

## 2020-07-13 PROCEDURE — 74230 X-RAY XM SWLNG FUNCJ C+: CPT

## 2020-07-13 PROCEDURE — 25010000002 PIPERACILLIN SOD-TAZOBACTAM PER 1 G: Performed by: INTERNAL MEDICINE

## 2020-07-13 PROCEDURE — 97535 SELF CARE MNGMENT TRAINING: CPT

## 2020-07-13 PROCEDURE — 92611 MOTION FLUOROSCOPY/SWALLOW: CPT

## 2020-07-13 PROCEDURE — 25010000003 POTASSIUM CHLORIDE 10 MEQ/100ML SOLUTION: Performed by: INTERNAL MEDICINE

## 2020-07-13 PROCEDURE — 99232 SBSQ HOSP IP/OBS MODERATE 35: CPT | Performed by: INTERNAL MEDICINE

## 2020-07-13 RX ADMIN — ASPIRIN 325 MG ORAL TABLET 325 MG: 325 PILL ORAL at 22:19

## 2020-07-13 RX ADMIN — BARIUM SULFATE 20 ML: 400 PASTE ORAL at 12:56

## 2020-07-13 RX ADMIN — TAZOBACTAM SODIUM AND PIPERACILLIN SODIUM 3.38 G: 375; 3 INJECTION, SOLUTION INTRAVENOUS at 08:45

## 2020-07-13 RX ADMIN — BARIUM SULFATE 50 ML: 400 SUSPENSION ORAL at 12:56

## 2020-07-13 RX ADMIN — BARIUM SULFATE 10 ML: 400 SUSPENSION ORAL at 12:56

## 2020-07-13 RX ADMIN — DONEPEZIL HYDROCHLORIDE 10 MG: 10 TABLET, FILM COATED ORAL at 08:44

## 2020-07-13 RX ADMIN — AMLODIPINE BESYLATE 10 MG: 10 TABLET ORAL at 08:44

## 2020-07-13 RX ADMIN — POLYETHYLENE GLYCOL 3350 17 G: 17 POWDER, FOR SOLUTION ORAL at 08:43

## 2020-07-13 RX ADMIN — LANSOPRAZOLE 30 MG: KIT at 06:10

## 2020-07-13 RX ADMIN — LISINOPRIL 40 MG: 40 TABLET ORAL at 08:43

## 2020-07-13 RX ADMIN — POTASSIUM CHLORIDE 10 MEQ: 7.46 INJECTION, SOLUTION INTRAVENOUS at 06:10

## 2020-07-13 RX ADMIN — BARIUM SULFATE 100 ML: 0.81 POWDER, FOR SUSPENSION ORAL at 12:57

## 2020-07-13 RX ADMIN — ATORVASTATIN CALCIUM 80 MG: 40 TABLET, FILM COATED ORAL at 22:19

## 2020-07-13 RX ADMIN — ASPIRIN 325 MG ORAL TABLET 325 MG: 325 PILL ORAL at 08:44

## 2020-07-13 RX ADMIN — TAZOBACTAM SODIUM AND PIPERACILLIN SODIUM 3.38 G: 375; 3 INJECTION, SOLUTION INTRAVENOUS at 16:23

## 2020-07-13 RX ADMIN — TAZOBACTAM SODIUM AND PIPERACILLIN SODIUM 3.38 G: 375; 3 INJECTION, SOLUTION INTRAVENOUS at 00:32

## 2020-07-13 NOTE — PROGRESS NOTES
[facility] Medicine Services  PROGRESS NOTE    Patient Name: Ken Jasso  : 1940  MRN: [id number]    Date of Admission: 2020  Primary Care Physician: Provider, No Known    Subjective   Subjective     CC:  Fall, hip fx    HPI:  No complaints, wants to know what the plan is. Repeat barium swallow scheduled for tday    Review of Systems  Gen- No fevers, chills  CV- No chest pain, palpitations  Resp- No cough, dyspnea  GI- No N/V/D, abd pain      Objective   Objective     Vital Signs:   Temp:  [97.5 °F (36.4 °C)-98.7 °F (37.1 °C)] 98.3 °F (36.8 °C)  Heart Rate:  [79-88] 88  Resp:  [16-18] 18  BP: (134-149)/(69-85) 135/69  Total (NIH Stroke Scale): 0     Physical Exam:  Constitutional: No acute distress, awake, alert  HENT: NCAT, mucous membranes moist  Respiratory: Clear to auscultation bilaterally, respiratory effort normal   Cardiovascular: RRR, no murmurs  Gastrointestinal: Positive bowel sounds, soft, nontender, nondistended  Musculoskeletal: No bilateral ankle edema  Psychiatric: Appropriate affect, cooperative  Neurologic: Oriented to self, intermittently confused, strength symmetric in all extremities, Cranial Nerves grossly intact to confrontation, speech clear  Skin: No rashes    Results Reviewed:  Results from last 7 days   Lab Units 20  0515 20  0538 20  0400   WBC 10*3/mm3 7.51 13.61* 21.58*   HEMOGLOBIN g/dL 9.1* 9.5* 9.2*   HEMATOCRIT % 28.5* 29.9* 28.6*   PLATELETS 10*3/mm3 300 343 329     Results from last 7 days   Lab Units 20  0515 20  0538 20  0328 20  0825   SODIUM mmol/L 139 141 139 136   POTASSIUM mmol/L 3.7 3.2* 2.9* 3.6   CHLORIDE mmol/L 103 103 102 98   CO2 mmol/L 28.0 27.0 25.0 25.0   BUN mg/dL 18 21 24* 12   CREATININE mg/dL 0.81 0.91 0.75* 0.80   GLUCOSE mg/dL 94 99 104* 123*   CALCIUM mg/dL 8.6 8.7 8.5* 9.2   ALT (SGPT) U/L  --   --   --  20   AST (SGOT) U/L  --   --   --  38     Estimated Creatinine  Clearance: 63.9 mL/min (by C-G formula based on SCr of 0.81 mg/dL).    Microbiology Results Abnormal     Procedure Component Value - Date/Time    Blood Culture - Blood, Arm, Left [[id number]] Collected:  07/11/20 1751    Lab Status:  Preliminary result Specimen:  Blood from Arm, Left Updated:  07/12/20 1815     Blood Culture No growth at 24 hours    Blood Culture - Blood, Arm, Left [[id number]] Collected:  07/08/20 1509    Lab Status:  Preliminary result Specimen:  Blood from Arm, Left Updated:  07/12/20 1545     Blood Culture No growth at 4 days    Blood Culture - Blood, Arm, Right [[id number]] Collected:  07/08/20 1509    Lab Status:  Preliminary result Specimen:  Blood from Arm, Right Updated:  07/12/20 1545     Blood Culture No growth at 4 days    Blood Culture - Blood, Arm, Right [[id number]] Collected:  06/29/20 1931    Lab Status:  Final result Specimen:  Blood from Arm, Right Updated:  07/04/20 2000     Blood Culture No growth at 5 days    Blood Culture - Blood, Arm, Left [[id number]] Collected:  06/29/20 1931    Lab Status:  Final result Specimen:  Blood from Arm, Left Updated:  07/04/20 2000     Blood Culture No growth at 5 days    MRSA Screen, PCR (Inpatient) - Swab, Nares [[id number]]  (Normal) Collected:  06/29/20 2352    Lab Status:  Final result Specimen:  Swab from Nares Updated:  06/30/20 0958     MRSA PCR Negative    Narrative:       MRSA Negative    COVID-19,[facility], NP SWAB IN TRANSPORT MEDIA 8-12 HR TAT - Swab, Nasopharynx [[id number]]  (Normal) Collected:  06/29/20 2004    Lab Status:  Final result Specimen:  Swab from Nasopharynx Updated:  06/30/20 0305     COVID19 Not Detected    Narrative:       Fact sheet for providers: https://www.fda.gov/media/537676/download     Fact sheet for patients: https://www.fda.gov/media/782836/download    Respiratory Panel, PCR - Swab, Nasopharynx [[id number]]  (Normal) Collected:  06/29/20 2004    Lab Status:  Final result Specimen:  Swab from Nasopharynx  Updated:  06/29/20 2129     ADENOVIRUS, PCR Not Detected     Coronavirus 229E Not Detected     Coronavirus HKU1 Not Detected     Coronavirus NL63 Not Detected     Coronavirus OC43 Not Detected     Human Metapneumovirus Not Detected     Human Rhinovirus/Enterovirus Not Detected     Influenza B PCR Not Detected     Parainfluenza Virus 1 Not Detected     Parainfluenza Virus 2 Not Detected     Parainfluenza Virus 3 Not Detected     Parainfluenza Virus 4 Not Detected     Bordetella pertussis pcr Not Detected     Influenza A H1 2009 PCR Not Detected     Chlamydophila pneumoniae PCR Not Detected     Mycoplasma pneumo by PCR Not Detected     Influenza A PCR Not Detected     Influenza A H3 Not Detected     Influenza A H1 Not Detected     RSV, PCR Not Detected     Bordetella parapertussis PCR Not Detected    Narrative:       The coronavirus on the RVP is NOT COVID-19 and is NOT indicative of infection with COVID-19.           Imaging Results (Last 24 Hours)     Procedure Component Value Units Date/Time    FL Video Swallow With Speech Single Contrast [[id number]] Resulted:  07/13/20 1228     Updated:  07/13/20 1257          Results for orders placed during the hospital encounter of 06/29/20   Adult Transthoracic Echo Complete W/ Cont if Necessary Per Protocol (With Agitated Saline)    Narrative · Estimated EF = 55%.  · Left ventricular systolic function is normal.  · Mild mitral valve regurgitation is present  · Mild to moderate tricuspid valve regurgitation is present.  · Calculated right ventricular systolic pressure from tricuspid   regurgitation is 34 mmHg.  · No intracardiac shunting is seen  · No cardiac source for embolus is seen          I have reviewed the medications:  Scheduled Meds:  amLODIPine 10 mg Oral Q24H   aspirin 325 mg Oral BID   atorvastatin 80 mg Oral Nightly   bisacodyl 10 mg Rectal Daily   donepezil 10 mg Oral Daily   lansoprazole 30 mg Oral QAM   lisinopril 40 mg Oral Q24H   piperacillin-tazobactam  3.375 g Intravenous Q8H   polyethylene glycol 17 g Oral Daily   sodium chloride 10 mL Intravenous Q12H   sodium chloride 10 mL Intravenous Q12H     Continuous Infusions:  Pharmacy Consult - Pharmacy to dose     sodium chloride 75 mL/hr Last Rate: 75 mL/hr (07/12/20 1353)     PRN Meds:.•  acetaminophen  •  acetaminophen **OR** [DISCONTINUED] acetaminophen  •  bisacodyl  •  labetalol  •  lidocaine PF 1%  •  magnesium sulfate  •  ondansetron  •  ondansetron  •  Pharmacy Consult - Pharmacy to dose  •  potassium chloride  •  potassium chloride  •  potassium chloride  •  sodium chloride  •  sodium chloride    Assessment/Plan   Assessment & Plan     Active Hospital Problems    Diagnosis  POA   • **Fracture of right hip (CMS/HCC) [S72.001A]  Yes   • Acute febrile illness [R50.9]  Yes   • Dementia (CMS/HCC) [F03.90]  Yes   • H/O: CVA (no deficit) [Z86.73]  Not Applicable      Resolved Hospital Problems   No resolved problems to display.        Brief Hospital Course to date:  Ken Jasso is a 80 y.o. male 80 y.o. male with PMH of CVA, HTN, dementia, and GERD who was admitted on 6/29 after fall while walking to car, sustaining a right femoral neck fracture, there was concern for stroke and code stroke was called. Imaging negative for acute CVA. Had fever up to 103 on arrival and non-sustained wide complex tachycardia requiring admission to the ICU. Now out of ICU and has been stable  On telemetry floor.         This patient's problems and plans were partially entered by my partner and updated as appropriate by me 07/12/20.     Dysphagia with ongoing aspiration events   - chest xray with right sided infiltrate  - zosyn restarted 7/11  - speech re-eval and comfort diet stopped, repeat barium swallow     Sepsis  Lactic acidosis  - CODE STATUS: NO CPR/INTUBATION  -- wife aware and patient is currently refusing peg  -- RR 7/7 AM-- favored that this is another aspiration event as no other focal source of infection-- fevers  noted to as high as 102- CXR reviewed and normal, UA ok, has been off antibiotics, continue to monitor  -- with recurrent aspiration but now that his wife is refusing hospice, diet changed to prevent recurrent aspiration     Hypokalemia  - replace per protocol     Right femoral neck fracture  -s/p hip percutaneous pinning on 7/1 by Dr. Harp  -PT/OT - family now wants rehab, restart PT/OT  -follow-up with Dr. Harp in 3 weeks, continue aspirin 325 mg BID for 6 weeks for VTE ppx     HTN- continue amlodipine, lisinopril, and atorvastatin      Dementia-continue aricept      DVT Prophylaxis:  Asa 325 BID  Disposition: I expect the patient to be discharged when rehab bed available    CODE STATUS:   Code Status and Medical Interventions:   Ordered at: 07/03/20 0956     Limited Support to NOT Include:    Intubation     Level Of Support Discussed With:    Patient     Code Status:    No CPR     Medical Interventions (Level of Support Prior to Arrest):    Limited         Electronically signed by Margie Dorado DO, 07/13/20, 13:08.

## 2020-07-13 NOTE — PLAN OF CARE
"  Problem: Patient Care Overview  Goal: Interprofessional Rounds/Family Conf  Outcome: Ongoing (interventions implemented as appropriate)  Flowsheets (Taken 7/13/2020 3494)  Summary: 1300 Palliative Team Conference: DARIAN Eagle RN, CHPN; DAVID Canseco; RN; JULIEN Dan, RN, CHPN; IVAN Rogers RN, CHPN; GERSON Escalante, ; JESSICA Cárdenas, RN, CHPN  Note:   Pt out of room for SLP eval at time of Palliative RN encounter; spoke with wife at bedside, reported pt has been comfortable, hopes he will be able to eat \"more normal\" diet. CM working on rehab placement. Palliative following for pt/family support.     "

## 2020-07-13 NOTE — PLAN OF CARE
Problem: Patient Care Overview  Goal: Plan of Care Review  Outcome: Ongoing (interventions implemented as appropriate)  Flowsheets (Taken 7/13/2020 3446)  Plan of Care Reviewed With: patient; spouse  Note:   SLP re-evaluation completed. Will f/u for further edu/diet tolerance assessment, as well as trial dysphagia tx, pending POC.  Please see note for further details and recommendations.

## 2020-07-13 NOTE — DISCHARGE PLACEMENT REQUEST
"Ken Valle (80 y.o. Male)   Rosie Moncada  [phone]    Date of Birth Social Security Number Address Home Phone MRN    1940  [id number] [place] [facility] [id number] [phone] [id number]    [employer] Marital Status          None        Admission Date Admission Type Admitting Provider Attending Provider Department, Room/Bed    6/29/20 Emergency Margie Dorado DO Carroll, Meghan C, DO [facility] 3G, S364/1    Discharge Date Discharge Disposition Discharge Destination                       Attending Provider:  Margie Dorado DO    Allergies:  No Known Allergies    Isolation:  None   Infection:  None   Code Status:  No CPR    Ht:  177.8 cm (70\")   Wt:  63.2 kg (139 lb 6.4 oz)    Admission Cmt:  None   Principal Problem:  Fracture of right hip (CMS/[facility]) [S72.001A]                 Active Insurance as of 6/29/2020     Primary Coverage     Payor Plan Insurance Group Employer/Plan Group    HUMANA MEDICARE REPLACEMENT HUMANA MEDICARE REPLACEMENT [id number]     Payor Plan Address Payor Plan Phone Number Payor Plan Fax Number Effective Dates    [address] [address] [phone]  1/1/2018 - None Entered    [facility] [address]       Subscriber Name Subscriber Birth Date Member ID       KEN VALLE 1940 [id number]                 Emergency Contacts      (Rel.) Home Phone Work Phone Mobile Phone    Kerri Valle (Spouse) -- -- [phone]    BILLYLAYA HORNE (Son) -- -- [phone]            Insurance Information                HUMANA MEDICARE REPLACEMENT/HUMANA MEDICARE REPLACEMENT Phone: [phone]    Subscriber: Ken Valle Subscriber#: [id number]    Group#: [id number] Precert#: [id number]             History & Physical      Case, Dot NIETO DO at 06/29/20 2136          INTENSIVIST   INITIAL HOSPITAL VISIT NOTE     Hospital:  LOS: 10 days     Mr. Ken Valle, 80 y.o. male is seen for a Chief Complaint of:  Chief Complaint   Patient " presents with   • Fall   • Stroke       Fracture of right hip (CMS/HCC)    Acute febrile illness    Dementia (CMS/HCC)    H/O: CVA (no deficit)      Subjective   S     Ken Jasso is a 80 y.o. male who  has a past medical history of Dementia (CMS/HCC), Dementia (CMS/HCC), Dysphagia, Esophageal dilatation (2017), Hiatal hernia, PNA (pneumonia) (2014), PNA (pneumonia) (2019), Pneumonia, Prostate CA (CMS/HCC) (2004), and Stroke (CMS/[facility]).  He has no deficits from prior CVA.  Reportedly he has a H/O PAF for which he is not on medications.    Today while walking to the car the patient suffered a mechanical fall and suffered a R femur fracture.  He did not have LOC or syncope.  EMS was contacted for concern of focal L sided weakness.  He presented to our ED and was found to have a fever of 103.1 w/ nausea/vomiting.    He receivede a CVA workup in the ED w/ Neurology consultation who recommended initiation of CVA order set and MRI for further work up.         PMH: He  has a past medical history of Dementia (CMS/HCC), Dementia (CMS/HCC), Dysphagia, Esophageal dilatation (2017), Hiatal hernia, PNA (pneumonia) (2014), PNA (pneumonia) (2019), Pneumonia, Prostate CA (CMS/HCC) (2004), and Stroke (CMS/[facility]).   PSxH: He  has a past surgical history that includes Hip Percutaneous Pinning (Right, 7/1/2020).      Medications:  No current facility-administered medications on file prior to encounter.      Current Outpatient Medications on File Prior to Encounter   Medication Sig   • amLODIPine-benazepril (LOTREL) 10-40 MG per capsule Take 1 capsule by mouth Daily.   • donepezil (ARICEPT) 10 MG tablet Take 10 mg by mouth Daily.   • omeprazole (priLOSEC) 20 MG capsule Take 20 mg by mouth Daily.       Allergies: He has No Known Allergies.   FH: His family history is not on file. Patient unable to provide at the time of encounter.   SH: He  reports that he has quit smoking. His smoking use included cigarettes. He has never used  smokeless tobacco.     The patient's relevant past medical, surgical and social history were reviewed and updated in Epic as appropriate.     ROS (14):   Review of Systems   Constitutional: Positive for fever. Negative for chills and diaphoresis.   HENT: Negative.    Eyes: Negative.    Respiratory: Positive for choking.    Cardiovascular: Negative.    Gastrointestinal: Positive for nausea and vomiting.   Endocrine: Negative.    Genitourinary: Negative.    Musculoskeletal: Positive for gait problem.   Skin: Negative.    Allergic/Immunologic: Negative.    Neurological: Positive for dizziness, facial asymmetry, speech difficulty and weakness.   Hematological: Negative.    Psychiatric/Behavioral: Positive for confusion.       Objective   O     Vitals    Temp  Min: 97.5 °F (36.4 °C)  Max: 98.3 °F (36.8 °C)  BP  Min: 137/69  Max: 153/81  Pulse  Min: 76  Max: 87  Resp  Min: 14  Max: 16  SpO2  Min: 95 %  Max: 100 % No data recorded     I/O 24 hrs (7:00AM - 6:59 AM)  Intake/Output       07/08/20 0700 - 07/09/20 0659    Intake (ml) 480    Output (ml) 375    Net (ml) 105    Last Weight  64.2 kg (141 lb 8 oz)          Medications (drips):    lactated ringers Last Rate: 75 mL/hr (07/06/20 1251)   niCARdipine    Pharmacy to dose vancomycin        Physical Examination    Telemetry: Normal sinus rhythm.    Constitutional:  No acute distress.  Conversant.   HEENT: Normocephalic and atraumatic.   Neck:  Normal range of motion. Neck supple.   No JVD present.   No thyromegaly.    Cardiovascular: Regular rate and rhythm.  No murmurs, rub or gallop.  No peripheral edema.   Respiratory: Normal respiratory effort.  Clear to ascultation.   Abdominal:  Soft. No masses.   Non-tender. No distension.   No hepatosplenomegaly.   MSK: Normal muscle strength and tone.   Extremities: No digital cyanosis or clubbing.   Skin: Skin is warm and dry.  No rashes, lesions or ulcers noted.    Neurological:   Alert and Oriented to person and place.    Moves  all extremities.   Psychiatric:  Normal affect.   Normal judgment.     Interval: baseline  1a. Level of Consciousness: 0-->Alert, keenly responsive  1b. LOC Questions: 0-->Answers both questions correctly  1c. LOC Commands: 0-->Performs both tasks correctly  2. Best Gaze: 0-->Normal  3. Visual: 0-->No visual loss  4. Facial Palsy: 0-->Normal symmetrical movements  5a. Motor Arm, Left: 0-->No drift, limb holds 90 (or 45) degrees for full 10 secs  5b. Motor Arm, Right: 0-->No drift, limb holds 90 (or 45) degrees for full 10 secs  6a. Motor Leg, Left: 0-->No drift, leg holds 30 degree position for full 5 secs  6b. Motor Leg, Right: 0-->No drift, leg holds 30 degree position for full 5 secs  7. Limb Ataxia: 0-->Absent  8. Sensory: 0-->Normal, no sensory loss  9. Best Language: 0-->No aphasia, normal  10. Dysarthria: 0-->Normal  11. Extinction and Inattention (formerly Neglect): 0-->No abnormality    Total (NIH Stroke Scale): 0       Results from last 7 days   Lab Units 07/08/20  0400 07/07/20  0825   WBC 10*3/mm3 21.58* 16.85*   HEMOGLOBIN g/dL 9.2* 12.4*   MCV fL 81.3 82.1   PLATELETS 10*3/mm3 329 442     Results from last 7 days   Lab Units 07/08/20  0328 07/07/20  0825 07/03/20  2134   SODIUM mmol/L 139 136  --    POTASSIUM mmol/L 2.9* 3.6 3.7   CO2 mmol/L 25.0 25.0  --    CREATININE mg/dL 0.75* 0.80  --    MAGNESIUM mg/dL  --  2.1  --    PHOSPHORUS mg/dL 2.8  --   --      Estimated Creatinine Clearance: 65.8 mL/min (A) (by C-G formula based on SCr of 0.75 mg/dL (L)).  Results from last 7 days   Lab Units 07/07/20  0825   ALK PHOS U/L 84   BILIRUBIN mg/dL 1.5*   ALT (SGPT) U/L 20   AST (SGOT) U/L 38             Images:   06/29/2020      Imaging Results (Last 24 Hours)     Procedure Component Value Units Date/Time    XR Chest 1 View [[id number]] Collected:  07/07/20 0848     Updated:  07/08/20 1757    Narrative:       EXAMINATION: XR CHEST 1 VW-      INDICATION: RRT, increased sputum; R50.9-Fever,  unspecified;  J96.01-Acute respiratory failure with hypoxia; I47.2-Ventricular  tachycardia; S51.011A-Laceration without foreign body of right elbow,  initial encounter; R13.12-Dysphagia, oropharyngeal phase.      COMPARISON: 06/29/2020.     FINDINGS: Cardiac silhouette within normal limits. Pulmonary vascularity  within normal limits. No focal opacification or consolidation. No  pneumothorax or pleural effusion. Degenerative changes of the spine.           Impression:       No acute cardiopulmonary process specifically no new focal  consolidation of involvement or effusion.     D:  07/07/2020  E:  07/07/2020     This report was finalized on 7/8/2020 5:54 PM by Dr. Manoj Elizondo.           ECG/EMG Results (last 24 hours)     Procedure Component Value Units Date/Time    ECG 12 Lead [[id number]] Collected:  06/29/20 1907     Updated:  06/29/20 1927    Narrative:       Test Reason : Acute Stroke Protocol (onset < 12 hrs)  Blood Pressure : **/** mmHG  Vent. Rate : 119 BPM     Atrial Rate : 119 BPM     P-R Int : 178 ms          QRS Dur : 090 ms      QT Int : 314 ms       P-R-T Axes : 044 -43 062 degrees     QTc Int : 441 ms    Sinus tachycardia  premature atrial complexes  Left axis deviation  Abnormal ECG  No previous ECGs available  Confirmed by JORGE L HUSSEIN MD ([id number]) on 6/29/2020 7:27:12 PM    Referred By:  bk           Confirmed By:JORGE L HUSSEIN MD          I reviewed the patient's new laboratory and imaging results.  I independently reviewed the patient's new images.    Assessment/Plan   A / P     Mr. Jasso is an 79yo M with a history of dementia, paroxysmal Afib, and falls who presented to the [facility] ED after a fall while walking to the car. There was concern for left facial droop and left sided weakness and he was brought in as a Code Stroke. CT head and CT Perfusion were negative for stroke. He was noted to have a fever of 103 while in the ED. He and his wife deny any fever prior to presentation or any  symptoms of infection. While in the ED, he reportedly had non-sustained wide complex tachycardia which resolved spontaneously and he was started on Amiodarone. He was admitted to the ICU for this possible wide complex tachycardia. He does have a history of frequent falls and he and his wife recently moved into assisted living.       Nutrition:   Diet Dysphagia; III - Pureed With Some Mashed; Nectar / Syrup Thick  Advance Directives:   Code Status and Medical Interventions:   Ordered at: 07/03/20 0956     Limited Support to NOT Include:    Intubation     Level Of Support Discussed With:    Patient     Code Status:    No CPR     Medical Interventions (Level of Support Prior to Arrest):    Limited         Fracture of right hip (CMS/[facility])    Acute febrile illness    Dementia (CMS/[facility])    H/O: CVA (no deficit)      Assessment / Plan:    1. Admit to ICU  2. Hold Amiodarone drip. Monitor for arrhythmias with resumption if needed.   3. Phenergan for nausea. Avoid Reglan and Zofran 2/2 QT prolongation.   4. Empiric ABX for now, check MRSA & await Cx results  5. Check Viral respiratory PCR and COVID swab for new fever.   6. Trend troponin  7. CT of abdomen to eval for source of infection  8. Further stroke workup per Neurology. His deficits appear to have resolved with treatment of his fever.   9. Tylenol for fever  10. Speech evaluation as he failed a bedside dysphagia screen.   11. Mechanical DVT PPX  12. Ortho Consult in am for right hip fracture    I have updated Mr. Jasso's wife by phone. He does have an advanced directive that states that he would like treatment limited if he has a terminal condition but is otherwise full support. She plans on visiting the hospital when she is cleared to do so.     I discussed the patient's findings and my recommendations with patient, family and nursing staff    I have personally seen, interviewed and examined the patient and verified all the key components of the history, physical  examination, assessment and plan with DAVID Kidd and reviewed the note, which reflects my changes and contributions.    Dot James DO  Pulmonary and Critical Care Medicine      Electronically signed by Dot James DO at 20 0954          Physical Therapy Notes (most recent note)      Karine Mcmahon, PT at 20 1108  Version 1 of 1         Patient Name: Ken Jasso  : 1940    MRN: [id number]                              Today's Date: 2020       Admit Date: 2020    Visit Dx:     ICD-10-CM ICD-9-CM   1. Acute febrile illness R50.9 780.60   2. Acute respiratory failure with hypoxia (CMS/[facility]) J96.01 518.81   3. Ventricular tachycardia (CMS/HCC) I47.2 427.1   4. Skin tear of right elbow without complication, initial encounter S51.011A 881.01   5. Oropharyngeal dysphagia R13.12 787.22     Patient Active Problem List   Diagnosis   • Acute febrile illness   • Fracture of right hip (CMS/HCC)   • Dementia (CMS/HCC)   • H/O: CVA (no deficit)     Past Medical History:   Diagnosis Date   • Dementia (CMS/HCC)    • Dementia (CMS/HCC)    • Dysphagia    • Esophageal dilatation    • Hiatal hernia    • PNA (pneumonia)    • PNA (pneumonia)    • Pneumonia     [facility] EPISODES    • Prostate CA (CMS/HCC)    • Stroke (CMS/HCC)          Past Surgical History:   Procedure Laterality Date   • HIP PERCUTANEOUS PINNING Right 2020    Procedure: HIP PERCUTANEOUS PINNING;  Surgeon: Sam Harp MD;  Location: [facility];  Service: Orthopedics;  Laterality: Right;     General Information     Row Name 20 1047          PT Evaluation Time/Intention    Document Type  progress note/recertification  -EJ     Mode of Treatment  physical therapy  -EJ     Row Name 20 4013          General Information    Patient Profile Reviewed?  yes  -EJ     Existing Precautions/Restrictions  fall;other (see comments) TTWB RLE, dementia  -EJ     Row Name 20 1040           Cognitive Assessment/Intervention- PT/OT    Orientation Status (Cognition)  oriented to;person;place;time  -EJ     Cognitive Assessment/Intervention Comment  Pt continues to have difficulty sequencing, following commands.  -     Row Name 07/13/20 1041          Safety Issues, Functional Mobility    Safety Issues Affecting Function (Mobility)  safety precautions follow-through/compliance;insight into deficits/self awareness;safety precaution awareness;ability to follow commands;awareness of need for assistance  -EJ     Impairments Affecting Function (Mobility)  balance;cognition;coordination;endurance/activity tolerance;motor control;pain;range of motion (ROM);strength;motor planning  -EJ       User Key  (r) = Recorded By, (t) = Taken By, (c) = Cosigned By    Initials Name Provider Type    EJ Karine Mcmahon PT Physical Therapist        Mobility     Row Name 07/13/20 1053          Bed Mobility Assessment/Treatment    Bed Mobility Assessment/Treatment  sit-supine;scooting/bridging  -EJ     Rolling Right [place] (Bed Mobility)  moderate assist (50% patient effort)  -EJ     Scooting/Bridging [place] (Bed Mobility)  verbal cues;maximum assist (25% patient effort);2 person assist  -EJ     Supine-Sit [place] (Bed Mobility)  verbal cues;maximum assist (25% patient effort);2 person assist  -EJ     Assistive Device (Bed Mobility)  draw sheet;head of bed elevated;bed rails  -EJ     Comment (Bed Mobility)  extra time needed, VC for sequencing.   -     Row Name 07/13/20 1053          Transfer Assessment/Treatment    Comment (Transfers)  Pt with PT demo sit<>stand with TTWB, arm placement, talking through scenarios from bed and BSC. Pt required multiple corrections during attempts, and cueing for sequencing throughout. Pt unable to maintain WB status on RLE with t/f or sequence movement for bed>BSC.  Squat pivot perofmed to chair from BSC.  -     Row Name 07/13/20 3400          Bed-Chair Transfer     Bed-Chair [place] (Transfers)  verbal cues;maximum assist (25% patient effort);2 person assist  -EJ     Assistive Device (Bed-Chair Transfers)  other (see comments) BUE support  -[facility] Name 07/13/20 1053          Sit-Stand Transfer    Sit-Stand [place] (Transfers)  verbal cues;maximum assist (25% patient effort);2 person assist  -EJ     Assistive Device (Sit-Stand Transfers)  other (see comments) B UE support  -EJ     Row Name 07/13/20 1053          Gait/Stairs Assessment/Training    [place] Level (Gait)  unable to assess  -EJ     Row Name 07/13/20 1053          Mobility Assessment/Intervention    Extremity Weight-bearing Status  right lower extremity  -EJ     Right Lower Extremity (Weight-bearing Status)  toe touch weight-bearing (TTWB)  -EJ       User Key  (r) = Recorded By, (t) = Taken By, (c) = Cosigned By    Initials Name Provider Type    Karine Hale PT Physical Therapist        Obj/Interventions     Row Name 07/13/20 1059          Therapeutic Exercise    Lower Extremity (Therapeutic Exercise)  gluteal sets;SLR (straight leg raise), right;quad sets, right;LAQ (long arc quad), right;heel slides, right  -EJ     Lower Extremity Range of Motion (Therapeutic Exercise)  ankle dorsiflexion/plantar flexion, bilateral;hip abduction/adduction, right  -EJ     Exercise Type (Therapeutic Exercise)  isometric contraction, static;AAROM (active assistive range of motion);AROM (active range of motion)  -EJ     Position (Therapeutic Exercise)  supine  -EJ     Sets/Reps (Therapeutic Exercise)  10  -EJ       User Key  (r) = Recorded By, (t) = Taken By, (c) = Cosigned By    Initials Name Provider Type    Karine Hale PT Physical Therapist        Goals/Plan    No documentation.       Clinical Impression     Row Name 07/13/20 1100          Pain Assessment    Additional Documentation  Pain Scale: Numbers Pre/Post-Treatment (Group)  -EJ     Row Name 07/13/20 1100          Pain Scale: Numbers  Pre/Post-Treatment    Pain Scale: Numbers, Pretreatment  0/10 - no pain  -EJ     Pain Scale: Numbers, Post-Treatment  0/10 - no pain  -EJ     Pain Location - Side  Right  -EJ     Pain Location - Orientation  generalized  -EJ     Pain Location  hip  -EJ     Pain Intervention(s)  Repositioned;Ambulation/increased activity  -EJ     Row Name 07/13/20 1100          Pain Scale: FACES Pre/Post-Treatment    Pain: FACES Scale, Pretreatment  0-->no hurt  -EJ     Pain: FACES Scale, Post-Treatment  0-->no hurt  -EJ     Row Name 07/13/20 1100          Plan of Care Review    Plan of Care Reviewed With  patient  -EJ     Progress  no change  -EJ     Outcome Summary  Pt continues to need MAX A x2 for squat pivot t/f from bed>BSC>chair and for bed mobility. Pt was unable to come to fully upright position with forward flexed posture and unable to maintain TTWB RLE. Tolerates ther ex.  -EJ     Row Name 07/13/20 1100          Physical Therapy Clinical Impression    Criteria for Skilled Interventions Met (PT Clinical Impression)  yes;treatment indicated  -EJ     Rehab Potential (PT Clinical Summary)  fair, will monitor progress closely  -EJ     Row Name 07/13/20 1100          Vital Signs    Pre Patient Position  Supine  -EJ     Intra Patient Position  Standing  -EJ     Post Patient Position  Sitting  -EJ     Row Name 07/13/20 1100          Positioning and Restraints    Pre-Treatment Position  in bed  -EJ     Post Treatment Position  chair  -EJ     In Chair  reclined;call light within reach;encouraged to call for assist;exit alarm on;notified nsg;waffle cushion;on mechanical lift sling;compression device  -EJ       User Key  (r) = Recorded By, (t) = Taken By, (c) = Cosigned By    Initials Name Provider Type    Karine Hale, PT Physical Therapist        Outcome Measures     Row Name 07/13/20 1104          How much help from another person do you currently need...    Turning from your back to your side while in flat bed without  using bedrails?  2  -EJ     Moving from lying on back to sitting on the side of a flat bed without bedrails?  2  -EJ     Moving to and from a bed to a chair (including a wheelchair)?  2  -EJ     Standing up from a chair using your arms (e.g., wheelchair, bedside chair)?  2  -EJ     Climbing 3-5 steps with a railing?  1  -EJ     To walk in hospital room?  1  -EJ     AM-PAC 6 Clicks Score (PT)  10  -EJ     Row Name 07/13/20 1104          Functional Assessment    Outcome Measure Options  AM-PAC 6 Clicks Basic Mobility (PT)  -EJ       User Key  (r) = Recorded By, (t) = Taken By, (c) = Cosigned By    Initials Name Provider Type    Karine Hale PT Physical Therapist        Physical Therapy Education                 Title: PT OT SLP Therapies (In Progress)     Topic: Physical Therapy (In Progress)     Point: Mobility training (In Progress)     Description:   Instruct learner(s) on safety and technique for assisting patient out of bed, chair or wheelchair.  Instruct in the proper use of assistive devices, such as walker, crutches, cane or brace.              Patient Friendly Description:   It's important to get you on your feet again, but we need to do so in a way that is safe for you. Falling has serious consequences, and your personal safety is the most important thing of all.        When it's time to get out of bed, one of us or a family member will sit next to you on the bed to give you support.     If your doctor or nurse tells you to use a walker, crutches, a cane, or a brace, be sure you use it every time you get out of bed, even if you think you don't need it.    Learning Progress Summary           Patient Acceptance, E, NR by ERICA at 7/13/2020 1105    Acceptance, E,D, VU,NR by CARLINE at 7/12/2020 1350    Comment:  Educated patient on WB status, safe hand placement with transfers, sequencing with transfer, bed mobility sequencing, and ther ex    Acceptance, E,D, VU,NR by CARLINE at 7/11/2020 4275    Comment:   Educated patient on importance of exercise and exercise technique.    Acceptance, E,D, VU,NR by CS at 7/10/2020 1425    Comment:  Educated patient on WB status, safe hand placement with transfers, sequencing with transfers, bed mobility, and ther ex.    Acceptance, E,D, NR by AA at 7/9/2020 0810    Acceptance, E, VU,NR by LIEN at 7/8/2020 1413    Comment:  Pt educated about transfer strategies and surgical precautions.    Acceptance, E,D, NR by AA at 7/6/2020 0827    Acceptance, E, NR by NS at 7/4/2020 1601    Comment:  Patient was educated about WBing precautions and sequencing with transfers.    Acceptance, E, NR by ERICA at 7/3/2020 1154    Acceptance, E,D, NR by SYLVIE at 7/2/2020 1447   Family Acceptance, E,D, NR by AA at 7/2/2020 1447                   Point: Home exercise program (In Progress)     Description:   Instruct learner(s) on appropriate technique for monitoring, assisting and/or progressing patient with therapeutic exercises and activities.              Learning Progress Summary           Patient Acceptance, E, NR by ERICA at 7/13/2020 1105    Acceptance, E,D, VU,NR by CARLINE at 7/12/2020 1350    Comment:  Educated patient on WB status, safe hand placement with transfers, sequencing with transfer, bed mobility sequencing, and ther ex    Acceptance, E,D, VU,NR by CARLINE at 7/11/2020 1545    Comment:  Educated patient on importance of exercise and exercise technique.    Acceptance, E,D, VU,NR by CS at 7/10/2020 1425    Comment:  Educated patient on WB status, safe hand placement with transfers, sequencing with transfers, bed mobility, and ther ex.    Acceptance, E,D, NR by AA at 7/9/2020 0810    Acceptance, E, VU,NR by LIEN at 7/8/2020 1413    Comment:  Pt educated about transfer strategies and surgical precautions.    Acceptance, E,D, NR by SYLVIE at 7/6/2020 0827    Acceptance, E, NR by NS at 7/4/2020 1601    Comment:  Patient was educated about WBing precautions and sequencing with transfers.                   Point: Body  mechanics (In Progress)     Description:   Instruct learner(s) on proper positioning and spine alignment for patient and/or caregiver during mobility tasks and/or exercises.              Learning Progress Summary           Patient Acceptance, E, NR by ERICA at 7/13/2020 1105    Acceptance, E,D, VU,NR by CS at 7/12/2020 1350    Comment:  Educated patient on WB status, safe hand placement with transfers, sequencing with transfer, bed mobility sequencing, and ther ex    Acceptance, E,D, VU,NR by CS at 7/11/2020 1545    Comment:  Educated patient on importance of exercise and exercise technique.    Acceptance, E,D, VU,NR by CS at 7/10/2020 1425    Comment:  Educated patient on WB status, safe hand placement with transfers, sequencing with transfers, bed mobility, and ther ex.    Acceptance, E,D, NR by AA at 7/9/2020 0810    Acceptance, E, VU,NR by LIEN at 7/8/2020 1413    Comment:  Pt educated about transfer strategies and surgical precautions.    Acceptance, E,D, NR by AA at 7/6/2020 0827    Acceptance, E, NR by NS at 7/4/2020 1601    Comment:  Patient was educated about WBing precautions and sequencing with transfers.    Acceptance, E, NR by ERICA at 7/3/2020 1154    Acceptance, E,D, NR by AA at 7/2/2020 1447   Family Acceptance, E,D, NR by AA at 7/2/2020 1447                   Point: Precautions (In Progress)     Description:   Instruct learner(s) on prescribed precautions during mobility and gait tasks              Learning Progress Summary           Patient Acceptance, E, NR by ERICA at 7/13/2020 1105    Acceptance, E,D, VU,NR by CS at 7/12/2020 1350    Comment:  Educated patient on WB status, safe hand placement with transfers, sequencing with transfer, bed mobility sequencing, and ther ex    Acceptance, E,D, VU,NR by CS at 7/11/2020 1545    Comment:  Educated patient on importance of exercise and exercise technique.    Acceptance, E,D, VU,NR by CS at 7/10/2020 1425    Comment:  Educated patient on WB status, safe hand  placement with transfers, sequencing with transfers, bed mobility, and ther ex.    Acceptance, E,D, NR by AA at 7/9/2020 0810    Acceptance, E, VU,NR by  at 7/8/2020 1413    Comment:  Pt educated about transfer strategies and surgical precautions.    Acceptance, E,D, NR by AA at 7/6/2020 0827    Acceptance, E, NR by NS at 7/4/2020 1601    Comment:  Patient was educated about WBing precautions and sequencing with transfers.    Acceptance, E, NR by  at 7/3/2020 1154    Acceptance, E,D, NR by AA at 7/2/2020 1447   Family Acceptance, E,D, NR by AA at 7/2/2020 1447                               User Key     Initials Effective Dates Name Provider Type Discipline     11/20/18 -  Karine Mcmahon, PT Physical Therapist PT    AA 04/02/18 -  Micaela Tate, PT Physical Therapist PT    CS 03/26/19 -  Grace Bailey, PT Physical Therapist PT    NS 09/10/19 -  Aissatou Díaz, PT Physical Therapist PT     05/14/20 -  Pat Baugh, PT Student PT Student PT              PT Recommendation and Plan     Outcome Summary/Treatment Plan (PT)  Anticipated Discharge Disposition (PT): skilled nursing facility  Plan of Care Reviewed With: patient  Progress: no change  Outcome Summary: Pt continues to need MAX A x2 for squat pivot t/f from bed>BSC>chair and for bed mobility. Pt was unable to come to fully upright position with forward flexed posture and unable to maintain TTWB RLE. Tolerates ther ex.     Time Calculation:   PT Charges     Row Name 07/13/20 1106             Time Calculation    Start Time  0935  -EJ      PT Received On  07/13/20  -      PT Goal Re-Cert Due Date  07/22/20  -         Time Calculation- PT    Total Timed Code Minutes- PT  34 minute(s)  -EJ         Timed Charges    20514 - PT Therapeutic Exercise Minutes  7  -EJ      07498 - PT Therapeutic Activity Minutes  27  -EJ        User Key  (r) = Recorded By, (t) = Taken By, (c) = Cosigned By    Initials Name Provider Type    EJ Karine Mcmahon, PT  Physical Therapist        Therapy Charges for Today     Code Description Service Date Service Provider Modifiers Qty    44419109803 HC PT THERAPEUTIC ACT EA 15 MIN 2020 Karine Mcmahon, PT GP 2    45651982444 HC PT THER SUPP EA 15 MIN 2020 Karine Mcmahon PT GP 2          PT G-Codes  Outcome Measure Options: AM-PAC 6 Clicks Basic Mobility (PT)  AM-PAC 6 Clicks Score (PT): 10  AM-PAC 6 Clicks Score (OT): 14    Karine Adame, PT  2020         Electronically signed by Karine Mcmahon PT at 20 1108          Occupational Therapy Notes (most recent note)      Mallory Coleman, OT at 20 0812          Acute Care - Occupational Therapy Treatment Note  [facility]     Patient Name: Ken Jasso  : 1940  MRN: [id number]  Today's Date: 2020             Admit Date: 2020       ICD-10-CM ICD-9-CM   1. Acute febrile illness R50.9 780.60   2. Acute respiratory failure with hypoxia (CMS/[facility]) J96.01 518.81   3. Ventricular tachycardia (CMS/[facility]) I47.2 427.1   4. Skin tear of right elbow without complication, initial encounter S51.011A 881.01   5. Oropharyngeal dysphagia R13.12 787.22     Patient Active Problem List   Diagnosis   • Acute febrile illness   • Fracture of right hip (CMS/[facility])   • Dementia (CMS/[facility])   • H/O: CVA (no deficit)     Past Medical History:   Diagnosis Date   • Dementia (CMS/HCC)    • Dementia (CMS/[facility])    • Dysphagia    • Esophageal dilatation    • Hiatal hernia    • PNA (pneumonia) 2014   • PNA (pneumonia) 2019   • Pneumonia     [facility] EPISOD[facility]    • Prostate CA (CMS/HCC)    • Stroke (CMS/HCC)     2019     Past Surgical History:   Procedure Laterality Date   • HIP PERCUTANEOUS PINNING Right 2020    Procedure: HIP PERCUTANEOUS PINNING;  Surgeon: Sam Harp MD;  Location: [facility];  Service: Orthopedics;  Laterality: Right;       Therapy Treatment    Rehabilitation Treatment Summary     Row Name 20 0812              Treatment Time/Intention    Discipline  occupational therapist  -AR      Document Type  therapy note (daily note)  -AR      Subjective Information  no complaints  -AR      Existing Precautions/Restrictions  (S) fall;weight bearing TTWB RLE, dementia  -AR      Recorded by [AR] Mallory Coleman, OT 07/09/20 0835      Row Name 07/09/20 0812             Vital Signs    Pre Patient Position  Supine  -AR      Intra Patient Position  Standing  -AR      Post Patient Position  Sitting  -AR      Recorded by [AR] Mallory Coleman OT 07/09/20 0857      Row Name 07/09/20 0812             Cognitive Assessment/Intervention- PT/OT    Affect/Mental Status (Cognitive)  WFL  -AR      Orientation Status (Cognition)  oriented to;person;place  -AR      Follows Commands (Cognition)  WFL  -AR      Cognitive Function (Cognitive)  memory deficit;safety deficit  -AR      Memory Deficit (Cognitive)  mild deficit  -AR      Safety Deficit (Cognitive)  moderate deficit  -AR      Recorded by [AR] Mallory Coleman, OT 07/09/20 0835      Row Name 07/09/20 0812             Safety Issues, Functional Mobility    Safety Issues Affecting Function (Mobility)  safety precaution awareness;safety precautions follow-through/compliance;sequencing abilities  -AR      Impairments Affecting Function (Mobility)  balance;cognition;endurance/activity tolerance;pain;range of motion (ROM);strength  -AR      Recorded by [AR] Mallory Coleman OT 07/09/20 0835      Row Name 07/09/20 0812             Mobility Assessment/Intervention    Extremity Weight-bearing Status  right lower extremity  -AR      Right Lower Extremity (Weight-bearing Status)  (S) toe touch weight-bearing (TTWB)  -AR      Recorded by [AR] Mallory Coleman OT 07/09/20 0835      Row Name 07/09/20 0812             Bed Mobility Assessment/Treatment    Bed Mobility Assessment/Treatment  scooting/bridging;supine-sit  -AR      Scooting/Bridging [place] (Bed Mobility)  maximum assist (25%  patient effort);verbal cues  -AR      Supine-Sit [place] (Bed Mobility)  maximum assist (25% patient effort);2 person assist;verbal cues  -AR      Bed Mobility, Safety Issues  decreased use of legs for bridging/pushing;unable to safely maintain weight bearing restrictions  -AR      Assistive Device (Bed Mobility)  bed rails;draw sheet;head of bed elevated  -AR      Recorded by [AR] Mallory Coleman, OT 07/09/20 0857      Row Name 07/09/20 0812             Functional Mobility    Functional Mobility- Ind. Level  unable to perform  -AR      Recorded by [AR] Mallory Coleman, OT 07/09/20 0857      Row Name 07/09/20 0812             Transfer Assessment/Treatment    Transfer Assessment/Treatment  sit-stand transfer;stand-sit transfer  -AR      Maintains Weight-bearing Status (Transfers)  verbal cues to maintain  -AR2      Comment (Transfers)  Reinforced TTWB RLE, pt with improved ability to maintain. Pt unable to achieve upright stance.   -AR2      Recorded by [AR] Mallory Coleman, OT 07/09/20 0857  [AR2] Mallory Coleman, OT 07/09/20 0910      Row Name 07/09/20 0812             Bed-Chair Transfer    Bed-Chair [place] (Transfers)  maximum assist (25% patient effort);2 person assist;verbal cues  -AR      Assistive Device (Bed-Chair Transfers)  other (see comments) BUE support  -AR      Recorded by [AR] Mallory Coleman, OT 07/09/20 0910      Row Name 07/09/20 0812             Sit-Stand Transfer    Sit-Stand [place] (Transfers)  maximum assist (25% patient effort);2 person assist;verbal cues  -AR      Assistive Device (Sit-Stand Transfers)  other (see comments) BUE support  -AR      Recorded by [AR] Mallory Coleman, OT 07/09/20 0910      Row Name 07/09/20 0812             Stand-Sit Transfer    Stand-Sit [place] (Transfers)  maximum assist (25% patient effort);2 person assist;verbal cues  -AR      Assistive Device (Stand-Sit Transfers)  other (see comments) BUE support  -AR       Recorded by [AR] Mallory Coleman, OT 07/09/20 0910      Row Name 07/09/20 0812             ADL Assessment/Intervention    75450 - OT Self Care/Mgmt Minutes  14  -AR      BADL Assessment/Intervention  upper body dressing;lower body dressing;grooming;feeding  -AR      Recorded by [AR] Mallory Coleman, OT 07/09/20 0910      Row Name 07/09/20 0812             Upper Body Dressing Assessment/Training    Upper Body Dressing [place] Level  don;pajama/robe;moderate assist (50% patient effort)  -AR      Upper Body Dressing Position  edge of bed sitting  -AR      Recorded by [AR] Mallory Coleman, OT 07/09/20 0910      Row Name 07/09/20 0812             Grooming Assessment/Training    [place] Level (Grooming)  wash face, hands;supervision  -AR      Grooming Position  supported sitting  -AR      Recorded by [AR] Mallory Coleman, OT 07/09/20 0910      Row Name 07/09/20 0812             Self-Feeding Assessment/Training    [place] Level (Feeding)  feeding skills;supervision  -AR      Self-Feeding Assess/Train, Spillage Amount  minimal  -AR      Position (Self-Feeding)  supported sitting  -AR      Recorded by [AR] Mallory Coleman, OT 07/09/20 0910      Row Name 07/09/20 0812             BADL Safety/Performance    Impairments, BADL Safety/Performance  balance;cognition;endurance/activity tolerance;maintains weight bearing status;range of motion;strength;trunk/postural control  -AR      Cognitive Impairments, BADL Safety/Performance  problem solving/reasoning;safety precaution awareness;safety precaution follow-through;sequencing abilities  -AR      Skilled BADL Treatment/Intervention  adaptive equipment training;compensatory training  -AR      Recorded by [AR] Mallory Coleman, OT 07/09/20 0910      Row Name 07/09/20 0812             Motor Skills Assessment/Interventions    Additional Documentation  Balance (Group);Balance Interventions (Group)  -AR      Recorded by [AR] Mallory Coleman, OT  07/09/20 0910      Row Name 07/09/20 0812             Balance    Balance  static sitting balance;static standing balance  -AR      Recorded by [AR] Mallory Coleman OT 07/09/20 0910      Row Name 07/09/20 0812             Static Sitting Balance    Level of [place] (Unsupported Sitting, Static Balance)  contact guard assist  -AR      Sitting Position (Unsupported Sitting, Static Balance)  sitting on edge of bed  -AR      Recorded by [AR] Mallory Coleman OT 07/09/20 0910      Row Name 07/09/20 0812             Static Standing Balance    Level of [place] (Supported Standing, Static Balance)  maximal assist, 25 to 49% patient effort;2 person assist  -AR      Time Able to Maintain Position (Supported Standing, Static Balance)  1 to 2 minutes  -AR      Assistive Device Utilized (Supported Standing, Static Balance)  other (see comments) BUE support  -AR      Recorded by [AR] Mallory Coleman OT 07/09/20 0910      Row Name 07/09/20 0812             Positioning and Restraints    Pre-Treatment Position  in bed  -AR      Post Treatment Position  chair  -AR      In Chair  reclined;call light within reach;encouraged to call for assist;exit alarm on;legs elevated;compression device  -AR      Recorded by [AR] Mallory Coleman OT 07/09/20 0910      Row Name 07/09/20 0812             Pain Scale: Numbers Pre/Post-Treatment    Pain Scale: Numbers, Pretreatment  0/10 - no pain  -AR      Pain Scale: Numbers, Post-Treatment  0/10 - no pain  -AR      Recorded by [AR] Mallory Coleman, OT 07/09/20 0910      Row Name                Wound 06/29/20 1930 Right elbow Skin Tear    Wound - Properties Group Date first assessed: 06/29/20 [AB] Time first assessed: 1930 [AB] Present on Hospital Admission: Y [AB] Side: Right [AB] Location: elbow [AB] Primary Wound Type: Skin tear [AB] Additional Comments: CLEANED AND DRESSING PLACED  [AB] Recorded by:  [AB] Efrem Mei RN 06/29/20 1953    Row Name                 Wound 07/01/20 1848 Right lateral hip Incision    Wound - Properties Group Date first assessed: 07/01/20 [HB] Time first assessed: 1848 [HB] Side: Right [HB] Orientation: lateral [HB] Location: hip [HB] Primary Wound Type: Incision [HB] Recorded by:  [HB] Mini Pitts RN 07/01/20 1848    Row Name 07/09/20 0812             Plan of Care Review    Plan of Care Reviewed With  patient  -AR      Progress  improving  -AR      Recorded by [AR] Mallory Coleman, OT 07/09/20 0910      Row Name 07/09/20 0812             Outcome Summary/Treatment Plan (OT)    Daily Summary of Progress (OT)  progress towards functional goals is fair  -AR      Anticipated Discharge Disposition (OT)  skilled nursing facility  -AR      Recorded by [AR] Mallory Coleman, OT 07/09/20 0910        User Key  (r) = Recorded By, (t) = Taken By, (c) = Cosigned By    Initials Name Effective Dates Discipline    AR Mallory Coleman, OT 06/22/15 -  OT    AB Efrem Mei RN 06/16/16 -  Nurse    HB Mini Pitts RN 02/21/20 -  Nurse        Wound 06/29/20 1930 Right elbow Skin Tear (Active)   Dressing Appearance open to air 7/8/2020  8:00 PM   Closure None 7/8/2020  8:00 PM   Base dry;red;scab 7/8/2020  8:00 PM   Periwound Temperature warm 7/8/2020  8:00 PM   Periwound Skin Turgor soft 7/8/2020  8:00 PM   Edges irregular 7/8/2020  8:00 PM   Drainage Amount none 7/8/2020  8:00 PM   Dressing Care, Wound open to air 7/8/2020  8:00 PM   Periwound Care, Wound absorptive dressing applied 7/8/2020 10:00 AM       Wound 07/01/20 1848 Right lateral hip Incision (Active)   Dressing Appearance no drainage;dressing loose 7/8/2020  8:00 PM   Closure AMY 7/8/2020  8:00 PM   Base dressing in place, unable to visualize 7/8/2020  8:00 PM   Periwound intact;dry 7/8/2020  8:00 PM   Periwound Temperature warm 7/8/2020  8:00 PM   Periwound Skin Turgor soft 7/8/2020  8:00 PM   Drainage Amount none 7/8/2020  8:00 PM   Dressing Care, Wound border dressing 7/8/2020  8:00 PM         Rehab Goal Summary     Row Name 07/09/20 0812             Transfer Goal 1 (OT)    Progress/Outcome (Transfer Goal 1, OT)  goal ongoing  -AR         Dressing Goal 1 (OT)    Progress/Outcome (Dressing Goal 1, OT)  goal ongoing  -AR         Strength Goal 1 (OT)    Progress/Outcome (Strength Goal 1, OT)  goal ongoing  -AR          Activity Tolerance Goal 1 (OT)    Progress/Outcome (Activity Tolerance Goal 1, OT)  goal ongoing  -AR        User Key  (r) = Recorded By, (t) = Taken By, (c) = Cosigned By    Initials Name Provider Type Discipline    Mallory Sinclair, OT Occupational Therapist OT        Occupational Therapy Education                 Title: PT OT SLP Therapies (In Progress)     Topic: Occupational Therapy (In Progress)     Point: ADL training (In Progress)     Description:   Instruct learner(s) on proper safety adaptation and remediation techniques during self care or transfers.   Instruct in proper use of assistive devices.              Learning Progress Summary           Patient Eager, E,TB,D,H, VU,NR by AR at 7/9/2020 0812    Eager, E,TB,D, NR by AR at 7/6/2020 0828    Acceptance, E,D, NR by JY at 7/2/2020 1457   Family Acceptance, E,D, NR by JY at 7/2/2020 1457                   Point: Home exercise program (In Progress)     Description:   Instruct learner(s) on appropriate technique for monitoring, assisting and/or progressing therapeutic exercises/activities.              Learning Progress Summary           Patient Eager, E,TB,D,H, VU,NR by AR at 7/9/2020 0812    Eager, E,TB,D, NR by AR at 7/6/2020 0828    Acceptance, E,D, NR by JY at 7/2/2020 1457   Family Acceptance, E,D, NR by JY at 7/2/2020 1457                   Point: Precautions (In Progress)     Description:   Instruct learner(s) on prescribed precautions during self-care and functional transfers.              Learning Progress Summary           Patient Eager, E,TB,D,H, VU,NR by AR at 7/9/2020 0812    Eager, E,TB,D, NR by AR at 7/6/2020  0828    Acceptance, E,D, NR by PATTI at 7/2/2020 1457   Family Acceptance, E,D, NR by PATTI at 7/2/2020 1457                   Point: Body mechanics (In Progress)     Description:   Instruct learner(s) on proper positioning and spine alignment during self-care, functional mobility activities and/or exercises.              Learning Progress Summary           Patient Eager, E,TB,D,H, VU,NR by AR at 7/9/2020 0812    Eager, E,TB,D, NR by AR at 7/6/2020 0828    Acceptance, E,D, NR by PATTI at 7/2/2020 1457   Family Acceptance, E,D, NR by PATTI at 7/2/2020 1457                               User Key     Initials Effective Dates Name Provider Type Discipline    AR 06/22/15 -  Mallory Coleman, OT Occupational Therapist OT    PATTI 01/29/20 -  Pat Montes OT Occupational Therapist OT                OT Recommendation and Plan  Outcome Summary/Treatment Plan (OT)  Daily Summary of Progress (OT): progress towards functional goals is fair  Anticipated Discharge Disposition (OT): skilled nursing facility  Daily Summary of Progress (OT): progress towards functional goals is fair  Plan of Care Review  Plan of Care Reviewed With: patient  Plan of Care Reviewed With: patient  Outcome Summary: Pt alert, Ox2 and no c/o pain. He completed bed mobility with max assist x2, transfer training with max assist x2 yet improved ability to maintain TTWB RLE. He completed UB dressing with mod assist, grooming with supervision and self-feeding with supervision. Recommend SNF-level rehab.  Outcome Measures     Row Name 07/09/20 0812             How much help from another is currently needed...    Putting on and taking off regular lower body clothing?  2  -AR      Bathing (including washing, rinsing, and drying)  2  -AR      Toileting (which includes using toilet bed pan or urinal)  2  -AR      Putting on and taking off regular upper body clothing  2  -AR      Taking care of personal grooming (such as brushing teeth)  3  -AR      Eating meals  3  -AR       AM-PAC 6 Clicks Score (OT)  14  -AR         Functional Assessment    Outcome Measure Options  AM-PAC 6 Clicks Daily Activity (OT)  -AR        User Key  (r) = Recorded By, (t) = Taken By, (c) = Cosigned By    Initials Name Provider Type    Mallory Sinclair OT Occupational Therapist           Time Calculation:   Time Calculation- OT     Row Name 07/09/20 0812             Time Calculation- OT    OT Start Time  0812  -AR      Total Timed Code Minutes- OT  14 minute(s)  -AR      OT Received On  07/09/20  -AR      OT Goal Re-Cert Due Date  07/19/20  -AR         Timed Charges    38495 - OT Self Care/Mgmt Minutes  14  -AR        User Key  (r) = Recorded By, (t) = Taken By, (c) = Cosigned By    Initials Name Provider Type    Mallory Sinclair OT Occupational Therapist        Therapy Charges for Today     Code Description Service Date Service Provider Modifiers Qty    71469876467 HC OT SELF CARE/MGMT/TRAIN EA 15 MIN 7/9/2020 Mallory Coleman OT GO 1               Mallory Coleman OT  7/9/2020    Electronically signed by Mallory Coleman OT at 07/09/20 0915

## 2020-07-13 NOTE — THERAPY TREATMENT NOTE
Patient Name: Ken Jasso  : 1940    MRN: [id number]                              Today's Date: 2020       Admit Date: 2020    Visit Dx:     ICD-10-CM ICD-9-CM   1. Acute febrile illness R50.9 780.60   2. Acute respiratory failure with hypoxia (CMS/[facility]) J96.01 518.81   3. Ventricular tachycardia (CMS/[facility]) I47.2 427.1   4. Skin tear of right elbow without complication, initial encounter S51.011A 881.01   5. Oropharyngeal dysphagia R13.12 787.22     Patient Active Problem List   Diagnosis   • Acute febrile illness   • Fracture of right hip (CMS/HCC)   • Dementia (CMS/HCC)   • H/O: CVA (no deficit)     Past Medical History:   Diagnosis Date   • Dementia (CMS/HCC)    • Dementia (CMS/HCC)    • Dysphagia    • Esophageal dilatation    • Hiatal hernia    • PNA (pneumonia)    • PNA (pneumonia)    • Pneumonia     [facility] EPISODES    • Prostate CA (CMS/HCC)    • Stroke (CMS/HCC)          Past Surgical History:   Procedure Laterality Date   • HIP PERCUTANEOUS PINNING Right 2020    Procedure: HIP PERCUTANEOUS PINNING;  Surgeon: Sam Harp MD;  Location: [facility];  Service: Orthopedics;  Laterality: Right;     General Information     Row Name 20 1047          PT Evaluation Time/Intention    Document Type  progress note/recertification  -EJ     Mode of Treatment  physical therapy  -EJ     Row Name 20 1047          General Information    Patient Profile Reviewed?  yes  -EJ     Existing Precautions/Restrictions  fall;other (see comments) TTWB RLE, dementia  -EJ     Row Name 20 1047          Cognitive Assessment/Intervention- PT/OT    Orientation Status (Cognition)  oriented to;person;place;time  -EJ     Cognitive Assessment/Intervention Comment  Pt continues to have difficulty sequencing, following commands.  -EJ     Row Name 20 1047          Safety Issues, Functional Mobility    Safety Issues Affecting Function (Mobility)  safety precautions  follow-through/compliance;insight into deficits/self awareness;safety precaution awareness;ability to follow commands;awareness of need for assistance  -EJ     Impairments Affecting Function (Mobility)  balance;cognition;coordination;endurance/activity tolerance;motor control;pain;range of motion (ROM);strength;motor planning  -EJ       User Key  (r) = Recorded By, (t) = Taken By, (c) = Cosigned By    Initials Name Provider Type     Karine Mcmahon PT Physical Therapist        Mobility     Row Name 07/13/20 1053          Bed Mobility Assessment/Treatment    Bed Mobility Assessment/Treatment  sit-supine;scooting/bridging  -EJ     Rolling Right [place] (Bed Mobility)  moderate assist (50% patient effort)  -EJ     Scooting/Bridging [place] (Bed Mobility)  verbal cues;maximum assist (25% patient effort);2 person assist  -EJ     Supine-Sit [place] (Bed Mobility)  verbal cues;maximum assist (25% patient effort);2 person assist  -EJ     Assistive Device (Bed Mobility)  draw sheet;head of bed elevated;bed rails  -EJ     Comment (Bed Mobility)  extra time needed, VC for sequencing.   -     Row Name 07/13/20 1053          Transfer Assessment/Treatment    Comment (Transfers)  Pt with PT demo sit<>stand with TTWB, arm placement, talking through scenarios from bed and BSC. Pt required multiple corrections during attempts, and cueing for sequencing throughout. Pt unable to maintain WB status on RLE with t/f or sequence movement for bed>BSC.  Squat pivot perofmed to chair from BSC.  -     Row Name 07/13/20 1053          Bed-Chair Transfer    Bed-Chair [place] (Transfers)  verbal cues;maximum assist (25% patient effort);2 person assist  -EJ     Assistive Device (Bed-Chair Transfers)  other (see comments) BUE support  -     Row Name 07/13/20 1053          Sit-Stand Transfer    Sit-Stand [place] (Transfers)  verbal cues;maximum assist (25% patient effort);2 person assist  -EJ     Assistive Device  (Sit-Stand Transfers)  other (see comments) B UE support  -EJ     Row Name 07/13/20 1053          Gait/Stairs Assessment/Training    [place] Level (Gait)  unable to assess  -EJ     Row Name 07/13/20 1053          Mobility Assessment/Intervention    Extremity Weight-bearing Status  right lower extremity  -EJ     Right Lower Extremity (Weight-bearing Status)  toe touch weight-bearing (TTWB)  -EJ       User Key  (r) = Recorded By, (t) = Taken By, (c) = Cosigned By    Initials Name Provider Type    Karine Hale PT Physical Therapist        Obj/Interventions     Row Name 07/13/20 1059          Therapeutic Exercise    Lower Extremity (Therapeutic Exercise)  gluteal sets;SLR (straight leg raise), right;quad sets, right;LAQ (long arc quad), right;heel slides, right  -EJ     Lower Extremity Range of Motion (Therapeutic Exercise)  ankle dorsiflexion/plantar flexion, bilateral;hip abduction/adduction, right  -EJ     Exercise Type (Therapeutic Exercise)  isometric contraction, static;AAROM (active assistive range of motion);AROM (active range of motion)  -EJ     Position (Therapeutic Exercise)  supine  -EJ     Sets/Reps (Therapeutic Exercise)  10  -EJ       User Key  (r) = Recorded By, (t) = Taken By, (c) = Cosigned By    Initials Name Provider Type    Karine Hale PT Physical Therapist        Goals/Plan    No documentation.       Clinical Impression     Row Name 07/13/20 1100          Pain Assessment    Additional Documentation  Pain Scale: Numbers Pre/Post-Treatment (Group)  -EJ     Row Name 07/13/20 1100          Pain Scale: Numbers Pre/Post-Treatment    Pain Scale: Numbers, Pretreatment  0/10 - no pain  -EJ     Pain Scale: Numbers, Post-Treatment  0/10 - no pain  -EJ     Pain Location - Side  Right  -EJ     Pain Location - Orientation  generalized  -EJ     Pain Location  hip  -EJ     Pain Intervention(s)  Repositioned;Ambulation/increased activity  -EJ     Row Name 07/13/20 1100          Pain Scale:  FACES Pre/Post-Treatment    Pain: FACES Scale, Pretreatment  0-->no hurt  -EJ     Pain: FACES Scale, Post-Treatment  0-->no hurt  -EJ     Row Name 07/13/20 1100          Plan of Care Review    Plan of Care Reviewed With  patient  -EJ     Progress  no change  -EJ     Outcome Summary  Pt continues to need MAX A x2 for squat pivot t/f from bed>BSC>chair and for bed mobility. Pt was unable to come to fully upright position with forward flexed posture and unable to maintain TTWB RLE. Tolerates ther ex.  -EJ     Row Name 07/13/20 1100          Physical Therapy Clinical Impression    Criteria for Skilled Interventions Met (PT Clinical Impression)  yes;treatment indicated  -EJ     Rehab Potential (PT Clinical Summary)  fair, will monitor progress closely  -EJ     Row Name 07/13/20 1100          Vital Signs    Pre Patient Position  Supine  -EJ     Intra Patient Position  Standing  -EJ     Post Patient Position  Sitting  -EJ     Row Name 07/13/20 1100          Positioning and Restraints    Pre-Treatment Position  in bed  -EJ     Post Treatment Position  chair  -EJ     In Chair  reclined;call light within reach;encouraged to call for assist;exit alarm on;notified nsg;waffle cushion;on mechanical lift sling;compression device  -EJ       User Key  (r) = Recorded By, (t) = Taken By, (c) = Cosigned By    Initials Name Provider Type    Karine Hale, PT Physical Therapist        Outcome Measures     Row Name 07/13/20 1104          How much help from another person do you currently need...    Turning from your back to your side while in flat bed without using bedrails?  2  -EJ     Moving from lying on back to sitting on the side of a flat bed without bedrails?  2  -EJ     Moving to and from a bed to a chair (including a wheelchair)?  2  -EJ     Standing up from a chair using your arms (e.g., wheelchair, bedside chair)?  2  -EJ     Climbing 3-5 steps with a railing?  1  -EJ     To walk in hospital room?  1  -EJ     AM-PAC 6  Clicks Score (PT)  10  -EJ     Row Name 07/13/20 1104          Functional Assessment    Outcome Measure Options  AM-PAC 6 Clicks Basic Mobility (PT)  -EJ       User Key  (r) = Recorded By, (t) = Taken By, (c) = Cosigned By    Initials Name Provider Type    Karine Hale PT Physical Therapist        Physical Therapy Education                 Title: PT OT SLP Therapies (In Progress)     Topic: Physical Therapy (In Progress)     Point: Mobility training (In Progress)     Description:   Instruct learner(s) on safety and technique for assisting patient out of bed, chair or wheelchair.  Instruct in the proper use of assistive devices, such as walker, crutches, cane or brace.              Patient Friendly Description:   It's important to get you on your feet again, but we need to do so in a way that is safe for you. Falling has serious consequences, and your personal safety is the most important thing of all.        When it's time to get out of bed, one of us or a family member will sit next to you on the bed to give you support.     If your doctor or nurse tells you to use a walker, crutches, a cane, or a brace, be sure you use it every time you get out of bed, even if you think you don't need it.    Learning Progress Summary           Patient Acceptance, E, NR by ERICA at 7/13/2020 1105    Acceptance, E,D, VU,NR by CARLINE at 7/12/2020 1350    Comment:  Educated patient on WB status, safe hand placement with transfers, sequencing with transfer, bed mobility sequencing, and ther ex    Acceptance, E,D, VU,NR by CS at 7/11/2020 1545    Comment:  Educated patient on importance of exercise and exercise technique.    Acceptance, E,D, VU,NR by CS at 7/10/2020 1425    Comment:  Educated patient on WB status, safe hand placement with transfers, sequencing with transfers, bed mobility, and ther ex.    Acceptance, E,D, NR by AA at 7/9/2020 0810    Acceptance, E, VU,NR by JAIMEG at 7/8/2020 1413    Comment:  Pt educated about transfer  strategies and surgical precautions.    Acceptance, E,D, NR by AA at 7/6/2020 0827    Acceptance, E, NR by NS at 7/4/2020 1601    Comment:  Patient was educated about WBing precautions and sequencing with transfers.    Acceptance, E, NR by ERICA at 7/3/2020 1154    Acceptance, E,D, NR by AA at 7/2/2020 1447   Family Acceptance, E,D, NR by AA at 7/2/2020 1447                   Point: Home exercise program (In Progress)     Description:   Instruct learner(s) on appropriate technique for monitoring, assisting and/or progressing patient with therapeutic exercises and activities.              Learning Progress Summary           Patient Acceptance, E, NR by ERICA at 7/13/2020 1105    Acceptance, E,D, VU,NR by CARLINE at 7/12/2020 1350    Comment:  Educated patient on WB status, safe hand placement with transfers, sequencing with transfer, bed mobility sequencing, and ther ex    Acceptance, E,D, VU,NR by CS at 7/11/2020 1545    Comment:  Educated patient on importance of exercise and exercise technique.    Acceptance, E,D, VU,NR by CS at 7/10/2020 1425    Comment:  Educated patient on WB status, safe hand placement with transfers, sequencing with transfers, bed mobility, and ther ex.    Acceptance, E,D, NR by AA at 7/9/2020 0810    Acceptance, E, VU,NR by LIEN at 7/8/2020 1413    Comment:  Pt educated about transfer strategies and surgical precautions.    Acceptance, E,D, NR by AA at 7/6/2020 0827    Acceptance, E, NR by NS at 7/4/2020 1601    Comment:  Patient was educated about WBing precautions and sequencing with transfers.                   Point: Body mechanics (In Progress)     Description:   Instruct learner(s) on proper positioning and spine alignment for patient and/or caregiver during mobility tasks and/or exercises.              Learning Progress Summary           Patient Acceptance, E, NR by ERICA at 7/13/2020 1105    Acceptance, E,D, VU,NR by CS at 7/12/2020 1350    Comment:  Educated patient on WB status, safe hand  placement with transfers, sequencing with transfer, bed mobility sequencing, and ther ex    Acceptance, E,D, VU,NR by CS at 7/11/2020 1545    Comment:  Educated patient on importance of exercise and exercise technique.    Acceptance, E,D, VU,NR by CS at 7/10/2020 1425    Comment:  Educated patient on WB status, safe hand placement with transfers, sequencing with transfers, bed mobility, and ther ex.    Acceptance, E,D, NR by AA at 7/9/2020 0810    Acceptance, E, VU,NR by JG at 7/8/2020 1413    Comment:  Pt educated about transfer strategies and surgical precautions.    Acceptance, E,D, NR by AA at 7/6/2020 0827    Acceptance, E, NR by NS at 7/4/2020 1601    Comment:  Patient was educated about WBing precautions and sequencing with transfers.    Acceptance, E, NR by ERICA at 7/3/2020 1154    Acceptance, E,D, NR by AA at 7/2/2020 1447   Family Acceptance, E,D, NR by AA at 7/2/2020 1447                   Point: Precautions (In Progress)     Description:   Instruct learner(s) on prescribed precautions during mobility and gait tasks              Learning Progress Summary           Patient Acceptance, E, NR by ERICA at 7/13/2020 1105    Acceptance, E,D, VU,NR by CARLINE at 7/12/2020 1350    Comment:  Educated patient on WB status, safe hand placement with transfers, sequencing with transfer, bed mobility sequencing, and ther ex    Acceptance, E,D, VU,NR by CS at 7/11/2020 1545    Comment:  Educated patient on importance of exercise and exercise technique.    Acceptance, E,D, VU,NR by CS at 7/10/2020 1425    Comment:  Educated patient on WB status, safe hand placement with transfers, sequencing with transfers, bed mobility, and ther ex.    Acceptance, E,D, NR by AA at 7/9/2020 0810    Acceptance, E, VU,NR by LIEN at 7/8/2020 1413    Comment:  Pt educated about transfer strategies and surgical precautions.    Acceptance, E,D, NR by AA at 7/6/2020 0827    Acceptance, E, NR by NS at 7/4/2020 1601    Comment:  Patient was educated about  WBing precautions and sequencing with transfers.    Acceptance, E, NR by  at 7/3/2020 1154    Acceptance, E,D, NR by AA at 7/2/2020 1447   Family Acceptance, E,D, NR by AA at 7/2/2020 1447                               User Key     Initials Effective Dates Name Provider Type Discipline     11/20/18 -  Karine Mcmahon, PT Physical Therapist PT    AA 04/02/18 -  Micaela Tate, PT Physical Therapist PT    CS 03/26/19 -  Grace Bailey, PT Physical Therapist PT    NS 09/10/19 -  Aissatou Díaz, PT Physical Therapist PT    JG 05/14/20 -  Pat Baugh, PT Student PT Student PT              PT Recommendation and Plan     Outcome Summary/Treatment Plan (PT)  Anticipated Discharge Disposition (PT): skilled nursing facility  Plan of Care Reviewed With: patient  Progress: no change  Outcome Summary: Pt continues to need MAX A x2 for squat pivot t/f from bed>BSC>chair and for bed mobility. Pt was unable to come to fully upright position with forward flexed posture and unable to maintain TTWB RLE. Tolerates ther ex.     Time Calculation:   PT Charges     Row Name 07/13/20 1106             Time Calculation    Start Time  0935  -EJ      PT Received On  07/13/20  -      PT Goal Re-Cert Due Date  07/22/20  -         Time Calculation- PT    Total Timed Code Minutes- PT  34 minute(s)  -EJ         Timed Charges    57082 - PT Therapeutic Exercise Minutes  7  -EJ      91423 - PT Therapeutic Activity Minutes  27  -EJ        User Key  (r) = Recorded By, (t) = Taken By, (c) = Cosigned By    Initials Name Provider Type     Karine Mcmahon, PT Physical Therapist        Therapy Charges for Today     Code Description Service Date Service Provider Modifiers Qty    78992746802 HC PT THERAPEUTIC ACT EA 15 MIN 7/13/2020 Karine Mcmahon, PT GP 2    29986604554 HC PT THER SUPP EA 15 MIN 7/13/2020 Karine Mcmahon, PT GP 2          PT G-Codes  Outcome Measure Options: AM-PAC 6 Clicks Basic Mobility (PT)  AM-PAC 6 Clicks  Score (PT): 10  AM-PAC 6 Clicks Score (OT): 14    Karine Adame, PT  7/13/2020

## 2020-07-13 NOTE — PLAN OF CARE
Pt VSS and Ao.  Pt has pain when moving.  Repositioning helps with pain.  Pt has slept between care.  Have been turning Pt q2h and Pt has heel boots in place.  Pt still needs x2 max assist with sling.

## 2020-07-13 NOTE — PROGRESS NOTES
Continued Stay Note  [facility]     Patient Name: Ken Jasso  MRN: [id number]  Today's Date: 7/13/2020    Admit Date: 6/29/2020    Discharge Plan     Row Name 07/13/20 1405       Plan    Plan  Update    Plan Comments  CM left Clementina vm at [facility] in [place] and left Maria Eugenia richard at [facility].  CM will follow up.    Clementina at [facility] in [place] can accept pt she is going to start precert. Pt may need ambulance to transport.        Discharge Codes    No documentation.       Expected Discharge Date and Time     Expected Discharge Date Expected Discharge Time    Jul 13, 2020             Rosie Moncada RN

## 2020-07-13 NOTE — MBS/VFSS/FEES
Acute Care - Speech Language Pathology   Swallow Re-Evaluation [facility]   Modified Barium Swallow Study (MBS)   & Caregiver Instruction     Patient Name: Ken Jasso  : 1940  MRN: [id number]  Today's Date: 2020               Admit Date: 2020    Visit Dx:     ICD-10-CM ICD-9-CM   1. Acute febrile illness R50.9 780.60   2. Acute respiratory failure with hypoxia (CMS/LTAC, located within [facility]) J96.01 518.81   3. Ventricular tachycardia (CMS/LTAC, located within [facility]) I47.2 427.1   4. Skin tear of right elbow without complication, initial encounter S51.011A 881.01   5. Oropharyngeal dysphagia R13.12 787.22     Patient Active Problem List   Diagnosis   • Acute febrile illness   • Fracture of right hip (CMS/HCC)   • Dementia (CMS/HCC)   • H/O: CVA (no deficit)     Past Medical History:   Diagnosis Date   • Dementia (CMS/HCC)    • Dementia (CMS/HCC)    • Dysphagia    • Esophageal dilatation    • Hiatal hernia    • PNA (pneumonia)    • PNA (pneumonia)    • Pneumonia     CHOKING EPISODES    • Prostate CA (CMS/HCC)    • Stroke (CMS/HCC)          Past Surgical History:   Procedure Laterality Date   • HIP PERCUTANEOUS PINNING Right 2020    Procedure: HIP PERCUTANEOUS PINNING;  Surgeon: Sam Harp MD;  Location: [facility];  Service: Orthopedics;  Laterality: Right;        SWALLOW EVALUATION (last 72 hours)      SLP Adult Swallow Evaluation     Row Name 20 1230 20 1000                Rehab Evaluation    Document Type  re-evaluation  -AC  re-evaluation  -AW       Subjective Information  no complaints  -AC  no complaints  -AW       Patient Observations  alert;cooperative  -AC  alert;cooperative  -AW       Patient/Family Observations  Pt's wife present in pt's rm following study.  -AC  no family present, but had discussion with wife on phone prior to seeing pt. She does want pt to hvae repeat swallow study, but maintain some form of PO.  -AW       Patient Effort  good  -AC  good  -AW       Symptoms  "Noted During/After Treatment  --  none  -AW          General Information    Patient Profile Reviewed  yes  -AC  yes  -AW       Pertinent History Of Current Problem  See prev eval.  -AC  --       Current Method of Nutrition  pureed;nectar/syrup-thick liquids  -AC  NPO  -AW       Prior Level of Function-Swallowing  --  no diet consistency restrictions  -AW       Plans/Goals Discussed with  patient;spouse/S.O.  -AC  patient  -AW       Barriers to Rehab  medically complex suspect dysphagia acute-on-chronic in nature  -AC  medically complex  -AW       Patient's Goals for Discharge  return to regular diet  -AC  return to regular diet pt wants thin liquids but wants to \"get better'   -AW       Family Goals for Discharge  patient able to eat/drink without coughing/choking  -AC  patient able to return to PO diet;other (see comments) wife will make sure decision after MBS, wants diet today  -AW          Pain Assessment    Additional Documentation  --  Pain Scale: FACES Pre/Post-Treatment (Group)  -AW          Pain Scale: FACES Pre/Post-Treatment    Pain: FACES Scale, Pretreatment  0-->no hurt  -AC  0-->no hurt  -AW       Pain: FACES Scale, Post-Treatment  0-->no hurt  -AC  0-->no hurt  -AW          Oral Motor and Function    Dentition Assessment  natural, present and adequate;lower dentures/partial in place  -AC  natural, present and adequate no denture adhesive - not staying in well  -AW       Secretion Management  --  WNL/WFL  -AW       Mucosal Quality  --  moist, healthy  -AW       Volitional Swallow  --  delayed  -AW       Volitional Cough  --  WFL  -AW          Oral Musculature and Cranial Nerve Assessment    Oral Motor General Assessment  --  WFL  -AW          General Eating/Swallowing Observations    Respiratory Support Currently in Use  room air  -AC  --       Eating/Swallowing Skills  fed by SLP;self-fed;other (see comments) required some assist  -AC  self-fed  -AW       Positioning During Eating  upright 90 " degree;upright in chair  -AC  upright in bed;upright in chair  -AW       Utensils Used  --  spoon;straw  -AW       Consistencies Trialed  --  thin liquids;pudding thick;nectar/syrup-thick liquids  -AW          Respiratory    Respiratory Status  --  WFL  -AW          Clinical Swallow Eval    Oral Prep Phase  --  WFL  -AW       Oral Transit  --  WFL  -AW       Oral Residue  --  WFL  -AW       Pharyngeal Phase  --  suspected pharyngeal impairment  -AW       Esophageal Phase  --  unremarkable  -AW       Clinical Swallow Evaluation Summary  --  pt exh coughing with thins, no s/s with nectar or pudding. did not test solid textures due to risk. Will examine on MBS study. SLP will f/u with wife by phone following MBS to give results/recs and determine dysphagia POC.   -AW          Pharyngeal Phase Concerns    Pharyngeal Phase Concerns  --  cough  -AW       Wet Vocal Quality  --  thin  -AW       Cough  --  thin  -AW       Throat Clear  --  thin  -AW       Pharyngeal Phase Concerns, Comment  --  worse performance with thins than at end of last week  -AW          MBS/VFSS    Utensils Used  spoon;cup  -AC  --       Consistencies Trialed  thin liquids;nectar/syrup-thick liquids;honey-thick liquids;pudding thick  -AC  --          MBS/VFSS Interpretation    Oral Prep Phase  WFL;other (see comments) for consistencies trialed  -AC  --       Oral Transit Phase  impaired  -AC  --       Oral Residue  impaired  -AC  --       Oral Phase, Comment  DNT solid 2' severity of aspiration risk  -AC  --          Oral Transit Phase    Impaired Oral Transit Phase  increased A-P transit time  -AC  --       Increased A-P Transit Time  all consistencies tested;secondary to impaired cognitive status  -AC  --          Oral Residue    Impaired Oral Residue  lingual residue  -AC  --       Lingual Residue  pudding/puree  -AC  --       Response to Oral Residue  cleared residue;with spontaneous subsequent swallow  -AC  --       Oral Residue, Comment  Mild  amount  -AC  --          Initiation of Pharyngeal Swallow    Initiation of Pharyngeal Swallow  bolus in pyriform sinuses  -AC  --       Pharyngeal Phase  impaired pharyngeal phase of swallowing  -AC  --       Penetration Before the Swallow  thin liquids;nectar-thick liquids;secondary to delayed swallow initiation or mistiming  -AC  --       Aspiration Before the Swallow  thin liquids;nectar-thick liquids;secondary to delayed swallow initiation or mistiming  -AC  --       Penetration During the Swallow  thin liquids;nectar-thick liquids;secondary to delayed swallow initiation or mistiming;secondary to reduced laryngeal elevation;secondary to reduced vestibular closure  -AC  --       Penetration After the Swallow  pudding/puree;secondary to residue;in valleculae;in pyriform sinuses  -AC  --       Aspiration After the Swallow  thin liquids;nectar-thick liquids;honey-thick liquids;secondary to residue;in valleculae;in pyriform sinuses;in laryngeal vestibule  -AC  --       Response to Penetration  no response  -AC  --       Response to Aspiration  inconsistent response;other (see comments) wk/non-productive cough only w/ large amounts of aspiration  -AC  --       Rosenbek's Scale  thin:;nectar:;honey:;8--->level 8;pudding/puree:;5--->level 5  -AC  --       Pharyngeal Residue  all consistencies tested;diffuse within pharynx;secondary to reduced base of tongue retraction;secondary to reduced posterior pharyngeal wall stripping;secondary to reduced laryngeal elevation;secondary to reduced hyolaryngeal excursion;other (see comments) greatest in valleculae & w/ pudding (severe-profound amt)  -AC  --       Response to Residue  unable to clear residue  -AC  --       Attempted Compensatory Maneuvers  bolus size;bolus presentation style;head turn to right;multiple swallows;effortful (hard swallow);breath hold;supraglottic swallow  -AC  --       Response to Attempted Compensatory Maneuvers  did not prevent aspiration;did not  "reduce residue  -AC  --       Pharyngeal Phase, Comment  Unforunately, no improvement in oropharyngeal swallow function. Pt cont to exhibit penetration/aspiration of thin and nectar-thick liquids before/during the swallow. Cont'd significant pharyngeal residue across consistencies--particularly w/ pudding--which resulted in aspiration of thin/nectar/honey after the swallow and penetration of pudding residue after the swallow (eventual aspiration deemed inevitable). Pt exhibited larger amount of aspiration of liquid when administered as liquid wash as residue increased.     Discussed results of study, risks of aspiration, silent aspiration, swallow physiology, and options w/ pt and his spouse following study.  Pt's wife reported that he has had swallowing issues in the past, as well as pneumonia. Suspect dysphagia in acute-on-chronic in nature. Pt/wife cont to decline feeding tube, despite understanding risks of aspiration. Desire \"safest-possible\" PO diet, though understand pt will aspirate all consistencies to some degree. Pt strongly dislikes thickened liquids and would like to avoid these, as well. Pt/wife would like to try full liquid diet (thin liquids) w/ precautions in place @ this time. Paged Dr. Dorado who ok'd this diet if pt's wishes. Will f/u for further edu/modifications and tx, if appropriate per POC.  -AC  --          Clinical Impression    SLP Swallowing Diagnosis  mild;oral dysfunction;severe;profound;pharyngeal dysfunction  -AC  pharyngeal dysfunction  -AW       Functional Impact  risk of aspiration/pneumonia;risk of malnutrition;risk of dehydration  -AC  risk of aspiration/pneumonia  -AW       Rehab Potential/Prognosis, Swallowing  re-evaluate goals as necessary  -AC  re-evaluate goals as necessary  -AW       Swallow Criteria for Skilled Therapeutic Interventions Met  demonstrates skilled criteria  -AC  demonstrates skilled criteria  -AW          Recommendations    Therapy Frequency (Swallow)  5 " days per week  -AC  PRN  -AW       Predicted Duration Therapy Intervention (Days)  until discharge  -AC  until discharge  -AW       SLP Diet Recommendation  full liquid diet;other (see comments) conservative comfort diet suggestion 2' pt refusal of FT  -AC  puree;nectar thick liquids  -AW       Recommended Diagnostics  --  reassess via VFSS (MBS)  -AW       Recommended Precautions and Strategies  upright posture during/after eating;small bites of food and sips of liquid;other (see comments) slow pace, aggressive oral care - prior to PO  -AC  upright posture during/after eating;small bites of food and sips of liquid  -AW       SLP Rec. for Method of Medication Administration  meds crushed;with pudding or applesauce crush w/ ice cream/runny puree if possible  -AC  meds crushed;with pudding or applesauce  -AW       Monitor for Signs of Aspiration  notify SLP if any concerns  -AC  notify SLP if any concerns  -AW       Anticipated Dischage Disposition (SLP)  anticipate therapy at next level of care;skilled nursing facility  -AC  skilled nursing facility  -AW         User Key  (r) = Recorded By, (t) = Taken By, (c) = Cosigned By    Initials Name Effective Dates    AC Lyla Otero, MS CCC-SLP 07/27/17 -     AW Macie Fatima, MS CCC-SLP 06/22/15 -           EDUCATION  The patient has been educated in the following areas:   Dysphagia (Swallowing Impairment) Oral Care/Hydration.    SLP Recommendation and Plan  SLP Swallowing Diagnosis: mild, oral dysfunction, severe, profound, pharyngeal dysfunction  SLP Diet Recommendation: full liquid diet, other (see comments)(conservative comfort diet suggestion 2' pt refusal of FT)  Recommended Precautions and Strategies: upright posture during/after eating, small bites of food and sips of liquid, other (see comments)(slow pace, aggressive oral care - prior to PO)  SLP Rec. for Method of Medication Administration: meds crushed, with pudding or applesauce(crush w/ ice cream/runny  puree if possible)     Monitor for Signs of Aspiration: notify SLP if any concerns     Swallow Criteria for Skilled Therapeutic Interventions Met: demonstrates skilled criteria  Anticipated Dischage Disposition (SLP): anticipate therapy at next level of care, skilled nursing facility  Rehab Potential/Prognosis, Swallowing: re-evaluate goals as necessary  Therapy Frequency (Swallow): 5 days per week  Predicted Duration Therapy Intervention (Days): until discharge       Plan of Care Reviewed With: patient, spouse    SLP GOALS     Row Name 07/13/20 1230             Oral Nutrition/Hydration Goal 1 (SLP)    Oral Nutrition/Hydration Goal 1, SLP  LTG: Pt will improve swallowing per clinical assessment, warranting repeat instrumental swallow study.  -AC      Time Frame (Oral Nutrition/Hydration Goal 1, SLP)  by discharge  -AC      Progress/Outcomes (Oral Nutrition/Hydration Goal 1, SLP)  goal revised this date  -AC         Oral Nutrition/Hydration Goal 2 (SLP)    Oral Nutrition/Hydration Goal 2, SLP  Pt will tolerate trials of full liquids (thin) w/o s/sxs discomfort or complaints w/ 80% acc w/o cues.  -AC      Time Frame (Oral Nutrition/Hydration Goal 2, SLP)  short term goal (STG)  -AC         Pharyngeal Strengthening Exercise Goal 1 (SLP)    Activity (Pharyngeal Strengthening Goal 1, SLP)  increase timing;increase superior movement of the hyolaryngeal complex;increase anterior movement of the hyolaryngeal complex;increase closure at entrance to airway/closure of airway at glottis;increase squeeze/positive pressure generation;increase tongue base retraction  -AC      Increase Timing  prepping - 3 second prep or suck swallow or 3-step swallow  -AC      Increase Superior Movement of the Hyolaryngeal Complex  falsetto  -AC      Increase Anterior Movement of the Hyolaryngeal Complex  chin tuck against resistance (CTAR)  -AC      Increase Closure at Entrance to Airway/Closure of Airway at Glottis  super-supraglottic swallow   -AC      Increase Squeeze/Positive Pressure Generation  hard effortful swallow  -AC      Increase Tongue Base Retraction  kathie  -AC      Bland/Accuracy (Pharyngeal Strengthening Goal 1, SLP)  with moderate cues (50-74% accuracy)  -AC      Time Frame (Pharyngeal Strengthening Goal 1, SLP)  short term goal (STG)  -AC        User Key  (r) = Recorded By, (t) = Taken By, (c) = Cosigned By    Initials Name Provider Type    Lyla Lock MS CCC-SLP Speech and Language Pathologist             Time Calculation:   Time Calculation- SLP     Row Name 07/13/20 1427 07/13/20 1426          Time Calculation- SLP    SLP Start Time  --  1230  -AC     Total Timed Code Minutes- SLP  20 minute(s)  -AC  --     SLP Received On  --  07/13/20  -       User Key  (r) = Recorded By, (t) = Taken By, (c) = Cosigned By    Initials Name Provider Type    Lyla Lock MS CCC-SLP Speech and Language Pathologist          Therapy Charges for Today     Code Description Service Date Service Provider Modifiers Qty    98367475744 HC ST SELF CARE/MGMT/TRAIN EA 15 MIN 7/13/2020 Lyla Otero MS CCC-SLP GN 1    78232963604 HC ST MOTION FLUORO EVAL SWALLOW 5 7/13/2020 Lyla Otero MS CCC-SLP GN 1               Lyla Otero MS CCC-TREVA  7/13/2020

## 2020-07-13 NOTE — NURSING NOTE
Daily low Luis check. Patient's Luis today is 14. All interventions in place and documented per protocol. No concerns noted per staff. Please notify WOC for any questions.

## 2020-07-13 NOTE — PLAN OF CARE
Problem: Patient Care Overview  Goal: Plan of Care Review  Flowsheets (Taken 7/13/2020 1100)  Progress: no change  Plan of Care Reviewed With: patient  Outcome Summary: Pt continues to need MAX A x2 for squat pivot t/f from bed>BSC>chair and for bed mobility. Pt was unable to come to fully upright position with forward flexed posture and unable to maintain TTWB RLE. Tolerates ther ex.

## 2020-07-13 NOTE — PLAN OF CARE
Problem: Patient Care Overview  Goal: Plan of Care Review  Outcome: Ongoing (interventions implemented as appropriate)  Flowsheets (Taken 7/13/2020 8052)  Progress: no change  Plan of Care Reviewed With: patient; spouse  Outcome Summary: VSS; Barium swallow test failed- conservative comfort diet of full liquids, thin- meds crushed in applesauce or pudding; bed at [facility] in [place] pending PA; no new complaints at this time  Goal: Individualization and Mutuality  Outcome: Ongoing (interventions implemented as appropriate)  Goal: Discharge Needs Assessment  Outcome: Ongoing (interventions implemented as appropriate)  Goal: Interprofessional Rounds/Family Conf  Outcome: Ongoing (interventions implemented as appropriate)     Problem: Skin Injury Risk (Adult)  Goal: Skin Health and Integrity  Outcome: Ongoing (interventions implemented as appropriate)     Problem: Dysphagia (Adult)  Goal: Functional/Safe Swallow  Outcome: Ongoing (interventions implemented as appropriate)  Goal: Compensatory Techniques to Improve Safety/Function with Swallowing  Outcome: Ongoing (interventions implemented as appropriate)     Problem: Palliative Care (Adult)  Goal: Identify Related Risk Factors and Signs and Symptoms  Outcome: Ongoing (interventions implemented as appropriate)  Goal: Maximized Comfort  Outcome: Ongoing (interventions implemented as appropriate)  Goal: Enhanced Quality of Life  Outcome: Ongoing (interventions implemented as appropriate)

## 2020-07-14 VITALS
TEMPERATURE: 98.2 F | WEIGHT: 139.4 LBS | RESPIRATION RATE: 18 BRPM | BODY MASS INDEX: 19.96 KG/M2 | SYSTOLIC BLOOD PRESSURE: 155 MMHG | HEART RATE: 85 BPM | OXYGEN SATURATION: 98 % | HEIGHT: 70 IN | DIASTOLIC BLOOD PRESSURE: 77 MMHG

## 2020-07-14 LAB
ALBUMIN SERPL-MCNC: 3 G/DL (ref 3.5–5.2)
ANION GAP SERPL CALCULATED.3IONS-SCNC: 9 MMOL/L (ref 5–15)
BASOPHILS # BLD AUTO: 0.03 10*3/MM3 (ref 0–0.2)
BASOPHILS NFR BLD AUTO: 0.5 % (ref 0–1.5)
BUN SERPL-MCNC: 12 MG/DL (ref 8–23)
BUN/CREAT SERPL: 15.2 (ref 7–25)
CALCIUM SPEC-SCNC: 8.6 MG/DL (ref 8.6–10.5)
CHLORIDE SERPL-SCNC: 98 MMOL/L (ref 98–107)
CO2 SERPL-SCNC: 26 MMOL/L (ref 22–29)
CREAT SERPL-MCNC: 0.79 MG/DL (ref 0.76–1.27)
DEPRECATED RDW RBC AUTO: 41.4 FL (ref 37–54)
EOSINOPHIL # BLD AUTO: 0.17 10*3/MM3 (ref 0–0.4)
EOSINOPHIL NFR BLD AUTO: 2.7 % (ref 0.3–6.2)
ERYTHROCYTE [DISTWIDTH] IN BLOOD BY AUTOMATED COUNT: 14 % (ref 12.3–15.4)
GFR SERPL CREATININE-BSD FRML MDRD: 94 ML/MIN/1.73
GLUCOSE SERPL-MCNC: 87 MG/DL (ref 65–99)
HCT VFR BLD AUTO: 30.3 % (ref 37.5–51)
HGB BLD-MCNC: 9.6 G/DL (ref 13–17.7)
IMM GRANULOCYTES # BLD AUTO: 0.07 10*3/MM3 (ref 0–0.05)
IMM GRANULOCYTES NFR BLD AUTO: 1.1 % (ref 0–0.5)
LYMPHOCYTES # BLD AUTO: 1.18 10*3/MM3 (ref 0.7–3.1)
LYMPHOCYTES NFR BLD AUTO: 18.6 % (ref 19.6–45.3)
MAGNESIUM SERPL-MCNC: 2.1 MG/DL (ref 1.6–2.4)
MCH RBC QN AUTO: 26 PG (ref 26.6–33)
MCHC RBC AUTO-ENTMCNC: 31.7 G/DL (ref 31.5–35.7)
MCV RBC AUTO: 82.1 FL (ref 79–97)
MONOCYTES # BLD AUTO: 0.85 10*3/MM3 (ref 0.1–0.9)
MONOCYTES NFR BLD AUTO: 13.4 % (ref 5–12)
NEUTROPHILS NFR BLD AUTO: 4.06 10*3/MM3 (ref 1.7–7)
NEUTROPHILS NFR BLD AUTO: 63.7 % (ref 42.7–76)
NRBC BLD AUTO-RTO: 0 /100 WBC (ref 0–0.2)
PHOSPHATE SERPL-MCNC: 3.5 MG/DL (ref 2.5–4.5)
PLATELET # BLD AUTO: 317 10*3/MM3 (ref 140–450)
PMV BLD AUTO: 10.9 FL (ref 6–12)
POTASSIUM SERPL-SCNC: 3.2 MMOL/L (ref 3.5–5.2)
RBC # BLD AUTO: 3.69 10*6/MM3 (ref 4.14–5.8)
SODIUM SERPL-SCNC: 133 MMOL/L (ref 136–145)
WBC # BLD AUTO: 6.36 10*3/MM3 (ref 3.4–10.8)

## 2020-07-14 PROCEDURE — 25010000002 PIPERACILLIN SOD-TAZOBACTAM PER 1 G: Performed by: INTERNAL MEDICINE

## 2020-07-14 PROCEDURE — 25010000003 POTASSIUM CHLORIDE 10 MEQ/100ML SOLUTION: Performed by: INTERNAL MEDICINE

## 2020-07-14 PROCEDURE — 85025 COMPLETE CBC W/AUTO DIFF WBC: CPT | Performed by: INTERNAL MEDICINE

## 2020-07-14 PROCEDURE — 99239 HOSP IP/OBS DSCHRG MGMT >30: CPT | Performed by: INTERNAL MEDICINE

## 2020-07-14 PROCEDURE — 83735 ASSAY OF MAGNESIUM: CPT | Performed by: INTERNAL MEDICINE

## 2020-07-14 PROCEDURE — 97110 THERAPEUTIC EXERCISES: CPT

## 2020-07-14 PROCEDURE — 92526 ORAL FUNCTION THERAPY: CPT

## 2020-07-14 PROCEDURE — 80069 RENAL FUNCTION PANEL: CPT | Performed by: INTERNAL MEDICINE

## 2020-07-14 RX ORDER — ATORVASTATIN CALCIUM 80 MG/1
80 TABLET, FILM COATED ORAL NIGHTLY
Start: 2020-07-14

## 2020-07-14 RX ORDER — LANSOPRAZOLE
30 KIT EVERY MORNING
Qty: 300 ML
Start: 2020-07-15

## 2020-07-14 RX ORDER — POTASSIUM CHLORIDE 7.45 MG/ML
10 INJECTION INTRAVENOUS
Status: COMPLETED | OUTPATIENT
Start: 2020-07-14 | End: 2020-07-14

## 2020-07-14 RX ORDER — POLYETHYLENE GLYCOL 3350 17 G/17G
17 POWDER, FOR SOLUTION ORAL DAILY
Start: 2020-07-15

## 2020-07-14 RX ORDER — ASPIRIN 325 MG
325 TABLET ORAL 2 TIMES DAILY
Start: 2020-07-14

## 2020-07-14 RX ORDER — SENNA PLUS 8.6 MG/1
2 TABLET ORAL ONCE
Status: DISCONTINUED | OUTPATIENT
Start: 2020-07-14 | End: 2020-07-14 | Stop reason: HOSPADM

## 2020-07-14 RX ORDER — AMLODIPINE BESYLATE 10 MG/1
10 TABLET ORAL
Start: 2020-07-15

## 2020-07-14 RX ORDER — BISACODYL 5 MG/1
10 TABLET, DELAYED RELEASE ORAL DAILY PRN
Start: 2020-07-14

## 2020-07-14 RX ORDER — LISINOPRIL 40 MG/1
40 TABLET ORAL
Start: 2020-07-15

## 2020-07-14 RX ADMIN — DONEPEZIL HYDROCHLORIDE 10 MG: 10 TABLET, FILM COATED ORAL at 08:59

## 2020-07-14 RX ADMIN — TAZOBACTAM SODIUM AND PIPERACILLIN SODIUM 3.38 G: 375; 3 INJECTION, SOLUTION INTRAVENOUS at 02:19

## 2020-07-14 RX ADMIN — POTASSIUM CHLORIDE 10 MEQ: 7.46 INJECTION, SOLUTION INTRAVENOUS at 13:30

## 2020-07-14 RX ADMIN — AMLODIPINE BESYLATE 10 MG: 10 TABLET ORAL at 08:59

## 2020-07-14 RX ADMIN — POTASSIUM CHLORIDE 10 MEQ: 7.46 INJECTION, SOLUTION INTRAVENOUS at 12:30

## 2020-07-14 RX ADMIN — POLYETHYLENE GLYCOL 3350 17 G: 17 POWDER, FOR SOLUTION ORAL at 08:59

## 2020-07-14 RX ADMIN — POTASSIUM CHLORIDE 10 MEQ: 7.46 INJECTION, SOLUTION INTRAVENOUS at 08:59

## 2020-07-14 RX ADMIN — POTASSIUM CHLORIDE 10 MEQ: 7.46 INJECTION, SOLUTION INTRAVENOUS at 15:22

## 2020-07-14 RX ADMIN — LISINOPRIL 40 MG: 40 TABLET ORAL at 08:59

## 2020-07-14 RX ADMIN — POTASSIUM CHLORIDE 10 MEQ: 7.46 INJECTION, SOLUTION INTRAVENOUS at 16:30

## 2020-07-14 RX ADMIN — ASPIRIN 325 MG ORAL TABLET 325 MG: 325 PILL ORAL at 08:59

## 2020-07-14 RX ADMIN — LANSOPRAZOLE 30 MG: KIT at 06:46

## 2020-07-14 RX ADMIN — BISACODYL 10 MG: 10 SUPPOSITORY RECTAL at 09:06

## 2020-07-14 RX ADMIN — POTASSIUM CHLORIDE 10 MEQ: 7.46 INJECTION, SOLUTION INTRAVENOUS at 10:14

## 2020-07-14 NOTE — THERAPY TREATMENT NOTE
Acute Care - Speech Language Pathology   Swallow Treatment Note [facility]     Patient Name: Ken Jasso  : 1940  MRN: [id number]  Today's Date: 2020               Admit Date: 2020    Visit Dx:      ICD-10-CM ICD-9-CM   1. Acute febrile illness R50.9 780.60   2. Acute respiratory failure with hypoxia (CMS/[facility]) J96.01 518.81   3. Ventricular tachycardia (CMS/[facility]) I47.2 427.1   4. Skin tear of right elbow without complication, initial encounter S51.011A 881.01   5. Oropharyngeal dysphagia R13.12 787.22     Patient Active Problem List   Diagnosis   • Acute febrile illness   • Fracture of right hip (CMS/[facility])   • Dementia (CMS/[facility])   • H/O: CVA (no deficit)       Therapy Treatment  Rehabilitation Treatment Summary     Row Name 20 1510             Treatment Time/Intention    Discipline  speech language pathologist  -AC      Document Type  therapy note (daily note)  -AC      Subjective Information  no complaints  -AC      Patient/Family Observations  Pt alert, cooperative. Wife present.  -AC      Care Plan Review  evaluation/treatment results reviewed;care plan/treatment goals reviewed;risks/benefits reviewed;current/potential barriers reviewed;patient/other agree to care plan  -AC      Care Plan Review, Other Participant(s)  spouse  -AC      Therapy Frequency (Swallow)  5 days per week  -AC      Patient Effort  good  -AC      Recorded by [AC] Lyla Otero MS CCC-SLP 20 1554      Row Name 20 1510             Pain Scale: FACES Pre/Post-Treatment    Pain: FACES Scale, Pretreatment  0-->no hurt  -AC      Pain: FACES Scale, Post-Treatment  0-->no hurt  -AC      Recorded by [AC] Lyla Otero MS CCC-SLP 20 1554      Row Name                Wound 20 Right elbow Skin Tear    Wound - Properties Group Date first assessed: 20 [AB] Time first assessed:  [AB] Present on Hospital Admission: Y [AB] Side: Right [AB] Location: elbow [AB] Primary Wound Type: Skin  tear [AB] Additional Comments: CLEANED AND DRESSING PLACED  [AB] Recorded by:  [AB] Efrem Mei RN 06/29/20 1953    Row Name                Wound 07/01/20 1848 Right lateral hip Incision    Wound - Properties Group Date first assessed: 07/01/20 [HB] Time first assessed: 1848 [HB] Side: Right [HB] Orientation: lateral [HB] Location: hip [HB] Primary Wound Type: Incision [HB] Recorded by:  [HB] Mini Pitts RN 07/01/20 1848    Row Name 07/14/20 1510             Outcome Summary/Treatment Plan (SLP)    Daily Summary of Progress (SLP)  progress toward functional goals as expected  -AC      Barriers to Overall Progress (SLP)  Suspected chronic dysphagia to some degree. Cog status.  -AC      Plan for Continued Treatment (SLP)  Pt/wife reported satisfaction w/ conservative comfort diet--full liquid (thin). Denied noting significant difficulty. Reviewed silent aspiration, aspiration precautions, and oral care. Pt d/c'ing to SNF for rehab this evening. Rec cont SLP services for dysphagia @ next level of care.  -AC      Anticipated Dischage Disposition (SLP)  anticipate therapy at next level of care;skilled nursing facility  -AC      Recorded by [AC] Lyla Otero MS CCC-SLP 07/14/20 8150        User Key  (r) = Recorded By, (t) = Taken By, (c) = Cosigned By    Initials Name Effective Dates Discipline    AC Lyla Otero MS CCC-SLP 07/27/17 -  SLP    AB Efrem Mei RN 06/16/16 -  Nurse    Mini Mansfield RN 02/21/20 -  Nurse          Outcome Summary  Outcome Summary/Treatment Plan (SLP)  Daily Summary of Progress (SLP): progress toward functional goals as expected (07/14/20 1510 : Lyla Otero, MS CCC-SLP)  Barriers to Overall Progress (SLP): Suspected chronic dysphagia to some degree. Cog status. (07/14/20 1510 : Lyla Otero, MS CCC-SLP)  Plan for Continued Treatment (SLP): Pt/wife reported satisfaction w/ conservative comfort diet--full liquid (thin). Denied noting significant difficulty. Reviewed silent  aspiration, aspiration precautions, and oral care. Pt d/c'ing to SNF for rehab this evening. Rec cont SLP services for dysphagia @ next level of care. (07/14/20 1510 : Lyla Otero, MS CCC-SLP)  Anticipated Dischage Disposition (SLP): anticipate therapy at next level of care, skilled nursing facility (07/14/20 1510 : Lyla Otero, MS CCC-SLP)      SLP GOALS     Row Name 07/14/20 1510 07/13/20 1230          Oral Nutrition/Hydration Goal 1 (SLP)    Oral Nutrition/Hydration Goal 1, SLP  LTG: Pt will improve swallowing per clinical assessment, warranting repeat instrumental swallow study.  -AC  LTG: Pt will improve swallowing per clinical assessment, warranting repeat instrumental swallow study.  -AC     Time Frame (Oral Nutrition/Hydration Goal 1, SLP)  by discharge  -AC  by discharge  -AC     Progress/Outcomes (Oral Nutrition/Hydration Goal 1, SLP)  continuing progress toward goal  -AC  goal revised this date  -AC        Oral Nutrition/Hydration Goal 2 (SLP)    Oral Nutrition/Hydration Goal 2, SLP  Pt will tolerate trials of full liquids (thin) w/o s/sxs discomfort or complaints w/ 80% acc w/o cues.  -AC  Pt will tolerate trials of full liquids (thin) w/o s/sxs discomfort or complaints w/ 80% acc w/o cues.  -AC     Time Frame (Oral Nutrition/Hydration Goal 2, SLP)  short term goal (STG)  -AC  short term goal (STG)  -AC     Barriers (Oral Nutrition/Hydration Goal 2, SLP)  Throat clearing noted 1/5 trials. Pt did not exhibit s/sxs discomfort w/ thin liquids. Reported that he is happy to have thin/cold liquids now. Pt generally taking small sips, as recommended.  -AC  --     Progress/Outcomes (Oral Nutrition/Hydration Goal 2, SLP)  continuing progress toward goal  -AC  --        Pharyngeal Strengthening Exercise Goal 1 (SLP)    Activity (Pharyngeal Strengthening Goal 1, SLP)  --  increase timing;increase superior movement of the hyolaryngeal complex;increase anterior movement of the hyolaryngeal complex;increase  closure at entrance to airway/closure of airway at glottis;increase squeeze/positive pressure generation;increase tongue base retraction  -AC     Increase Timing  prepping - 3 second prep or suck swallow or 3-step swallow  -AC  prepping - 3 second prep or suck swallow or 3-step swallow  -AC     Increase Superior Movement of the Hyolaryngeal Complex  falsetto  -AC  falsetto  -AC     Increase Anterior Movement of the Hyolaryngeal Complex  chin tuck against resistance (CTAR)  -AC  chin tuck against resistance (CTAR)  -AC     Increase Closure at Entrance to Airway/Closure of Airway at Glottis  super-supraglottic swallow  -AC  super-supraglottic swallow  -AC     Increase Squeeze/Positive Pressure Generation  hard effortful swallow  -AC  hard effortful swallow  -AC     Increase Tongue Base Retraction  kathie  -AC  kathie  -AC     Gove/Accuracy (Pharyngeal Strengthening Goal 1, SLP)  with moderate cues (50-74% accuracy)  -AC  with moderate cues (50-74% accuracy)  -AC     Time Frame (Pharyngeal Strengthening Goal 1, SLP)  short term goal (STG)  -AC  short term goal (STG)  -AC     Barriers (Pharyngeal Strengthening Goal 1, SLP)  CTAR x10, effortful swallow x5, kathie x5 w/ mod cues.  -AC  --     Progress/Outcomes (Pharyngeal Strengthening Goal 1, SLP)  continuing progress toward goal  -AC  --       User Key  (r) = Recorded By, (t) = Taken By, (c) = Cosigned By    Initials Name Provider Type    Lyla Lock, MS CCC-SLP Speech and Language Pathologist          EDUCATION  The patient has been educated in the following areas:   Dysphagia (Swallowing Impairment) Oral Care/Hydration.    SLP Recommendation and Plan  Daily Summary of Progress (SLP): progress toward functional goals as expected  Barriers to Overall Progress (SLP): Suspected chronic dysphagia to some degree. Cog status.  Plan for Continued Treatment (SLP): Pt/wife reported satisfaction w/ conservative comfort diet--full liquid (thin). Denied noting  significant difficulty. Reviewed silent aspiration, aspiration precautions, and oral care. Pt d/c'ing to SNF for rehab this evening. Rec cont SLP services for dysphagia @ next level of care.  Anticipated Dischage Disposition (SLP): anticipate therapy at next level of care, skilled nursing facility                    Time Calculation:   Time Calculation- SLP     Row Name 07/14/20 1557             Time Calculation- SLP    SLP Start Time  1510  -      SLP Received On  07/14/20  -        User Key  (r) = Recorded By, (t) = Taken By, (c) = Cosigned By    Initials Name Provider Type    Lyla Lock MS CCC-SLP Speech and Language Pathologist          Therapy Charges for Today     Code Description Service Date Service Provider Modifiers Qty    19184066541 HC ST SELF CARE/MGMT/TRAIN EA 15 MIN 7/13/2020 Lyla Otero MS CCC-SLP GN 1    14367874679 HC ST MOTION FLUORO EVAL SWALLOW 5 7/13/2020 Lyla Otero MS CCC-SLP GN 1    51860806318 HC ST TREATMENT SWALLOW 3 7/14/2020 Lyla Otero MS CCC-SLP GN 1                 Lyla Otero MS CCC-SLP  7/14/2020

## 2020-07-14 NOTE — PROGRESS NOTES
Case Management Discharge Note      Final Note: [facility] in [place] has accepted pt to transfer today. [facility] will transport at 6pm. CMN is in Chart. Nurse will call report [phone] fax transfer summary [phone]. CM left the wife vm..         Destination      No service has been selected for the patient.      Durable Medical Equipment      No service has been selected for the patient.      Dialysis/Infusion      No service has been selected for the patient.      Home Medical Care      No service has been selected for the patient.      Therapy      No service has been selected for the patient.      Community Resources      No service has been selected for the patient.             Final Discharge Disposition Code: 03 - skilled nursing facility (SNF)

## 2020-07-14 NOTE — PLAN OF CARE
Problem: Patient Care Overview  Goal: Plan of Care Review  Flowsheets (Taken 7/14/2020 8369)  Outcome Summary: Pt anticipating d/c later this date thus agreeable to seated TE for UEs prior to d/c. OT reviewed w/ pt WB status for safety recall. Pt required additional education and explanation of WB. Pt tolerated multi planar TE encompassing BUEs x 10 reps each w/ no increase in pain noted.

## 2020-07-14 NOTE — DISCHARGE SUMMARY
[facility] Medicine Services  DISCHARGE SUMMARY    Patient Name: Ken Jasso  : 1940  MRN: [id number]    Date of Admission: 2020  6:15 PM  Date of Discharge: 2020  Primary Care Physician: Provider, No Known    Consults     Date and Time Order Name Status Description    2020 0927 Inpatient Palliative Care MD Consult Completed     2020 0030 Inpatient Orthopedic Surgery Consult Completed     2020 1816 Inpatient Neurology Consult Stroke Completed           Hospital Course     Presenting Problem:   Acute febrile illness [R50.9]    Active Hospital Problems    Diagnosis  POA   • **Fracture of right hip (CMS/HCC) [S72.001A]  Yes   • Acute febrile illness [R50.9]  Yes   • Dementia (CMS/HCC) [F03.90]  Yes   • H/O: CVA (no deficit) [Z86.73]  Not Applicable      Resolved Hospital Problems   No resolved problems to display.          Hospital Course:  Ken Jasso is a 80 y.o. male  with PMH of CVA, HTN, dementia, and GERD who was admitted on  after fall while walking to car, sustaining a right femoral neck fracture, there was concern for stroke and code stroke was called. Imaging negative for acute CVA. Had fever up to 103 on arrival and non-sustained wide complex tachycardia requiring admission to the ICU. Now out of ICU and has been stable  On telemetry floor.         This patient's problems and plans were partially entered by my partner and updated as appropriate by me 20.     Dysphagia with ongoing aspiration events   - chest xray with right sided infiltrate  - zosyn restarted   - speech re-eval and comfort diet stopped, repeat barium swallow     Sepsis  Lactic acidosis  - CODE STATUS: NO CPR/INTUBATION  -- wife aware and patient is currently refusing peg  -- RR 77 AM-- favored that this is another aspiration event as no other focal source of infection-- fevers noted to as high as 102- CXR reviewed and normal, UA ok, has been off  antibiotics, continue to monitor  -- with recurrent aspiration but now that his wife is refusing hospice, diet changed to prevent recurrent aspiration     Hypokalemia  - replace per protocol     Right femoral neck fracture  -s/p hip percutaneous pinning on 7/1 by Dr. Harp  -PT/OT - family now wants rehab, restart PT/OT  -follow-up with Dr. Harp in 3 weeks, continue aspirin 325 mg BID for 6 weeks for VTE ppx     HTN- continue amlodipine, lisinopril, and atorvastatin      Dementia-continue aricept     Patient aspirates almost everything that he eats.  He is refusing peg and his wife is in denial and thinks he can eat what he wants. Risk of re-admission is high due to aspiration pneumonia.  They have been offered hospice and have refused.      Discharge Follow Up Recommendations for outpatient labs/diagnostics:   Follow up with PCP in 1 week    Day of Discharge     HPI:   No complaints     Review of Systems  Gen- No fevers, chills  CV- No chest pain, palpitations  Resp- No cough, dyspnea  GI- No N/V/D, abd pain      Vital Signs:   Temp:  [97.7 °F (36.5 °C)-98.4 °F (36.9 °C)] 98.1 °F (36.7 °C)  Heart Rate:  [77-84] 84  Resp:  [18-20] 20  BP: (141-163)/(74-85) 145/85     Physical Exam:  Constitutional: No acute distress, awake, alert  HENT: NCAT, mucous membranes moist  Respiratory: Coarse BS bilaterally, respiratory effort normal   Cardiovascular: RRR, no murmurs  Gastrointestinal: Positive bowel sounds, soft, nontender, nondistended  Musculoskeletal: No bilateral ankle edema  Psychiatric: Appropriate affect, cooperative  Neurologic: Oriented to self speech slow with some slurring  Skin: No rashes    Pertinent  and/or Most Recent Results     Results from last 7 days   Lab Units 07/14/20  0611 07/13/20  0515 07/12/20  0538 07/08/20  0400 07/08/20  0328   WBC 10*3/mm3 6.36 7.51 13.61* 21.58*  --    HEMOGLOBIN g/dL 9.6* 9.1* 9.5* 9.2*  --    HEMATOCRIT % 30.3* 28.5* 29.9* 28.6*  --    PLATELETS 10*3/mm3 317 300 343 329   --    SODIUM mmol/L 133* 139 141  --  139   POTASSIUM mmol/L 3.2* 3.7 3.2*  --  2.9*   CHLORIDE mmol/L 98 103 103  --  102   CO2 mmol/L 26.0 28.0 27.0  --  25.0   BUN mg/dL 12 18 21  --  24*   CREATININE mg/dL 0.79 0.81 0.91  --  0.75*   GLUCOSE mg/dL 87 94 99  --  104*   CALCIUM mg/dL 8.6 8.6 8.7  --  8.5*           Invalid input(s): PROT, LABALBU        Invalid input(s): TG, LDLCALC, LDLREALC  Results from last 7 days   Lab Units 07/08/20  1509 07/07/20  1249   LACTATE mmol/L 1.3 1.2       Brief Urine Lab Results  (Last result in the past 365 days)      Color   Clarity   Blood   Leuk Est   Nitrite   Protein   CREAT   Urine HCG        07/08/20 1816 Yellow Clear Negative Trace Negative 30 mg/dL (1+)               Microbiology Results Abnormal     Procedure Component Value - Date/Time    Blood Culture - Blood, Arm, Left [[id number]] Collected:  07/11/20 1751    Lab Status:  Preliminary result Specimen:  Blood from Arm, Left Updated:  07/13/20 1815     Blood Culture No growth at 2 days    Blood Culture - Blood, Arm, Left [[id number]] Collected:  07/08/20 1509    Lab Status:  Final result Specimen:  Blood from Arm, Left Updated:  07/13/20 1545     Blood Culture No growth at 5 days    Blood Culture - Blood, Arm, Right [[id number]] Collected:  07/08/20 1509    Lab Status:  Final result Specimen:  Blood from Arm, Right Updated:  07/13/20 1545     Blood Culture No growth at 5 days    Blood Culture - Blood, Arm, Right [[id number]] Collected:  06/29/20 1931    Lab Status:  Final result Specimen:  Blood from Arm, Right Updated:  07/04/20 2000     Blood Culture No growth at 5 days    Blood Culture - Blood, Arm, Left [[id number]] Collected:  06/29/20 1931    Lab Status:  Final result Specimen:  Blood from Arm, Left Updated:  07/04/20 2000     Blood Culture No growth at 5 days    MRSA Screen, PCR (Inpatient) - Swab, Nares [[id number]]  (Normal) Collected:  06/29/20 9323    Lab Status:  Final result Specimen:  Swab from Nares Updated:   06/30/20 0958     MRSA PCR Negative    Narrative:       MRSA Negative    COVID-19,[facility] IN-HOUSE, NP SWAB IN TRANSPORT MEDIA 8-12 HR TAT - Swab, Nasopharynx [[id number]]  (Normal) Collected:  06/29/20 2004    Lab Status:  Final result Specimen:  Swab from Nasopharynx Updated:  06/30/20 0305     COVID19 Not Detected    Narrative:       Fact sheet for providers: https://www.fda.gov/media/125076/download     Fact sheet for patients: https://www.fda.gov/media/271427/download    Respiratory Panel, PCR - Swab, Nasopharynx [[id number]]  (Normal) Collected:  06/29/20 2004    Lab Status:  Final result Specimen:  Swab from Nasopharynx Updated:  06/29/20 2129     ADENOVIRUS, PCR Not Detected     Coronavirus 229E Not Detected     Coronavirus HKU1 Not Detected     Coronavirus NL63 Not Detected     Coronavirus OC43 Not Detected     Human Metapneumovirus Not Detected     Human Rhinovirus/Enterovirus Not Detected     Influenza B PCR Not Detected     Parainfluenza Virus 1 Not Detected     Parainfluenza Virus 2 Not Detected     Parainfluenza Virus 3 Not Detected     Parainfluenza Virus 4 Not Detected     Bordetella pertussis pcr Not Detected     Influenza A H1 2009 PCR Not Detected     Chlamydophila pneumoniae PCR Not Detected     Mycoplasma pneumo by PCR Not Detected     Influenza A PCR Not Detected     Influenza A H3 Not Detected     Influenza A H1 Not Detected     RSV, PCR Not Detected     Bordetella parapertussis PCR Not Detected    Narrative:       The coronavirus on the RVP is NOT COVID-19 and is NOT indicative of infection with COVID-19.           Imaging Results (All)     Procedure Component Value Units Date/Time    FL Video Swallow With Speech Single Contrast [[id number]] Collected:  07/13/20 1526     Updated:  07/13/20 2151    Narrative:       EXAMINATION: FL VIDEO SWALLOW W SPEECH SINGLE-CONTRAST-     INDICATION: dysphagia; R50.9-Fever, unspecified; J96.01-Acute  respiratory failure with hypoxia; I47.2-Ventricular  tachycardia;  S51.011A-Laceration without foreign body of right elbow, initial  encounter; R13.12-Dysphagia, oropharyngeal phase     TECHNIQUE: 2 minutes and 12 seconds of fluoroscopic time was used for  this exam. 12 associated fluoroscopic loops were saved. The patient was  evaluated in the seated lateral position while taking a variety of  consistencies of barium by mouth under the direction of speech  pathology.     COMPARISON: NONE     FINDINGS: 2 minutes and 12 seconds of fluoroscopy provided for a  modified barium swallow. Please see speech therapy report for full  details and recommendations.          Impression:       Fluoroscopy provided for a modified barium swallow. Please  see speech therapy report for full details and recommendations.         This report was finalized on 7/13/2020 9:47 PM by Dr. Jorge Alberto Munoz MD.       XR Chest 1 View [[id number]] Collected:  07/11/20 1814     Updated:  07/11/20 1902    Narrative:          EXAMINATION: XR CHEST 1 VW - 07/11/2020     INDICATION: Possible aspiration, fever.  R50.9-Fever, unspecified;  J96.01-Acute respiratory failure with hypoxia; I47.2-Ventricular  tachycardia; S51.011A-Laceration without foreign body of right elbow,  initial encounter; R13.12-Dysphagia, oropharyngeal phase      COMPARISON: Chest x-ray dated 07/07/2020     FINDINGS: Cardiac size unchanged with opacifications in the right  midlung fairly  patchy in appearance consistent with acute airspace  disease such as bronchopneumonia. No pneumothorax or pleural effusion.  Degenerative changes of the spine.           Impression:       Opacifications of the right midlung fairly confluent  consistent with acute airspace disease such as bronchopneumonia. No  pleural effusion.     DICTATED:   07/11/2020  EDITED/ls :   07/11/2020      This report was finalized on 7/11/2020 6:59 PM by Dr. Manoj Elizondo.       XR Chest 1 View [[id number]] Collected:  07/07/20 0848     Updated:  07/08/20 1757    Narrative:        EXAMINATION: XR CHEST 1 VW-      INDICATION: RRT, increased sputum; R50.9-Fever, unspecified;  J96.01-Acute respiratory failure with hypoxia; I47.2-Ventricular  tachycardia; S51.011A-Laceration without foreign body of right elbow,  initial encounter; R13.12-Dysphagia, oropharyngeal phase.      COMPARISON: 06/29/2020.     FINDINGS: Cardiac silhouette within normal limits. Pulmonary vascularity  within normal limits. No focal opacification or consolidation. No  pneumothorax or pleural effusion. Degenerative changes of the spine.           Impression:       No acute cardiopulmonary process specifically no new focal  consolidation of involvement or effusion.     D:  07/07/2020  E:  07/07/2020     This report was finalized on 7/8/2020 5:54 PM by Dr. Manoj Elizondo.       FL C Arm During Surgery [[id number]] Collected:  07/02/20 0835     Updated:  07/06/20 0857    Narrative:       EXAMINATION: FL C ARM DURING SURGERY- 07/01/2020     INDICATION: hip pinning; R50.9-Fever, unspecified; J96.01-Acute  respiratory failure with hypoxia; I47.2-Ventricular tachycardia;  S51.011A-Laceration without foreign body of right elbow, initial  encounter      TECHNIQUE: Intraoperative fluoroscopy for improved localization and  treatment planning     COMPARISON: NONE     FINDINGS: Intraoperative fluoroscopy with total fluoroscopic time usage  2 minutes 12 seconds and 16 representative images saved during right hip  pinning.       Impression:       Intraoperative fluoroscopy utilized during right hip  pinning.     D:  07/02/2020  E:  07/02/2020         This report was finalized on 7/6/2020 8:54 AM by Dr. Manoj Elizondo.       FL Video Swallow With Speech Single Contrast [[id number]] Collected:  07/02/20 1420     Updated:  07/02/20 2016    Narrative:       EXAMINATION: FL VIDEO SWALLOW W SPEECH SINGLE-CONTRAST-     INDICATION: dysphagia; R50.9-Fever, unspecified; J96.01-Acute  respiratory failure with hypoxia; I47.2-Ventricular  tachycardia;  S51.011A-Laceration without foreign body of right elbow, initial  encounter; R13.10-Dysphagia, unspecified     TECHNIQUE: 48 seconds of fluoroscopic time was used for this exam. 8  associated fluoroscopic loops were saved. The patient was evaluated in  the seated lateral position while taking a variety of consistencies of  barium by mouth under the direction of speech pathology.     COMPARISON: NONE     FINDINGS: 48 seconds of fluoroscopy provided for a modified barium  swallow. Please see speech therapy report for full details and  recommendations.          Impression:       Fluoroscopy provided for a modified barium swallow. Please  see speech therapy report for full details and recommendations.         This report was finalized on 7/2/2020 8:13 PM by Dr. Cher Macias MD.       XR Femur 2 View Right [[id number]] Collected:  07/01/20 1131     Updated:  07/02/20 2014    Narrative:       EXAMINATION: XR FEMUR 2 VW RIGHT-      INDICATION: Right hip fracture; R50.9-Fever, unspecified; J96.01-Acute  respiratory failure with hypoxia; I47.2-Ventricular tachycardia;  S51.011A-Laceration without foreign body of right elbow, initial  encounter.      COMPARISON: None.     FINDINGS: Two views of the right femur reveal fracture seen of the right  femoral neck. Slight impaction identified with mild overlapping of the  fracture fragments. The remainder of the femur is unremarkable. The  acetabulum is intact with some mild degenerative changes identified.           Impression:       Right femoral neck fracture. The remainder of the femur is  grossly unremarkable with spurring of the patella. There is minimal  degenerative changes seen within the acetabulum with no significant  displacement.     D:  07/01/2020  E:  07/01/2020     This report was finalized on 7/2/2020 8:11 PM by Dr. Chre Macias MD.       XR Hip With or Without Pelvis 2 - 3 View Right [[id number]] Collected:  07/01/20 2116     Updated:   07/01/20 2118    Narrative:       CR Hip Uni Comp Min 2 Vws RT    INDICATION:   Status post percutaneous screws. Right hip fracture.    COMPARISON:   6/29/2020    FINDINGS:  AP and frog-leg lateral view(s) of the right hip. There is been the interval placement of 3 percutaneous screws. No evidence of hardware failure. The position of the screws appears satisfactory.      Impression:       Satisfactory postoperative exam.    Signer Name: Lisandro Colby MD   Signed: 7/1/2020 9:16 PM   Workstation Name: [id number]    [facility]    CT Chest Without Contrast [[id number]] Collected:  06/29/20 1844     Updated:  07/01/20 1500    Narrative:       EXAMINATION: CT CHEST WO CONTRAST- 06/29/2020     INDICATION: low sats, fever      TECHNIQUE: CT chest without intravenous contrast     The radiation dose reduction device was turned on for each scan per the  ALARA (As Low as Reasonably Achievable) protocol.     COMPARISON: NONE     FINDINGS: Thyroid is homogeneous in attenuation. No bulky mediastinal  adenopathy. Central airways are patent. Esophagus in normal course and  caliber. Atherosclerotic nonaneurysmal thoracic aorta.  Cardiac size  within normal limits and without pericardial effusion. Extended lung  windows demonstrate mild to moderate subsegmental dependent atelectasis  without focal consolidation or opacification. No nodule or mass. No  pleural effusion. Degenerative changes of the thoracic spine without  aggressive osseous lesion. No soft tissue body wall findings of concern.  Visualized portions of the upper abdomen grossly unremarkable.       Impression:       Low lung volumes with hypoventilatory effects including  moderate subsegmental dependent atelectasis in the dependent portions  however no focal consolidation or opacification to suggest acute  parenchymal process or airspace disease. Negative for pneumonia.     D:  06/29/2020  E:  06/30/2020        This report was finalized on  7/1/2020 2:56 PM by Dr. Manoj Elizondo.       CT Cerebral Perfusion With & Without Contrast [[id number]] Collected:  06/29/20 1852     Updated:  07/01/20 1500    Narrative:       EXAMINATION: CT CEREBRAL PERFUSION W WO CONTRAST-      INDICATION: Stroke.      TECHNIQUE: CT cerebral perfusion with and without intravenous contrast.  Multiple parametric maps including mean transit time, time to drain,  cerebral blood flow and cerebral blood volume performed.     The radiation dose reduction device was turned on for each scan per the  ALARA (As Low as Reasonably Achievable) protocol.     COMPARISON: CT head stroke protocol immediately prior.     FINDINGS: Grossly symmetric and normal correlating parametric maps  without perfusion defect of abnormality. No reversible ischemia within a  specific vascular territory is identified.       Impression:       No reversible ischemia within a specific vascular territory.     D:  06/29/2020  E:  06/30/2020        This report was finalized on 7/1/2020 2:56 PM by Dr. Manoj Elizondo.       CT Angiogram Neck [[id number]] Collected:  06/29/20 1854     Updated:  07/01/20 1500    Narrative:       EXAMINATION: CT ANGIOGRAM NECK-, CT ANGIOGRAM HEAD-      INDICATION: Stroke.      TECHNIQUE: CT angiogram head and neck with and without intravenous  contrast administration. 2-D and 3-D reconstructions performed.     The radiation dose reduction device was turned on for each scan per the  ALARA (As Low as Reasonably Achievable) protocol.     COMPARISON: CT cerebral perfusion and CT head noncontrast performed  concurrently.     FINDINGS: CTA NECK: Normal three vessel arch with patent great vessel  origins. Proximal subclavian arteries are patent. Vertebral arteries  demonstrate grossly symmetric appearance of the vertebral artery caliber  and flow signal of opacification without hemodynamically significant  stenosis, aneurysm or occlusion with tortuosity of the vertebral  arteries bilateral exiting the  foramina, however no luminal irregularity  or defect. Calcification within the V4 intradural segment left vertebral  discussed further below the dedicated CTA head portion. Carotids  demonstrate grossly normal course and branching pattern with  atherosclerotic involvement including calcific disease creating 25%  right and 20% left luminal narrowing as measured by NASCET criteria with  patency to the distal internal carotid arteries intracranial segments  discussed further below in the dedicated CTA head portion. Cervical soft  tissues unremarkable for acute findings without bulky cervical  adenopathy. Thyroid is homogeneous. Lung apices are grossly clear.     CTA HEAD: Distal internal carotid arteries demonstrate mild irregularity  of atherosclerotic involvement and calcific disease burden with mild  luminal stenosis, however no hemodynamically significant stenosis,  aneurysm or occlusion. Anterior cerebral arteries are patent without  hemodynamically significant stenosis, aneurysm or occlusion. Middle  cerebral arteries are patent without hemodynamically significant  stenosis, aneurysm or occlusion. Vertebrobasilar system and posterior  cerebral arteries demonstrate at least moderate stenosis of calcific  disease involvement left vertebral distal V4 segment, however, patency  observed of the basilar artery and basilar apex with posterior cerebral  arteries widely patent bilaterally without hemodynamically significant  stenosis, aneurysm or occlusion. Visualized venous structures are  unremarkable with superior sagittal sinus widely patent.       Impression:       1. CTA neck demonstrates atherosclerotic involvement including calcific  disease creating 25% right and 20% left luminal narrowing as measured by  NASCET criteria.  2. CTA head demonstrates minimal atherosclerotic involvement of the  distal internal carotid arteries without hemodynamically significant  stenosis, aneurysm or occlusion with calcific disease  burden associated  as well as moderate stenosis of calcific disease burden and involvement  of the left V4 segment focal area distal left vertebral artery without  distinct occlusion and patency observed of the [place]. [place] is otherwise unremarkable without occlusion in particular the  MCA distributions are widely patent.     D:  06/29/2020  E:  06/30/2020     This report was finalized on 7/1/2020 2:56 PM by Dr. Manoj Elizondo.       CT Angiogram Head [[id number]] Collected:  06/29/20 1854     Updated:  07/01/20 1500    Narrative:       EXAMINATION: CT ANGIOGRAM NECK-, CT ANGIOGRAM HEAD-      INDICATION: Stroke.      TECHNIQUE: CT angiogram head and neck with and without intravenous  contrast administration. 2-D and 3-D reconstructions performed.     The radiation dose reduction device was turned on for each scan per the  ALARA (As Low as Reasonably Achievable) protocol.     COMPARISON: CT cerebral perfusion and CT head noncontrast performed  concurrently.     FINDINGS: CTA NECK: Normal three vessel arch with patent great vessel  origins. Proximal subclavian arteries are patent. Vertebral arteries  demonstrate grossly symmetric appearance of the vertebral artery caliber  and flow signal of opacification without hemodynamically significant  stenosis, aneurysm or occlusion with tortuosity of the vertebral  arteries bilateral exiting the foramina, however no luminal irregularity  or defect. Calcification within the V4 intradural segment left vertebral  discussed further below the dedicated CTA head portion. Carotids  demonstrate grossly normal course and branching pattern with  atherosclerotic involvement including calcific disease creating 25%  right and 20% left luminal narrowing as measured by NASCET criteria with  patency to the distal internal carotid arteries intracranial segments  discussed further below in the dedicated CTA head portion. Cervical soft  tissues unremarkable for acute findings  without bulky cervical  adenopathy. Thyroid is homogeneous. Lung apices are grossly clear.     CTA HEAD: Distal internal carotid arteries demonstrate mild irregularity  of atherosclerotic involvement and calcific disease burden with mild  luminal stenosis, however no hemodynamically significant stenosis,  aneurysm or occlusion. Anterior cerebral arteries are patent without  hemodynamically significant stenosis, aneurysm or occlusion. Middle  cerebral arteries are patent without hemodynamically significant  stenosis, aneurysm or occlusion. Vertebrobasilar system and posterior  cerebral arteries demonstrate at least moderate stenosis of calcific  disease involvement left vertebral distal V4 segment, however, patency  observed of the basilar artery and basilar apex with posterior cerebral  arteries widely patent bilaterally without hemodynamically significant  stenosis, aneurysm or occlusion. Visualized venous structures are  unremarkable with superior sagittal sinus widely patent.       Impression:       1. CTA neck demonstrates atherosclerotic involvement including calcific  disease creating 25% right and 20% left luminal narrowing as measured by  NASCET criteria.  2. CTA head demonstrates minimal atherosclerotic involvement of the  distal internal carotid arteries without hemodynamically significant  stenosis, aneurysm or occlusion with calcific disease burden associated  as well as moderate stenosis of calcific disease burden and involvement  of the left V4 segment focal area distal left vertebral artery without  distinct occlusion and patency observed of the [place]. [place] is otherwise unremarkable without occlusion in particular the  MCA distributions are widely patent.     D:  06/29/2020  E:  06/30/2020     This report was finalized on 7/1/2020 2:56 PM by Dr. Manoj Elizondo.       CT Head Without Contrast Stroke Protocol [[id number]] Collected:  06/29/20 1823     Updated:  07/01/20 1456     Narrative:       EXAMINATION: CT HEAD WO CONTRAST STROKE PROTOCOL-      INDICATION: Stroke.      TECHNIQUE: CT head without intravenous contrast following stroke  protocol.     The radiation dose reduction device was turned on for each scan per the  ALARA (As Low as Reasonably Achievable) protocol.     COMPARISON: None.     FINDINGS: Midline structures are symmetric without evidence of mass,  mass effect or midline shift demonstrating at least moderately advanced  low-attenuation of the periventricular and deep white matter of chronic  small vessel ischemic disease. No intraaxial hemorrhage or extraaxial  fluid collection. Globes and orbits are unremarkable. Visualized  paranasal sinuses and mastoid air cells are grossly clear and well  pneumatized. Calvarium intact.       Impression:       No acute cardiopulmonary process. Specifically no acute  intracranial hemorrhage.     NOTE: Scan performed on 06/29/2020 at 1816 hours. Scan report given to  treatment team in person by Dr. Elizondo at scanner on 06/29/2020 at 1820  hours.     D:  06/29/2020  E:  06/30/2020        This report was finalized on 7/1/2020 2:56 PM by Dr. Manoj Elizondo.       CT Abdomen Pelvis Without Contrast [[id number]] Collected:  06/30/20 0917     Updated:  06/30/20 1651    Narrative:       EXAMINATION: CT ABDOMEN PELVIS WO CONTRAST- 06/30/2020     INDICATION: R50.9-Fever, unspecified; J96.01-Acute respiratory failure  with hypoxia; I47.2-Ventricular tachycardia; S51.011A-Laceration without  foreign body of right elbow, initial encounter;  vomiting, sepsis,  infection     TECHNIQUE: Multiple axial CT imaging was obtained of the abdomen and  pelvis without the administration of intravenous contrast.     The radiation dose reduction device was turned on for each scan per the  ALARA (As Low as Reasonably Achievable) protocol.     COMPARISON: NONE     FINDINGS: Some increased markings identified lung bases bilaterally  concerning for possible infiltrate.  Aspiration cannot be excluded and  clinical correlation is needed. The liver is homogeneous in appearance  with vicarious excretion of contrast into the gallbladder. There is  residual contrast seen in the renal collecting systems bilaterally. The  spleen is unremarkable. The adrenal glands within normal limits.  Pancreas is homogeneous. There is decompression identified of the  stomach with possible mild wall thickening. Clinical correlation is  needed. No abdominal or retroperitoneal lymphadenopathy. The abdominal  portion of the gastrointestinal tract within normal limits. No free  fluid or free air. No abnormal mass or fluid collections identified.     Pelvis: The pelvic organs are unremarkable. The pelvic portion of the  gastrointestinal tract within normal limits. The appendix is normal. No  free fluid or free air. No abnormal mass or fluid collections  identified. Contrast seen in the renal collecting systems bilaterally.  No evidence of obstruction. The bony structures reveal no evidence of  osseous abnormality. Degenerative changes seen within the spine and  pelvis.       Impression:       Atelectatic changes or infiltrate seen at the lung bases  bilaterally for which clinical correlation is needed. Mild wall  thickening of the stomach which may be related to incomplete distention.  Clinical correlation is needed. Stool within the colon. The remainder of  the abdomen and pelvis is grossly unremarkable. No obstruction to the  kidneys with residual contrast seen in the renal collecting systems as  well as the ureters and bladder from prior examination.     D:  06/30/2020  E:  06/30/2020     This report was finalized on 6/30/2020 4:48 PM by Dr. Cher Macias MD.       XR Hip With or Without Pelvis 2 - 3 View Right [[id number]] Collected:  06/29/20 2102     Updated:  06/29/20 2104    Narrative:       CR Hip Uni Comp Min 2 Vws RT    INDICATION:   Pain after a fall today.    COMPARISON:    None.    FINDINGS:  AP and frog-leg lateral view(s) of the right hip.  Laterally impacted subcapital right femoral neck fracture with up to 9 mm of suspected impaction. No pelvic fracture. No significant arthrosis right hip. Left hip unremarkable. Contrast distended  bladder. No bone erosion or destruction.        Impression:       Laterally impacted subcapital right femoral neck fracture.    Signer Name: JAIME Díaz MD   Signed: 6/29/2020 9:02 PM   Workstation Name: [facility]    [facility]    XR Chest 1 View [[id number]] Collected:  06/29/20 1936     Updated:  06/29/20 1941    Narrative:       CR Chest 1 Vw    INDICATION:   Acute stroke protocol.     COMPARISON:    None available.    FINDINGS:  Single portable AP view(s) of the chest.  The heart and mediastinal contours are normal. The lungs are clear. No pneumothorax or pleural effusion.      Impression:       No acute cardiopulmonary findings.    Signer Name: JAIME Díaz MD   Signed: 6/29/2020 7:36 PM   Workstation Name: [facility]    [facility]                    Results for orders placed during the hospital encounter of 06/29/20   Adult Transthoracic Echo Complete W/ Cont if Necessary Per Protocol (With Agitated Saline)    Narrative · Estimated EF = 55%.  · Left ventricular systolic function is normal.  · Mild mitral valve regurgitation is present  · Mild to moderate tricuspid valve regurgitation is present.  · Calculated right ventricular systolic pressure from tricuspid   regurgitation is 34 mmHg.  · No intracardiac shunting is seen  · No cardiac source for embolus is seen          Plan for Follow-up of Pending Labs/Results:    Order Current Status    Blood Culture - Blood, Arm, Left Preliminary result        Discharge Details        Discharge Medications      New Medications      Instructions Start Date   amLODIPine 10 MG tablet  Commonly known as:  NORVASC   10 mg, Oral, Every 24 Hours  Scheduled   Start Date:  July 15, 2020     aspirin 325 MG tablet   325 mg, Oral, 2 times daily      atorvastatin 80 MG tablet  Commonly known as:  LIPITOR   80 mg, Oral, Nightly      bisacodyl 5 MG EC tablet  Commonly known as:  DULCOLAX   10 mg, Oral, Daily PRN      lansoprazole 3 MG/ML suspension oral suspension   30 mg, Oral, Every Morning   Start Date:  July 15, 2020     lisinopril 40 MG tablet  Commonly known as:  PRINIVIL,ZESTRIL   40 mg, Oral, Every 24 Hours Scheduled   Start Date:  July 15, 2020     polyethylene glycol 17 g packet  Commonly known as:  MIRALAX   17 g, Oral, Daily   Start Date:  July 15, 2020        Continue These Medications      Instructions Start Date   donepezil 10 MG tablet  Commonly known as:  ARICEPT   10 mg, Oral, Daily         Stop These Medications    amLODIPine-benazepril 10-40 MG per capsule  Commonly known as:  LOTREL     omeprazole 20 MG capsule  Commonly known as:  priLOSEC            No Known Allergies      Discharge Disposition:  Skilled Nursing Facility (DC - External)    Diet:  Hospital:  Diet Order   Procedures   • Diet Full Liquid; Thin       Activity:  Activity Instructions     Discharge activity      No driving until cleared by surgeon.     TOUCH DOWN Weight bearing with assist devices.  Use assist devices as needed (walker, wheelchair).    Non weight bearing (specify restrictions)      TOUCH DOWN Weight bearing with assist devices    Patient may shower      Okay to shower 3 days after surgery but do not allow the incision site to soak in water.          Restrictions or Other Recommendations:  Would recommend palliative care, needs aspiration precautions       CODE STATUS:    Code Status and Medical Interventions:   Ordered at: 07/03/20 0956     Limited Support to NOT Include:    Intubation     Level Of Support Discussed With:    Patient     Code Status:    No CPR     Medical Interventions (Level of Support Prior to Arrest):    Limited       Future Appointments   Date  Time Provider Department [place]   7/23/2020 10:00 AM Sam Harp MD [facility] None       Additional Instructions for the Follow-ups that You Need to Schedule     Apply ice to affected area as needed   As directed      Discharge Follow-up with Specified Provider: Sam Harp MD; 3 Weeks   As directed      To:  Sam Harp MD    Follow Up:  3 Weeks    Follow Up Details:  Follow-up with [facility] - Sam Harp MD Office: [phone]         Remove dressing   As directed      Okay to remove dressing 3 days after surgery.  A clean dressing needs to be replaced daily and as needed if there is any drainage noted.    Order Comments:  Okay to remove dressing 3 days after surgery.  A clean dressing needs to be replaced daily and as needed if there is any drainage noted.                      Electronically signed by Margie Dorado DO, 07/14/20, 11:44 AM.      Time Spent on Discharge:  I spent 39 minutes on this discharge activity which included: face-to-face encounter with the patient, reviewing the data in the system, coordination of the care with the nursing staff as well as consultants, documentation, and entering orders.

## 2020-07-14 NOTE — PROGRESS NOTES
Continued Stay Note  [facility]     Patient Name: Ken Jasso  MRN: [id number]  Today's Date: 7/14/2020    Admit Date: 6/29/2020    Discharge Plan     Row Name 07/14/20 1106       Plan    Plan  Rehab    Plan Comments  Per chart review, pt to dc to rehab facility today.  Hospice will f/u with pt in 2 weeks.  Please, call [phone] if I can be of further assistance.          Arely Bejarano RN

## 2020-07-14 NOTE — PLAN OF CARE
Pt slept through the night.  Pt did not complain of pain.  VSS and AO.  Pt was calm and cooperative.  Kept Pt comfortable.

## 2020-07-14 NOTE — THERAPY TREATMENT NOTE
Patient Name: Ken Jasso  : 1940    MRN: [id number]                              Today's Date: 2020       Admit Date: 2020    Visit Dx:     ICD-10-CM ICD-9-CM   1. Acute febrile illness R50.9 780.60   2. Acute respiratory failure with hypoxia (CMS/[facility]) J96.01 518.81   3. Ventricular tachycardia (CMS/[facility]) I47.2 427.1   4. Skin tear of right elbow without complication, initial encounter S51.011A 881.01   5. Oropharyngeal dysphagia R13.12 787.22     Patient Active Problem List   Diagnosis   • Acute febrile illness   • Fracture of right hip (CMS/HCC)   • Dementia (CMS/HCC)   • H/O: CVA (no deficit)     Past Medical History:   Diagnosis Date   • Dementia (CMS/HCC)    • Dementia (CMS/HCC)    • Dysphagia    • Esophageal dilatation    • Hiatal hernia    • PNA (pneumonia)    • PNA (pneumonia)    • Pneumonia     [facility] EPISODES    • Prostate CA (CMS/HCC)    • Stroke (CMS/HCC)          Past Surgical History:   Procedure Laterality Date   • HIP PERCUTANEOUS PINNING Right 2020    Procedure: HIP PERCUTANEOUS PINNING;  Surgeon: Sam Harp MD;  Location: [facility];  Service: Orthopedics;  Laterality: Right;     General Information     Row Name 20 1134          PT Evaluation Time/Intention    Document Type  therapy note (daily note)  -AA     Mode of Treatment  physical therapy  -AA     Row Name 20 1134          General Information    Patient Profile Reviewed?  yes  -AA     Existing Precautions/Restrictions  fall;other (see comments) TTWB RLE, dementia  -AA     Row Name 20 1134          Cognitive Assessment/Intervention- PT/OT    Orientation Status (Cognition)  oriented to;person;place;time  -AA     Row Name 20 1134          Safety Issues, Functional Mobility    Impairments Affecting Function (Mobility)  balance;cognition;coordination;endurance/activity tolerance;motor control;pain;range of motion (ROM);strength;motor planning  -AA       User Key   (r) = Recorded By, (t) = Taken By, (c) = Cosigned By    Initials Name Provider Type    Micaela Pickering, PT Physical Therapist        Mobility     Row Name 07/14/20 1134          Bed Mobility Assessment/Treatment    Comment (Bed Mobility)  pt UIC  -AA     Row Name 07/14/20 1134          Transfer Assessment/Treatment    Comment (Transfers)  pt decline due to just getting into chair  -AA     Row Name 07/14/20 1134          Mobility Assessment/Intervention    Extremity Weight-bearing Status  right lower extremity  -AA     Right Lower Extremity (Weight-bearing Status)  toe touch weight-bearing (TTWB)  -AA       User Key  (r) = Recorded By, (t) = Taken By, (c) = Cosigned By    Initials Name Provider Type    Micaela Pickering, PT Physical Therapist        Obj/Interventions     Row Name 07/14/20 1134          Therapeutic Exercise    Lower Extremity (Therapeutic Exercise)  gluteal sets;heel slides, bilateral;LAQ (long arc quad), bilateral;marching while seated;quad sets, bilateral;SLR (straight leg raise), bilateral  -AA     Lower Extremity Range of Motion (Therapeutic Exercise)  hip abduction/adduction, bilateral;ankle dorsiflexion/plantar flexion, bilateral  -AA     Core Strength (Therapeutic Exercise)  bridging, bilateral lower extremities  -AA     Exercise Type (Therapeutic Exercise)  AROM (active range of motion)  -AA     Position (Therapeutic Exercise)  seated  -AA     Sets/Reps (Therapeutic Exercise)  10  -AA     Row Name 07/14/20 1134          Static Sitting Balance    Level of [place] (Unsupported Sitting, Static Balance)  contact guard assist  -AA     Sitting Position (Unsupported Sitting, Static Balance)  sitting in chair  -AA     Time Able to Maintain Position (Unsupported Sitting, Static Balance)  more than 5 minutes  -AA       User Key  (r) = Recorded By, (t) = Taken By, (c) = Cosigned By    Initials Name Provider Type    Micaela Pickering, PT Physical Therapist        Goals/Plan     Row Name 07/14/20  1134          Bed Mobility Goal 1 (PT)    Progress/Outcomes (Bed Mobility Goal 1, PT)  goal not met;discharged from facility;good progress toward goal  -AA     Row Name 07/14/20 1134          Transfer Goal 1 (PT)    Progress/Outcome (Transfer Goal 1, PT)  goal not met;discharged from facility  -AA     Row Name 07/14/20 1134          Gait Training Goal 1 (PT)    Progress/Outcome (Gait Training Goal 1, PT)  goal not met;discharged from facility  -AA       User Key  (r) = Recorded By, (t) = Taken By, (c) = Cosigned By    Initials Name Provider Type    Micaela Pickering, PT Physical Therapist        Clinical Impression     [facility] Name 07/14/20 1134          Pain Assessment    Additional Documentation  Pain Scale: Numbers Pre/Post-Treatment (Group)  -AA     Row Name 07/14/20 1134          Pain Scale: Numbers Pre/Post-Treatment    Pain Scale: Numbers, Pretreatment  0/10 - no pain  -AA     Pain Scale: Numbers, Post-Treatment  0/10 - no pain  -AA     Row Name 07/14/20 1134          Plan of Care Review    Plan of Care Reviewed With  patient  -AA     Progress  no change  -AA     Row Name 07/14/20 1134          Positioning and Restraints    Pre-Treatment Position  sitting in chair/recliner  -AA     Post Treatment Position  chair  -AA     In Chair  notified nsg;exit alarm on;waffle cushion;reclined;heels elevated;on mechanical lift sling;call light within reach;encouraged to call for assist SCDs  -AA       User Key  (r) = Recorded By, (t) = Taken By, (c) = Cosigned By    Initials Name Provider Type    Micaela Pickering, PT Physical Therapist        Outcome Measures     Row Name 07/14/20 1134          How much help from another person do you currently need...    Turning from your back to your side while in flat bed without using bedrails?  2  -AA     Moving from lying on back to sitting on the side of a flat bed without bedrails?  2  -AA     Moving to and from a bed to a chair (including a wheelchair)?  2  -AA     Standing up from  a chair using your arms (e.g., wheelchair, bedside chair)?  2  -AA     Climbing 3-5 steps with a railing?  1  -AA     To walk in hospital room?  1  -AA     AM-PAC 6 Clicks Score (PT)  10  -AA     Row Name 07/14/20 1134          Functional Assessment    Outcome Measure Options  AM-PAC 6 Clicks Basic Mobility (PT)  -AA       User Key  (r) = Recorded By, (t) = Taken By, (c) = Cosigned By    Initials Name Provider Type    Micaela Pickering, PT Physical Therapist        Physical Therapy Education                 Title: PT OT SLP Therapies (In Progress)     Topic: Physical Therapy (In Progress)     Point: Mobility training (In Progress)     Description:   Instruct learner(s) on safety and technique for assisting patient out of bed, chair or wheelchair.  Instruct in the proper use of assistive devices, such as walker, crutches, cane or brace.              Patient Friendly Description:   It's important to get you on your feet again, but we need to do so in a way that is safe for you. Falling has serious consequences, and your personal safety is the most important thing of all.        When it's time to get out of bed, one of us or a family member will sit next to you on the bed to give you support.     If your doctor or nurse tells you to use a walker, crutches, a cane, or a brace, be sure you use it every time you get out of bed, even if you think you don't need it.    Learning Progress Summary           Patient Acceptance, E, NR by  at 7/13/2020 1105    Acceptance, E,D, VU,NR by CS at 7/12/2020 1350    Comment:  Educated patient on WB status, safe hand placement with transfers, sequencing with transfer, bed mobility sequencing, and ther ex    Acceptance, E,D, VU,NR by CS at 7/11/2020 1545    Comment:  Educated patient on importance of exercise and exercise technique.    Acceptance, E,D, VU,NR by CS at 7/10/2020 1425    Comment:  Educated patient on WB status, safe hand placement with transfers, sequencing with transfers,  bed mobility, and ther ex.    Acceptance, E,D, NR by AA at 7/9/2020 0810    Acceptance, E, VU,NR by LIEN at 7/8/2020 1413    Comment:  Pt educated about transfer strategies and surgical precautions.    Acceptance, E,D, NR by AA at 7/6/2020 0827    Acceptance, E, NR by NS at 7/4/2020 1601    Comment:  Patient was educated about WBing precautions and sequencing with transfers.    Acceptance, E, NR by ERICA at 7/3/2020 1154    Acceptance, E,D, NR by AA at 7/2/2020 1447   [facility] Acceptance, E,D, NR by AA at 7/2/2020 1447                   Point: Home exercise program (In Progress)     Description:   Instruct learner(s) on appropriate technique for monitoring, assisting and/or progressing patient with therapeutic exercises and activities.              Learning Progress Summary           Patient Acceptance, E,D, NR by AA at 7/14/2020 1134    Acceptance, E, NR by ERICA at 7/13/2020 1105    Acceptance, E,D, VU,NR by  at 7/12/2020 1350    Comment:  Educated patient on WB status, safe hand placement with transfers, sequencing with transfer, bed mobility sequencing, and ther ex    Acceptance, E,D, VU,NR by CS at 7/11/2020 1545    Comment:  Educated patient on importance of exercise and exercise technique.    Acceptance, E,D, VU,NR by  at 7/10/2020 1425    Comment:  Educated patient on WB status, safe hand placement with transfers, sequencing with transfers, bed mobility, and ther ex.    Acceptance, E,D, NR by AA at 7/9/2020 0810    Acceptance, E, VU,NR by LIEN at 7/8/2020 1413    Comment:  Pt educated about transfer strategies and surgical precautions.    Acceptance, E,D, NR by AA at 7/6/2020 0827    Acceptance, E, NR by NS at 7/4/2020 1601    Comment:  Patient was educated about WBing precautions and sequencing with transfers.                   Point: Body mechanics (In Progress)     Description:   Instruct learner(s) on proper positioning and spine alignment for patient and/or caregiver during mobility tasks and/or exercises.               Learning Progress Summary           Patient Acceptance, E, NR by ERICA at 7/13/2020 1105    Acceptance, E,D, VU,NR by CS at 7/12/2020 1350    Comment:  Educated patient on WB status, safe hand placement with transfers, sequencing with transfer, bed mobility sequencing, and ther ex    Acceptance, E,D, VU,NR by CS at 7/11/2020 1545    Comment:  Educated patient on importance of exercise and exercise technique.    Acceptance, E,D, VU,NR by CS at 7/10/2020 1425    Comment:  Educated patient on WB status, safe hand placement with transfers, sequencing with transfers, bed mobility, and ther ex.    Acceptance, E,D, NR by AA at 7/9/2020 0810    Acceptance, E, VU,NR by JJULIO at 7/8/2020 1413    Comment:  Pt educated about transfer strategies and surgical precautions.    Acceptance, E,D, NR by AA at 7/6/2020 0827    Acceptance, E, NR by NS at 7/4/2020 1601    Comment:  Patient was educated about WBing precautions and sequencing with transfers.    Acceptance, E, NR by ERICA at 7/3/2020 1154    Acceptance, E,D, NR by AA at 7/2/2020 1447   Family Acceptance, E,D, NR by AA at 7/2/2020 1447                   Point: Precautions (In Progress)     Description:   Instruct learner(s) on prescribed precautions during mobility and gait tasks              Learning Progress Summary           Patient Acceptance, E, NR by ERICA at 7/13/2020 1105    Acceptance, E,D, VU,NR by  at 7/12/2020 1350    Comment:  Educated patient on WB status, safe hand placement with transfers, sequencing with transfer, bed mobility sequencing, and ther ex    Acceptance, E,D, VU,NR by CS at 7/11/2020 1545    Comment:  Educated patient on importance of exercise and exercise technique.    Acceptance, E,D, VU,NR by CARLINE at 7/10/2020 1425    Comment:  Educated patient on WB status, safe hand placement with transfers, sequencing with transfers, bed mobility, and ther ex.    Acceptance, E,D, NR by AA at 7/9/2020 0810    Acceptance, E, VU,NR by LIEN at 7/8/2020 1413     Comment:  Pt educated about transfer strategies and surgical precautions.    Acceptance, E,D, NR by AA at 7/6/2020 0827    Acceptance, E, NR by NS at 7/4/2020 1601    Comment:  Patient was educated about WBing precautions and sequencing with transfers.    Acceptance, E, NR by  at 7/3/2020 1154    Acceptance, E,D, NR by AA at 7/2/2020 1447   Family Acceptance, E,D, NR by AA at 7/2/2020 1447                               User Key     Initials Effective Dates Name Provider Type Discipline    EJ 11/20/18 -  Karine Mcmahon, PT Physical Therapist PT    AA 04/02/18 -  Micaela Tate, PT Physical Therapist PT     03/26/19 -  Grace Bailey, PT Physical Therapist PT    NS 09/10/19 -  Aissatou Díaz, PT Physical Therapist PT    JG 05/14/20 -  Pat Baugh, PT Student PT Student PT              PT Recommendation and Plan  Planned Therapy Interventions (PT Eval): balance training, bed mobility training, gait training, home exercise program, patient/family education, strengthening, transfer training  Outcome Summary/Treatment Plan (PT)  Anticipated Discharge Disposition (PT): skilled nursing facility  Plan of Care Reviewed With: patient  Progress: no change  Outcome Summary: Pt UIC upon arrival.  Plan is to DC to SNF today for rehab.  BLE ther-ex performed in chair with cues.  No pain reported.     Time Calculation:   PT Charges     Row Name 07/14/20 1134             Time Calculation    Start Time  1134  -AA      PT Received On  07/14/20  -      PT Goal Re-Cert Due Date  07/22/20  -         Time Calculation- PT    Total Timed Code Minutes- PT  14 minute(s)  -         Timed Charges    37275 - PT Therapeutic Exercise Minutes  14  -AA        User Key  (r) = Recorded By, (t) = Taken By, (c) = Cosigned By    Initials Name Provider Type    AA Micaela Tate, PT Physical Therapist        Therapy Charges for Today     Code Description Service Date Service Provider Modifiers Qty    31658694686 HC PT THER PROC EA 15  MIN 7/14/2020 Micaela Tate, PT GP 1          PT G-Codes  Outcome Measure Options: AM-PAC 6 Clicks Basic Mobility (PT)  AM-PAC 6 Clicks Score (PT): 10  AM-PAC 6 Clicks Score (OT): 14 April ROX Tate PT  7/14/2020

## 2020-07-14 NOTE — PLAN OF CARE
Problem: Patient Care Overview  Goal: Plan of Care Review  Outcome: Ongoing (interventions implemented as appropriate)  Flowsheets (Taken 7/14/2020 1744)  Plan of Care Reviewed With: patient; spouse  Note:   SLP treatment completed. Will continue to address dysphagia in tx. Please see note for further details and recommendations.

## 2020-07-14 NOTE — THERAPY TREATMENT NOTE
Acute Care - Occupational Therapy Treatment Note  [facility]     Patient Name: Ken Jasso  : 1940  MRN: [id number]  Today's Date: 2020             Admit Date: 2020       ICD-10-CM ICD-9-CM   1. Acute febrile illness R50.9 780.60   2. Acute respiratory failure with hypoxia (CMS/formerly [facility]) J96.01 518.81   3. Ventricular tachycardia (CMS/formerly [facility]) I47.2 427.1   4. Skin tear of right elbow without complication, initial encounter S51.011A 881.01   5. Oropharyngeal dysphagia R13.12 787.22     Patient Active Problem List   Diagnosis   • Acute febrile illness   • Fracture of right hip (CMS/HCC)   • Dementia (CMS/HCC)   • H/O: CVA (no deficit)     Past Medical History:   Diagnosis Date   • Dementia (CMS/HCC)    • Dementia (CMS/HCC)    • Dysphagia    • Esophageal dilatation 2017   • Hiatal hernia    • PNA (pneumonia)    • PNA (pneumonia)    • Pneumonia     [place] EPISODES    • Prostate CA (CMS/HCC)    • Stroke (CMS/HCC)          Past Surgical History:   Procedure Laterality Date   • HIP PERCUTANEOUS PINNING Right 2020    Procedure: HIP PERCUTANEOUS PINNING;  Surgeon: Sam Harp MD;  Location: [facility];  Service: Orthopedics;  Laterality: Right;       Therapy Treatment    Rehabilitation Treatment Summary     Row Name 20 1550 20 1510          Treatment Time/Intention    Discipline  occupational therapist  -JY  speech language pathologist  -AC     Document Type  therapy note (daily note)  -JY  therapy note (daily note)  -AC     Subjective Information  no complaints  -JY  no complaints  -AC     Mode of Treatment  occupational therapy  -JY  --     Patient/Family Observations  alert, cooperative, agreeable to therapy; spouse present   -JY  Pt alert, cooperative. Wife present.  -AC     Care Plan Review  --  evaluation/treatment results reviewed;care plan/treatment goals reviewed;risks/benefits reviewed;current/potential barriers reviewed;patient/other agree to care plan   -AC     Care Plan Review, Other Participant(s)  --  spouse  -AC     Therapy Frequency (OT Eval)  daily  -JY  --     Therapy Frequency (Swallow)  --  5 days per week  -AC     Patient Effort  good  -JY  good  -AC     Recorded by [JY] Pat Montes, OT 07/14/20 1628 [AC] Lyla Otero, MS CCC-SLP 07/14/20 1554     Row Name 07/14/20 1550             Vital Signs    Pre Systolic BP Rehab  145  -JY      Pre Treatment Diastolic BP  85  -JY      Pretreatment Heart Rate (beats/min)  -- no further tele available; nurse approved tx   -JY      O2 Delivery Pre Treatment  room air  -JY      O2 Delivery Intra Treatment  room air  -JY      O2 Delivery Post Treatment  room air  -JY      Pre Patient Position  Sitting  -JY      Intra Patient Position  Sitting  -JY      Post Patient Position  Sitting  -JY      Recorded by [JY] Pat Montes, OT 07/14/20 1628      Row Name 07/14/20 1550             Cognitive Assessment/Intervention    Additional Documentation  Cognitive Assessment/Intervention (Group)  -JY      Recorded by [JY] Pat Montes, OT 07/14/20 1628      Row Name 07/14/20 1550             Cognitive Assessment/Intervention- PT/OT    Affect/Mental Status (Cognitive)  WFL  -JY      Orientation Status (Cognition)  oriented x 3  -JY      Follows Commands (Cognition)  WFL  -JY      Recorded by [JY] Pat Montes, OT 07/14/20 1628      Row Name 07/14/20 1510             Pain Scale: FACES Pre/Post-Treatment    Pain: FACES Scale, Pretreatment  0-->no hurt  -AC      Pain: FACES Scale, Post-Treatment  0-->no hurt  -AC      Recorded by [AC] Lyla Otero, MS CCC-SLP 07/14/20 1554      Row Name                Wound 06/29/20 1930 Right elbow Skin Tear    Wound - Properties Group Date first assessed: 06/29/20 [AB] Time first assessed: 1930 [AB] Present on Hospital Admission: Y [AB] Side: Right [AB] Location: elbow [AB] Primary Wound Type: Skin tear [AB] Additional Comments: CLEANED AND DRESSING PLACED  [AB] Recorded by:  [AB] Hamida  Efrem OSBORN RN 06/29/20 1953    Row Name                Wound 07/01/20 1848 Right lateral hip Incision    Wound - Properties Group Date first assessed: 07/01/20 [HB] Time first assessed: 1848 [HB] Side: Right [HB] Orientation: lateral [HB] Location: hip [HB] Primary Wound Type: Incision [HB] Recorded by:  [HB] Mini Pitts RN 07/01/20 1848    Row Name 07/14/20 1550             Plan of Care Review    Plan of Care Reviewed With  patient;spouse  -JAIMEY      Progress  no change  -JY      Recorded by [JY] Pat Montes, OT 07/14/20 1628      Row Name 07/14/20 1510             Outcome Summary/Treatment Plan (SLP)    Daily Summary of Progress (SLP)  progress toward functional goals as expected  -AC      Barriers to Overall Progress (SLP)  Suspected chronic dysphagia to some degree. Cog status.  -AC      Plan for Continued Treatment (SLP)  Pt/wife reported satisfaction w/ conservative comfort diet--full liquid (thin). Denied noting significant difficulty. Reviewed silent aspiration, aspiration precautions, and oral care. Pt d/c'ing to SNF for rehab this evening. Rec cont SLP services for dysphagia @ next level of care.  -AC      Anticipated Dischage Disposition (SLP)  anticipate therapy at next level of care;skilled nursing facility  -AC      Recorded by [AC] Lyla Otero MS CCC-SLP 07/14/20 1554        User Key  (r) = Recorded By, (t) = Taken By, (c) = Cosigned By    Initials Name Effective Dates Discipline    AC Lyla Otero, MS CCC-SLP 07/27/17 -  SLP    AB Efrem Mei RN 06/16/16 -  Nurse    Pat Carranza, OT 01/29/20 -  OT    Mini Mansfield RN 02/21/20 -  Nurse        Wound 06/29/20 1930 Right elbow Skin Tear (Active)   Dressing Appearance open to air 7/14/2020  8:50 AM   Base dry;scab 7/14/2020  8:50 AM   Drainage Amount none 7/14/2020  8:50 AM   Dressing Care, Wound open to air 7/14/2020  8:50 AM       Wound 07/01/20 1848 Right lateral hip Incision (Active)   Dressing Appearance dry;intact;no drainage  7/14/2020  8:50 AM   Closure AMY 7/14/2020  8:50 AM   Base dressing in place, unable to visualize 7/14/2020  8:50 AM   Drainage Amount none 7/14/2020  8:50 AM     Rehab Goal Summary     Row Name 07/14/20 1510 07/14/20 1134          Bed Mobility Goal 1 (PT)    Progress/Outcomes (Bed Mobility Goal 1, PT)  --  goal not met;discharged from facility;good progress toward goal  -AA        Transfer Goal 1 (PT)    Progress/Outcome (Transfer Goal 1, PT)  --  goal not met;discharged from facility  -AA        Gait Training Goal 1 (PT)    Progress/Outcome (Gait Training Goal 1, PT)  --  goal not met;discharged from facility  -AA        Oral Nutrition/Hydration Goal 1 (SLP)    Oral Nutrition/Hydration Goal 1, SLP  LTG: Pt will improve swallowing per clinical assessment, warranting repeat instrumental swallow study.  -AC  --     Time Frame (Oral Nutrition/Hydration Goal 1, SLP)  by discharge  -AC  --     Progress/Outcomes (Oral Nutrition/Hydration Goal 1, SLP)  continuing progress toward goal  -AC  --        Oral Nutrition/Hydration Goal 2 (SLP)    Oral Nutrition/Hydration Goal 2, SLP  Pt will tolerate trials of full liquids (thin) w/o s/sxs discomfort or complaints w/ 80% acc w/o cues.  -AC  --     Time Frame (Oral Nutrition/Hydration Goal 2, SLP)  short term goal (STG)  -AC  --     Barriers (Oral Nutrition/Hydration Goal 2, SLP)  Throat clearing noted 1/5 trials. Pt did not exhibit s/sxs discomfort w/ thin liquids. Reported that he is happy to have thin/cold liquids now. Pt generally taking small sips, as recommended.  -AC  --     Progress/Outcomes (Oral Nutrition/Hydration Goal 2, SLP)  continuing progress toward goal  -AC  --        Pharyngeal Strengthening Exercise Goal 1 (SLP)    Increase Timing  prepping - 3 second prep or suck swallow or 3-step swallow  -AC  --     Increase Superior Movement of the Hyolaryngeal Complex  falsetto  -AC  --     Increase Anterior Movement of the Hyolaryngeal Complex  chin tuck against  resistance (CTAR)  -AC  --     Increase Closure at Entrance to Airway/Closure of Airway at Glottis  super-supraglottic swallow  -AC  --     Increase Squeeze/Positive Pressure Generation  hard effortful swallow  -AC  --     Increase Tongue Base Retraction  kathie  -AC  --     [place]/Accuracy (Pharyngeal Strengthening Goal 1, SLP)  with moderate cues (50-74% accuracy)  -AC  --     Time Frame (Pharyngeal Strengthening Goal 1, SLP)  short term goal (STG)  -AC  --     Barriers (Pharyngeal Strengthening Goal 1, SLP)  CTAR x10, effortful swallow x5, kathie x5 w/ mod cues.  -AC  --     Progress/Outcomes (Pharyngeal Strengthening Goal 1, SLP)  continuing progress toward goal  -AC  --       User Key  (r) = Recorded By, (t) = Taken By, (c) = Cosigned By    Initials Name Provider Type Discipline    AC Lyla Otero, MS CCC-SLP Speech and Language Pathologist SLP    Micaela Pickering, PT Physical Therapist PT        Occupational Therapy Education                 Title: PT OT SLP Therapies (In Progress)     Topic: Occupational Therapy (In Progress)     Point: ADL training (In Progress)     Description:   Instruct learner(s) on proper safety adaptation and remediation techniques during self care or transfers.   Instruct in proper use of assistive devices.              Learning Progress Summary           Patient Eager, E,TB,D,H, VU,NR by AR at 7/9/2020 0812    Eager, E,TB,D, NR by AR at 7/6/2020 0828    Acceptance, E,D, NR by PATTI at 7/2/2020 1457   Family Acceptance, E,D, NR by JAIMEY at 7/2/2020 1457                   Point: Home exercise program (Done)     Description:   Instruct learner(s) on appropriate technique for monitoring, assisting and/or progressing therapeutic exercises/activities.              Learning Progress Summary           Patient Acceptance, E,D, DU by PATTI at 7/14/2020 1550    Eager, E,TB,D,H, VU,NR by AR at 7/9/2020 0812    Eager, E,TB,D, NR by AR at 7/6/2020 0828    Acceptance, E,D, NR by PATTI at 7/2/2020 1457    Family Acceptance, E,D, DU by JY at 7/14/2020 1550    Acceptance, E,D, NR by JY at 7/2/2020 1457                   Point: Precautions (Done)     Description:   Instruct learner(s) on prescribed precautions during self-care and functional transfers.              Learning Progress Summary           Patient Acceptance, E,D, DU by JY at 7/14/2020 1550    Eager, E,TB,D,H, VU,NR by AR at 7/9/2020 0812    Eager, E,TB,D, NR by AR at 7/6/2020 0828    Acceptance, E,D, NR by JY at 7/2/2020 1457   Family Acceptance, E,D, DU by JY at 7/14/2020 1550    Acceptance, E,D, NR by JY at 7/2/2020 1457                   Point: Body mechanics (Done)     Description:   Instruct learner(s) on proper positioning and spine alignment during self-care, functional mobility activities and/or exercises.              Learning Progress Summary           Patient Acceptance, E,D, DU by JY at 7/14/2020 1550    Eager, E,TB,D,H, VU,NR by AR at 7/9/2020 0812    Eager, E,TB,D, NR by AR at 7/6/2020 0828    Acceptance, E,D, NR by JY at 7/2/2020 1457   Family Acceptance, E,D, DU by JY at 7/14/2020 1550    Acceptance, E,D, NR by JY at 7/2/2020 1457                               User Key     Initials Effective Dates Name Provider Type Discipline    AR 06/22/15 -  Mallory Coleman, OT Occupational Therapist OT    J 01/29/20 -  Pat Montes OT Occupational Therapist OT                OT Recommendation and Plan  Outcome Summary/Treatment Plan (OT)  Anticipated Discharge Disposition (OT): skilled nursing facility  Therapy Frequency (OT Eval): daily  Plan of Care Review  Plan of Care Reviewed With: patient, spouse  Plan of Care Reviewed With: patient, spouse  Outcome Summary: Pt anticipating d/c later this date thus agreeable to seated TE for UEs prior to d/c. OT reviewed w/ pt WB status for safety recall. Pt required additional education and explanation of WB. Pt tolerated multi planar TE encompassing BUEs x 10 reps each w/ no increase in pain  noted.  Outcome Measures     Row Name 07/14/20 1550             How much help from another is currently needed...    Putting on and taking off regular lower body clothing?  2  -JY      Bathing (including washing, rinsing, and drying)  2  -JY      Toileting (which includes using toilet bed pan or urinal)  2  -JY      Putting on and taking off regular upper body clothing  2  -JY      Taking care of personal grooming (such as brushing teeth)  3  -JY      Eating meals  3  -JY      AM-PAC 6 Clicks Score (OT)  14  -JY         Functional Assessment    Outcome Measure Options  AM-PAC 6 Clicks Daily Activity (OT)  -JY        User Key  (r) = Recorded By, (t) = Taken By, (c) = Cosigned By    Initials Name Provider Type    Pat Carranza OT Occupational Therapist           Time Calculation:   Time Calculation- OT     Row Name 07/14/20 1550             Time Calculation- OT    OT Start Time  1550  -JY      OT Received On  07/14/20  -JY      OT Goal Re-Cert Due Date  07/19/20  -JY         Timed Charges    68918 - OT Therapeutic Exercise Minutes  15  -JY        User Key  (r) = Recorded By, (t) = Taken By, (c) = Cosigned By    Initials Name Provider Type    Pat Carranza OT Occupational Therapist        Therapy Charges for Today     Code Description Service Date Service Provider Modifiers Qty    [id number]  OT THER PROC EA 15 MIN 7/14/2020 Pat Montes OT GO 1               Pat Montes OT  7/14/2020

## 2020-07-16 LAB — BACTERIA SPEC AEROBE CULT: NORMAL

## 2022-04-18 NOTE — PLAN OF CARE
Problem: Patient Care Overview  Goal: Plan of Care Review  Outcome: Ongoing (interventions implemented as appropriate)  Flowsheets (Taken 7/14/2020 3742)  Outcome Summary: Pt UIC upon arrival.  Plan is to DC to SNF today for rehab.  BLE ther-ex performed in chair with cues.  No pain reported.      yes

## 2024-01-24 NOTE — PLAN OF CARE
Problem: Palliative Care (Adult)  Intervention: Support/Optimize Psychosocial Response  Note:   Patient sleeping, did not rouse to d/w wife at bedside.  Encouraged good self-care.  Patient wife receptive to supportive/empathic presence, active listening.      Caller: Hannah Ch    Relationship: Mother    Best call back number: [phone]     What form or medical record are you requesting: IMMUNIZATION RECORDS     Who is requesting this form or medical record from you: MOM    How would you like to receive the form or medical records (pick-up, mail, fax): MAIL  If mail, what is the address: [address] [place] [address] [address]  [facility] [id number]       Timeframe paperwork needed: ASAP    Additional notes:

## (undated) DEVICE — APPL CHLORAPREP TINTED 26ML TEAL

## (undated) DEVICE — PAD GRND REM POLYHESIVE A/ DISP

## (undated) DEVICE — 6617 IOBAN II PATIENT ISOLATION DRAPE 5/BX,4BX/CS: Brand: STERI-DRAPE™ IOBAN™ 2

## (undated) DEVICE — SCRW CANN THRD 7.3X16X75MM
Type: IMPLANTABLE DEVICE | Site: HIP | Status: NON-FUNCTIONAL
Removed: 2020-07-01

## (undated) DEVICE — GW THRD TROC/PT 2.8X300MM

## (undated) DEVICE — PK MAJ SHLDR SPLT 10

## (undated) DEVICE — 2963 MEDIPORE SOFT CLOTH TAPE 3 IN X 10 YD 12 RLS/CS: Brand: 3M™ MEDIPORE™

## (undated) DEVICE — PAD ARMBRD SURG CONVOL 7.5X20X2IN

## (undated) DEVICE — CVR HNDL LIGHT RIGID

## (undated) DEVICE — 3M™ WARMING BLANKET, UPPER BODY, 10 PER CASE, 42268: Brand: BAIR HUGGER™

## (undated) DEVICE — GLV SURG SENSICARE PI ORTHO SZ7.5 LF STRL